# Patient Record
Sex: MALE | Race: WHITE | NOT HISPANIC OR LATINO | Employment: OTHER | URBAN - METROPOLITAN AREA
[De-identification: names, ages, dates, MRNs, and addresses within clinical notes are randomized per-mention and may not be internally consistent; named-entity substitution may affect disease eponyms.]

---

## 2018-08-15 ENCOUNTER — APPOINTMENT (EMERGENCY)
Dept: RADIOLOGY | Facility: HOSPITAL | Age: 48
End: 2018-08-15
Payer: COMMERCIAL

## 2018-08-15 ENCOUNTER — HOSPITAL ENCOUNTER (EMERGENCY)
Facility: HOSPITAL | Age: 48
Discharge: HOME | End: 2018-08-15
Attending: EMERGENCY MEDICINE | Admitting: EMERGENCY MEDICINE
Payer: COMMERCIAL

## 2018-08-15 VITALS
WEIGHT: 185 LBS | HEART RATE: 98 BPM | RESPIRATION RATE: 16 BRPM | TEMPERATURE: 98.6 F | DIASTOLIC BLOOD PRESSURE: 93 MMHG | BODY MASS INDEX: 28.04 KG/M2 | SYSTOLIC BLOOD PRESSURE: 162 MMHG | HEIGHT: 68 IN | OXYGEN SATURATION: 98 %

## 2018-08-15 DIAGNOSIS — K11.20 SIALOADENITIS: Primary | ICD-10-CM

## 2018-08-15 LAB
ANION GAP SERPL CALCULATED.3IONS-SCNC: 8 MMOL/L (ref 4–13)
BASOPHILS # BLD AUTO: 0.04 THOUSANDS/ΜL (ref 0–0.1)
BASOPHILS NFR BLD AUTO: 1 % (ref 0–1)
BUN SERPL-MCNC: 16 MG/DL (ref 5–25)
CALCIUM SERPL-MCNC: 9.3 MG/DL (ref 8.3–10.1)
CHLORIDE SERPL-SCNC: 103 MMOL/L (ref 100–108)
CO2 SERPL-SCNC: 27 MMOL/L (ref 21–32)
CREAT SERPL-MCNC: 1.71 MG/DL (ref 0.6–1.3)
EOSINOPHIL # BLD AUTO: 0.16 THOUSAND/ΜL (ref 0–0.61)
EOSINOPHIL NFR BLD AUTO: 2 % (ref 0–6)
ERYTHROCYTE [DISTWIDTH] IN BLOOD BY AUTOMATED COUNT: 12.6 % (ref 11.6–15.1)
GFR SERPL CREATININE-BSD FRML MDRD: 47 ML/MIN/1.73SQ M
GLUCOSE SERPL-MCNC: 146 MG/DL (ref 65–140)
HCT VFR BLD AUTO: 44.2 % (ref 36.5–49.3)
HGB BLD-MCNC: 15.1 G/DL (ref 12–17)
IMM GRANULOCYTES # BLD AUTO: 0.06 THOUSAND/UL (ref 0–0.2)
IMM GRANULOCYTES NFR BLD AUTO: 1 % (ref 0–2)
LYMPHOCYTES # BLD AUTO: 1.57 THOUSANDS/ΜL (ref 0.6–4.47)
LYMPHOCYTES NFR BLD AUTO: 20 % (ref 14–44)
MCH RBC QN AUTO: 33.3 PG (ref 26.8–34.3)
MCHC RBC AUTO-ENTMCNC: 34.2 G/DL (ref 31.4–37.4)
MCV RBC AUTO: 97 FL (ref 82–98)
MONOCYTES # BLD AUTO: 0.56 THOUSAND/ΜL (ref 0.17–1.22)
MONOCYTES NFR BLD AUTO: 7 % (ref 4–12)
NEUTROPHILS # BLD AUTO: 5.35 THOUSANDS/ΜL (ref 1.85–7.62)
NEUTS SEG NFR BLD AUTO: 69 % (ref 43–75)
NRBC BLD AUTO-RTO: 0 /100 WBCS
PLATELET # BLD AUTO: 202 THOUSANDS/UL (ref 149–390)
PMV BLD AUTO: 10.8 FL (ref 8.9–12.7)
POTASSIUM SERPL-SCNC: 3.9 MMOL/L (ref 3.5–5.3)
RBC # BLD AUTO: 4.54 MILLION/UL (ref 3.88–5.62)
SODIUM SERPL-SCNC: 138 MMOL/L (ref 136–145)
WBC # BLD AUTO: 7.74 THOUSAND/UL (ref 4.31–10.16)

## 2018-08-15 PROCEDURE — 85025 COMPLETE CBC W/AUTO DIFF WBC: CPT | Performed by: EMERGENCY MEDICINE

## 2018-08-15 PROCEDURE — 70491 CT SOFT TISSUE NECK W/DYE: CPT

## 2018-08-15 PROCEDURE — 36415 COLL VENOUS BLD VENIPUNCTURE: CPT | Performed by: EMERGENCY MEDICINE

## 2018-08-15 PROCEDURE — 99284 EMERGENCY DEPT VISIT MOD MDM: CPT

## 2018-08-15 PROCEDURE — 80048 BASIC METABOLIC PNL TOTAL CA: CPT | Performed by: EMERGENCY MEDICINE

## 2018-08-15 RX ORDER — PALIPERIDONE 6 MG/1
6 TABLET, EXTENDED RELEASE ORAL EVERY MORNING
COMMUNITY

## 2018-08-15 RX ADMIN — IOHEXOL 85 ML: 350 INJECTION, SOLUTION INTRAVENOUS at 18:56

## 2018-08-16 ENCOUNTER — VBI (OUTPATIENT)
Dept: ADMINISTRATIVE | Facility: OTHER | Age: 48
End: 2018-08-16

## 2018-08-16 NOTE — ED NOTES
T/c to [facility] for transport home Ref# [facility] [place]  08/15/18 2027

## 2018-08-16 NOTE — DISCHARGE INSTRUCTIONS
Sialoadenitis   WHAT YOU NEED TO KNOW:   Sialoadenitis is an inflammation or infection of one or more of your salivary glands  A small stone can block the salivary gland and cause inflammation  Infection may be caused by a virus or bacteria  You can develop sialoadenitis on one or both sides of your face  You may have sialoadenitis once, or it may come back and last a long time  DISCHARGE INSTRUCTIONS:   Manage your sialoadenitis:  It is important to manage your sialoadenitis to help prevent future infections  · Drinking liquids:  Adults should drink about 9 to 13 cups of liquid each day  One cup is 8 ounces  Good choices of liquids for most people include water, juice, and milk  Coffee, soup, and fruit may be counted in your daily liquid amount  Ask your caregiver how much liquid you should drink each day  · Keep your mouth moist:  Suck on hard candy or chew sugarless gum to get your saliva flowing  Sour and tart flavors such as lemon and orange will help get saliva to flow  This will help keep your mouth moist and help push out a stone blocking your salivary duct  · Rinse your mouth:  Use water or mouthwash to clean out pus that may be draining into your mouth  · Massage your jaw:  Massage the area of your swollen gland  This may help relieve swelling and pain by pushing the pus out of the gland  · Apply heat:  Place a warm, moist cloth on the area  Medicines:   · NSAIDs  help decrease swelling and pain or fever  This medicine is available with or without a doctor's order  NSAIDs can cause stomach bleeding or kidney problems in certain people  If you take blood thinner medicine, always ask your healthcare provider if NSAIDs are safe for you  Always read the medicine label and follow directions  · Do not give aspirin to children under 25years of age  Your child could develop Reye syndrome if he takes aspirin  Reye syndrome can cause life-threatening brain and liver damage   Check your child's medicine labels for aspirin, salicylates, or oil of wintergreen  · Pain medicine: You may be given medicine to take away or decrease pain  Do not wait until the pain is severe before you take your medicine  · Antibiotics: This medicine will help fight an infection  You will be given antibiotics if your sialoadenitis is caused by a bacterial infection  Take your antibiotics until they are gone, even if you feel better  · Take your medicine as directed  Contact your healthcare provider if you think your medicine is not helping or if you have side effects  Tell him of her if you are allergic to any medicine  Keep a list of the medicines, vitamins, and herbs you take  Include the amounts, and when and why you take them  Bring the list or the pill bottles to follow-up visits  Carry your medicine list with you in case of an emergency  Follow up with your healthcare provider or ear, nose, and throat specialist as directed:  Write down your questions so you remember to ask them during your visits  Contact your healthcare provider or ear, nose, and throat specialist if:   · The pain and swelling do not go away within 2 days, or they get worse  · Your mouth and eyes are very dry  · You lose movement on one side of your face  · You have questions about your condition or care  Return to the emergency department if:   · You have a fever  · Your salivary gland gets red and hot or drains pus  · You have trouble opening your mouth because of swelling  · You have trouble breathing or swallowing because of swelling  © 2017 [employer] Information is for End User's use only and may not be sold, redistributed or otherwise used for commercial purposes  All illustrations and images included in CareNotes® are the copyrighted property of A D A M , Inc  or Renato [employer]  The above information is an  only   It is not intended as medical advice for individual conditions or treatments  Talk to your doctor, nurse or pharmacist before following any medical regimen to see if it is safe and effective for you

## 2018-08-16 NOTE — TELEPHONE ENCOUNTER
NO OUTREACH FROM [facility] [place] - PATIENT IS TO FOLLOW UP WITH DR LIANG IN 1 WEEK - PATIENT HAS AN NEW PATIENT PHYSICAL SCHEDULED ON 8/31 WITH DR CASEY

## 2018-08-18 NOTE — ED PROVIDER NOTES
History  Chief Complaint   Patient presents with    Neck Swelling     Pt arrived via squad from home  Noticed swelling to left side of neck while eating dinner tonight  No difficulty swallowing or breathing  Patient presents for evaluation of of left neck swelling starting while eating dinner tonight  No toruble swallowing or breathing  History provided by:  Patient   used: No        Prior to Admission Medications   Prescriptions Last Dose Informant Patient Reported? Taking?   paliperidone (INVEGA) 6 MG 24 hr tablet   Yes Yes   Sig: Take 6 mg by mouth every morning      Facility-Administered Medications: None       Past Medical History:   Diagnosis Date    Mood disorder ([facility] )     Psychiatric disorder     Schizophrenia ([place] [address] )        History reviewed  No pertinent surgical history  History reviewed  No pertinent family history  I have reviewed and agree with the history as documented  Social History   Substance Use Topics    Smoking status: Current Every Day Smoker     Packs/day: 1 00    Smokeless tobacco: Never Used    Alcohol use Yes      Comment: weekends        Review of Systems   All other systems reviewed and are negative  Physical Exam  Physical Exam   Constitutional: He is oriented to person, place, and time  No distress  HENT:   Head:       Mouth/Throat: Oropharynx is clear and moist    Eyes: Pupils are equal, round, and reactive to light  Neck: Normal range of motion  Neck supple  Cardiovascular: Normal rate, regular rhythm and intact distal pulses  Pulmonary/Chest: Effort normal and breath sounds normal  No respiratory distress  Abdominal: Soft  There is no tenderness  Musculoskeletal: Normal range of motion  Neurological: He is alert and oriented to person, place, and time  Skin: Capillary refill takes less than 2 seconds  He is not diaphoretic  Nursing note and vitals reviewed        Vital Signs  ED Triage Vitals   Temperature Pulse Respirations Blood Pressure SpO2   08/15/18 1801 08/15/18 1801 08/15/18 1801 08/15/18 1801 08/15/18 1801   98 6 °F (37 °C) 98 16 162/93 98 %      Temp src Heart Rate Source Patient Position - Orthostatic VS BP Location FiO2 (%)   -- -- -- -- --             Pain Score       08/15/18 1802       No Pain           Vitals:    08/15/18 1801   BP: 162/93   Pulse: 98       Visual Acuity      ED Medications  Medications   iohexol (OMNIPAQUE) 350 MG/ML injection (MULTI-DOSE) 85 mL (85 mL Intravenous Given 8/15/18 1856)       Diagnostic Studies  Results Reviewed     Procedure Component Value Units Date/Time    Basic metabolic panel [[id number]]  (Abnormal) Collected:  08/15/18 1816    Lab Status:  Final result Specimen:  Blood from Arm, Left Updated:  08/15/18 1832     Sodium 138 mmol/L      Potassium 3 9 mmol/L      Chloride 103 mmol/L      CO2 27 mmol/L      Anion Gap 8 mmol/L      BUN 16 mg/dL      Creatinine 1 71 (H) mg/dL      Glucose 146 (H) mg/dL      Calcium 9 3 mg/dL      eGFR 47 ml/min/1 73sq m     Narrative:         National Kidney Disease Education Program recommendations are as follows:  GFR calculation is accurate only with a steady state creatinine  Chronic Kidney disease less than 60 ml/min/1 73 sq  meters  Kidney failure less than 15 ml/min/1 73 sq  meters      CBC and differential [[id number]] Collected:  08/15/18 1816    Lab Status:  Final result Specimen:  Blood from Arm, Left Updated:  08/15/18 1821     WBC 7 74 Thousand/uL      RBC 4 54 Million/uL      Hemoglobin 15 1 g/dL      Hematocrit 44 2 %      MCV 97 fL      MCH 33 3 pg      MCHC 34 2 g/dL      RDW 12 6 %      MPV 10 8 fL      Platelets 723 Thousands/uL      nRBC 0 /100 WBCs      Neutrophils Relative 69 %      Immat GRANS % 1 %      Lymphocytes Relative 20 %      Monocytes Relative 7 %      Eosinophils Relative 2 %      Basophils Relative 1 %      Neutrophils Absolute 5 35 Thousands/µL      Immature Grans Absolute 0 06 Thousand/uL      Lymphocytes Absolute 1 57 Thousands/µL      Monocytes Absolute 0 56 Thousand/µL      Eosinophils Absolute 0 16 Thousand/µL      Basophils Absolute 0 04 Thousands/µL                  CT soft tissue neck   Final Result by Aidee Reed MD (08/15 1927)      1  Asymmetric enlargement of the left submandibular gland with surrounding infiltrative changes compatible with sialoadenitis  There is a central 6 mm sialolith likely obstructing  The study was marked in EPIC for significant notification  Workstation performed: [id number]                    Procedures  Procedures       Phone Contacts  ED Phone Contact    ED Course                               MDM  Number of Diagnoses or Management Options  Sialoadenitis:   Diagnosis management comments: Pulse ox 98% on RA indicating adequate oxygenation    CT reviewed with patient  Advised sour/lemon candies and follow up with ENT  Amount and/or Complexity of Data Reviewed  Clinical lab tests: reviewed and ordered  Tests in the radiology section of CPT®: ordered and reviewed  Decide to obtain previous medical records or to obtain history from someone other than the patient: yes  Review and summarize past medical records: yes    Patient Progress  Patient progress: stable    CritCare Time    Disposition  Final diagnoses:   Sialoadenitis     Time reflects when diagnosis was documented in both MDM as applicable and the Disposition within this note     Time User Action Codes Description Comment    8/15/2018  8:09 PM Jordyn Tommy Add [K11 20] Sialoadenitis       ED Disposition     ED Disposition Condition Comment    Discharge  Arleth Caul discharge to home/self care      Condition at discharge: stable        Follow-up Information     Follow up With Specialties Details Why Son White MD Otolaryngology In 1 week  [address] [facility] [address] [facility]   [phone]            Discharge Medication List as of 8/15/2018  8:10 PM      CONTINUE these medications which have NOT CHANGED    Details   paliperidone (INVEGA) 6 MG 24 hr tablet Take 6 mg by mouth every morning, Historical Med           No discharge procedures on file      ED Provider  Electronically Signed by           Brooklyn Baig DO  08/18/18 1024

## 2018-08-31 ENCOUNTER — OFFICE VISIT (OUTPATIENT)
Dept: FAMILY MEDICINE CLINIC | Facility: CLINIC | Age: 48
End: 2018-08-31
Payer: COMMERCIAL

## 2018-08-31 VITALS
BODY MASS INDEX: 27.98 KG/M2 | SYSTOLIC BLOOD PRESSURE: 130 MMHG | HEART RATE: 80 BPM | HEIGHT: 68 IN | DIASTOLIC BLOOD PRESSURE: 94 MMHG | TEMPERATURE: 97.5 F | WEIGHT: 184.6 LBS | RESPIRATION RATE: 16 BRPM

## 2018-08-31 DIAGNOSIS — K11.20 SUBMANDIBULAR SIALOADENITIS: ICD-10-CM

## 2018-08-31 DIAGNOSIS — R30.0 DYSURIA: ICD-10-CM

## 2018-08-31 DIAGNOSIS — R79.89 ELEVATED SERUM CREATININE: ICD-10-CM

## 2018-08-31 DIAGNOSIS — F17.200 SMOKER: ICD-10-CM

## 2018-08-31 DIAGNOSIS — R73.09 HIGH GLUCOSE: ICD-10-CM

## 2018-08-31 DIAGNOSIS — R05.9 COUGH: ICD-10-CM

## 2018-08-31 DIAGNOSIS — Z00.00 PHYSICAL EXAM: Primary | ICD-10-CM

## 2018-08-31 DIAGNOSIS — F20.9 SCHIZOPHRENIA, UNSPECIFIED TYPE (HCC): ICD-10-CM

## 2018-08-31 DIAGNOSIS — I10 HYPERTENSION, UNSPECIFIED TYPE: ICD-10-CM

## 2018-08-31 LAB
SL AMB  POCT GLUCOSE, UA: ABNORMAL
SL AMB LEUKOCYTE ESTERASE,UA: ABNORMAL
SL AMB POCT BILIRUBIN,UA: ABNORMAL
SL AMB POCT BLOOD,UA: 250
SL AMB POCT CLARITY,UA: ABNORMAL
SL AMB POCT COLOR,UA: CLEAR
SL AMB POCT HEMOGLOBIN AIC: 5.6
SL AMB POCT KETONES,UA: ABNORMAL
SL AMB POCT NITRITE,UA: ABNORMAL
SL AMB POCT PH,UA: 5
SL AMB POCT SPECIFIC GRAVITY,UA: 1
SL AMB POCT URINE PROTEIN: ABNORMAL
SL AMB POCT UROBILINOGEN: ABNORMAL

## 2018-08-31 PROCEDURE — 99396 PREV VISIT EST AGE 40-64: CPT | Performed by: FAMILY MEDICINE

## 2018-08-31 PROCEDURE — 3725F SCREEN DEPRESSION PERFORMED: CPT | Performed by: FAMILY MEDICINE

## 2018-08-31 PROCEDURE — 93000 ELECTROCARDIOGRAM COMPLETE: CPT | Performed by: FAMILY MEDICINE

## 2018-08-31 PROCEDURE — 81003 URINALYSIS AUTO W/O SCOPE: CPT | Performed by: FAMILY MEDICINE

## 2018-08-31 PROCEDURE — 83036 HEMOGLOBIN GLYCOSYLATED A1C: CPT | Performed by: FAMILY MEDICINE

## 2018-08-31 NOTE — PROGRESS NOTES
Chief Complaint   Patient presents with   [employer] Establish Care    Physical Exam        Patient ID: Dinorah Teran is a 53 y o  male  60-year-old patient new to practice in to get established and follow up from [facility] emergency room visit 08/15/2018  Diagnosed with sialoadenitis after presenting with left facial/neck swelling  Has not yet been to see ENT for follow-up  Has been compliant with using lemon drops as recommended by ER doctor to try to increase saliva and dislodge sialolith  Does have significant history as well of schizophrenia for which he follows with psychiatrist   Indu Fraser has been stable on current medications  He is due for some additional labs  Creatinine in emergency room was noted to be elevated at 1 71-question related to medications versus other  Is not aware of any kidney disease in self or family  Glucose was also noted to be elevated-patient was not fasting-hemoglobin A1c in office today equal to 5 6%  Patient is a positive smoker has had no recent chest x-ray  Has occasional cough-denies chest pain, shortness of breath, hemoptysis or night sweats  No significant unexplained weight change or fever  Past Medical History:   Diagnosis Date    Hypertension     Mood disorder ([facility] )     Psychiatric disorder     Schizophrenia ([facility] )     Schizophrenia ([facility] )        History reviewed  No pertinent surgical history  Family History   Problem Relation Age of Onset   [employer] Breast cancer Mother     Cancer Father         bone/spinal         There is no immunization history on file for this patient  Allergies   Allergen Reactions    Geodon [Ziprasidone]     Haldol [Haloperidol]        Current Outpatient Prescriptions   Medication Sig Dispense Refill    paliperidone (INVEGA) 6 MG 24 hr tablet Take 6 mg by mouth every morning       No current facility-administered medications for this visit          Social History     Social History    Marital status: Single     Spouse name: N/A    Number of children: N/A    Years of education: N/A     Social History Main Topics    Smoking status: Current Every Day Smoker     Packs/day: 1 00    Smokeless tobacco: Never Used    Alcohol use Yes      Comment: weekends    Drug use: No    Sexual activity: Not Asked     Other Topics Concern    None     Social History Narrative    None       Review of Systems   Constitutional: Positive for fatigue  Negative for fever  HENT: Positive for dental problem  Recently in [address] [place] [address] - sialoadenitis   Respiratory: Negative  Cardiovascular: Negative  Gastrointestinal: Negative  Musculoskeletal: Positive for arthralgias and myalgias  Neurological: Negative for seizures  Psychiatric/Behavioral:        Hx schizophrenia     Objective:    /94 (BP Location: Left arm, Patient Position: Sitting, Cuff Size: Standard)   Pulse 80   Temp 97 5 °F (36 4 °C)   Resp 16   Ht 5' 8" (1 727 m)   Wt 83 7 kg (184 lb 9 6 oz)   BMI 28 07 kg/m²        Physical Exam   Constitutional: He is oriented to person, place, and time  He appears well-developed and well-nourished  No distress  HENT:   Nose: Nose normal    Mouth/Throat: No oropharyngeal exudate  Mild facial asymmetry due to left submandibular swelling  Fair dentition   Eyes: Conjunctivae are normal    Neck: Neck supple  Cardiovascular: Normal rate, regular rhythm, normal heart sounds and intact distal pulses  Pulmonary/Chest: Effort normal and breath sounds normal  No respiratory distress  Abdominal: Soft  Bowel sounds are normal  There is no tenderness  Musculoskeletal: Normal range of motion  Neurological: He is alert and oriented to person, place, and time  No cranial nerve deficit  Skin: Skin is warm and dry  No rash noted  Psychiatric:   Mood stable, affect flattened, cooperative w exam   Nursing note and vitals reviewed          Assessment/Plan:     Diagnoses and all orders for this visit:    Physical exam    Dysuria  - POCT urine dip auto non-scope    Hypertension, unspecified type  Comments:  borderline hi diastolic  lower sodium in diet, avoid caffeine, avoid NSAIDs  cont t monitor  Orders:  -     POCT ECG  -     US retroperitoneal complete; Future  -     XR chest pa & lateral; Future    High glucose  Comments:  [id number]=2 6% in office today  Orders:  -     POCT hemoglobin A1c    Elevated serum creatinine  Comments:  check renal u/s , rpt renal lab studies  Orders:  -     US retroperitoneal complete; Future    Smoker  Comments:  encouraged smoking cessation  Orders:  -     XR chest pa & lateral; Future    Cough  Comments:  check cxr  Orders:  -     XR chest pa & lateral; Future    Submandibular sialoadenitis  Comments:  cont to suc on lemon drops to help inc saliva in attempt to rid prb stone  Ref to ENT  Orders:  -     Ambulatory Referral to Otolaryngology; Future    Sch[facility]ia, unspecified type ([facility] )  Comments:  cont  w psych mgt          Carol Gonzalez MD

## 2018-08-31 NOTE — PATIENT INSTRUCTIONS

## 2018-09-04 ENCOUNTER — TRANSCRIBE ORDERS (OUTPATIENT)
Dept: RADIOLOGY | Facility: CLINIC | Age: 48
End: 2018-09-04

## 2018-09-04 ENCOUNTER — TELEPHONE (OUTPATIENT)
Dept: FAMILY MEDICINE CLINIC | Facility: CLINIC | Age: 48
End: 2018-09-04

## 2018-09-04 ENCOUNTER — APPOINTMENT (OUTPATIENT)
Dept: RADIOLOGY | Facility: CLINIC | Age: 48
End: 2018-09-04
Payer: COMMERCIAL

## 2018-09-04 DIAGNOSIS — F17.200 SMOKER: ICD-10-CM

## 2018-09-04 DIAGNOSIS — I10 HYPERTENSION, UNSPECIFIED TYPE: ICD-10-CM

## 2018-09-04 DIAGNOSIS — K11.20 SIALOADENITIS OF SUBMANDIBULAR GLAND: Primary | ICD-10-CM

## 2018-09-04 DIAGNOSIS — R05.9 COUGH: ICD-10-CM

## 2018-09-04 PROCEDURE — 71046 X-RAY EXAM CHEST 2 VIEWS: CPT

## 2018-09-04 NOTE — TELEPHONE ENCOUNTER
Dr Kentrell Gamble:    Dr Kentrell Gamble:    Patient needs an order for Dr Drake Olmedo for his health insurance  Please call him when its ready  Jason Mejía

## 2018-09-04 NOTE — TELEPHONE ENCOUNTER
Confirmed with [facility] that they do accept patients insurance- [facility] with patient - please advise patient upon return call that Dr Eber Franklin does accept patients insurance

## 2018-09-13 ENCOUNTER — OFFICE VISIT (OUTPATIENT)
Dept: OTOLARYNGOLOGY | Facility: CLINIC | Age: 48
End: 2018-09-13
Payer: COMMERCIAL

## 2018-09-13 VITALS
BODY MASS INDEX: 28.19 KG/M2 | SYSTOLIC BLOOD PRESSURE: 138 MMHG | DIASTOLIC BLOOD PRESSURE: 90 MMHG | HEIGHT: 68 IN | WEIGHT: 186 LBS

## 2018-09-13 DIAGNOSIS — K11.5 SUBMANDIBULAR SIALOLITHIASIS: Primary | ICD-10-CM

## 2018-09-13 PROCEDURE — 99203 OFFICE O/P NEW LOW 30 MIN: CPT | Performed by: OTOLARYNGOLOGY

## 2018-09-13 NOTE — PROGRESS NOTES
Formerly named [facility]'s Otolaryngology New Patient Visit    Malick Campuzano is a 53 y o  who presents with a chief complaint of acute sialolithiasis    Pertinent elements of the history include:  He presents status post left submandibular gland swelling following eating  He describes acute left neck swelling to the size of a golf ball  Not painful  He was seen at [facility] for this and treated with sialogogues  Swelling resolved within 24 hours  Not given antibiotics  He has not had any recurrent swelling since this episode, which occurred 1 month ago  CT neck images reviewed  He has continued to chew sour candies and stay well hydrated, which has been helpful  Review of systems 10 point review of systems reviewed as documented in the intake form, scanned into the medical record under the media tab  Results reviewed; images from any scan have been personally reviewed:    CT neck with contrast demonstrates left submandibular salivary duct stone (6mm) and glandular enlargement  The past medical, surgical, social and family history have been reviewed as documented in today's record  Physical exam: (abnormal findings appear in bold and supercede any conflicting normal findings listed below)    /90 (BP Location: Left arm, Patient Position: Sitting, Cuff Size: Adult)   Ht 5' 8" (1 727 m)   Wt 84 4 kg (186 lb)   BMI 28 28 kg/m²     Constitutional:  Well developed, well nourished and groomed, in no acute distress  Eyes:  Extra-ocular movements intact, pupils equally round and reactive to light and accommodation, the lids and conjunctivae are normal in appearance  Head: Atraumatic, normocephalic, no visible scalp lesions, bony palpation unremarkable without stepoffs, parotid and submandibular salivary glands non-tender to palpation and without masses bilaterally       Ears:  Auricles normal in appearance bilaterally, mastoid prominence non-tender, external auditory canals clear bilaterally, tympanic membranes intact bilaterally without evidence of middle ear effusion or masses, normal appearing ossicles  Nose/Sinuses:  External appearance unremarkable, no maxillary or frontal sinus tenderness to palpation bilaterally  Anterior rhinoscopy reveals:  Normal mucosa    Oral Cavity:  Moist mucus membranes, gums and dentition unremarkable, no oral mucosal masses or lesions, floor of mouth soft, tongue mobile without masses or lesions  Oropharynx:  Base of tongue soft and without masses, tonsils bilaterally unremarkable, soft palate mucosa unremarkable, laryngeal mirror exam unrevealing  Neck:  No visible or palpable cervical lesions or lymphadenopathy, thyroid gland is normal in size and symmetry and without masses, normal laryngeal elevation with swallowing  Cardiovascular:  Normal rate and rhythm, no palpable thrills, no jugulovenous distension observed  Respiratory:  Normal respiratory effort without evidence of retractions or use of accessory muscles  Integument:  Normal appearing without observed masses or lesions  Neurologic:  Cranial nerves II-XII intact bilaterally  Psychiatric:  Alert and oriented to time, place and person, normal affect  Procedures      Assessment:   1  Submandibular sialolithiasis         Orders  No orders of the defined types were placed in this encounter  Discussion/Plan:    1  He had an isolated episode of left submandibular gland sialolithiasis  Swelling decreased dramatically overnight suggesting that the 6 mm stone seen on CT has passed  He has not had any recurrent symptoms  I recommended maintaining a well hydrated state but he does not need to use sialagogues as frequently as he has been doing  At this time there is no need for further procedures, however, if his symptoms recur he would benefit from sialoendoscopy or submandibular gland excision    We discussed the possibility that his current medications may predispose him to developing salivary duct stones and while this is possible, it is impossible to say for sure that there is a causality between medication and his symptoms  He will follow up again as needed  Thank you for allowing me to participate in the care of your patient

## 2018-09-13 NOTE — LETTER
September 13, 2018     Milady Sepulveda, [address] [facility]    Patient: Geena Zaldivar   YOB: 1970   Date of Visit: 9/13/2018       Dear Dr Erlinda Severin: Thank you for referring Geena Zaldivar to me for evaluation  Below are my notes for this consultation  If you have questions, please do not hesitate to call me  I look forward to following your patient along with you  Sincerely,        Nancy Amador MD        CC: No Recipients  Nancy Amador MD  9/13/2018 11:00 AM  Sign at close encounter  [facility] [place] Otolaryngology New Patient Visit    Geena Zaldivar is a 53 y o  who presents with a chief complaint of acute sialolithiasis    Pertinent elements of the history include:  He presents status post left submandibular gland swelling following eating  He describes acute left neck swelling to the size of a golf ball  Not painful  He was seen at [facility] for this and treated with sialogogues  Swelling resolved within 24 hours  Not given antibiotics  He has not had any recurrent swelling since this episode, which occurred 1 month ago  CT neck images reviewed  He has continued to chew sour candies and stay well hydrated, which has been helpful  Review of systems 10 point review of systems reviewed as documented in the intake form, scanned into the medical record under the media tab  Results reviewed; images from any scan have been personally reviewed:    CT neck with contrast demonstrates left submandibular salivary duct stone (6mm) and glandular enlargement  The past medical, surgical, social and family history have been reviewed as documented in today's record      Physical exam: (abnormal findings appear in bold and supercede any conflicting normal findings listed below)    /90 (BP Location: Left arm, Patient Position: Sitting, Cuff Size: Adult)   Ht 5' 8" (1 727 m)   Wt 84 4 kg (186 lb)   BMI 28 28 kg/m²      Constitutional:  Well developed, well nourished and groomed, in no acute distress  Eyes:  Extra-ocular movements intact, pupils equally round and reactive to light and accommodation, the lids and conjunctivae are normal in appearance  Head: Atraumatic, normocephalic, no visible scalp lesions, bony palpation unremarkable without stepoffs, parotid and submandibular salivary glands non-tender to palpation and without masses bilaterally  Ears:  Auricles normal in appearance bilaterally, mastoid prominence non-tender, external auditory canals clear bilaterally, tympanic membranes intact bilaterally without evidence of middle ear effusion or masses, normal appearing ossicles  Nose/Sinuses:  External appearance unremarkable, no maxillary or frontal sinus tenderness to palpation bilaterally  Anterior rhinoscopy reveals:  Normal mucosa    Oral Cavity:  Moist mucus membranes, gums and dentition unremarkable, no oral mucosal masses or lesions, floor of mouth soft, tongue mobile without masses or lesions  Oropharynx:  Base of tongue soft and without masses, tonsils bilaterally unremarkable, soft palate mucosa unremarkable, laryngeal mirror exam unrevealing  Neck:  No visible or palpable cervical lesions or lymphadenopathy, thyroid gland is normal in size and symmetry and without masses, normal laryngeal elevation with swallowing  Cardiovascular:  Normal rate and rhythm, no palpable thrills, no jugulovenous distension observed  Respiratory:  Normal respiratory effort without evidence of retractions or use of accessory muscles  Integument:  Normal appearing without observed masses or lesions  Neurologic:  Cranial nerves II-XII intact bilaterally  Psychiatric:  Alert and oriented to time, place and person, normal affect  Procedures      Assessment:   1  Submandibular sialolithiasis         Orders  No orders of the defined types were placed in this encounter  Discussion/Plan:    1   He had an isolated episode of left submandibular gland sialolithiasis  Swelling decreased dramatically overnight suggesting that the 6 mm stone seen on CT has passed  He has not had any recurrent symptoms  I recommended maintaining a well hydrated state but he does not need to use sialagogues as frequently as he has been doing  At this time there is no need for further procedures, however, if his symptoms recur he would benefit from sialoendoscopy or submandibular gland excision  We discussed the possibility that his current medications may predispose him to developing salivary duct stones and while this is possible, it is impossible to say for sure that there is a causality between medication and his symptoms  He will follow up again as needed  Thank you for allowing me to participate in the care of your patient

## 2018-09-18 ENCOUNTER — HOSPITAL ENCOUNTER (OUTPATIENT)
Dept: RADIOLOGY | Facility: CLINIC | Age: 48
Discharge: HOME | End: 2018-09-18
Payer: COMMERCIAL

## 2018-09-18 DIAGNOSIS — I10 HYPERTENSION, UNSPECIFIED TYPE: ICD-10-CM

## 2018-09-18 DIAGNOSIS — R79.89 ELEVATED SERUM CREATININE: ICD-10-CM

## 2018-09-18 PROCEDURE — 76770 US EXAM ABDO BACK WALL COMP: CPT

## 2018-09-24 ENCOUNTER — TELEPHONE (OUTPATIENT)
Dept: FAMILY MEDICINE CLINIC | Facility: CLINIC | Age: 48
End: 2018-09-24

## 2018-09-24 NOTE — TELEPHONE ENCOUNTER
Dr Hernan Dozier    Patient is requesting results of abdomen ultrasound done 9/18/18  On 9/6/18 patient received letter from [employer] stating the ultrasound was approved, which is why he went for test   He has now received letter dated 9/19 denying abdomen ultrasound stating they have not received doctor's notes, records regarding this issue  Patient is requesting you follow up with [facility] regarding this, so he does not get a bill  Copies of both letters from [facility] placed in Dr Hernan Dozier folder, copies in drawer up front

## 2018-09-25 NOTE — TELEPHONE ENCOUNTER
Please pull letters from drawer up front and see where to send office note and abnormal kidney function(metabolic panels)  labs in chart-also send approval letter under media I believe dated 9/7/18  Inform pt when done and find out how he is feeling- let him know that u/sshowed a renal cyst--we will monitor for now      Thanks

## 2018-10-01 NOTE — TELEPHONE ENCOUNTER
Gera Phillips called from [employer]  They received request for appeal with supporting documents and will send out for independent review   Her # is

## 2018-10-05 NOTE — TELEPHONE ENCOUNTER
US is  Approved  Disclaimer # Pippa Rae # [id number] valid 9/6 to 11/5 set up at  only  Diana gaines/anca

## 2018-10-08 NOTE — TELEPHONE ENCOUNTER
Patient brought in letter from [employer] dated 10/2 stating they are denying appeal on abdomen ultrasound  Per Teresa and Dr Heydi Olmstead, I advised patient that we spoke with [facility] on 10/5 and was advised of approval  Gave him authorization# for his record  Made copy of latest letter from [facility] to be scanned in chart

## 2018-10-15 ENCOUNTER — TELEPHONE (OUTPATIENT)
Dept: FAMILY MEDICINE CLINIC | Facility: CLINIC | Age: 48
End: 2018-10-15

## 2018-10-15 ENCOUNTER — OFFICE VISIT (OUTPATIENT)
Dept: FAMILY MEDICINE CLINIC | Facility: CLINIC | Age: 48
End: 2018-10-15
Payer: COMMERCIAL

## 2018-10-15 VITALS
RESPIRATION RATE: 16 BRPM | TEMPERATURE: 96.9 F | DIASTOLIC BLOOD PRESSURE: 98 MMHG | WEIGHT: 190.8 LBS | HEART RATE: 88 BPM | BODY MASS INDEX: 28.92 KG/M2 | SYSTOLIC BLOOD PRESSURE: 150 MMHG | HEIGHT: 68 IN

## 2018-10-15 DIAGNOSIS — I10 ESSENTIAL HYPERTENSION: Primary | ICD-10-CM

## 2018-10-15 DIAGNOSIS — N28.1 RENAL CYST: ICD-10-CM

## 2018-10-15 DIAGNOSIS — R73.09 ABNORMAL GLUCOSE: ICD-10-CM

## 2018-10-15 DIAGNOSIS — F20.9 SCHIZOPHRENIA, UNSPECIFIED TYPE (HCC): ICD-10-CM

## 2018-10-15 DIAGNOSIS — E78.5 HYPERLIPIDEMIA, UNSPECIFIED HYPERLIPIDEMIA TYPE: ICD-10-CM

## 2018-10-15 PROCEDURE — 99214 OFFICE O/P EST MOD 30 MIN: CPT | Performed by: FAMILY MEDICINE

## 2018-10-15 RX ORDER — METOPROLOL SUCCINATE 25 MG/1
25 TABLET, EXTENDED RELEASE ORAL DAILY
Qty: 30 TABLET | Refills: 3 | Status: SHIPPED | OUTPATIENT
Start: 2018-10-15 | End: 2018-11-12 | Stop reason: DRUGHIGH

## 2018-10-15 NOTE — TELEPHONE ENCOUNTER
Dr Mario Lawrence    Patient wants to know if he is ok to eat 3 hard boiled eggs a day, with new medication you prescribed  Please advise  Ok to leave message on machine

## 2018-10-18 LAB
ALBUMIN SERPL-MCNC: 3.9 G/DL (ref 3.5–5.5)
ALBUMIN/GLOB SERPL: 1.4 {RATIO} (ref 1.2–2.2)
ALP SERPL-CCNC: 55 IU/L (ref 39–117)
ALT SERPL-CCNC: 20 IU/L (ref 0–44)
AST SERPL-CCNC: 18 IU/L (ref 0–40)
BASOPHILS # BLD AUTO: 0 X10E3/UL (ref 0–0.2)
BASOPHILS NFR BLD AUTO: 1 %
BILIRUB SERPL-MCNC: 0.2 MG/DL (ref 0–1.2)
BUN SERPL-MCNC: 14 MG/DL (ref 6–24)
BUN/CREAT SERPL: 12 (ref 9–20)
CALCIUM SERPL-MCNC: 9.5 MG/DL (ref 8.7–10.2)
CHLORIDE SERPL-SCNC: 102 MMOL/L (ref 96–106)
CHOLEST SERPL-MCNC: 323 MG/DL (ref 100–199)
CHOLEST/HDLC SERPL: 5.7 RATIO (ref 0–5)
CO2 SERPL-SCNC: 22 MMOL/L (ref 20–29)
CREAT SERPL-MCNC: 1.17 MG/DL (ref 0.76–1.27)
CYTOLOGY CMNT CVX/VAG CYTO-IMP: ABNORMAL
EOSINOPHIL # BLD AUTO: 0.2 X10E3/UL (ref 0–0.4)
EOSINOPHIL NFR BLD AUTO: 3 %
ERYTHROCYTE [DISTWIDTH] IN BLOOD BY AUTOMATED COUNT: 14.7 % (ref 12.3–15.4)
EST. AVERAGE GLUCOSE BLD GHB EST-MCNC: 114 MG/DL
GLOBULIN SER-MCNC: 2.7 G/DL (ref 1.5–4.5)
GLUCOSE SERPL-MCNC: 99 MG/DL (ref 65–99)
HBA1C MFR BLD: 5.6 % (ref 4.8–5.6)
HCT VFR BLD AUTO: 44.6 % (ref 37.5–51)
HDLC SERPL-MCNC: 57 MG/DL
HGB BLD-MCNC: 15.7 G/DL (ref 13–17.7)
IMM GRANULOCYTES # BLD: 0.1 X10E3/UL (ref 0–0.1)
IMM GRANULOCYTES NFR BLD: 2 %
LDLC SERPL CALC-MCNC: 192 MG/DL (ref 0–99)
LDLC SERPL DIRECT ASSAY-MCNC: 221 MG/DL (ref 0–99)
LYMPHOCYTES # BLD AUTO: 2 X10E3/UL (ref 0.7–3.1)
LYMPHOCYTES NFR BLD AUTO: 29 %
MAGNESIUM SERPL-MCNC: 2 MG/DL (ref 1.6–2.3)
MCH RBC QN AUTO: 33.6 PG (ref 26.6–33)
MCHC RBC AUTO-ENTMCNC: 35.2 G/DL (ref 31.5–35.7)
MCV RBC AUTO: 96 FL (ref 79–97)
MICRODELETION SYND BLD/T FISH: NORMAL
MONOCYTES # BLD AUTO: 0.8 X10E3/UL (ref 0.1–0.9)
MONOCYTES NFR BLD AUTO: 12 %
NEUTROPHILS # BLD AUTO: 3.7 X10E3/UL (ref 1.4–7)
NEUTROPHILS NFR BLD AUTO: 53 %
PLATELET # BLD AUTO: 178 X10E3/UL (ref 150–379)
POTASSIUM SERPL-SCNC: 4.2 MMOL/L (ref 3.5–5.2)
PROT SERPL-MCNC: 6.6 G/DL (ref 6–8.5)
RBC # BLD AUTO: 4.67 X10E6/UL (ref 4.14–5.8)
SL AMB EGFR AFRICAN AMERICAN: 85 ML/MIN/1.73
SL AMB EGFR NON AFRICAN AMERICAN: 74 ML/MIN/1.73
SL AMB VLDL CHOLESTEROL CALC: 74 MG/DL (ref 5–40)
SODIUM SERPL-SCNC: 140 MMOL/L (ref 134–144)
TRIGL SERPL-MCNC: 372 MG/DL (ref 0–149)
TSH SERPL DL<=0.005 MIU/L-ACNC: 1.7 UIU/ML (ref 0.45–4.5)
WBC # BLD AUTO: 7 X10E3/UL (ref 3.4–10.8)

## 2018-10-29 NOTE — PROGRESS NOTES
Assessment/Plan:     Diagnoses and all orders for this visit:    Essential hypertension  Comments:  rx metoprolol, dec sodium in diet and liit caffeine  cont t monitor  Orders:  -     Comprehensive metabolic panel; Future  -     HEMOGLOBIN A1C W/ EAG ESTIMATION; Future  -     Magnesium; Future  -     CBC and differential; Future  -     Comprehensive metabolic panel  -     HEMOGLOBIN A1C W/ EAG ESTIMATION  -     Magnesium  -     CBC and differential  -     metoprolol succinate (TOPROL-XL) 25 mg 24 hr tablet; Take 1 tablet (25 mg total) by mouth daily    Schizophrenia, unspecified type ([facility] )  Comments:  cont current meds and psych follow-up  Orders:  -     Magnesium; Future  -     CBC and differential; Future  -     Magnesium  -     CBC and differential    Renal cyst  Comments:  appears to be simple cyst, check renal fxn, cont surveillance  Orders:  -     CBC and differential; Future  -     CBC and differential    Hyperlipidemia, unspecified hyperlipidemia type  Comments:  check lipds and thyroid fxn  maintain low chol diet  follow-up post lab  Orders:  -     Comprehensive metabolic panel; Future  -     Lipid panel; Future  -     TSH, 3rd generation with Free T4 reflex; Future  -     Comprehensive metabolic panel  -     Lipid panel  -     TSH, 3rd generation with Free T4 reflex    Abnormal glucose  Comments:  check labs, watch diet  Orders:  -     Comprehensive metabolic panel; Future  -     HEMOGLOBIN A1C W/ EAG ESTIMATION; Future  -     Comprehensive metabolic panel  -     HEMOGLOBIN A1C W/ EAG ESTIMATION    Other orders  -     LDL cholesterol, direct  -     Cardiovascular Report            Subjective:      Patient ID: Marybeth Magaña is a 53 y o  male  Chief Complaint   Patient presents with    Results     us and xray       52 yo pt -sig hx schizophrenia-in for BP check and to review recent imaging studies  BP remains elevated  Pt denies h/a, cp , dizziness  +Smoker--cxr-negative    U/S renal--+simple renal cyst, min debris in bladder  No further prob w salivary gland-Saw ENT-Dr Lisa Haider reviewed in chart        The following portions of the patient's history were reviewed and updated as appropriate: allergies, current medications, past family history, past medical history, past social history, past surgical history and problem list      Review of Systems   Constitutional: Positive for fatigue  Respiratory: Negative  Cardiovascular: Negative  Gastrointestinal: Negative  Musculoskeletal: Positive for arthralgias and myalgias  Neurological: Negative  Psychiatric/Behavioral: Positive for behavioral problems, decreased concentration and sleep disturbance  The patient is nervous/anxious  Objective:    /98 (BP Location: Left arm, Patient Position: Sitting, Cuff Size: Large)   Pulse 88   Temp (!) 96 9 °F (36 1 °C)   Resp 16   Ht 5' 8" (1 727 m)   Wt 86 5 kg (190 lb 12 8 oz)   BMI 29 01 kg/m²        Physical Exam   Constitutional: He is oriented to person, place, and time  OW, chronically ill   Eyes: Conjunctivae are normal  No scleral icterus  Neck: Neck supple  Cardiovascular: Normal rate, regular rhythm and intact distal pulses  Pulmonary/Chest: Effort normal and breath sounds normal  No respiratory distress  Abdominal: Soft  Bowel sounds are normal  There is tenderness  Musculoskeletal: Normal range of motion  Neurological: He is alert and oriented to person, place, and time  No cranial nerve deficit  Skin: Skin is warm and dry  Psychiatric:   Mood stable, affect sl flat, cooperative w exam   Nursing note and vitals reviewed          Zelda Roque MD

## 2018-11-02 ENCOUNTER — TELEPHONE (OUTPATIENT)
Dept: FAMILY MEDICINE CLINIC | Facility: CLINIC | Age: 48
End: 2018-11-02

## 2018-11-02 NOTE — TELEPHONE ENCOUNTER
Dr Milvia Templeton,     Patient said that he has been dizzy since Monday but during the day he gets used too it and he thinks it is because if his medications  Can you please call him back regarding this?  TY

## 2018-11-02 NOTE — TELEPHONE ENCOUNTER
I spoke with patient  He states that the dizziness seems to be improving, but he is dizzy all day  States that he believes that it is coming from the new medication that you put him on for BP  Patient has not checked his BP since starting the medication   Please advise

## 2018-11-02 NOTE — TELEPHONE ENCOUNTER
Need to know BP and HR readings--can he come back in for appt to check? Also is he staying hydrated?   Any other sxs-eg h/a, cp,etc?

## 2018-11-12 ENCOUNTER — OFFICE VISIT (OUTPATIENT)
Dept: FAMILY MEDICINE CLINIC | Facility: CLINIC | Age: 48
End: 2018-11-12
Payer: COMMERCIAL

## 2018-11-12 VITALS
HEART RATE: 98 BPM | SYSTOLIC BLOOD PRESSURE: 134 MMHG | TEMPERATURE: 97.1 F | RESPIRATION RATE: 16 BRPM | DIASTOLIC BLOOD PRESSURE: 98 MMHG | BODY MASS INDEX: 28.98 KG/M2 | HEIGHT: 68 IN | WEIGHT: 191.2 LBS

## 2018-11-12 DIAGNOSIS — F20.9 SCHIZOPHRENIA, UNSPECIFIED TYPE (HCC): ICD-10-CM

## 2018-11-12 DIAGNOSIS — I10 ESSENTIAL HYPERTENSION: Primary | ICD-10-CM

## 2018-11-12 DIAGNOSIS — E78.2 MIXED HYPERLIPIDEMIA: ICD-10-CM

## 2018-11-12 PROCEDURE — 99214 OFFICE O/P EST MOD 30 MIN: CPT | Performed by: FAMILY MEDICINE

## 2018-11-12 RX ORDER — METOPROLOL SUCCINATE 50 MG/1
50 TABLET, EXTENDED RELEASE ORAL DAILY
Qty: 90 TABLET | Refills: 3 | Status: SHIPPED | OUTPATIENT
Start: 2018-11-12 | End: 2018-12-10 | Stop reason: SDUPTHER

## 2018-12-04 ENCOUNTER — TELEPHONE (OUTPATIENT)
Dept: FAMILY MEDICINE CLINIC | Facility: CLINIC | Age: 48
End: 2018-12-04

## 2018-12-05 ENCOUNTER — TELEPHONE (OUTPATIENT)
Dept: FAMILY MEDICINE CLINIC | Facility: CLINIC | Age: 48
End: 2018-12-05

## 2018-12-05 NOTE — TELEPHONE ENCOUNTER
Jordy Perez's bp today at [facility] was 161/93    He checked it again a few mins later just now and it was 156/100

## 2018-12-06 DIAGNOSIS — I10 ESSENTIAL HYPERTENSION: Primary | ICD-10-CM

## 2018-12-06 RX ORDER — AMLODIPINE BESYLATE 5 MG/1
5 TABLET ORAL DAILY
Qty: 30 TABLET | Refills: 3 | Status: SHIPPED | OUTPATIENT
Start: 2018-12-06 | End: 2018-12-11 | Stop reason: DRUGHIGH

## 2018-12-06 NOTE — TELEPHONE ENCOUNTER
Please inform pt that I sent an additional BP med-amlodipine- to [id number] pharmacy  He should cont the metoprolol as well--he can take one med in AM and the other PM  Also sched appt in 2-3 weeks to recheck BP-thanks

## 2018-12-09 NOTE — TELEPHONE ENCOUNTER
Reviewed--I had also snt in new med-amlodipine- to add on to current ones taking-  Please check how he is doing on new med and howBP readings have been--thanks

## 2018-12-10 ENCOUNTER — OFFICE VISIT (OUTPATIENT)
Dept: FAMILY MEDICINE CLINIC | Facility: CLINIC | Age: 48
End: 2018-12-10
Payer: COMMERCIAL

## 2018-12-10 VITALS
DIASTOLIC BLOOD PRESSURE: 90 MMHG | HEART RATE: 72 BPM | BODY MASS INDEX: 28.89 KG/M2 | RESPIRATION RATE: 14 BRPM | WEIGHT: 190 LBS | TEMPERATURE: 97 F | SYSTOLIC BLOOD PRESSURE: 130 MMHG

## 2018-12-10 DIAGNOSIS — I10 ESSENTIAL HYPERTENSION: ICD-10-CM

## 2018-12-10 DIAGNOSIS — J06.9 VIRAL URI: Primary | ICD-10-CM

## 2018-12-10 PROCEDURE — 99213 OFFICE O/P EST LOW 20 MIN: CPT | Performed by: NURSE PRACTITIONER

## 2018-12-10 RX ORDER — BENZONATATE 200 MG/1
200 CAPSULE ORAL 3 TIMES DAILY PRN
Qty: 20 CAPSULE | Refills: 0 | Status: SHIPPED | OUTPATIENT
Start: 2018-12-10 | End: 2019-02-06 | Stop reason: ALTCHOICE

## 2018-12-10 NOTE — TELEPHONE ENCOUNTER
Pt is on his 2nd day of new med , he was into see pa for acute visit , bp was 130/90  he wasn't happy that he did not get in to see you but they said your schedule was booked  tc/cma Tc/cma

## 2018-12-10 NOTE — PATIENT INSTRUCTIONS
Upper Respiratory Infection   AMBULATORY CARE:   An upper respiratory infection  is also called a common cold  It can affect your nose, throat, ears, and sinuses  Common signs and symptoms include the following:  Cold symptoms are usually worst for the first 3 to 5 days  You may have any of the following:  · Runny or stuffy nose    · Sneezing and coughing    · Sore throat or hoarseness    · Red, watery, and sore eyes    · Fatigue     · Chills and fever    · Headache, body aches, or sore muscles  Seek care immediately if:   · You have chest pain or trouble breathing  Contact your healthcare provider if:   · You have a fever over 102ºF (39°C)  · Your sore throat gets worse or you see white or yellow spots in your throat  · Your symptoms get worse after 3 to 5 days or your cold is not better in 14 days  · You have a rash anywhere on your skin  · You have large, tender lumps in your neck  · You have thick, green or yellow drainage from your nose  · You cough up thick yellow, green, or bloody mucus  · You have vomiting for more than 24 hours and cannot keep fluids down  · You have a bad earache  · You have questions or concerns about your condition or care  Treatment for a cold: There is no cure for the common cold  Colds are caused by viruses and do not get better with antibiotics  Most people get better in 7 to 14 days  You may continue to cough for 2 to 3 weeks  The following may help decrease your symptoms:  · Decongestants  help reduce nasal congestion and help you breathe more easily  If you take decongestant pills, they may make you feel restless or not able to sleep  Do not use decongestant sprays for more than a few days  · Cough suppressants  help reduce coughing  Ask your healthcare provider which type of cough medicine is best for you  · NSAIDs , such as ibuprofen, help decrease swelling, pain, and fever   NSAIDs can cause stomach bleeding or kidney problems in certain people  If you take blood thinner medicine, always ask your healthcare provider if NSAIDs are safe for you  Always read the medicine label and follow directions  · Acetaminophen  decreases pain and fever  It is available without a doctor's order  Ask how much to take and how often to take it  Follow directions  Read the labels of all other medicines you are using to see if they also contain acetaminophen, or ask your doctor or pharmacist  Acetaminophen can cause liver damage if not taken correctly  Do not use more than 4 grams (4,000 milligrams) total of acetaminophen in one day  Manage your cold:   · Rest as much as possible  Slowly start to do more each day  · Drink more liquids as directed  Liquids will help thin and loosen mucus so you can cough it up  Liquids will also help prevent dehydration  Liquids that help prevent dehydration include water, fruit juice, and broth  Do not drink liquids that contain caffeine  Caffeine can increase your risk for dehydration  Ask your healthcare provider how much liquid to drink each day  · Soothe a sore throat  Gargle with warm salt water  This helps your sore throat feel better  Make salt water by dissolving ¼ teaspoon salt in 1 cup warm water  You may also suck on hard candy or throat lozenges  You may use a sore throat spray  · Use a humidifier or vaporizer  Use a cool mist humidifier or a vaporizer to increase air moisture in your home  This may make it easier for you to breathe and help decrease your cough  · Use saline nasal drops as directed  These help relieve congestion  · Apply petroleum-based jelly around the outside of your nostrils  This can decrease irritation from blowing your nose  · Do not smoke  Nicotine and other chemicals in cigarettes and cigars can make your symptoms worse  They can also cause infections such as bronchitis or pneumonia   Ask your healthcare provider for information if you currently smoke and need help to quit  E-cigarettes or smokeless tobacco still contain nicotine  Talk to your healthcare provider before you use these products  Prevent spreading your cold to others:   · Try to stay away from other people during the first 2 to 3 days of your cold when it is more easily spread  · Do not share food or drinks  · Do not share hand towels with household members  · Wash your hands often, especially after you blow your nose  Turn away from other people and cover your mouth and nose with a tissue when you sneeze or cough  Follow up with your healthcare provider as directed:  Write down your questions so you remember to ask them during your visits  © 2017 [facility] Information is for End User's use only and may not be sold, redistributed or otherwise used for commercial purposes  All illustrations and images included in CareNotes® are the copyrighted property of [employer]  or [employer]  The above information is an  only  It is not intended as medical advice for individual conditions or treatments  Talk to your doctor, nurse or pharmacist before following any medical regimen to see if it is safe and effective for you

## 2018-12-10 NOTE — PROGRESS NOTES
Assessment:      viral upper respiratory illness      Plan:      Discussed diagnosis and treatment of URI  Discussed the importance of avoiding unnecessary antibiotic therapy  Suggested symptomatic OTC remedies  Nasal saline spray for congestion  Follow up as needed  Call in 7 days if symptoms aren't resolving  Do not smoke while sick  Subjective:       History was provided by the patient  Aakash Headley is a 53 y o  male who presents for evaluation of symptoms of a URI  Symptoms include dry cough, post nasal drip, sinus and nasal congestion and sore throat  Onset of symptoms was 3 days ago, Slightly better since that time  Associated symptoms include achiness, lightheadedness, low grade fever, productive cough with  yellow colored sputum, rash and shortness of breath  He is not drinking much  Evaluation to date: none  Treatment to date: only taking tylenol  Daily smoker >1ppd  no sick contacts  The following portions of the patient's history were reviewed and updated as appropriate: allergies, current medications, past family history, past medical history, past social history, past surgical history and problem list     Review of Systems  Pertinent items are noted in HPI        Objective:      /90 (BP Location: Left arm, Patient Position: Sitting, Cuff Size: Adult)   Pulse 72   Temp (!) 97 °F (36 1 °C) (Temporal)   Resp 14   Wt 86 2 kg (190 lb)   BMI 28 89 kg/m²   General appearance: alert and oriented, in no acute distress  Head: Normocephalic, without obvious abnormality, sinuses nontender to percussion  Ears: normal TM's and external ear canals both ears  Nose: no discharge, turbinates red  Throat: abnormal findings: mild oropharyngeal erythema  Lungs: clear to auscultation bilaterally  Heart: regular rate and rhythm, S1, S2 normal, no murmur, click, rub or gallop  Pulses: 2+ and symmetric  Lymph nodes: Cervical, supraclavicular, and axillary nodes normal

## 2018-12-11 PROBLEM — E78.2 MIXED HYPERLIPIDEMIA: Status: ACTIVE | Noted: 2018-12-11

## 2018-12-11 PROBLEM — I10 ESSENTIAL HYPERTENSION: Status: ACTIVE | Noted: 2018-12-11

## 2018-12-11 PROBLEM — F20.9 SCHIZOPHRENIA (HCC): Status: ACTIVE | Noted: 2018-12-11

## 2018-12-11 RX ORDER — METOPROLOL SUCCINATE 50 MG/1
25 TABLET, EXTENDED RELEASE ORAL DAILY
Qty: 30 TABLET | Refills: 0 | Status: SHIPPED | OUTPATIENT
Start: 2018-12-11 | End: 2018-12-11 | Stop reason: DRUGHIGH

## 2018-12-11 RX ORDER — METOPROLOL SUCCINATE 50 MG/1
50 TABLET, EXTENDED RELEASE ORAL DAILY
Qty: 90 TABLET | Refills: 1 | Status: SHIPPED | OUTPATIENT
Start: 2018-12-11 | End: 2019-05-26 | Stop reason: SDUPTHER

## 2018-12-11 RX ORDER — AMLODIPINE BESYLATE 5 MG/1
5 TABLET ORAL DAILY
COMMUNITY
End: 2018-12-19 | Stop reason: SDUPTHER

## 2018-12-11 NOTE — PROGRESS NOTES
Assessment/Plan:    Diagnoses and all orders for this visit:    Essential hypertension  Comments:  BP remains elevated--inc  Metoprolol from 25 to 50mg XL daily  Lower salt and caffeine in diet-cont  to monitor  Call in one week w further readings  Orders:  -     Discontinue: metoprolol succinate (TOPROL-XL) 50 mg 24 hr tablet; Take 1 tablet (50 mg total) by mouth daily    Mixed hyperlipidemia  Comments:  declines statin today--wants to try dietary modification and inc exercise for few mths first--will recheck in 3-4mths-if still hi will readdress med  [facility], [facility] )  Comments:  stable on Invega---rx and tx thru psychiatrist     cont BP med -disregard d/c med order        Subjective:      Patient ID: Jalen Castaneda is a 53 y o  male  Chief Complaint   Patient presents with    Follow-up     bp check       53year-old patient in for blood pressure check and follow-up  Recently started on metoprolol-tolerating without side effects-her blood pressure remains with high diastolic  Denies chest pain, headache, dizziness or focal weakness  Recent labs show elevated lipids  Remainder labs essentially within normal   No new complaints  Mood stable  Continues to follow with psychiatrist on a regular basis  The following portions of the patient's history were reviewed and updated as appropriate: allergies, current medications, past family history, past medical history, past social history, past surgical history and problem list      Review of Systems   Constitutional: Positive for fatigue  Respiratory: Negative  Cardiovascular: Negative  Gastrointestinal: Negative  Musculoskeletal: Positive for arthralgias and myalgias  Neurological: Negative  Psychiatric/Behavioral: Positive for decreased concentration and sleep disturbance  The patient is nervous/anxious            Objective:    /98 (BP Location: Left arm, Patient Position: Sitting, Cuff Size: Large)   Pulse 98   Temp (!) 97 1 °F (36 2 °C)   Resp 16   Ht 5' 8" (1 727 m)   Wt 86 7 kg (191 lb 3 2 oz)   BMI 29 07 kg/m²        Physical Exam   Constitutional: He is oriented to person, place, and time  Overweight, NAD   Eyes: Conjunctivae are normal  No scleral icterus  Neck: Neck supple  Cardiovascular: Normal rate, regular rhythm, normal heart sounds and intact distal pulses  Pulmonary/Chest: Effort normal and breath sounds normal  No respiratory distress  Abdominal: Soft  Bowel sounds are normal  There is no tenderness  Musculoskeletal: Normal range of motion  Neurological: He is alert and oriented to person, place, and time  No cranial nerve deficit  Skin: Skin is warm and dry  Psychiatric:   Mood stable  Affect flattened  Cooperative w exam   Denies a/v halluc at this time  Nursing note and vitals reviewed          Fina Nichole MD

## 2018-12-11 NOTE — TELEPHONE ENCOUNTER
Reviewed--cont current meds--if pt would like me to recheck BP sometime next week please make appt (30 min) now while we have openings--thanks

## 2018-12-19 ENCOUNTER — OFFICE VISIT (OUTPATIENT)
Dept: FAMILY MEDICINE CLINIC | Facility: CLINIC | Age: 48
End: 2018-12-19
Payer: COMMERCIAL

## 2018-12-19 VITALS
WEIGHT: 190.2 LBS | RESPIRATION RATE: 14 BRPM | HEART RATE: 98 BPM | HEIGHT: 68 IN | SYSTOLIC BLOOD PRESSURE: 124 MMHG | TEMPERATURE: 97.2 F | DIASTOLIC BLOOD PRESSURE: 70 MMHG | BODY MASS INDEX: 28.82 KG/M2

## 2018-12-19 DIAGNOSIS — I10 ESSENTIAL HYPERTENSION: Primary | ICD-10-CM

## 2018-12-19 DIAGNOSIS — E78.5 HYPERLIPIDEMIA, UNSPECIFIED HYPERLIPIDEMIA TYPE: ICD-10-CM

## 2018-12-19 DIAGNOSIS — F20.9 SCHIZOPHRENIA, UNSPECIFIED TYPE (HCC): ICD-10-CM

## 2018-12-19 PROCEDURE — 99214 OFFICE O/P EST MOD 30 MIN: CPT | Performed by: FAMILY MEDICINE

## 2018-12-19 RX ORDER — AMLODIPINE BESYLATE 5 MG/1
5 TABLET ORAL DAILY
Qty: 90 TABLET | Refills: 1 | Status: SHIPPED | OUTPATIENT
Start: 2018-12-19 | End: 2019-06-12 | Stop reason: SDUPTHER

## 2019-01-02 ENCOUNTER — TELEPHONE (OUTPATIENT)
Dept: FAMILY MEDICINE CLINIC | Facility: CLINIC | Age: 49
End: 2019-01-02

## 2019-01-02 NOTE — TELEPHONE ENCOUNTER
Dr Squires Home,  Pt  Is concerned about taking amlodopine as he saw on the news it has carcinogens in it  He picked up amlodopine besonate today  Please advise

## 2019-01-03 NOTE — TELEPHONE ENCOUNTER
Patient said that he has Amlodipine Bensalate and he said that the pharmacy said that it is okay for him so he is going to continue to take the medication unless advised otherwise, TY

## 2019-01-03 NOTE — TELEPHONE ENCOUNTER
Please ask t to confirm with pharmacy that his brand of amlodipine is not part of recall  I do not believe they would have dispensed it any concern

## 2019-01-07 NOTE — PROGRESS NOTES
Assessment/Plan:   Diagnoses and all orders for this visit:    Essential hypertension  Comments:  BP wnl--tolerating meds better--cont same  Sched eye exam   Orders:  -     amLODIPine (NORVASC) 5 mg tablet; Take 1 tablet (5 mg total) by mouth daily    Hyperlipidemia, unspecified hyperlipidemia type  Comments:  t/c start of statin--pt declines today-wants to cont efforts w diet/exercise to see if able to bring down w/o med-will recheck in few mths,  Orders:  -     Lipid panel; Future  -     Comprehensive metabolic panel; Future  -     Lipid panel  -     Comprehensive metabolic panel    Adult BMI 28 0-28 9 kg/sq m  Comments:  target BMI 25-26    Schizophrenia, unspecified type ([facility] )  Comments:  stable--cont thru psych mgt  Subjective:      Patient ID: Colette Carnes is a 51 y o  male  Chief Complaint   Patient presents with    Follow-up     hypertension        52-year-old patient in for follow-up hypertension and  Hyperlipidemia  BP improved  Patient tolerating meds better now  Initially was having some dizziness which has since resolved  Reviewed last lipids again as well  Patient would like to continue to work on diet and exercise program prior to starting any medication for the elevation noted  Due to schedule eye and dental checks  Continues to follow with psych regarding mental health issues  No new complaints  The following portions of the patient's history were reviewed and updated as appropriate: allergies, current medications, past family history, past medical history, past social history, past surgical history and problem list      Review of Systems   Constitutional: Positive for fatigue  Respiratory: Negative  Cardiovascular: Negative  Gastrointestinal: Negative  Neurological: Negative  Psychiatric/Behavioral: The patient is nervous/anxious            Objective:    /70 (BP Location: Left arm, Patient Position: Sitting, Cuff Size: Large)   Pulse 98   Temp Emaline Peter ) 97 2 °F (36 2 °C)   Resp 14   Ht 5' 8" (1 727 m)   Wt 86 3 kg (190 lb 3 2 oz)   BMI 28 92 kg/m²        Physical Exam   Constitutional: He is oriented to person, place, and time  He appears well-developed and well-nourished  No distress  HENT:   Mouth/Throat: No oropharyngeal exudate  Eyes: Conjunctivae are normal    Cardiovascular: Normal rate, regular rhythm, normal heart sounds and intact distal pulses  Pulmonary/Chest: Effort normal and breath sounds normal  No respiratory distress  Abdominal: Soft  Bowel sounds are normal  There is no tenderness  Musculoskeletal: Normal range of motion  Neurological: He is alert and oriented to person, place, and time  No cranial nerve deficit  Psychiatric:   Mood stable, affect blunted, pleasant,cooperative w exam   Nursing note and vitals reviewed  Labs;  Labs in chart were reviewed        Cailin Johnson MD

## 2019-01-30 LAB
ALBUMIN SERPL-MCNC: 4.2 G/DL (ref 3.5–5.5)
ALBUMIN/GLOB SERPL: 1.6 {RATIO} (ref 1.2–2.2)
ALP SERPL-CCNC: 56 IU/L (ref 39–117)
ALT SERPL-CCNC: 22 IU/L (ref 0–44)
AST SERPL-CCNC: 17 IU/L (ref 0–40)
BILIRUB SERPL-MCNC: 0.3 MG/DL (ref 0–1.2)
BUN SERPL-MCNC: 15 MG/DL (ref 6–24)
BUN/CREAT SERPL: 11 (ref 9–20)
CALCIUM SERPL-MCNC: 10 MG/DL (ref 8.7–10.2)
CHLORIDE SERPL-SCNC: 102 MMOL/L (ref 96–106)
CHOLEST SERPL-MCNC: 309 MG/DL (ref 100–199)
CHOLEST/HDLC SERPL: 7 RATIO (ref 0–5)
CO2 SERPL-SCNC: 24 MMOL/L (ref 20–29)
CREAT SERPL-MCNC: 1.37 MG/DL (ref 0.76–1.27)
CYTOLOGY CMNT CVX/VAG CYTO-IMP: ABNORMAL
GLOBULIN SER-MCNC: 2.6 G/DL (ref 1.5–4.5)
GLUCOSE SERPL-MCNC: 88 MG/DL (ref 65–99)
HDLC SERPL-MCNC: 44 MG/DL
LDLC SERPL CALC-MCNC: ABNORMAL MG/DL (ref 0–99)
LDLC SERPL DIRECT ASSAY-MCNC: 197 MG/DL (ref 0–99)
MICRODELETION SYND BLD/T FISH: NORMAL
POTASSIUM SERPL-SCNC: 4.6 MMOL/L (ref 3.5–5.2)
PROT SERPL-MCNC: 6.8 G/DL (ref 6–8.5)
SL AMB EGFR AFRICAN AMERICAN: 70 ML/MIN/1.73
SL AMB EGFR NON AFRICAN AMERICAN: 61 ML/MIN/1.73
SL AMB VLDL CHOLESTEROL CALC: ABNORMAL MG/DL (ref 5–40)
SODIUM SERPL-SCNC: 141 MMOL/L (ref 134–144)
TRIGL SERPL-MCNC: 528 MG/DL (ref 0–149)

## 2019-01-31 ENCOUNTER — TELEPHONE (OUTPATIENT)
Dept: FAMILY MEDICINE CLINIC | Facility: CLINIC | Age: 49
End: 2019-01-31

## 2019-01-31 NOTE — TELEPHONE ENCOUNTER
----- Message from Zelda Roque MD sent at 1/31/2019 12:20 PM EST -----  Please schedule appt (30min) to review labs and check BP

## 2019-02-06 ENCOUNTER — OFFICE VISIT (OUTPATIENT)
Dept: FAMILY MEDICINE CLINIC | Facility: CLINIC | Age: 49
End: 2019-02-06
Payer: COMMERCIAL

## 2019-02-06 VITALS
WEIGHT: 189.4 LBS | DIASTOLIC BLOOD PRESSURE: 82 MMHG | RESPIRATION RATE: 14 BRPM | HEART RATE: 78 BPM | BODY MASS INDEX: 28.7 KG/M2 | SYSTOLIC BLOOD PRESSURE: 120 MMHG | TEMPERATURE: 97.6 F | HEIGHT: 68 IN

## 2019-02-06 DIAGNOSIS — F20.9 SCHIZOPHRENIA, UNSPECIFIED TYPE (HCC): ICD-10-CM

## 2019-02-06 DIAGNOSIS — I10 ESSENTIAL HYPERTENSION: ICD-10-CM

## 2019-02-06 DIAGNOSIS — E78.01 FAMILIAL HYPERCHOLESTEROLEMIA: Primary | ICD-10-CM

## 2019-02-06 PROCEDURE — 3008F BODY MASS INDEX DOCD: CPT | Performed by: FAMILY MEDICINE

## 2019-02-06 PROCEDURE — 3079F DIAST BP 80-89 MM HG: CPT | Performed by: FAMILY MEDICINE

## 2019-02-06 PROCEDURE — 99213 OFFICE O/P EST LOW 20 MIN: CPT | Performed by: FAMILY MEDICINE

## 2019-02-06 PROCEDURE — 3074F SYST BP LT 130 MM HG: CPT | Performed by: FAMILY MEDICINE

## 2019-02-06 RX ORDER — ROSUVASTATIN CALCIUM 10 MG/1
10 TABLET, COATED ORAL DAILY
Qty: 30 TABLET | Refills: 5 | Status: SHIPPED | OUTPATIENT
Start: 2019-02-06 | End: 2019-07-21 | Stop reason: SDUPTHER

## 2019-02-06 NOTE — PROGRESS NOTES
Assessment/Plan:    Diagnoses and all orders for this visit:    Familial hypercholesterolemia  Comments:  add Crestor 10mg daily, tyr to maintain ow chol diet and inc exercise as tolerated  Orders:  -     rosuvastatin (CRESTOR) 10 MG tablet; Take 1 tablet (10 mg total) by mouth daily  -     Lipid Panel with Direct LDL reflex; Future  -     Comprehensive metabolic panel; Future  -     Lipid Panel with Direct LDL reflex  -     Comprehensive metabolic panel    Essential hypertension  Comments:  BP wnl-cont w current mgt  -metoprolol and amlodipine, low salt diet, limit caffeine, sched yrly eye exams,  Orders:  -     Lipid Panel with Direct LDL reflex; Future  -     Comprehensive metabolic panel; Future  -     Lipid Panel with Direct LDL reflex  -     Comprehensive metabolic panel    Schizophrenia, unspecified type (HCC)  Comments:  follows w psychiatrist         Subjective:      Patient ID: Gosia Lindsay is a 51 y o  male  Chief Complaint   Patient presents with    Results     labs    Blood Pressure Check       51year-old patient in for blood pressure check and to review labs  BP improved with combination of metoprolol amlodipine  Patient tolerating medication without any adverse effect at this time  Denies chest pain, headache, dizziness or focal weakness  Labs do show elevated lipids  Past thyroid function normal   LFTs within normal   Creatinine equal to 1 37  Mood stable at visit  Continues to follow Psychiatry  The following portions of the patient's history were reviewed and updated as appropriate: allergies, current medications, past family history, past medical history, past social history, past surgical history and problem list      Review of Systems   Constitutional: Negative  Eyes:        Wears glasses   Respiratory: Negative  Cardiovascular: Negative      Psychiatric/Behavioral:        Hx Schizophrenia         Objective:    /82 (BP Location: Left arm, Patient Position: Sitting, Cuff Size: Large)   Pulse 78   Temp 97 6 °F (36 4 °C)   Resp 14   Ht 5' 8" (1 727 m)   Wt 85 9 kg (189 lb 6 4 oz)   BMI 28 80 kg/m²        Physical Exam   Constitutional: He is oriented to person, place, and time  He appears well-developed and well-nourished  Cardiovascular: Normal rate and regular rhythm  Pulmonary/Chest: Effort normal and breath sounds normal  No respiratory distress  Abdominal: Soft  Bowel sounds are normal  There is no tenderness  Musculoskeletal: Normal range of motion  Neurological: He is alert and oriented to person, place, and time  Psychiatric:   Anxious, cooperative w exam   Nursing note and vitals reviewed          Manuel Monsalve MD

## 2019-02-06 NOTE — PATIENT INSTRUCTIONS
Chronic Hypertension   AMBULATORY CARE:   Hypertension  is high blood pressure (BP)  Your BP is the force of your blood moving against the walls of your arteries  Normal BP is less than 120/80  Prehypertension is between 120/80 and 139/89  Hypertension is 140/90 or higher  Hypertension causes your BP to get so high that your heart has to work much harder than normal  This can damage your heart  Chronic hypertension is a long-term condition that you can control with a healthy lifestyle or medicines  A controlled blood pressure helps protect your organs, such as your heart, lungs, brain, and kidneys  Common symptoms include the following:   · Headache     · Blurred vision    · Chest pain     · Dizziness or weakness     · Trouble breathing     · Nosebleeds  Call 911 for any of the following:   · You have discomfort in your chest that feels like squeezing, pressure, fullness, or pain  · You become confused or have difficulty speaking  · You suddenly feel lightheaded or have trouble breathing  · You have pain or discomfort in your back, neck, jaw, stomach, or arm  Seek care immediately if:   · You have a severe headache or vision loss  · You have weakness in an arm or leg  Contact your healthcare provider if:   · You feel faint, dizzy, confused, or drowsy  · You have been taking your BP medicine and your BP is still higher than your healthcare provider says it should be  · You have questions or concerns about your condition or care  Treatment for chronic hypertension  may include medicine to lower your BP and lower your cholesterol level  A low cholesterol level helps prevent heart disease and makes it easier to control your blood pressure  Heart disease can make your blood pressure harder to control  You may also need to make lifestyle changes  Take your medicine exactly as directed    Manage chronic hypertension:  Talk with your healthcare provider about these and other ways to manage hypertension:  · Take your BP at home  Sit and rest for 5 minutes before you take your BP  Extend your arm and support it on a flat surface  Your arm should be at the same level as your heart  Follow the directions that came with your BP monitor  If possible, take at least 2 BP readings each time  Take your BP at least twice a day at the same times each day, such as morning and evening  Keep a record of your BP readings and bring it to your follow-up visits  Ask your healthcare provider what your blood pressure should be  · Limit sodium (salt) as directed  Too much sodium can affect your fluid balance  Check labels to find low-sodium or no-salt-added foods  Some low-sodium foods use potassium salts for flavor  Too much potassium can also cause health problems  Your healthcare provider will tell you how much sodium and potassium are safe for you to have in a day  He or she may recommend that you limit sodium to 2,300 mg a day  · Follow the meal plan recommended by your healthcare provider  A dietitian or your provider can give you more information on low-sodium plans or the DASH (Dietary Approaches to Stop Hypertension) eating plan  The DASH plan is low in sodium, unhealthy fats, and total fat  It is high in potassium, calcium, and fiber  · Exercise to maintain a healthy weight  Exercise at least 30 minutes per day, on most days of the week  This will help decrease your blood pressure  Ask about the best exercise plan for you  · Decrease stress  This may help lower your BP  Learn ways to relax, such as deep breathing or listening to music  · Limit alcohol  Women should limit alcohol to 1 drink a day  Men should limit alcohol to 2 drinks a day  A drink of alcohol is 12 ounces of beer, 5 ounces of wine, or 1½ ounces of liquor  · Do not smoke  Nicotine and other chemicals in cigarettes and cigars can increase your BP and also cause lung damage   Ask your healthcare provider for information if you currently smoke and need help to quit  E-cigarettes or smokeless tobacco still contain nicotine  Talk to your healthcare provider before you use these products  Follow up with your healthcare provider as directed: You will need to return to have your BP checked and to have other lab tests done  Write down your questions so you remember to ask them during your visits  © 2017 [employer] Information is for End User's use only and may not be sold, redistributed or otherwise used for commercial purposes  All illustrations and images included in CareNotes® are the copyrighted property of [employer]  or [employer]  The above information is an  only  It is not intended as medical advice for individual conditions or treatments  Talk to your doctor, nurse or pharmacist before following any medical regimen to see if it is safe and effective for you  Hyperlipidemia   AMBULATORY CARE:   Hyperlipidemia  is a high level of lipids (fats) in your blood  These lipids include cholesterol or triglycerides  Lipids are made by your body  They also come from the foods you eat  Your body needs lipids to work properly, but high levels increase your risk for heart disease, heart attack, and stroke     Call 911 for any of the following:   · You have any of the following signs of a heart attack:      ¨ Squeezing, pressure, or pain in your chest that lasts longer than 5 minutes or returns    ¨ Discomfort or pain in your back, neck, jaw, stomach, or arm     ¨ Trouble breathing    ¨ Nausea or vomiting    ¨ Lightheadedness or a sudden cold sweat, especially with chest pain or trouble breathing    · You have any of the following signs of a stroke:      ¨ Numbness or drooping on one side of your face     ¨ Weakness in an arm or leg    ¨ Confusion or difficulty speaking    ¨ Dizziness, a severe headache, or vision loss  Contact your healthcare provider if:   · You have questions or concerns about your condition or care  Treatment of hyperlipidemia  may first include lifestyle changes to help decrease your lipid levels  You may also need to take medicine to lower your lipid levels  Some of the lifestyle changes you may need to make include the following:  · Maintain a healthy weight  Ask your healthcare provider how much you should weigh  Ask him or her to help you create a weight loss plan if you are overweight  Weight loss can decrease your cholesterol and triglyceride levels  · Exercise as directed  Exercise lowers your cholesterol levels and helps you maintain a healthy weight  Get 40 minutes or more of moderate exercise 3 to 4 days each week  You can split your exercise into four 10-minute workouts instead of 40 minutes at one time  Examples of moderate exercises include walking briskly, swimming, or riding a bike  Work with your healthcare provider to plan the best exercise program for you  · Do not smoke  Nicotine and other chemicals in cigarettes and cigars can increase your risk for a heart attack and stroke  Ask your healthcare provider for information if you currently smoke and need help to quit  E-cigarettes or smokeless tobacco still contain nicotine  Talk to your healthcare provider before you use these products  · Eat heart-healthy foods  Talk to your dietitian about a heart-healthy diet  The following will help you manage hyperlipidemia:     ¨ Decrease the total amount of fat you eat  Choose lean meats, fat-free or 1% fat milk, and low-fat dairy products, such as yogurt and cheese  Limit or do not eat red meat  Red meats are high in fat and cholesterol  ¨ Replace unhealthy fats with healthy fats  Unhealthy fats include saturated fat, trans fat, and cholesterol  Choose soft margarines that are low in saturated fat and have little or no trans fat  Monounsaturated fats are healthy fats   These are found in olive oil, canola oil, avocado, and nuts  Polyunsaturated fats are also healthy  These are found in fish, flaxseed, walnuts, and soybeans  ¨ Eat fruits and vegetables every day  They are low in calories and fat and a good source of essential vitamins  Include dark green, red, and orange vegetables  Examples include spinach, kale, broccoli, and carrots  ¨ Eat foods high in fiber  Choose whole grain, high-fiber foods  Good choices include whole-wheat breads or cereals, beans, peas, fruits, and vegetables  · Ask your healthcare provider if it is safe for you to drink alcohol  Alcohol can increase your cholesterol and triglyceride levels  A drink of alcohol is 12 ounces of beer, 5 ounces of wine, or 1½ ounces of liquor  Follow up with your healthcare provider as directed: You may need to return for more tests  Your healthcare provider may refer you to a dietitian  Write down your questions so you remember to ask them during your visits  © 2017 [employer]  Information is for End User's use only and may not be sold, redistributed or otherwise used for commercial purposes  All illustrations and images included in CareNotes® are the copyrighted property of [employer]  or [employer]  The above information is an  only  It is not intended as medical advice for individual conditions or treatments  Talk to your doctor, nurse or pharmacist before following any medical regimen to see if it is safe and effective for you

## 2019-04-25 LAB
ALBUMIN SERPL-MCNC: 4.3 G/DL (ref 3.5–5.5)
ALBUMIN/GLOB SERPL: 1.7 {RATIO} (ref 1.2–2.2)
ALP SERPL-CCNC: 61 IU/L (ref 39–117)
ALT SERPL-CCNC: 34 IU/L (ref 0–44)
AST SERPL-CCNC: 25 IU/L (ref 0–40)
BILIRUB SERPL-MCNC: 0.3 MG/DL (ref 0–1.2)
BUN SERPL-MCNC: 18 MG/DL (ref 6–24)
BUN/CREAT SERPL: 14 (ref 9–20)
CALCIUM SERPL-MCNC: 9.8 MG/DL (ref 8.7–10.2)
CHLORIDE SERPL-SCNC: 105 MMOL/L (ref 96–106)
CHOLEST SERPL-MCNC: 232 MG/DL (ref 100–199)
CO2 SERPL-SCNC: 23 MMOL/L (ref 20–29)
CREAT SERPL-MCNC: 1.26 MG/DL (ref 0.76–1.27)
GLOBULIN SER-MCNC: 2.6 G/DL (ref 1.5–4.5)
GLUCOSE SERPL-MCNC: 101 MG/DL (ref 65–99)
HDLC SERPL-MCNC: 48 MG/DL
LDLC SERPL CALC-MCNC: 111 MG/DL (ref 0–99)
LDLC/HDLC SERPL: 2.3 RATIO (ref 0–3.6)
MICRODELETION SYND BLD/T FISH: NORMAL
POTASSIUM SERPL-SCNC: 4.5 MMOL/L (ref 3.5–5.2)
PROT SERPL-MCNC: 6.9 G/DL (ref 6–8.5)
SL AMB EGFR AFRICAN AMERICAN: 77 ML/MIN/1.73
SL AMB EGFR NON AFRICAN AMERICAN: 67 ML/MIN/1.73
SL AMB VLDL CHOLESTEROL CALC: 73 MG/DL (ref 5–40)
SODIUM SERPL-SCNC: 141 MMOL/L (ref 134–144)
TRIGL SERPL-MCNC: 366 MG/DL (ref 0–149)

## 2019-05-26 DIAGNOSIS — I10 ESSENTIAL HYPERTENSION: ICD-10-CM

## 2019-05-26 RX ORDER — METOPROLOL SUCCINATE 50 MG/1
TABLET, EXTENDED RELEASE ORAL
Qty: 90 TABLET | Refills: 1 | Status: SHIPPED | OUTPATIENT
Start: 2019-05-26 | End: 2019-11-16 | Stop reason: SDUPTHER

## 2019-06-12 DIAGNOSIS — I10 ESSENTIAL HYPERTENSION: ICD-10-CM

## 2019-06-12 RX ORDER — AMLODIPINE BESYLATE 5 MG/1
TABLET ORAL
Qty: 90 TABLET | Refills: 1 | Status: SHIPPED | OUTPATIENT
Start: 2019-06-12 | End: 2019-12-03 | Stop reason: SDUPTHER

## 2019-07-21 DIAGNOSIS — E78.01 FAMILIAL HYPERCHOLESTEROLEMIA: ICD-10-CM

## 2019-07-22 RX ORDER — ROSUVASTATIN CALCIUM 10 MG/1
TABLET, COATED ORAL
Qty: 30 TABLET | Refills: 2 | Status: SHIPPED | OUTPATIENT
Start: 2019-07-22 | End: 2019-10-21 | Stop reason: SDUPTHER

## 2019-08-20 ENCOUNTER — OFFICE VISIT (OUTPATIENT)
Dept: FAMILY MEDICINE CLINIC | Facility: CLINIC | Age: 49
End: 2019-08-20
Payer: COMMERCIAL

## 2019-08-20 VITALS
WEIGHT: 189 LBS | HEART RATE: 72 BPM | TEMPERATURE: 98.3 F | DIASTOLIC BLOOD PRESSURE: 78 MMHG | BODY MASS INDEX: 28.64 KG/M2 | SYSTOLIC BLOOD PRESSURE: 112 MMHG | HEIGHT: 68 IN | RESPIRATION RATE: 12 BRPM

## 2019-08-20 DIAGNOSIS — E78.01 FAMILIAL HYPERCHOLESTEROLEMIA: ICD-10-CM

## 2019-08-20 DIAGNOSIS — I10 ESSENTIAL HYPERTENSION: Primary | ICD-10-CM

## 2019-08-20 DIAGNOSIS — F17.210 CIGARETTE NICOTINE DEPENDENCE WITHOUT COMPLICATION: ICD-10-CM

## 2019-08-20 DIAGNOSIS — F20.9 SCHIZOPHRENIA, UNSPECIFIED TYPE (HCC): ICD-10-CM

## 2019-08-20 PROCEDURE — 99214 OFFICE O/P EST MOD 30 MIN: CPT | Performed by: FAMILY MEDICINE

## 2019-08-23 LAB
ALBUMIN SERPL-MCNC: 4.3 G/DL (ref 3.5–5.5)
ALBUMIN/GLOB SERPL: 1.7 {RATIO} (ref 1.2–2.2)
ALP SERPL-CCNC: 62 IU/L (ref 39–117)
ALT SERPL-CCNC: 32 IU/L (ref 0–44)
AST SERPL-CCNC: 24 IU/L (ref 0–40)
BILIRUB SERPL-MCNC: 0.3 MG/DL (ref 0–1.2)
BUN SERPL-MCNC: 14 MG/DL (ref 6–24)
BUN/CREAT SERPL: 11 (ref 9–20)
CALCIUM SERPL-MCNC: 9.9 MG/DL (ref 8.7–10.2)
CHLORIDE SERPL-SCNC: 105 MMOL/L (ref 96–106)
CHOLEST SERPL-MCNC: 227 MG/DL (ref 100–199)
CHOLEST/HDLC SERPL: 4.6 RATIO (ref 0–5)
CO2 SERPL-SCNC: 22 MMOL/L (ref 20–29)
CREAT SERPL-MCNC: 1.33 MG/DL (ref 0.76–1.27)
GLOBULIN SER-MCNC: 2.6 G/DL (ref 1.5–4.5)
GLUCOSE SERPL-MCNC: 103 MG/DL (ref 65–99)
HDLC SERPL-MCNC: 49 MG/DL
LDLC SERPL CALC-MCNC: 100 MG/DL (ref 0–99)
LDLC SERPL DIRECT ASSAY-MCNC: 129 MG/DL (ref 0–99)
MICRODELETION SYND BLD/T FISH: NORMAL
POTASSIUM SERPL-SCNC: 4.7 MMOL/L (ref 3.5–5.2)
PROT SERPL-MCNC: 6.9 G/DL (ref 6–8.5)
SL AMB EGFR AFRICAN AMERICAN: 73 ML/MIN/1.73
SL AMB EGFR NON AFRICAN AMERICAN: 63 ML/MIN/1.73
SL AMB VLDL CHOLESTEROL CALC: 78 MG/DL (ref 5–40)
SODIUM SERPL-SCNC: 141 MMOL/L (ref 134–144)
TRIGL SERPL-MCNC: 390 MG/DL (ref 0–149)

## 2019-09-01 NOTE — PROGRESS NOTES
Assessment/Plan:     Diagnoses and all orders for this visit:    Essential hypertension  Comments:  BP wnl  cont current mgt  Sched yearly eye check  Orders:  -     Comprehensive metabolic panel; Future  -     Lipid panel; Future  -     Comprehensive metabolic panel  -     Lipid panel    Familial hypercholesterolemia  Comments:  check lipid profile and cmp    cont statin, low chol diet, encourage daily exercise  Orders:  -     Comprehensive metabolic panel; Future  -     Lipid panel; Future  -     Comprehensive metabolic panel  -     Lipid panel    Cigarette nicotine dependence without complication  Comments:  cont to encourage smoking cessation    [facility] )  Comments:  mood stable at vist  continued follow-up w psychistrist     BMI 28 0-28 9,adult    Other orders  -     LDL cholesterol, direct  -     Cardiovascular Report            Subjective:      Patient ID: Christine Elizabeth is a 51 y o  male  Chief Complaint   Patient presents with    Blood Pressure Check    Hyperlipidemia    Nicotine Dependence       50 yo pt in for BP check and follow-up on hyperlipidiemia  Complaint w meds but not always w diet/exercise  BP wnl on current meds  Overall feels well  Offers no new c/o  Needs to check date last eye exam   Cont to smoke  The following portions of the patient's history were reviewed and updated as appropriate: allergies, current medications, past family history, past medical history, past social history, past surgical history and problem list      Review of Systems   Constitutional: Negative  Respiratory: Negative  Cardiovascular: Negative  Gastrointestinal: Negative  Musculoskeletal: Positive for arthralgias  Neurological: Negative  Psychiatric/Behavioral: The patient is nervous/anxious            Objective:    /78 (BP Location: Left arm, Patient Position: Sitting, Cuff Size: Adult)   Pulse 72   Temp 98 3 °F (36 8 °C) (Tympanic)   Resp 12 Ht 5' 8" (1 727 m)   Wt 85 7 kg (189 lb)   BMI 28 74 kg/m²        Physical Exam   Constitutional: He is oriented to person, place, and time  He appears well-developed and well-nourished  No distress  Eyes: Conjunctivae are normal  No scleral icterus  Neck: Neck supple  Cardiovascular: Normal rate and regular rhythm  Pulmonary/Chest: Effort normal and breath sounds normal  No respiratory distress  Abdominal: Soft  Bowel sounds are normal  He exhibits no distension  There is no tenderness  Neurological: He is alert and oriented to person, place, and time  No cranial nerve deficit  Skin: Skin is warm and dry  Nursing note and vitals reviewed            Stu Peoples MD

## 2019-09-01 NOTE — ASSESSMENT & PLAN NOTE
BMI Counseling: Body mass index is 28 74 kg/m²  Discussed the patient's BMI with him  The BMI is above average  BMI counseling and education was provided to the patient  Nutrition recommendations include reducing portion sizes, decreasing overall calorie intake, 3-5 servings of fruits/vegetables daily, reducing fast food intake and consuming healthier snacks  Rec exercise min 3-5x /week-walking 20-30 minutes

## 2019-09-03 ENCOUNTER — TELEPHONE (OUTPATIENT)
Dept: FAMILY MEDICINE CLINIC | Facility: CLINIC | Age: 49
End: 2019-09-03

## 2019-09-03 NOTE — TELEPHONE ENCOUNTER
----- Message from Iza Valente MD sent at 9/3/2019  7:43 AM EDT -----  Please inform cholesterol still a little high but overall better since starting his medications--he should continue the meds and low cholesterol diet/regular exercise,

## 2019-09-03 NOTE — TELEPHONE ENCOUNTER
----- Message from Jimbo Santiago MD sent at 9/3/2019  7:43 AM EDT -----  Please inform cholesterol still a little high but overall better since starting his medications--he should continue the meds and low cholesterol diet/regular exercise,

## 2019-09-03 NOTE — TELEPHONE ENCOUNTER
I informed pt of Dr Rica Grace instructions  Pt also wants to know when Dr Erlinda Severin wants to see him again

## 2019-09-03 NOTE — TELEPHONE ENCOUNTER
----- Message from Giancarlo Murphy MD sent at 9/3/2019  7:43 AM EDT -----  Please inform cholesterol still a little high but overall better since starting his medications--he should continue the meds and low cholesterol diet/regular exercise,

## 2019-09-03 NOTE — TELEPHONE ENCOUNTER
Tamara Espinoza called back and wants to know when Dr Hernan Dozier wants to see him again?  Please let him know when you can, TY

## 2019-09-03 NOTE — TELEPHONE ENCOUNTER
----- Message from Mario Ferrera MD sent at 9/3/2019  7:43 AM EDT -----  Please inform cholesterol still a little high but overall better since starting his medications--he should continue the meds and low cholesterol diet/regular exercise,

## 2019-09-03 NOTE — TELEPHONE ENCOUNTER
----- Message from Griselda Ravel, MD sent at 9/3/2019  7:43 AM EDT -----  Please inform cholesterol still a little high but overall better since starting his medications--he should continue the meds and low cholesterol diet/regular exercise,

## 2019-10-21 DIAGNOSIS — E78.01 FAMILIAL HYPERCHOLESTEROLEMIA: ICD-10-CM

## 2019-10-21 RX ORDER — ROSUVASTATIN CALCIUM 10 MG/1
10 TABLET, COATED ORAL DAILY
Qty: 30 TABLET | Refills: 2 | Status: SHIPPED | OUTPATIENT
Start: 2019-10-21 | End: 2020-01-13

## 2019-11-16 DIAGNOSIS — I10 ESSENTIAL HYPERTENSION: ICD-10-CM

## 2019-11-16 RX ORDER — METOPROLOL SUCCINATE 50 MG/1
TABLET, EXTENDED RELEASE ORAL
Qty: 30 TABLET | Refills: 5 | Status: SHIPPED | OUTPATIENT
Start: 2019-11-16 | End: 2020-05-10

## 2019-12-03 DIAGNOSIS — I10 ESSENTIAL HYPERTENSION: ICD-10-CM

## 2019-12-03 RX ORDER — AMLODIPINE BESYLATE 5 MG/1
5 TABLET ORAL DAILY
Qty: 90 TABLET | Refills: 1 | Status: SHIPPED | OUTPATIENT
Start: 2019-12-03 | End: 2020-05-23

## 2020-01-13 DIAGNOSIS — E78.01 FAMILIAL HYPERCHOLESTEROLEMIA: ICD-10-CM

## 2020-01-13 LAB — HBA1C MFR BLD HPLC: 5.6 %

## 2020-01-13 RX ORDER — ROSUVASTATIN CALCIUM 10 MG/1
TABLET, COATED ORAL
Qty: 30 TABLET | Refills: 2 | Status: SHIPPED | OUTPATIENT
Start: 2020-01-13 | End: 2020-04-11

## 2020-03-06 ENCOUNTER — OFFICE VISIT (OUTPATIENT)
Dept: FAMILY MEDICINE CLINIC | Facility: CLINIC | Age: 50
End: 2020-03-06
Payer: COMMERCIAL

## 2020-03-06 VITALS
HEART RATE: 66 BPM | TEMPERATURE: 97.2 F | HEIGHT: 68 IN | WEIGHT: 190 LBS | DIASTOLIC BLOOD PRESSURE: 92 MMHG | BODY MASS INDEX: 28.79 KG/M2 | RESPIRATION RATE: 14 BRPM | SYSTOLIC BLOOD PRESSURE: 128 MMHG

## 2020-03-06 DIAGNOSIS — J02.9 SORE THROAT: Primary | ICD-10-CM

## 2020-03-06 DIAGNOSIS — R09.81 SINUS CONGESTION: ICD-10-CM

## 2020-03-06 PROCEDURE — 99213 OFFICE O/P EST LOW 20 MIN: CPT | Performed by: FAMILY MEDICINE

## 2020-03-06 PROCEDURE — 3074F SYST BP LT 130 MM HG: CPT | Performed by: FAMILY MEDICINE

## 2020-03-06 PROCEDURE — 3080F DIAST BP >= 90 MM HG: CPT | Performed by: FAMILY MEDICINE

## 2020-03-06 RX ORDER — AMOXICILLIN 875 MG/1
875 TABLET, COATED ORAL 2 TIMES DAILY
Qty: 20 TABLET | Refills: 0 | Status: SHIPPED | OUTPATIENT
Start: 2020-03-06 | End: 2020-03-16

## 2020-04-11 DIAGNOSIS — E78.01 FAMILIAL HYPERCHOLESTEROLEMIA: ICD-10-CM

## 2020-04-11 RX ORDER — ROSUVASTATIN CALCIUM 10 MG/1
TABLET, COATED ORAL
Qty: 30 TABLET | Refills: 2 | Status: SHIPPED | OUTPATIENT
Start: 2020-04-11 | End: 2020-07-07

## 2020-05-10 DIAGNOSIS — I10 ESSENTIAL HYPERTENSION: ICD-10-CM

## 2020-05-10 RX ORDER — METOPROLOL SUCCINATE 50 MG/1
TABLET, EXTENDED RELEASE ORAL
Qty: 30 TABLET | Refills: 5 | Status: SHIPPED | OUTPATIENT
Start: 2020-05-10 | End: 2020-10-20 | Stop reason: SDUPTHER

## 2020-05-23 DIAGNOSIS — I10 ESSENTIAL HYPERTENSION: ICD-10-CM

## 2020-05-23 RX ORDER — AMLODIPINE BESYLATE 5 MG/1
TABLET ORAL
Qty: 90 TABLET | Refills: 1 | Status: SHIPPED | OUTPATIENT
Start: 2020-05-23 | End: 2020-10-20 | Stop reason: SDUPTHER

## 2020-06-17 ENCOUNTER — OFFICE VISIT (OUTPATIENT)
Dept: FAMILY MEDICINE CLINIC | Facility: CLINIC | Age: 50
End: 2020-06-17
Payer: COMMERCIAL

## 2020-06-17 VITALS
HEIGHT: 69 IN | TEMPERATURE: 98.2 F | WEIGHT: 191 LBS | BODY MASS INDEX: 28.29 KG/M2 | HEART RATE: 96 BPM | RESPIRATION RATE: 14 BRPM | DIASTOLIC BLOOD PRESSURE: 98 MMHG | SYSTOLIC BLOOD PRESSURE: 138 MMHG

## 2020-06-17 DIAGNOSIS — Z00.00 PHYSICAL EXAM: Primary | ICD-10-CM

## 2020-06-17 DIAGNOSIS — F20.9 SCHIZOPHRENIA, UNSPECIFIED TYPE (HCC): ICD-10-CM

## 2020-06-17 DIAGNOSIS — E78.01 FAMILIAL HYPERCHOLESTEROLEMIA: ICD-10-CM

## 2020-06-17 DIAGNOSIS — E55.9 VITAMIN D DEFICIENCY: ICD-10-CM

## 2020-06-17 DIAGNOSIS — R31.1 BENIGN ESSENTIAL MICROSCOPIC HEMATURIA: ICD-10-CM

## 2020-06-17 DIAGNOSIS — I10 ESSENTIAL HYPERTENSION: ICD-10-CM

## 2020-06-17 LAB
SL AMB  POCT GLUCOSE, UA: ABNORMAL
SL AMB LEUKOCYTE ESTERASE,UA: ABNORMAL
SL AMB POCT BILIRUBIN,UA: ABNORMAL
SL AMB POCT BLOOD,UA: 250
SL AMB POCT CLARITY,UA: ABNORMAL
SL AMB POCT COLOR,UA: YELLOW
SL AMB POCT KETONES,UA: ABNORMAL
SL AMB POCT NITRITE,UA: ABNORMAL
SL AMB POCT PH,UA: 5
SL AMB POCT SPECIFIC GRAVITY,UA: 1.02
SL AMB POCT URINE PROTEIN: 500
SL AMB POCT UROBILINOGEN: ABNORMAL

## 2020-06-17 PROCEDURE — 3080F DIAST BP >= 90 MM HG: CPT | Performed by: FAMILY MEDICINE

## 2020-06-17 PROCEDURE — 3008F BODY MASS INDEX DOCD: CPT | Performed by: FAMILY MEDICINE

## 2020-06-17 PROCEDURE — 81003 URINALYSIS AUTO W/O SCOPE: CPT | Performed by: FAMILY MEDICINE

## 2020-06-17 PROCEDURE — 99396 PREV VISIT EST AGE 40-64: CPT | Performed by: FAMILY MEDICINE

## 2020-06-17 PROCEDURE — 3075F SYST BP GE 130 - 139MM HG: CPT | Performed by: FAMILY MEDICINE

## 2020-06-19 LAB
APPEARANCE UR: CLEAR
BACTERIA UR CULT: NORMAL
BACTERIA URNS QL MICRO: ABNORMAL
BILIRUB UR QL STRIP: NEGATIVE
COLOR UR: YELLOW
EPI CELLS #/AREA URNS HPF: ABNORMAL /HPF (ref 0–10)
GLUCOSE UR QL: NEGATIVE
HGB UR QL STRIP: ABNORMAL
KETONES UR QL STRIP: NEGATIVE
LEUKOCYTE ESTERASE UR QL STRIP: NEGATIVE
Lab: NO GROWTH
MICRO URNS: ABNORMAL
MUCOUS THREADS URNS QL MICRO: PRESENT
NITRITE UR QL STRIP: NEGATIVE
PH UR STRIP: 5.5 [PH] (ref 5–7.5)
PROT UR QL STRIP: ABNORMAL
RBC #/AREA URNS HPF: ABNORMAL /HPF (ref 0–2)
SP GR UR: 1.02 (ref 1–1.03)
UROBILINOGEN UR STRIP-ACNC: 0.2 MG/DL (ref 0.2–1)
WBC #/AREA URNS HPF: ABNORMAL /HPF (ref 0–5)

## 2020-06-23 LAB
COUNSELING NOTE: NORMAL
CYTOLOGIST CVX/VAG CYTO: NORMAL
DX ICD CODE: NORMAL
PATH REPORT.FINAL DX SPEC: NORMAL
PATH REPORT.GROSS SPEC: NORMAL
PATH REPORT.SITE OF ORIGIN SPEC: NORMAL
PATHOLOGIST NAME: NORMAL

## 2020-06-27 LAB
25(OH)D3+25(OH)D2 SERPL-MCNC: 17.8 NG/ML (ref 30–100)
ALBUMIN SERPL-MCNC: 4.4 G/DL (ref 4–5)
ALBUMIN/GLOB SERPL: 1.8 {RATIO} (ref 1.2–2.2)
ALP SERPL-CCNC: 64 IU/L (ref 39–117)
ALT SERPL-CCNC: 29 IU/L (ref 0–44)
AMYLASE SERPL-CCNC: 51 U/L (ref 31–110)
AST SERPL-CCNC: 24 IU/L (ref 0–40)
BILIRUB SERPL-MCNC: 0.4 MG/DL (ref 0–1.2)
BUN SERPL-MCNC: 17 MG/DL (ref 6–24)
BUN/CREAT SERPL: 11 (ref 9–20)
CALCIUM SERPL-MCNC: 9.7 MG/DL (ref 8.7–10.2)
CHLORIDE SERPL-SCNC: 105 MMOL/L (ref 96–106)
CHOLEST SERPL-MCNC: 264 MG/DL (ref 100–199)
CO2 SERPL-SCNC: 18 MMOL/L (ref 20–29)
CREAT SERPL-MCNC: 1.49 MG/DL (ref 0.76–1.27)
ERYTHROCYTE [DISTWIDTH] IN BLOOD BY AUTOMATED COUNT: 13.8 % (ref 11.6–15.4)
GLOBULIN SER-MCNC: 2.4 G/DL (ref 1.5–4.5)
GLUCOSE SERPL-MCNC: 115 MG/DL (ref 65–99)
HCT VFR BLD AUTO: 45.7 % (ref 37.5–51)
HDLC SERPL-MCNC: 57 MG/DL
HGB BLD-MCNC: 16.4 G/DL (ref 13–17.7)
LDLC SERPL CALC-MCNC: ABNORMAL MG/DL (ref 0–99)
LDLC SERPL DIRECT ASSAY-MCNC: 134 MG/DL (ref 0–99)
LDLC/HDLC SERPL: ABNORMAL RATIO
LIPASE SERPL-CCNC: 35 U/L (ref 13–78)
MAGNESIUM SERPL-MCNC: 2 MG/DL (ref 1.6–2.3)
MCH RBC QN AUTO: 35.7 PG (ref 26.6–33)
MCHC RBC AUTO-ENTMCNC: 35.9 G/DL (ref 31.5–35.7)
MCV RBC AUTO: 99 FL (ref 79–97)
MICRODELETION SYND BLD/T FISH: NORMAL
MICRODELETION SYND BLD/T FISH: NORMAL
PLATELET # BLD AUTO: 175 X10E3/UL (ref 150–450)
POTASSIUM SERPL-SCNC: 4.5 MMOL/L (ref 3.5–5.2)
PROT SERPL-MCNC: 6.8 G/DL (ref 6–8.5)
PTH-INTACT SERPL-MCNC: 39 PG/ML (ref 15–65)
RBC # BLD AUTO: 4.6 X10E6/UL (ref 4.14–5.8)
SL AMB EGFR AFRICAN AMERICAN: 63 ML/MIN/1.73
SL AMB EGFR NON AFRICAN AMERICAN: 54 ML/MIN/1.73
SL AMB PDF IMAGE: NORMAL
SL AMB VLDL CHOLESTEROL CALC: ABNORMAL MG/DL (ref 5–40)
SODIUM SERPL-SCNC: 138 MMOL/L (ref 134–144)
TRIGL SERPL-MCNC: 496 MG/DL (ref 0–149)
WBC # BLD AUTO: 5.9 X10E3/UL (ref 3.4–10.8)

## 2020-07-07 DIAGNOSIS — E78.01 FAMILIAL HYPERCHOLESTEROLEMIA: ICD-10-CM

## 2020-07-07 RX ORDER — ROSUVASTATIN CALCIUM 10 MG/1
TABLET, COATED ORAL
Qty: 30 TABLET | Refills: 2 | Status: SHIPPED | OUTPATIENT
Start: 2020-07-07 | End: 2020-09-07

## 2020-07-11 DIAGNOSIS — E78.1 HYPERTRIGLYCERIDEMIA: ICD-10-CM

## 2020-07-11 DIAGNOSIS — E55.9 VITAMIN D DEFICIENCY: Primary | ICD-10-CM

## 2020-07-11 RX ORDER — ERGOCALCIFEROL 1.25 MG/1
50000 CAPSULE ORAL WEEKLY
Qty: 12 CAPSULE | Refills: 0 | Status: SHIPPED | OUTPATIENT
Start: 2020-07-11 | End: 2020-09-09

## 2020-07-11 RX ORDER — FENOFIBRATE 145 MG/1
145 TABLET, COATED ORAL DAILY
Qty: 90 TABLET | Refills: 0 | Status: SHIPPED | OUTPATIENT
Start: 2020-07-11 | End: 2020-10-05

## 2020-07-13 ENCOUNTER — TELEPHONE (OUTPATIENT)
Dept: FAMILY MEDICINE CLINIC | Facility: CLINIC | Age: 50
End: 2020-07-13

## 2020-07-13 NOTE — TELEPHONE ENCOUNTER
[facility] tc/cma----- Message from Millie Arvizu MD sent at 7/11/2020  3:11 PM EDT -----  Please inform pt that triglycerides went up and vit d is low--I sent in 2 new medications for pt to ADD ON to what he is already taking-  Fenofibrate to take once daily and  Vit D--to take once WEEKLY  If pt has further questions please schedule visit  Otherwise can schedule follow-up in 6-8 weeks to see how he is tolerating the medications

## 2020-07-13 NOTE — TELEPHONE ENCOUNTER
[facility] tc/cma----- Message from Saintclair Halter, MD sent at 7/11/2020  3:11 PM EDT -----  Please inform pt that triglycerides went up and vit d is low--I sent in 2 new medications for pt to ADD ON to what he is already taking-  Fenofibrate to take once daily and  Vit D--to take once WEEKLY  If pt has further questions please schedule visit  Otherwise can schedule follow-up in 6-8 weeks to see how he is tolerating the medications

## 2020-07-20 ENCOUNTER — TELEPHONE (OUTPATIENT)
Dept: FAMILY MEDICINE CLINIC | Facility: CLINIC | Age: 50
End: 2020-07-20

## 2020-07-20 NOTE — TELEPHONE ENCOUNTER
[facility] tc/cma----- Message from Matthias Goldmann, MD sent at 7/11/2020  3:11 PM EDT -----  Please inform pt that triglycerides went up and vit d is low--I sent in 2 new medications for pt to ADD ON to what he is already taking-  Fenofibrate to take once daily and  Vit D--to take once WEEKLY  If pt has further questions please schedule visit  Otherwise can schedule follow-up in 6-8 weeks to see how he is tolerating the medications

## 2020-09-02 ENCOUNTER — OFFICE VISIT (OUTPATIENT)
Dept: FAMILY MEDICINE CLINIC | Facility: CLINIC | Age: 50
End: 2020-09-02
Payer: COMMERCIAL

## 2020-09-02 VITALS
SYSTOLIC BLOOD PRESSURE: 132 MMHG | WEIGHT: 184.2 LBS | HEART RATE: 78 BPM | DIASTOLIC BLOOD PRESSURE: 88 MMHG | BODY MASS INDEX: 27.28 KG/M2 | RESPIRATION RATE: 14 BRPM | HEIGHT: 69 IN | TEMPERATURE: 98.2 F

## 2020-09-02 DIAGNOSIS — F20.9 SCHIZOPHRENIA, UNSPECIFIED TYPE (HCC): ICD-10-CM

## 2020-09-02 DIAGNOSIS — E55.9 VITAMIN D DEFICIENCY: ICD-10-CM

## 2020-09-02 DIAGNOSIS — Z00.00 PHYSICAL EXAM: Primary | ICD-10-CM

## 2020-09-02 DIAGNOSIS — I10 ESSENTIAL HYPERTENSION: ICD-10-CM

## 2020-09-02 DIAGNOSIS — E78.1 HYPERTRIGLYCERIDEMIA: ICD-10-CM

## 2020-09-02 PROBLEM — R30.0 DYSURIA: Status: ACTIVE | Noted: 2020-09-02

## 2020-09-02 PROBLEM — E78.01 FAMILIAL HYPERCHOLESTEROLEMIA: Status: ACTIVE | Noted: 2020-09-02

## 2020-09-02 LAB
SL AMB  POCT GLUCOSE, UA: ABNORMAL
SL AMB LEUKOCYTE ESTERASE,UA: ABNORMAL
SL AMB POCT BILIRUBIN,UA: ABNORMAL
SL AMB POCT BLOOD,UA: 250
SL AMB POCT CLARITY,UA: CLEAR
SL AMB POCT COLOR,UA: YELLOW
SL AMB POCT KETONES,UA: ABNORMAL
SL AMB POCT NITRITE,UA: ABNORMAL
SL AMB POCT PH,UA: 6.5
SL AMB POCT SPECIFIC GRAVITY,UA: 1.01
SL AMB POCT URINE PROTEIN: 30
SL AMB POCT UROBILINOGEN: ABNORMAL

## 2020-09-02 PROCEDURE — 3079F DIAST BP 80-89 MM HG: CPT | Performed by: FAMILY MEDICINE

## 2020-09-02 PROCEDURE — 99396 PREV VISIT EST AGE 40-64: CPT | Performed by: FAMILY MEDICINE

## 2020-09-02 PROCEDURE — 81003 URINALYSIS AUTO W/O SCOPE: CPT | Performed by: FAMILY MEDICINE

## 2020-09-02 NOTE — PROGRESS NOTES
FAMILY [facility] HEALTH MAINTENANCE OFFICE VISIT  [facility] - [facility]    NAME: Ramin Gongora  AGE: 53 y o  SEX: male  : 1970     DATE: 2020    Assessment and Plan     1  Physical exam  -     POCT urine dip auto non-scope    2  Essential hypertension  -     POCT urine dip auto non-scope  -     Comprehensive metabolic panel; Future  -     Comprehensive metabolic panel    3  Vitamin D deficiency  -     Vitamin D 25 hydroxy; Future  -     Vitamin D 25 hydroxy    4  Hypertriglyceridemia  -     Comprehensive metabolic panel; Future  -     Lipid Panel with Direct LDL reflex; Future  -     Comprehensive metabolic panel  -     Lipid Panel with Direct LDL reflex  -     TSH, 3rd generation; Future  -     TSH, 3rd generation    5  BMI 27 0-27 9,adult      · Patient Counseling:   · Nutrition: Stressed importance of a well balanced diet, moderation of sodium/saturated fat, caloric balance and sufficient intake of fiber  · Exercise: Stressed the importance of regular exercise with a goal of 150 minutes per week  · Dental Health: Discussed daily flossing and brushing and regular dental visits   · Sexuality: Discussed sexually transmitted infections, use of condoms and prevention of unintended pregnancy  · Alcohol Use:  Recommended moderation of alcohol intake  · Injury Prevention: Discussed Safety Belts, Safety Helmets, and Smoke Detectors    · Immunizations reviewed: Risks and Benefits discussed  · Discussed benefits of:  Colon Cancer Screening, Prostate Cancer Screening  and Screening labs   BMI Counseling: Body mass index is 27 2 kg/m²  Discussed with patient's BMI with him   The BMI is above normal  Nutrition recommendations include 3-5 servings of fruits/vegetables daily, reducing fast food intake, consuming healthier snacks, decreasing soda and/or juice intake, moderation in carbohydrate intake, increasing intake of lean protein, reducing intake of saturated fat and trans fat and reducing intake of cholesterol  Exercise recommendations include exercising 3-5 times per week and strength training exercises  Chief Complaint     Chief Complaint   Patient presents with    Annual Exam     request bw -cholesterol , vitamin d       History of Present Illness     HPI    Well Adult Physical   Patient here for a comprehensive physical exam       Diet and Physical Activity  Diet: fluctuates  Exercise: intermittently      Depression Screen  PHQ-9 Depression Screening    PHQ-9:    Frequency of the following problems over the past two weeks:               General Health  Hearing: Normal:  bilateral  Vision: wears glasses  Dental: due for dental check    Reproductive Health  Denies nocturia      The following portions of the patient's history were reviewed and updated as appropriate: allergies, current medications, past family history, past medical history, past social history, past surgical history and problem list     Review of Systems     Review of Systems   Constitutional: Positive for fatigue  Negative for fever  Eyes:        Wears glasses   Respiratory: Negative  Cardiovascular: Negative  Gastrointestinal: Negative  Musculoskeletal: Positive for arthralgias  Skin: Negative for rash  Allergic/Immunologic: Positive for environmental allergies  Neurological: Negative for seizures and syncope  Psychiatric/Behavioral: Positive for decreased concentration and sleep disturbance  The patient is nervous/anxious  Past Medical History     Past Medical History:   Diagnosis Date    Hypertension     Mood disorder ([facility] )     Psychiatric disorder     Schizophrenia ([facility] )     Schizophrenia ([facility] )        Past Surgical History     History reviewed  No pertinent surgical history      Social History     Social History     Socioeconomic History    Marital status: Single     Spouse name: None    Number of children: None    Years of education: None    Highest education level: None   Occupational History    None   Social Needs    Financial resource strain: None    Food insecurity     Worry: None     Inability: None    Transportation needs     Medical: None     Non-medical: None   Tobacco Use    Smoking status: Current Every Day Smoker     Packs/day: 1 00    Smokeless tobacco: Never Used   Substance and Sexual Activity    Alcohol use: Yes     Comment: weekends    Drug use: No    Sexual activity: None   Lifestyle    Physical activity     Days per week: None     Minutes per session: None    Stress: None   Relationships    Social connections     Talks on phone: None     Gets together: None     Attends Alevism service: None     Active member of club or organization: None     Attends meetings of clubs or organizations: None     Relationship status: None    Intimate partner violence     Fear of current or ex partner: None     Emotionally abused: None     Physically abused: None     Forced sexual activity: None   Other Topics Concern    None   Social History Narrative    None       Family History     Family History   Problem Relation Age of Onset    Breast cancer Mother     Cancer Father         bone/spinal       Current Medications       Current Outpatient Medications:     amLODIPine (NORVASC) 5 mg tablet, TAKE 1 TABLET BY MOUTH EVERY DAY, Disp: 90 tablet, Rfl: 1    fenofibrate (TRICOR) 145 mg tablet, Take 1 tablet (145 mg total) by mouth daily, Disp: 90 tablet, Rfl: 0    metoprolol succinate (TOPROL-XL) 50 mg 24 hr tablet, TAKE 1 TABLET BY MOUTH ONCE A DAY, Disp: 30 tablet, Rfl: 5    paliperidone (INVEGA) 6 MG 24 hr tablet, Take 6 mg by mouth every morning, Disp: , Rfl:     ergocalciferol (VITAMIN D2) 50,000 units, TAKE 1 CAPSULE BY MOUTH ONE TIME PER WEEK, Disp: 12 capsule, Rfl: 0    rosuvastatin (CRESTOR) 10 MG tablet, TAKE 1 TABLET BY MOUTH EVERY DAY, Disp: 30 tablet, Rfl: 2     Allergies     Allergies   Allergen Reactions    Geodon [Ziprasidone] Fatigue    Haldol [Haloperidol] Fatigue       Objective     /88 (BP Location: Left arm, Patient Position: Sitting, Cuff Size: Large)   Pulse 78   Temp 98 2 °F (36 8 °C)   Resp 14   Ht 5' 9" (1 753 m)   Wt 83 6 kg (184 lb 3 2 oz)   BMI 27 20 kg/m²      Physical Exam  Vitals signs and nursing note reviewed  Constitutional:       General: He is not in acute distress  Appearance: He is well-developed  HENT:      Mouth/Throat:      Pharynx: No oropharyngeal exudate  Eyes:      General: No scleral icterus  Conjunctiva/sclera: Conjunctivae normal    Neck:      Musculoskeletal: Neck supple  Cardiovascular:      Rate and Rhythm: Normal rate and regular rhythm  Pulmonary:      Effort: Pulmonary effort is normal  No respiratory distress  Breath sounds: Normal breath sounds  Abdominal:      General: Bowel sounds are normal       Palpations: Abdomen is soft  Tenderness: There is no abdominal tenderness  Musculoskeletal: Normal range of motion  Skin:     General: Skin is warm and dry  Capillary Refill: Capillary refill takes less than 2 seconds  Neurological:      Mental Status: He is alert and oriented to person, place, and time  Cranial Nerves: No cranial nerve deficit        Comments: No acute focal deficit   Psychiatric:      Comments: Mood stable, pleasant, cooperative            Visual Acuity Screening    Right eye Left eye Both eyes   Without correction:      With correction: 20/25 20/40 20/25       Kianna Rolon MD  [address]

## 2020-09-07 DIAGNOSIS — E78.01 FAMILIAL HYPERCHOLESTEROLEMIA: ICD-10-CM

## 2020-09-07 RX ORDER — ROSUVASTATIN CALCIUM 10 MG/1
TABLET, COATED ORAL
Qty: 30 TABLET | Refills: 2 | Status: SHIPPED | OUTPATIENT
Start: 2020-09-07 | End: 2020-10-20 | Stop reason: SDUPTHER

## 2020-09-09 DIAGNOSIS — E55.9 VITAMIN D DEFICIENCY: ICD-10-CM

## 2020-09-09 RX ORDER — ERGOCALCIFEROL 1.25 MG/1
CAPSULE ORAL
Qty: 12 CAPSULE | Refills: 0 | Status: SHIPPED | OUTPATIENT
Start: 2020-09-09 | End: 2020-10-20 | Stop reason: SDUPTHER

## 2020-09-30 LAB
25(OH)D3+25(OH)D2 SERPL-MCNC: 39.1 NG/ML (ref 30–100)
ALBUMIN SERPL-MCNC: 4.3 G/DL (ref 4–5)
ALBUMIN/GLOB SERPL: 1.9 {RATIO} (ref 1.2–2.2)
ALP SERPL-CCNC: 38 IU/L (ref 39–117)
ALT SERPL-CCNC: 13 IU/L (ref 0–44)
AST SERPL-CCNC: 16 IU/L (ref 0–40)
BILIRUB SERPL-MCNC: 0.3 MG/DL (ref 0–1.2)
BUN SERPL-MCNC: 21 MG/DL (ref 6–24)
BUN/CREAT SERPL: 13 (ref 9–20)
CALCIUM SERPL-MCNC: 9.7 MG/DL (ref 8.7–10.2)
CHLORIDE SERPL-SCNC: 105 MMOL/L (ref 96–106)
CHOLEST SERPL-MCNC: 187 MG/DL (ref 100–199)
CO2 SERPL-SCNC: 21 MMOL/L (ref 20–29)
CREAT SERPL-MCNC: 1.61 MG/DL (ref 0.76–1.27)
GLOBULIN SER-MCNC: 2.3 G/DL (ref 1.5–4.5)
GLUCOSE SERPL-MCNC: 95 MG/DL (ref 65–99)
HDLC SERPL-MCNC: 57 MG/DL
LDLC SERPL CALC-MCNC: 96 MG/DL (ref 0–99)
LDLC/HDLC SERPL: 1.7 RATIO (ref 0–3.6)
MICRODELETION SYND BLD/T FISH: NORMAL
MICRODELETION SYND BLD/T FISH: NORMAL
POTASSIUM SERPL-SCNC: 4.8 MMOL/L (ref 3.5–5.2)
PROT SERPL-MCNC: 6.6 G/DL (ref 6–8.5)
SL AMB EGFR AFRICAN AMERICAN: 57 ML/MIN/1.73
SL AMB EGFR NON AFRICAN AMERICAN: 49 ML/MIN/1.73
SL AMB PDF IMAGE: NORMAL
SL AMB VLDL CHOLESTEROL CALC: 34 MG/DL (ref 5–40)
SODIUM SERPL-SCNC: 140 MMOL/L (ref 134–144)
TRIGL SERPL-MCNC: 203 MG/DL (ref 0–149)
TSH SERPL DL<=0.005 MIU/L-ACNC: 1.74 UIU/ML (ref 0.45–4.5)

## 2020-10-01 ENCOUNTER — TELEPHONE (OUTPATIENT)
Dept: FAMILY MEDICINE CLINIC | Facility: CLINIC | Age: 50
End: 2020-10-01

## 2020-10-05 DIAGNOSIS — E78.1 HYPERTRIGLYCERIDEMIA: ICD-10-CM

## 2020-10-05 RX ORDER — FENOFIBRATE 145 MG/1
TABLET, COATED ORAL
Qty: 30 TABLET | Refills: 2 | Status: SHIPPED | OUTPATIENT
Start: 2020-10-05 | End: 2020-10-20 | Stop reason: SDUPTHER

## 2020-10-20 ENCOUNTER — OFFICE VISIT (OUTPATIENT)
Dept: FAMILY MEDICINE CLINIC | Facility: CLINIC | Age: 50
End: 2020-10-20
Payer: COMMERCIAL

## 2020-10-20 VITALS
WEIGHT: 184.6 LBS | HEIGHT: 69 IN | SYSTOLIC BLOOD PRESSURE: 134 MMHG | BODY MASS INDEX: 27.34 KG/M2 | RESPIRATION RATE: 16 BRPM | DIASTOLIC BLOOD PRESSURE: 90 MMHG | TEMPERATURE: 97.6 F | HEART RATE: 76 BPM

## 2020-10-20 DIAGNOSIS — E55.9 VITAMIN D DEFICIENCY: ICD-10-CM

## 2020-10-20 DIAGNOSIS — Z12.5 PROSTATE CANCER SCREENING: ICD-10-CM

## 2020-10-20 DIAGNOSIS — I10 ESSENTIAL HYPERTENSION: Primary | ICD-10-CM

## 2020-10-20 DIAGNOSIS — E78.01 FAMILIAL HYPERCHOLESTEROLEMIA: ICD-10-CM

## 2020-10-20 DIAGNOSIS — F20.9 SCHIZOPHRENIA, UNSPECIFIED TYPE (HCC): ICD-10-CM

## 2020-10-20 DIAGNOSIS — E78.1 HYPERTRIGLYCERIDEMIA: ICD-10-CM

## 2020-10-20 PROCEDURE — 99214 OFFICE O/P EST MOD 30 MIN: CPT | Performed by: FAMILY MEDICINE

## 2020-10-20 RX ORDER — FENOFIBRATE 145 MG/1
145 TABLET, COATED ORAL DAILY
Qty: 90 TABLET | Refills: 1 | Status: SHIPPED | OUTPATIENT
Start: 2020-10-20 | End: 2021-08-23

## 2020-10-20 RX ORDER — AMLODIPINE BESYLATE 5 MG/1
5 TABLET ORAL DAILY
Qty: 90 TABLET | Refills: 1 | Status: SHIPPED | OUTPATIENT
Start: 2020-10-20 | End: 2021-05-06

## 2020-10-20 RX ORDER — ERGOCALCIFEROL 1.25 MG/1
50000 CAPSULE ORAL WEEKLY
Qty: 12 CAPSULE | Refills: 1 | Status: SHIPPED | OUTPATIENT
Start: 2020-10-20 | End: 2021-06-02

## 2020-10-20 RX ORDER — METOPROLOL SUCCINATE 50 MG/1
50 TABLET, EXTENDED RELEASE ORAL DAILY
Qty: 90 TABLET | Refills: 1 | Status: SHIPPED | OUTPATIENT
Start: 2020-10-20 | End: 2021-04-18

## 2020-10-20 RX ORDER — ROSUVASTATIN CALCIUM 10 MG/1
10 TABLET, COATED ORAL DAILY
Qty: 90 TABLET | Refills: 1 | Status: SHIPPED | OUTPATIENT
Start: 2020-10-20 | End: 2021-03-27

## 2020-11-17 PROBLEM — Z12.5 PROSTATE CANCER SCREENING: Status: ACTIVE | Noted: 2020-09-02

## 2021-03-10 DIAGNOSIS — Z23 ENCOUNTER FOR IMMUNIZATION: ICD-10-CM

## 2021-03-26 DIAGNOSIS — E78.01 FAMILIAL HYPERCHOLESTEROLEMIA: ICD-10-CM

## 2021-03-27 RX ORDER — ROSUVASTATIN CALCIUM 10 MG/1
TABLET, COATED ORAL
Qty: 30 TABLET | Refills: 5 | Status: SHIPPED | OUTPATIENT
Start: 2021-03-27 | End: 2021-11-15

## 2021-03-31 ENCOUNTER — IMMUNIZATIONS (OUTPATIENT)
Dept: FAMILY MEDICINE CLINIC | Facility: HOSPITAL | Age: 51
End: 2021-03-31

## 2021-03-31 DIAGNOSIS — Z23 ENCOUNTER FOR IMMUNIZATION: Primary | ICD-10-CM

## 2021-03-31 PROCEDURE — 0001A SARS-COV-2 / COVID-19 MRNA VACCINE (PFIZER-BIONTECH) 30 MCG: CPT

## 2021-03-31 PROCEDURE — 91300 SARS-COV-2 / COVID-19 MRNA VACCINE (PFIZER-BIONTECH) 30 MCG: CPT

## 2021-04-18 DIAGNOSIS — I10 ESSENTIAL HYPERTENSION: ICD-10-CM

## 2021-04-18 RX ORDER — METOPROLOL SUCCINATE 50 MG/1
TABLET, EXTENDED RELEASE ORAL
Qty: 30 TABLET | Refills: 5 | Status: SHIPPED | OUTPATIENT
Start: 2021-04-18 | End: 2021-11-08 | Stop reason: SDUPTHER

## 2021-04-21 ENCOUNTER — IMMUNIZATIONS (OUTPATIENT)
Dept: FAMILY MEDICINE CLINIC | Facility: HOSPITAL | Age: 51
End: 2021-04-21

## 2021-04-21 DIAGNOSIS — Z23 ENCOUNTER FOR IMMUNIZATION: Primary | ICD-10-CM

## 2021-04-21 PROCEDURE — 0002A SARS-COV-2 / COVID-19 MRNA VACCINE (PFIZER-BIONTECH) 30 MCG: CPT

## 2021-04-21 PROCEDURE — 91300 SARS-COV-2 / COVID-19 MRNA VACCINE (PFIZER-BIONTECH) 30 MCG: CPT

## 2021-05-06 DIAGNOSIS — I10 ESSENTIAL HYPERTENSION: ICD-10-CM

## 2021-05-06 RX ORDER — AMLODIPINE BESYLATE 5 MG/1
TABLET ORAL
Qty: 90 TABLET | Refills: 1 | Status: SHIPPED | OUTPATIENT
Start: 2021-05-06 | End: 2021-10-28

## 2021-05-21 LAB
25(OH)D3+25(OH)D2 SERPL-MCNC: 36.2 NG/ML (ref 30–100)
ALBUMIN SERPL-MCNC: 4 G/DL (ref 4–5)
ALBUMIN/GLOB SERPL: 1.6 {RATIO} (ref 1.2–2.2)
ALP SERPL-CCNC: 33 IU/L (ref 48–121)
ALT SERPL-CCNC: 14 IU/L (ref 0–44)
AMYLASE SERPL-CCNC: 54 U/L (ref 31–110)
AST SERPL-CCNC: 17 IU/L (ref 0–40)
BILIRUB SERPL-MCNC: 0.3 MG/DL (ref 0–1.2)
BUN SERPL-MCNC: 22 MG/DL (ref 6–24)
BUN/CREAT SERPL: 12 (ref 9–20)
CALCIUM SERPL-MCNC: 9.5 MG/DL (ref 8.7–10.2)
CHLORIDE SERPL-SCNC: 107 MMOL/L (ref 96–106)
CHOLEST SERPL-MCNC: 212 MG/DL (ref 100–199)
CO2 SERPL-SCNC: 21 MMOL/L (ref 20–29)
CREAT SERPL-MCNC: 1.77 MG/DL (ref 0.76–1.27)
ERYTHROCYTE [DISTWIDTH] IN BLOOD BY AUTOMATED COUNT: 12.9 % (ref 11.6–15.4)
GLOBULIN SER-MCNC: 2.5 G/DL (ref 1.5–4.5)
GLUCOSE SERPL-MCNC: 103 MG/DL (ref 65–99)
HCT VFR BLD AUTO: 41.6 % (ref 37.5–51)
HDLC SERPL-MCNC: 57 MG/DL
HGB BLD-MCNC: 14.6 G/DL (ref 13–17.7)
LDLC SERPL CALC-MCNC: 117 MG/DL (ref 0–99)
LDLC/HDLC SERPL: 2.1 RATIO (ref 0–3.6)
LIPASE SERPL-CCNC: 36 U/L (ref 13–78)
MCH RBC QN AUTO: 35.1 PG (ref 26.6–33)
MCHC RBC AUTO-ENTMCNC: 35.1 G/DL (ref 31.5–35.7)
MCV RBC AUTO: 100 FL (ref 79–97)
MICRODELETION SYND BLD/T FISH: NORMAL
MICRODELETION SYND BLD/T FISH: NORMAL
PLATELET # BLD AUTO: 199 X10E3/UL (ref 150–450)
POTASSIUM SERPL-SCNC: 4.6 MMOL/L (ref 3.5–5.2)
PROT SERPL-MCNC: 6.5 G/DL (ref 6–8.5)
PSA SERPL-MCNC: 4.1 NG/ML (ref 0–4)
RBC # BLD AUTO: 4.16 X10E6/UL (ref 4.14–5.8)
SL AMB EGFR AFRICAN AMERICAN: 51 ML/MIN/1.73
SL AMB EGFR NON AFRICAN AMERICAN: 44 ML/MIN/1.73
SL AMB VLDL CHOLESTEROL CALC: 38 MG/DL (ref 5–40)
SODIUM SERPL-SCNC: 139 MMOL/L (ref 134–144)
TRIGL SERPL-MCNC: 222 MG/DL (ref 0–149)
WBC # BLD AUTO: 5.7 X10E3/UL (ref 3.4–10.8)

## 2021-05-24 ENCOUNTER — TELEPHONE (OUTPATIENT)
Dept: FAMILY MEDICINE CLINIC | Facility: CLINIC | Age: 51
End: 2021-05-24

## 2021-05-24 NOTE — TELEPHONE ENCOUNTER
----- Message from Luis Mason MD sent at 5/21/2021  4:47 PM EDT -----  Please schedule 30min in-office follow-up appt to review labs and check BP--thanks

## 2021-05-25 ENCOUNTER — RA CDI HCC (OUTPATIENT)
Dept: OTHER | Facility: HOSPITAL | Age: 51
End: 2021-05-25

## 2021-05-25 NOTE — PROGRESS NOTES
[place] [address]  coding opportunities             Chart reviewed, (number of) suggestions sent to provider: 1     Problem listed updated  Provider Accepted, (number of) suggestions accepted: 1        Patients insurance company: [employer] [place] (Medicare and [employer] for [employer] and [employer])     Visit status: Patient canceled the appointment        [place] [address]  coding opportunities             Chart reviewed, (number of) suggestions sent to provider: 1     Problem listed updated   Provider Accepted, (number of) suggestions accepted: 1        Patients insurance company: [employer] [place] (Medicare and Commercial for [employer] and [employer])           [place] [address]  coding opportunities             Chart reviewed, (number of) suggestions sent to provider: 1  F20 9  Schizophrenia, unspecified         Patients insurance company: [employer] [place] (Medicare and Commercial for [employer] and [employer])

## 2021-06-01 DIAGNOSIS — E55.9 VITAMIN D DEFICIENCY: ICD-10-CM

## 2021-06-02 ENCOUNTER — RA CDI HCC (OUTPATIENT)
Dept: OTHER | Facility: HOSPITAL | Age: 51
End: 2021-06-02

## 2021-06-02 RX ORDER — ERGOCALCIFEROL 1.25 MG/1
CAPSULE ORAL
Qty: 12 CAPSULE | Refills: 1 | Status: SHIPPED | OUTPATIENT
Start: 2021-06-02 | End: 2021-11-05

## 2021-06-02 NOTE — PROGRESS NOTES
[facility] [employer]  coding opportunities             Chart reviewed, (number of) suggestions sent to provider: 1     Problem listed updated  Provider Accepted, (number of) suggestions accepted: 1     Provider Rejected Suggestions for: Note remains open and unfinished  suggestion is in the dx list              Patients insurance company: [employer] (Medicare and Commercial for [employer] and [employer])     Visit status: Patient arrived for their scheduled appointment        [facility] [employer]  coding opportunities             Chart reviewed, (number of) suggestions sent to provider: 1     Problem listed updated   Provider Accepted, (number of) suggestions accepted: 1        Patients insurance company: [employer] (Medicare and Commercial for [employer] and [employer])           [facility] [employer]  coding opportunities             Chart reviewed, (number of) suggestions sent to provider: 1  F20 9  Schizophrenia, unspecified         Patients insurance company: [employer] (Medicare and Commercial for [employer] and [employer])

## 2021-06-08 ENCOUNTER — OFFICE VISIT (OUTPATIENT)
Dept: FAMILY MEDICINE CLINIC | Facility: CLINIC | Age: 51
End: 2021-06-08
Payer: COMMERCIAL

## 2021-06-08 VITALS
BODY MASS INDEX: 27.81 KG/M2 | RESPIRATION RATE: 16 BRPM | WEIGHT: 187.8 LBS | HEIGHT: 69 IN | SYSTOLIC BLOOD PRESSURE: 140 MMHG | HEART RATE: 88 BPM | DIASTOLIC BLOOD PRESSURE: 84 MMHG | TEMPERATURE: 98.4 F

## 2021-06-08 DIAGNOSIS — F20.9 SCHIZOPHRENIA, UNSPECIFIED TYPE (HCC): ICD-10-CM

## 2021-06-08 DIAGNOSIS — E78.2 MIXED HYPERLIPIDEMIA: ICD-10-CM

## 2021-06-08 DIAGNOSIS — I10 ESSENTIAL HYPERTENSION: Primary | ICD-10-CM

## 2021-06-08 DIAGNOSIS — Z12.11 ENCOUNTER FOR COLORECTAL CANCER SCREENING: ICD-10-CM

## 2021-06-08 DIAGNOSIS — Z12.12 ENCOUNTER FOR COLORECTAL CANCER SCREENING: ICD-10-CM

## 2021-06-08 DIAGNOSIS — R97.20 ELEVATED PSA: ICD-10-CM

## 2021-06-08 PROCEDURE — 99214 OFFICE O/P EST MOD 30 MIN: CPT | Performed by: FAMILY MEDICINE

## 2021-06-09 ENCOUNTER — TELEPHONE (OUTPATIENT)
Dept: FAMILY MEDICINE CLINIC | Facility: CLINIC | Age: 51
End: 2021-06-09

## 2021-06-26 PROBLEM — R97.20 ELEVATED PSA: Status: ACTIVE | Noted: 2021-06-26

## 2021-06-26 NOTE — PROGRESS NOTES
Assessment/Plan:    1  Essential hypertension  Assessment & Plan:  BP in acceptable range  Cont current meds  sched eye exam      2  Mixed hyperlipidemia  Assessment & Plan:  Cont statin, low chol diet  Inc exercise as tolerated      3  Schizophrenia, unspecified type [facility])  Assessment & Plan:  Mood stable  Cont follow-up w psychiatry      4  Encounter for colorectal cancer screening  -     Ambulatory referral to Gastroenterology; Future    5  Elevated PSA  -     Ambulatory referral to Urology; Future    6  BMI 27 0-27 9,adult    BMI Counseling: Body mass index is 27 73 kg/m²  The BMI is above normal  Nutrition recommendations include encouraging healthy choices of fruits and vegetables, consuming healthier snacks, moderation in carbohydrate intake, increasing intake of lean protein, reducing intake of saturated and trans fat and reducing intake of cholesterol  Exercise recommendations include exercising 3-5 times per week and strength training exercises  No pharmacotherapy was ordered  Subjective:      Patient ID: Princess Mccollum is a 49 y o  male  Chief Complaint   Patient presents with    Hypertension     f/u    Results     labs       HPI  Follow-up  Sys BP borderline  Labs reviewed  PSA  Sl above upper level=4 1  Elevated BUN/creat  Lipids borderline high  Borderline hi MCV  Overall feels well  Received  COVID vaccine-Pfizer  Mood stable--cont to follow w psych--Dr Siobhan Field [facility]-#[phone]    The following portions of the patient's history were reviewed and updated as appropriate: allergies, current medications, past family history, past medical history, past social history, past surgical history and problem list     Review of Systems   Constitutional: Positive for fatigue  Negative for fever  Respiratory: Negative  Cardiovascular: Negative  Gastrointestinal: Negative      Psychiatric/Behavioral:        Hx schizophrenia         Current Outpatient Medications   Medication Sig Dispense Refill    amLODIPine (NORVASC) 5 mg tablet TAKE 1 TABLET BY MOUTH EVERY DAY 90 tablet 1    ergocalciferol (VITAMIN D2) 50,000 units TAKE 1 CAPSULE BY MOUTH ONE TIME PER WEEK 12 capsule 1    fenofibrate (TRICOR) 145 mg tablet Take 1 tablet (145 mg total) by mouth daily 90 tablet 1    metoprolol succinate (TOPROL-XL) 50 mg 24 hr tablet TAKE 1 TABLET BY MOUTH EVERY DAY 30 tablet 5    paliperidone (INVEGA) 6 MG 24 hr tablet Take 6 mg by mouth every morning      rosuvastatin (CRESTOR) 10 MG tablet TAKE 1 TABLET BY MOUTH EVERY DAY 30 tablet 5     No current facility-administered medications for this visit  Objective:    /84   Pulse 88   Temp 98 4 °F (36 9 °C)   Resp 16   Ht 5' 9" (1 753 m)   Wt 85 2 kg (187 lb 12 8 oz)   BMI 27 73 kg/m²        Physical Exam  Vitals and nursing note reviewed  Constitutional:       General: He is not in acute distress  Appearance: He is well-developed  HENT:      Mouth/Throat:      Pharynx: No oropharyngeal exudate  Eyes:      General: No scleral icterus  Conjunctiva/sclera: Conjunctivae normal    Cardiovascular:      Rate and Rhythm: Normal rate and regular rhythm  Pulses: Normal pulses  Pulmonary:      Effort: Pulmonary effort is normal  No respiratory distress  Breath sounds: Normal breath sounds  Abdominal:      General: Bowel sounds are normal       Palpations: Abdomen is soft  Tenderness: There is no abdominal tenderness  Musculoskeletal:         General: No tenderness  Normal range of motion  Cervical back: Neck supple  Skin:     General: Skin is warm and dry  Capillary Refill: Capillary refill takes less than 2 seconds  Coloration: Skin is not jaundiced  Neurological:      Mental Status: He is alert and oriented to person, place, and time  Cranial Nerves: No cranial nerve deficit        Comments: No acute focal deficit   Psychiatric:      Comments: Mood stable, pleasant, cooperative Zelda Roque MD

## 2021-07-14 ENCOUNTER — OFFICE VISIT (OUTPATIENT)
Dept: GASTROENTEROLOGY | Facility: CLINIC | Age: 51
End: 2021-07-14
Payer: COMMERCIAL

## 2021-07-14 VITALS
SYSTOLIC BLOOD PRESSURE: 134 MMHG | HEART RATE: 75 BPM | HEIGHT: 69 IN | DIASTOLIC BLOOD PRESSURE: 92 MMHG | TEMPERATURE: 97.7 F | WEIGHT: 188 LBS | BODY MASS INDEX: 27.85 KG/M2

## 2021-07-14 DIAGNOSIS — Z12.12 ENCOUNTER FOR COLORECTAL CANCER SCREENING: Primary | ICD-10-CM

## 2021-07-14 DIAGNOSIS — Z12.11 ENCOUNTER FOR COLORECTAL CANCER SCREENING: Primary | ICD-10-CM

## 2021-07-14 PROCEDURE — 3080F DIAST BP >= 90 MM HG: CPT | Performed by: INTERNAL MEDICINE

## 2021-07-14 PROCEDURE — 3008F BODY MASS INDEX DOCD: CPT | Performed by: INTERNAL MEDICINE

## 2021-07-14 PROCEDURE — 3075F SYST BP GE 130 - 139MM HG: CPT | Performed by: INTERNAL MEDICINE

## 2021-07-14 PROCEDURE — 4004F PT TOBACCO SCREEN RCVD TLK: CPT | Performed by: INTERNAL MEDICINE

## 2021-07-14 PROCEDURE — 99243 OFF/OP CNSLTJ NEW/EST LOW 30: CPT | Performed by: INTERNAL MEDICINE

## 2021-07-14 NOTE — H&P (VIEW-ONLY)
Consultation - [facility]  Jhoana Payne 1970 male         Chief Complaint:  For colonoscopy    HPI:   52-year-old male with history of schizophrenia, hyperlipidemia, hypertension was referred for colonoscopy  Patient never had colonoscopy in the past   Patient has regular bowel movements and denies any blood or mucus in the stool  Appetite is good and denies any recent weight loss  Denies any abdominal pain, nausea, or vomiting  Has no heartburn or acid reflux  Denies any difficulty swallowing  REVIEW OF SYSTEMS: Review of Systems   Constitutional: Negative for activity change, appetite change, chills, diaphoresis, fatigue, fever and unexpected weight change  HENT: Negative for ear discharge, ear pain, facial swelling, hearing loss, nosebleeds, sore throat, tinnitus and voice change  Eyes: Negative for pain, discharge, redness, itching and visual disturbance  Respiratory: Negative for apnea, cough, chest tightness, shortness of breath and wheezing  Cardiovascular: Negative for chest pain and palpitations  Gastrointestinal:        As noted in HPI   Endocrine: Negative for cold intolerance, heat intolerance and polyuria  Genitourinary: Negative for difficulty urinating, dysuria, flank pain, hematuria and urgency  Musculoskeletal: Negative for arthralgias, back pain, gait problem, joint swelling and myalgias  Skin: Negative for rash and wound  Neurological: Negative for dizziness, tremors, seizures, speech difficulty, light-headedness, numbness and headaches  Hematological: Negative for adenopathy  Does not bruise/bleed easily  Psychiatric/Behavioral: Negative for agitation, behavioral problems and confusion  The patient is not nervous/anxious  Past Medical History:   Diagnosis Date    Hypertension     Mood disorder ([facility] )     Psychiatric disorder     Schizophrenia ([facility] )     Schizophrenia ([facility] )       History reviewed   No pertinent surgical history  Social History     Socioeconomic History    Marital status: Single     Spouse name: Not on file    Number of children: Not on file    Years of education: Not on file    Highest education level: Not on file   Occupational History    Not on file   Tobacco Use    Smoking status: Current Every Day Smoker     Packs/day: 1 00    Smokeless tobacco: Never Used   Vaping Use    Vaping Use: Never used   Substance and Sexual Activity    Alcohol use: Yes     Comment: weekends    Drug use: No    Sexual activity: Not on file   Other Topics Concern    Not on file   Social History Narrative    Not on file     Social Determinants of Health     Financial Resource Strain:     Difficulty of Paying Living Expenses:    Food Insecurity:     Worried About Running Out of Food in the Last Year:     Ran Out of Food in the Last Year:    Transportation Needs:     Lack of Transportation (Medical):      Lack of Transportation (Non-Medical):    Physical Activity:     Days of Exercise per Week:     Minutes of Exercise per Session:    Stress:     Feeling of Stress :    Social Connections:     Frequency of Communication with Friends and Family:     Frequency of Social Gatherings with Friends and Family:     Attends Mormon Services:     Active Member of Clubs or Organizations:     Attends Club or Organization Meetings:     Marital Status:    Intimate Partner Violence:     Fear of Current or Ex-Partner:     Emotionally Abused:     Physically Abused:     Sexually Abused:      Family History   Problem Relation Age of Onset    Breast cancer Mother     Cancer Father         bone/spinal     Geodon [ziprasidone] and Haldol [haloperidol]  Current Outpatient Medications   Medication Sig Dispense Refill    amLODIPine (NORVASC) 5 mg tablet TAKE 1 TABLET BY MOUTH EVERY DAY 90 tablet 1    ergocalciferol (VITAMIN D2) 50,000 units TAKE 1 CAPSULE BY MOUTH ONE TIME PER WEEK 12 capsule 1    fenofibrate (TRICOR) 145 mg tablet Take 1 tablet (145 mg total) by mouth daily 90 tablet 1    metoprolol succinate (TOPROL-XL) 50 mg 24 hr tablet TAKE 1 TABLET BY MOUTH EVERY DAY 30 tablet 5    paliperidone (INVEGA) 6 MG 24 hr tablet Take 6 mg by mouth every morning      rosuvastatin (CRESTOR) 10 MG tablet TAKE 1 TABLET BY MOUTH EVERY DAY 30 tablet 5     No current facility-administered medications for this visit  Blood pressure 134/92, pulse 75, temperature 97 7 °F (36 5 °C), height 5' 9" (1 753 m), weight 85 3 kg (188 lb)  PHYSICAL EXAM: Physical Exam  Constitutional:       Appearance: He is well-developed  HENT:      Head: Normocephalic and atraumatic  Eyes:      General: No scleral icterus  Right eye: No discharge  Left eye: No discharge  Conjunctiva/sclera: Conjunctivae normal       Pupils: Pupils are equal, round, and reactive to light  Neck:      Thyroid: No thyromegaly  Vascular: No JVD  Trachea: No tracheal deviation  Cardiovascular:      Rate and Rhythm: Normal rate and regular rhythm  Heart sounds: Normal heart sounds  No murmur heard  No friction rub  No gallop  Pulmonary:      Effort: Pulmonary effort is normal  No respiratory distress  Breath sounds: Normal breath sounds  No wheezing or rales  Chest:      Chest wall: No tenderness  Abdominal:      General: Bowel sounds are normal  There is no distension  Palpations: Abdomen is soft  There is no mass  Tenderness: There is no abdominal tenderness  There is no guarding or rebound  Hernia: No hernia is present  Musculoskeletal:      Cervical back: Neck supple  Lymphadenopathy:      Cervical: No cervical adenopathy  Skin:     General: Skin is warm and dry  Findings: No erythema or rash  Neurological:      Mental Status: He is alert and oriented to person, place, and time  Psychiatric:         Behavior: Behavior normal          Thought Content:  Thought content normal           Lab Results Component Value Date    WBC 5 7 05/20/2021    HGB 14 6 05/20/2021    HCT 41 6 05/20/2021     (H) 05/20/2021     05/20/2021     Lab Results   Component Value Date    CALCIUM 9 3 08/15/2018    K 4 6 05/20/2021    CO2 21 05/20/2021     (H) 05/20/2021    BUN 22 05/20/2021    CREATININE 1 77 (H) 05/20/2021     Lab Results   Component Value Date    ALT 14 05/20/2021    AST 17 05/20/2021     No results found for: INR, PROTIME    US retroperitoneal complete    Result Date: 9/20/2018  Impression: No hydronephrosis  Simple cyst in the right kidney  Minimal debris in the bladder  Workstation performed: [id number]       ASSESSMENT & PLAN:    Encounter for colorectal cancer screening  Screening for colon cancer - patient is at average risk for colon cancer screening  Rule out colorectal lesions including polyps or malignancy         -Schedule for colonoscopy  -High-fiber diet     -Patient was given instructions about the colonoscopy prep     -Patient was explained about  the risks and benefits of the procedure  Risks including but not limited to bleeding, infection, perforation were explained in detail  Also explained about less than 100% sensitivity with the exam and other alternatives

## 2021-07-14 NOTE — PROGRESS NOTES
Consultation - [facility]) [facility]  Andreea Ga 1970 male         Chief Complaint:  For colonoscopy    HPI:   56-year-old male with history of schizophrenia, hyperlipidemia, hypertension was referred for colonoscopy  Patient never had colonoscopy in the past   Patient has regular bowel movements and denies any blood or mucus in the stool  Appetite is good and denies any recent weight loss  Denies any abdominal pain, nausea, or vomiting  Has no heartburn or acid reflux  Denies any difficulty swallowing  REVIEW OF SYSTEMS: Review of Systems   Constitutional: Negative for activity change, appetite change, chills, diaphoresis, fatigue, fever and unexpected weight change  HENT: Negative for ear discharge, ear pain, facial swelling, hearing loss, nosebleeds, sore throat, tinnitus and voice change  Eyes: Negative for pain, discharge, redness, itching and visual disturbance  Respiratory: Negative for apnea, cough, chest tightness, shortness of breath and wheezing  Cardiovascular: Negative for chest pain and palpitations  Gastrointestinal:        As noted in HPI   Endocrine: Negative for cold intolerance, heat intolerance and polyuria  Genitourinary: Negative for difficulty urinating, dysuria, flank pain, hematuria and urgency  Musculoskeletal: Negative for arthralgias, back pain, gait problem, joint swelling and myalgias  Skin: Negative for rash and wound  Neurological: Negative for dizziness, tremors, seizures, speech difficulty, light-headedness, numbness and headaches  Hematological: Negative for adenopathy  Does not bruise/bleed easily  Psychiatric/Behavioral: Negative for agitation, behavioral problems and confusion  The patient is not nervous/anxious  Past Medical History:   Diagnosis Date    Hypertension     Mood disorder ([facility] )     Psychiatric disorder     Schizophrenia ([facility] )     Schizophrenia ([facility] )       History reviewed   No pertinent surgical history  Social History     Socioeconomic History    Marital status: Single     Spouse name: Not on file    Number of children: Not on file    Years of education: Not on file    Highest education level: Not on file   Occupational History    Not on file   Tobacco Use    Smoking status: Current Every Day Smoker     Packs/day: 1 00    Smokeless tobacco: Never Used   Vaping Use    Vaping Use: Never used   Substance and Sexual Activity    Alcohol use: Yes     Comment: weekends    Drug use: No    Sexual activity: Not on file   Other Topics Concern    Not on file   Social History Narrative    Not on file     Social Determinants of Health     Financial Resource Strain:     Difficulty of Paying Living Expenses:    Food Insecurity:     Worried About Running Out of Food in the Last Year:     Ran Out of Food in the Last Year:    Transportation Needs:     Lack of Transportation (Medical):      Lack of Transportation (Non-Medical):    Physical Activity:     Days of Exercise per Week:     Minutes of Exercise per Session:    Stress:     Feeling of Stress :    Social Connections:     Frequency of Communication with Friends and Family:     Frequency of Social Gatherings with Friends and Family:     Attends Bahai Services:     Active Member of Clubs or Organizations:     Attends Club or Organization Meetings:     Marital Status:    Intimate Partner Violence:     Fear of Current or Ex-Partner:     Emotionally Abused:     Physically Abused:     Sexually Abused:      Family History   Problem Relation Age of Onset    Breast cancer Mother     Cancer Father         bone/spinal     Geodon [ziprasidone] and Haldol [haloperidol]  Current Outpatient Medications   Medication Sig Dispense Refill    amLODIPine (NORVASC) 5 mg tablet TAKE 1 TABLET BY MOUTH EVERY DAY 90 tablet 1    ergocalciferol (VITAMIN D2) 50,000 units TAKE 1 CAPSULE BY MOUTH ONE TIME PER WEEK 12 capsule 1    fenofibrate (TRICOR) 145 mg tablet Take 1 tablet (145 mg total) by mouth daily 90 tablet 1    metoprolol succinate (TOPROL-XL) 50 mg 24 hr tablet TAKE 1 TABLET BY MOUTH EVERY DAY 30 tablet 5    paliperidone (INVEGA) 6 MG 24 hr tablet Take 6 mg by mouth every morning      rosuvastatin (CRESTOR) 10 MG tablet TAKE 1 TABLET BY MOUTH EVERY DAY 30 tablet 5     No current facility-administered medications for this visit  Blood pressure 134/92, pulse 75, temperature 97 7 °F (36 5 °C), height 5' 9" (1 753 m), weight 85 3 kg (188 lb)  PHYSICAL EXAM: Physical Exam  Constitutional:       Appearance: He is well-developed  HENT:      Head: Normocephalic and atraumatic  Eyes:      General: No scleral icterus  Right eye: No discharge  Left eye: No discharge  Conjunctiva/sclera: Conjunctivae normal       Pupils: Pupils are equal, round, and reactive to light  Neck:      Thyroid: No thyromegaly  Vascular: No JVD  Trachea: No tracheal deviation  Cardiovascular:      Rate and Rhythm: Normal rate and regular rhythm  Heart sounds: Normal heart sounds  No murmur heard  No friction rub  No gallop  Pulmonary:      Effort: Pulmonary effort is normal  No respiratory distress  Breath sounds: Normal breath sounds  No wheezing or rales  Chest:      Chest wall: No tenderness  Abdominal:      General: Bowel sounds are normal  There is no distension  Palpations: Abdomen is soft  There is no mass  Tenderness: There is no abdominal tenderness  There is no guarding or rebound  Hernia: No hernia is present  Musculoskeletal:      Cervical back: Neck supple  Lymphadenopathy:      Cervical: No cervical adenopathy  Skin:     General: Skin is warm and dry  Findings: No erythema or rash  Neurological:      Mental Status: He is alert and oriented to person, place, and time  Psychiatric:         Behavior: Behavior normal          Thought Content:  Thought content normal           Lab Results Component Value Date    WBC 5 7 05/20/2021    HGB 14 6 05/20/2021    HCT 41 6 05/20/2021     (H) 05/20/2021     05/20/2021     Lab Results   Component Value Date    CALCIUM 9 3 08/15/2018    K 4 6 05/20/2021    CO2 21 05/20/2021     (H) 05/20/2021    BUN 22 05/20/2021    CREATININE 1 77 (H) 05/20/2021     Lab Results   Component Value Date    ALT 14 05/20/2021    AST 17 05/20/2021     No results found for: INR, PROTIME    US retroperitoneal complete    Result Date: 9/20/2018  Impression: No hydronephrosis  Simple cyst in the right kidney  Minimal debris in the bladder  Workstation performed: [id number]       ASSESSMENT & PLAN:    Encounter for colorectal cancer screening  Screening for colon cancer - patient is at average risk for colon cancer screening  Rule out colorectal lesions including polyps or malignancy         -Schedule for colonoscopy  -High-fiber diet     -Patient was given instructions about the colonoscopy prep     -Patient was explained about  the risks and benefits of the procedure  Risks including but not limited to bleeding, infection, perforation were explained in detail  Also explained about less than 100% sensitivity with the exam and other alternatives

## 2021-07-28 ENCOUNTER — TELEPHONE (OUTPATIENT)
Dept: GASTROENTEROLOGY | Facility: AMBULARY SURGERY CENTER | Age: 51
End: 2021-07-28

## 2021-07-28 NOTE — TELEPHONE ENCOUNTER
LM for pt re: returned his call w/ diet prior to colonoscopy procedure 8/10/2021   Provided my direct phone # in scheduling on pt's message

## 2021-08-02 ENCOUNTER — TELEPHONE (OUTPATIENT)
Dept: PREADMISSION TESTING | Facility: HOSPITAL | Age: 51
End: 2021-08-02

## 2021-08-03 NOTE — PRE-PROCEDURE INSTRUCTIONS
Pre-Surgery Instructions:   Medication Instructions    amLODIPine (NORVASC) 5 mg tablet Patient was instructed by Physician and understands   ergocalciferol (VITAMIN D2) 50,000 units Patient was instructed by Physician and understands   fenofibrate (TRICOR) 145 mg tablet Patient was instructed by Physician and understands   metoprolol succinate (TOPROL-XL) 50 mg 24 hr tablet Instructed patient per Anesthesia Guidelines   paliperidone (INVEGA) 6 MG 24 hr tablet Instructed patient per Anesthesia Guidelines   rosuvastatin (CRESTOR) 10 MG tablet Patient was instructed by Physician and understands  Pt to take invega and metoprolol a m of procedure  Pt takes amlodipine evening prior to procedure as directed

## 2021-08-10 ENCOUNTER — HOSPITAL ENCOUNTER (OUTPATIENT)
Dept: GASTROENTEROLOGY | Facility: AMBULARY SURGERY CENTER | Age: 51
Setting detail: OUTPATIENT SURGERY
Discharge: HOME | End: 2021-08-10
Attending: INTERNAL MEDICINE | Admitting: INTERNAL MEDICINE
Payer: COMMERCIAL

## 2021-08-10 ENCOUNTER — ANESTHESIA EVENT (OUTPATIENT)
Dept: GASTROENTEROLOGY | Facility: AMBULARY SURGERY CENTER | Age: 51
End: 2021-08-10

## 2021-08-10 ENCOUNTER — ANESTHESIA (OUTPATIENT)
Dept: GASTROENTEROLOGY | Facility: AMBULARY SURGERY CENTER | Age: 51
End: 2021-08-10

## 2021-08-10 VITALS
HEART RATE: 66 BPM | SYSTOLIC BLOOD PRESSURE: 142 MMHG | RESPIRATION RATE: 20 BRPM | DIASTOLIC BLOOD PRESSURE: 95 MMHG | TEMPERATURE: 97.4 F | OXYGEN SATURATION: 99 %

## 2021-08-10 DIAGNOSIS — Z12.12 ENCOUNTER FOR COLORECTAL CANCER SCREENING: ICD-10-CM

## 2021-08-10 DIAGNOSIS — Z12.11 ENCOUNTER FOR COLORECTAL CANCER SCREENING: ICD-10-CM

## 2021-08-10 PROCEDURE — 88305 TISSUE EXAM BY PATHOLOGIST: CPT | Performed by: PATHOLOGY

## 2021-08-10 PROCEDURE — 45380 COLONOSCOPY AND BIOPSY: CPT | Performed by: INTERNAL MEDICINE

## 2021-08-10 RX ORDER — PROPOFOL 10 MG/ML
INJECTION, EMULSION INTRAVENOUS CONTINUOUS PRN
Status: DISCONTINUED | OUTPATIENT
Start: 2021-08-10 | End: 2021-08-10

## 2021-08-10 RX ORDER — PROPOFOL 10 MG/ML
INJECTION, EMULSION INTRAVENOUS AS NEEDED
Status: DISCONTINUED | OUTPATIENT
Start: 2021-08-10 | End: 2021-08-10

## 2021-08-10 RX ORDER — SODIUM CHLORIDE, SODIUM LACTATE, POTASSIUM CHLORIDE, CALCIUM CHLORIDE 600; 310; 30; 20 MG/100ML; MG/100ML; MG/100ML; MG/100ML
125 INJECTION, SOLUTION INTRAVENOUS CONTINUOUS
Status: CANCELLED | OUTPATIENT
Start: 2021-08-10

## 2021-08-10 RX ORDER — SODIUM CHLORIDE, SODIUM LACTATE, POTASSIUM CHLORIDE, CALCIUM CHLORIDE 600; 310; 30; 20 MG/100ML; MG/100ML; MG/100ML; MG/100ML
INJECTION, SOLUTION INTRAVENOUS CONTINUOUS PRN
Status: DISCONTINUED | OUTPATIENT
Start: 2021-08-10 | End: 2021-08-10

## 2021-08-10 RX ORDER — LIDOCAINE HYDROCHLORIDE 10 MG/ML
INJECTION, SOLUTION EPIDURAL; INFILTRATION; INTRACAUDAL; PERINEURAL AS NEEDED
Status: DISCONTINUED | OUTPATIENT
Start: 2021-08-10 | End: 2021-08-10

## 2021-08-10 RX ORDER — SODIUM CHLORIDE, SODIUM LACTATE, POTASSIUM CHLORIDE, CALCIUM CHLORIDE 600; 310; 30; 20 MG/100ML; MG/100ML; MG/100ML; MG/100ML
125 INJECTION, SOLUTION INTRAVENOUS CONTINUOUS
Status: DISCONTINUED | OUTPATIENT
Start: 2021-08-10 | End: 2021-08-14 | Stop reason: HOSPADM

## 2021-08-10 RX ADMIN — PROPOFOL 150 MCG/KG/MIN: 10 INJECTION, EMULSION INTRAVENOUS at 12:03

## 2021-08-10 RX ADMIN — SODIUM CHLORIDE, SODIUM LACTATE, POTASSIUM CHLORIDE, AND CALCIUM CHLORIDE: .6; .31; .03; .02 INJECTION, SOLUTION INTRAVENOUS at 11:58

## 2021-08-10 RX ADMIN — PROPOFOL 140 MG: 10 INJECTION, EMULSION INTRAVENOUS at 12:03

## 2021-08-10 RX ADMIN — LIDOCAINE HYDROCHLORIDE 50 MG: 10 INJECTION, SOLUTION EPIDURAL; INFILTRATION; INTRACAUDAL; PERINEURAL at 12:03

## 2021-08-10 NOTE — ANESTHESIA POSTPROCEDURE EVALUATION
Post-Op Assessment Note    CV Status:  Stable  Pain Score: 0    Pain management: adequate     Mental Status:  Alert and awake   Hydration Status:  Stable and euvolemic   PONV Controlled:  Controlled   Airway Patency:  Patent and adequate      Post Op Vitals Reviewed: Yes      Staff: CRNA         No complications documented      BP   127/91   Temp     Pulse  84   Resp   20   SpO2   98

## 2021-08-10 NOTE — ANESTHESIA PREPROCEDURE EVALUATION
Procedure:  COLONOSCOPY    Relevant Problems   CARDIO   (+) Essential hypertension   (+) Familial hypercholesterolemia   (+) Hypertriglyceridemia   (+) Mixed hyperlipidemia      NEURO/PSYCH   (+) Schizophrenia (HCC)        Physical Exam    Airway    Mallampati score: II  TM Distance: >3 FB  Neck ROM: full     Dental   No notable dental hx     Cardiovascular  Cardiovascular exam normal    Pulmonary  Pulmonary exam normal     Other Findings        Anesthesia Plan  ASA Score- 2     Anesthesia Type- IV sedation with anesthesia with ASA Monitors  Additional Monitors:   Airway Plan:           Plan Factors-Exercise tolerance (METS): >4 METS  Chart reviewed  Imaging results reviewed  Existing labs reviewed  Patient summary reviewed  Patient is a current smoker  Patient smoked on day of surgery  Induction-     Postoperative Plan-     Informed Consent- Anesthetic plan and risks discussed with patient  I personally reviewed this patient with the CRNA  Discussed and agreed on the Anesthesia Plan with the CRNA  Ivy Spence

## 2021-08-23 DIAGNOSIS — E78.1 HYPERTRIGLYCERIDEMIA: ICD-10-CM

## 2021-08-23 RX ORDER — FENOFIBRATE 145 MG/1
TABLET, COATED ORAL
Qty: 90 TABLET | Refills: 1 | Status: SHIPPED | OUTPATIENT
Start: 2021-08-23 | End: 2022-02-28 | Stop reason: ALTCHOICE

## 2021-10-13 ENCOUNTER — OFFICE VISIT (OUTPATIENT)
Dept: FAMILY MEDICINE CLINIC | Facility: CLINIC | Age: 51
End: 2021-10-13
Payer: COMMERCIAL

## 2021-10-13 VITALS
HEART RATE: 72 BPM | HEIGHT: 68 IN | RESPIRATION RATE: 14 BRPM | BODY MASS INDEX: 28.95 KG/M2 | OXYGEN SATURATION: 97 % | SYSTOLIC BLOOD PRESSURE: 128 MMHG | DIASTOLIC BLOOD PRESSURE: 74 MMHG | WEIGHT: 191 LBS | TEMPERATURE: 97.9 F

## 2021-10-13 DIAGNOSIS — F20.9 SCHIZOPHRENIA, UNSPECIFIED TYPE (HCC): ICD-10-CM

## 2021-10-13 DIAGNOSIS — F17.200 TOBACCO DEPENDENCE: ICD-10-CM

## 2021-10-13 DIAGNOSIS — I10 ESSENTIAL HYPERTENSION: ICD-10-CM

## 2021-10-13 DIAGNOSIS — E78.1 HYPERTRIGLYCERIDEMIA: Primary | ICD-10-CM

## 2021-10-13 PROCEDURE — 99214 OFFICE O/P EST MOD 30 MIN: CPT | Performed by: FAMILY MEDICINE

## 2021-10-28 DIAGNOSIS — I10 ESSENTIAL HYPERTENSION: ICD-10-CM

## 2021-10-28 RX ORDER — AMLODIPINE BESYLATE 5 MG/1
TABLET ORAL
Qty: 90 TABLET | Refills: 1 | Status: SHIPPED | OUTPATIENT
Start: 2021-10-28 | End: 2022-04-21

## 2021-11-03 PROBLEM — F17.200 TOBACCO DEPENDENCE: Status: ACTIVE | Noted: 2021-11-03

## 2021-11-05 DIAGNOSIS — E55.9 VITAMIN D DEFICIENCY: ICD-10-CM

## 2021-11-05 RX ORDER — ERGOCALCIFEROL 1.25 MG/1
CAPSULE ORAL
Qty: 12 CAPSULE | Refills: 1 | Status: SHIPPED | OUTPATIENT
Start: 2021-11-05 | End: 2022-05-06

## 2021-11-08 DIAGNOSIS — I10 ESSENTIAL HYPERTENSION: ICD-10-CM

## 2021-11-08 RX ORDER — METOPROLOL SUCCINATE 50 MG/1
50 TABLET, EXTENDED RELEASE ORAL DAILY
Qty: 30 TABLET | Refills: 5 | Status: SHIPPED | OUTPATIENT
Start: 2021-11-08 | End: 2022-04-23

## 2021-11-12 NOTE — TELEPHONE ENCOUNTER
Pt HR 88 , he is cutting back on salt , he has 2 1/2 cups of coffee in the a m  He will try to cut back  tc/cma 1 person assist

## 2021-11-14 DIAGNOSIS — E78.01 FAMILIAL HYPERCHOLESTEROLEMIA: ICD-10-CM

## 2021-11-15 RX ORDER — ROSUVASTATIN CALCIUM 10 MG/1
TABLET, COATED ORAL
Qty: 30 TABLET | Refills: 5 | Status: SHIPPED | OUTPATIENT
Start: 2021-11-15 | End: 2022-04-25

## 2021-11-19 LAB
ALBUMIN SERPL-MCNC: 4.1 G/DL (ref 4–5)
ALBUMIN/GLOB SERPL: 2.1 {RATIO} (ref 1.2–2.2)
ALP SERPL-CCNC: 34 IU/L (ref 44–121)
ALT SERPL-CCNC: 11 IU/L (ref 0–44)
AST SERPL-CCNC: 19 IU/L (ref 0–40)
BILIRUB SERPL-MCNC: 0.2 MG/DL (ref 0–1.2)
BUN SERPL-MCNC: 24 MG/DL (ref 6–24)
BUN/CREAT SERPL: 13 (ref 9–20)
CALCIUM SERPL-MCNC: 9.2 MG/DL (ref 8.7–10.2)
CHLORIDE SERPL-SCNC: 105 MMOL/L (ref 96–106)
CHOLEST SERPL-MCNC: 228 MG/DL (ref 100–199)
CO2 SERPL-SCNC: 21 MMOL/L (ref 20–29)
CREAT SERPL-MCNC: 1.86 MG/DL (ref 0.76–1.27)
ERYTHROCYTE [DISTWIDTH] IN BLOOD BY AUTOMATED COUNT: 12.3 % (ref 11.6–15.4)
GLOBULIN SER-MCNC: 2 G/DL (ref 1.5–4.5)
GLUCOSE SERPL-MCNC: 106 MG/DL (ref 65–99)
HCT VFR BLD AUTO: 41.4 % (ref 37.5–51)
HDLC SERPL-MCNC: 63 MG/DL
HGB BLD-MCNC: 14.5 G/DL (ref 13–17.7)
LDLC SERPL CALC-MCNC: 124 MG/DL (ref 0–99)
LDLC/HDLC SERPL: 2 RATIO (ref 0–3.6)
MCH RBC QN AUTO: 33.6 PG (ref 26.6–33)
MCHC RBC AUTO-ENTMCNC: 35 G/DL (ref 31.5–35.7)
MCV RBC AUTO: 96 FL (ref 79–97)
MICRODELETION SYND BLD/T FISH: NORMAL
MICRODELETION SYND BLD/T FISH: NORMAL
PLATELET # BLD AUTO: 204 X10E3/UL (ref 150–450)
POTASSIUM SERPL-SCNC: 5.1 MMOL/L (ref 3.5–5.2)
PROT SERPL-MCNC: 6.1 G/DL (ref 6–8.5)
RBC # BLD AUTO: 4.31 X10E6/UL (ref 4.14–5.8)
SL AMB EGFR AFRICAN AMERICAN: 48 ML/MIN/1.73
SL AMB EGFR NON AFRICAN AMERICAN: 41 ML/MIN/1.73
SL AMB VLDL CHOLESTEROL CALC: 41 MG/DL (ref 5–40)
SODIUM SERPL-SCNC: 140 MMOL/L (ref 134–144)
TRIGL SERPL-MCNC: 235 MG/DL (ref 0–149)
WBC # BLD AUTO: 6.7 X10E3/UL (ref 3.4–10.8)

## 2021-12-08 ENCOUNTER — TELEPHONE (OUTPATIENT)
Dept: FAMILY MEDICINE CLINIC | Facility: CLINIC | Age: 51
End: 2021-12-08

## 2021-12-14 ENCOUNTER — IMMUNIZATIONS (OUTPATIENT)
Dept: FAMILY MEDICINE CLINIC | Facility: HOSPITAL | Age: 51
End: 2021-12-14

## 2021-12-14 DIAGNOSIS — Z23 ENCOUNTER FOR IMMUNIZATION: Primary | ICD-10-CM

## 2021-12-14 PROCEDURE — 91306 COVID-19 MODERNA VACC 0.25 ML BOOSTER: CPT

## 2021-12-14 PROCEDURE — 0064A COVID-19 MODERNA VACC 0.25 ML BOOSTER: CPT

## 2021-12-15 ENCOUNTER — TELEPHONE (OUTPATIENT)
Dept: FAMILY MEDICINE CLINIC | Facility: CLINIC | Age: 51
End: 2021-12-15

## 2021-12-20 ENCOUNTER — RA CDI HCC (OUTPATIENT)
Dept: OTHER | Facility: HOSPITAL | Age: 51
End: 2021-12-20

## 2022-01-04 ENCOUNTER — OFFICE VISIT (OUTPATIENT)
Dept: FAMILY MEDICINE CLINIC | Facility: CLINIC | Age: 52
End: 2022-01-04
Payer: COMMERCIAL

## 2022-01-04 VITALS
DIASTOLIC BLOOD PRESSURE: 88 MMHG | OXYGEN SATURATION: 98 % | WEIGHT: 199.8 LBS | HEIGHT: 68 IN | TEMPERATURE: 98.5 F | SYSTOLIC BLOOD PRESSURE: 136 MMHG | HEART RATE: 76 BPM | RESPIRATION RATE: 16 BRPM | BODY MASS INDEX: 30.28 KG/M2

## 2022-01-04 DIAGNOSIS — E78.2 MIXED HYPERLIPIDEMIA: ICD-10-CM

## 2022-01-04 DIAGNOSIS — F20.9 SCHIZOPHRENIA, UNSPECIFIED TYPE (HCC): ICD-10-CM

## 2022-01-04 DIAGNOSIS — I10 ESSENTIAL HYPERTENSION: Primary | ICD-10-CM

## 2022-01-04 DIAGNOSIS — N28.9 RENAL INSUFFICIENCY: ICD-10-CM

## 2022-01-04 PROCEDURE — 99214 OFFICE O/P EST MOD 30 MIN: CPT | Performed by: FAMILY MEDICINE

## 2022-01-04 NOTE — PATIENT INSTRUCTIONS
Hypertension, Ambulatory Care   GENERAL INFORMATION:   Hypertension  is high blood pressure (BP)  Your BP is the force of your blood moving against the walls of your arteries  Hypertension is a BP of 140/90 or higher  Hypertension causes your BP to get so high that your heart has to work much harder than normal  This can cause damage to your heart  Common symptoms include the following:   Headache     Blurred vision     Chest pain     Dizziness or weakness     Trouble breathing    Nosebleeds  Seek immediate care for the following symptoms:   Severe headache or vision loss    Weakness in an arm or leg    Confusion or difficulty speaking    Discomfort in your chest that feels like squeezing, pressure, fullness, or pain    Suddenly feeling lightheaded or trouble breathing    Pain or discomfort in your back, neck, jaw, stomach, or arm  Treatment for hypertension  may include medicine to lower your BP  You may also need to make lifestyle changes  Take your medicine exactly as directed  Manage hypertension:   Take your BP at home  Sit and rest for 5 minutes before you take your BP  Extend your arm and support it on a flat surface  Your arm should be at the same level as your heart  Follow the directions that came with your BP monitor  If possible, take at least 2 BP readings each time  Take your BP at least twice a day at the same times each day, such as morning and evening  Keep a log of your BP readings and bring it to your follow-up visits  Eat less sodium (salt)  Do not add sodium to your food  Limit foods that are high in sodium, such as canned foods, potato chips, and cold cuts  Your healthcare provider may suggest that you follow the [facility] [address] [facility]  The plan is low in sodium, unhealthy fats, and total fat  It is high in potassium, calcium, and fiber  Exercise regularly  Exercise at least 30 minutes per day, on most days of the week  This will help decrease your BP   Ask your healthcare provider about the best exercise plan for you  Limit alcohol  Women should limit alcohol to 1 drink a day  Men should limit alcohol to 2 drinks a day  A drink of alcohol is 12 ounces of beer, 5 ounces of wine, or 1½ ounces of liquor  Do not smoke  If you smoke, it is never too late to quit  Smoking can increase your BP  Smoking also worsens other health conditions you may have that can increase your risk for hypertension  Ask your healthcare provider for information if you need help quitting  Follow up with your healthcare provider as directed: You will need to return to have your BP checked and to have other lab tests done  Write down your questions so you remember to ask them during your visits  CARE AGREEMENT:   You have the right to help plan your care  Learn about your health condition and how it may be treated  Discuss treatment options with your caregivers to decide what care you want to receive  You always have the right to refuse treatment  The above information is an  only  It is not intended as medical advice for individual conditions or treatments  Talk to your doctor, nurse or pharmacist before following any medical regimen to see if it is safe and effective for you  © 2014 [address] is for End User's use only and may not be sold, redistributed or otherwise used for commercial purposes  All illustrations and images included in CareNotes® are the copyrighted property of [employer]  or Renato Clark  Meal Planning with the Plate Method   AMBULATORY CARE:   Meal planning with the plate method  is a simple way for people with diabetes to plan meals  This method can help you eat the right amount of carbohydrates and keep your blood sugar levels under control  Carbohydrates naturally raise your blood sugar level  Your blood sugar level can rise too high if you eat too much carbohydrate at one time   Carbohydrates are found in starches (bread, cereal, starchy vegetables, and beans), fruit, milk, yogurt, and sweets  How to use the plate method to plan meals:   · Draw an imaginary line down the middle of a 9-inch dinner plate  On one side, draw another line to divide that section in half  Your plate will have 3 sections  · Fill the largest section of your plate with non-starchy vegetables  These include broccoli, spinach, cucumbers, peppers, cauliflower, and tomatoes  · Add a starch to 1 of the small sections of your plate  Starches include pasta, rice, whole-grain bread, tortillas, corn, potatoes, and beans  · Add meat or another source of protein to the other small section of your plate  Examples include chicken or turkey without skin, fish, lean beef or pork, low-fat cheese, tofu, or eggs  · Add dairy or fruit to the side of your plate if your meal plan allows  Examples of dairy include skim or 1% milk or low-fat yogurt  If you do not drink milk, you may be able to add another serving of starchy food instead  · Add a low-calorie or calorie-free drink such as water or unsweetened tea or coffee  Serving sizes of foods:   · Non-starchy vegetables:      ? ½ cup of cooked vegetables or 1 cup of raw vegetables    ? ½ cup of vegetable juice    · Starches:      ? 1 ounce of whole-wheat bread or 1 small (6 inch) flour or corn tortilla    ? 1 small (4 inch) pancake (about ¼ inch thick)    ? ¾ cup of dry, unsweetened, whole-grain ready-to-eat cereal or ¼ cup of low-fat granola    ? ½ cup of cooked cereal or oatmeal    ? ? cup of rice or pasta     ? ½ cup of corn, green peas, sweet potatoes, or mashed potatoes    ? ½ cup of cooked beans and peas (garbanzo, sidhu, kidney, white, split, black-eyed)    · Meat and other protein sources:      ? 3 to 4 ounces of any lean meat, fish, or poultry    ? ½ cup of tofu or tempeh    ? 1 large egg    ?  1½ ounces (about 2 tablespoons) of nuts or 2 tablespoons of peanut butter    · Fruit:      ? 1 small piece of fresh fruit     ? ½ cup of canned or fresh fruit or unsweetened fruit juice    ? ¼ cup of dried fruit    · Milk and yogurt:      ? 1 cup (8 ounces) of skim or 1% milk    ? ¾ cup (6 ounces) of plain, non-fat yogurt    Other guidelines to follow:   · Limit salt and sugar  Choose and prepare foods and drinks with less salt and added sugars  Use the nutrition information on food labels to help you make healthy choices  The percent daily value listed on the food label tells you whether a food is low or high in certain nutrients  A percent daily value of 5% or less means that the food is low in a nutrient  A percent daily value of 20% or more means that the food is high in a nutrient  · Choose healthy fats  Choose healthy fats such as polyunsaturated and monounsaturated fats in place of unhealthy fats  Healthy fats are found in vegetable oils such as soybean, corn, canola, olive, and sunflower oil  Unhealthy fats are saturated fats, trans fats, and cholesterol  Unhealthy fats are found in shortening, butter, stick margarine, and animal fat  · Ask your healthcare provider if alcohol is safe for you  and how much is safe for you  If you choose to drink alcohol, drink it with meals  When you drink alcohol on an empty stomach, your blood sugar may fall to a low level  © Copyright [employer] 2021 Information is for End User's use only and may not be sold, redistributed or otherwise used for commercial purposes  All illustrations and images included in CareNotes® are the copyrighted property of [employer]  or [address] [place], [place] [address]   The above information is an  only  It is not intended as medical advice for individual conditions or treatments  Talk to your doctor, nurse or pharmacist before following any medical regimen to see if it is safe and effective for you

## 2022-01-11 PROBLEM — N28.9 RENAL INSUFFICIENCY: Status: ACTIVE | Noted: 2022-01-11

## 2022-01-11 LAB
BUN SERPL-MCNC: 21 MG/DL (ref 6–24)
BUN/CREAT SERPL: 10 (ref 9–20)
CALCIUM SERPL-MCNC: 9.2 MG/DL (ref 8.7–10.2)
CHLORIDE SERPL-SCNC: 106 MMOL/L (ref 96–106)
CO2 SERPL-SCNC: 20 MMOL/L (ref 20–29)
CREAT SERPL-MCNC: 2.11 MG/DL (ref 0.76–1.27)
GLUCOSE SERPL-MCNC: 102 MG/DL (ref 65–99)
MICRODELETION SYND BLD/T FISH: NORMAL
POTASSIUM SERPL-SCNC: 4.9 MMOL/L (ref 3.5–5.2)
SL AMB EGFR AFRICAN AMERICAN: 41 ML/MIN/1.73
SL AMB EGFR NON AFRICAN AMERICAN: 35 ML/MIN/1.73
SODIUM SERPL-SCNC: 140 MMOL/L (ref 134–144)

## 2022-01-12 NOTE — PROGRESS NOTES
Assessment/Plan:    1  Essential hypertension  Assessment & Plan:  BP within range-cont current mgt  Maintain yearly eye exams  Orders:  -     Basic metabolic panel; Future  -     Basic metabolic panel    2  Mixed hyperlipidemia  Assessment & Plan:  Cont current meds and low chol diet  Regular exercise      3  Schizophrenia, unspecified type [facility])  Assessment & Plan:  Cont under care of Dr Maggie Lim cmp--may need t/c med adjustment pending rpt renal fxn  4  Renal insufficiency  Assessment & Plan:  ?sec to [place] +/- hx htn  maintain hydration  Rpt bmp prior t psych appt 1/14--forward result to Dr Aleks Lund for review/recommendations  T/c follow-up renal imaging  Last u/s 2018--simple right renal cyst, min bladder debrs-otherwise wnl  No urinary c/o      5  BMI 30 0-30 9,adult    BMI Counseling: Body mass index is 30 38 kg/m²  The BMI is above normal  Nutrition recommendations include encouraging healthy choices of fruits and vegetables, consuming healthier snacks, moderation in carbohydrate intake, increasing intake of lean protein, reducing intake of saturated and trans fat and reducing intake of cholesterol  Exercise recommendations include exercising 3-5 times per week and strength training exercises  No pharmacotherapy was ordered  Rationale for BMI follow-up plan is due to patient being overweight or obese  Patient Instructions         Hypertension, Ambulatory Care   GENERAL INFORMATION:   Hypertension  is high blood pressure (BP)  Your BP is the force of your blood moving against the walls of your arteries  Hypertension is a BP of 140/90 or higher  Hypertension causes your BP to get so high that your heart has to work much harder than normal  This can cause damage to your heart    Common symptoms include the following:   Headache     Blurred vision     Chest pain     Dizziness or weakness     Trouble breathing    Nosebleeds  Seek immediate care for the following symptoms:   Severe headache or vision loss    Weakness in an arm or leg    Confusion or difficulty speaking    Discomfort in your chest that feels like squeezing, pressure, fullness, or pain    Suddenly feeling lightheaded or trouble breathing    Pain or discomfort in your back, neck, jaw, stomach, or arm  Treatment for hypertension  may include medicine to lower your BP  You may also need to make lifestyle changes  Take your medicine exactly as directed  Manage hypertension:   Take your BP at home  Sit and rest for 5 minutes before you take your BP  Extend your arm and support it on a flat surface  Your arm should be at the same level as your heart  Follow the directions that came with your BP monitor  If possible, take at least 2 BP readings each time  Take your BP at least twice a day at the same times each day, such as morning and evening  Keep a log of your BP readings and bring it to your follow-up visits  Eat less sodium (salt)  Do not add sodium to your food  Limit foods that are high in sodium, such as canned foods, potato chips, and cold cuts  Your healthcare provider may suggest that you follow the [address] [place] [address] Street  The plan is low in sodium, unhealthy fats, and total fat  It is high in potassium, calcium, and fiber  Exercise regularly  Exercise at least 30 minutes per day, on most days of the week  This will help decrease your BP  Ask your healthcare provider about the best exercise plan for you  Limit alcohol  Women should limit alcohol to 1 drink a day  Men should limit alcohol to 2 drinks a day  A drink of alcohol is 12 ounces of beer, 5 ounces of wine, or 1½ ounces of liquor  Do not smoke  If you smoke, it is never too late to quit  Smoking can increase your BP  Smoking also worsens other health conditions you may have that can increase your risk for hypertension  Ask your healthcare provider for information if you need help quitting    Follow up with your healthcare provider as directed: You will need to return to have your BP checked and to have other lab tests done  Write down your questions so you remember to ask them during your visits  CARE AGREEMENT:   You have the right to help plan your care  Learn about your health condition and how it may be treated  Discuss treatment options with your caregivers to decide what care you want to receive  You always have the right to refuse treatment  The above information is an  only  It is not intended as medical advice for individual conditions or treatments  Talk to your doctor, nurse or pharmacist before following any medical regimen to see if it is safe and effective for you  © 2014 [address] is for End User's use only and may not be sold, redistributed or otherwise used for commercial purposes  All illustrations and images included in CareNotes® are the copyrighted property of [employer]  or Renato Clark  Meal Planning with the Plate Method   AMBULATORY CARE:   Meal planning with the plate method  is a simple way for people with diabetes to plan meals  This method can help you eat the right amount of carbohydrates and keep your blood sugar levels under control  Carbohydrates naturally raise your blood sugar level  Your blood sugar level can rise too high if you eat too much carbohydrate at one time  Carbohydrates are found in starches (bread, cereal, starchy vegetables, and beans), fruit, milk, yogurt, and sweets  How to use the plate method to plan meals:   · Draw an imaginary line down the middle of a 9-inch dinner plate  On one side, draw another line to divide that section in half  Your plate will have 3 sections  · Fill the largest section of your plate with non-starchy vegetables  These include broccoli, spinach, cucumbers, peppers, cauliflower, and tomatoes  · Add a starch to 1 of the small sections of your plate   Starches include pasta, rice, whole-grain bread, tortillas, corn, potatoes, and beans  · Add meat or another source of protein to the other small section of your plate  Examples include chicken or turkey without skin, fish, lean beef or pork, low-fat cheese, tofu, or eggs  · Add dairy or fruit to the side of your plate if your meal plan allows  Examples of dairy include skim or 1% milk or low-fat yogurt  If you do not drink milk, you may be able to add another serving of starchy food instead  · Add a low-calorie or calorie-free drink such as water or unsweetened tea or coffee  Serving sizes of foods:   · Non-starchy vegetables:      ? ½ cup of cooked vegetables or 1 cup of raw vegetables    ? ½ cup of vegetable juice    · Starches:      ? 1 ounce of whole-wheat bread or 1 small (6 inch) flour or corn tortilla    ? 1 small (4 inch) pancake (about ¼ inch thick)    ? ¾ cup of dry, unsweetened, whole-grain ready-to-eat cereal or ¼ cup of low-fat granola    ? ½ cup of cooked cereal or oatmeal    ? ? cup of rice or pasta     ? ½ cup of corn, green peas, sweet potatoes, or mashed potatoes    ? ½ cup of cooked beans and peas (garbanzo, sidhu, kidney, white, split, black-eyed)    · Meat and other protein sources:      ? 3 to 4 ounces of any lean meat, fish, or poultry    ? ½ cup of tofu or tempeh    ? 1 large egg    ? 1½ ounces (about 2 tablespoons) of nuts or 2 tablespoons of peanut butter    · Fruit:      ? 1 small piece of fresh fruit     ? ½ cup of canned or fresh fruit or unsweetened fruit juice    ? ¼ cup of dried fruit    · Milk and yogurt:      ? 1 cup (8 ounces) of skim or 1% milk    ? ¾ cup (6 ounces) of plain, non-fat yogurt    Other guidelines to follow:   · Limit salt and sugar  Choose and prepare foods and drinks with less salt and added sugars  Use the nutrition information on food labels to help you make healthy choices  The percent daily value listed on the food label tells you whether a food is low or high in certain nutrients   A percent daily value of 5% or less means that the food is low in a nutrient  A percent daily value of 20% or more means that the food is high in a nutrient  · Choose healthy fats  Choose healthy fats such as polyunsaturated and monounsaturated fats in place of unhealthy fats  Healthy fats are found in vegetable oils such as soybean, corn, canola, olive, and sunflower oil  Unhealthy fats are saturated fats, trans fats, and cholesterol  Unhealthy fats are found in shortening, butter, stick margarine, and animal fat  · Ask your healthcare provider if alcohol is safe for you  and how much is safe for you  If you choose to drink alcohol, drink it with meals  When you drink alcohol on an empty stomach, your blood sugar may fall to a low level  © Copyright [employer] 2021 Information is for End User's use only and may not be sold, redistributed or otherwise used for commercial purposes  All illustrations and images included in CareNotes® are the copyrighted property of [employer]  or [employer]   The above information is an  only  It is not intended as medical advice for individual conditions or treatments  Talk to your doctor, nurse or pharmacist before following any medical regimen to see if it is safe and effective for you  Subjective:      Patient ID: Ramin Gongora is a 47 y o  male  Chief Complaint   Patient presents with    Follow-up     labs and some questions for the doctor       HPI  Follow-up  Interval hx reviewed  Last labs w inc creat, inc lipids  BP w/i range  +nicotine and etoh use though has decreased  Denies cp, cough or change in b/b  Eye care utd  Had covid vacc--Moderna as well as received flu shot     Upcoming appt Dr Jeevan Talbot at [facility] 1/14/2022  The following portions of the patient's history were reviewed and updated as appropriate: allergies, current medications, past family history, past medical history, past social history, past surgical history and problem list     Review of Systems   Constitutional: Positive for fatigue  Negative for fever  Eyes:        Wears glasses   Respiratory: Negative  Cardiovascular: Negative  Gastrointestinal: Negative  Musculoskeletal: Positive for arthralgias  Skin: Negative for rash  Allergic/Immunologic: Positive for environmental allergies  Neurological: Negative for seizures and syncope  Psychiatric/Behavioral: Positive for decreased concentration and sleep disturbance  The patient is nervous/anxious  Current Outpatient Medications   Medication Sig Dispense Refill    amLODIPine (NORVASC) 5 mg tablet TAKE 1 TABLET BY MOUTH EVERY DAY 90 tablet 1    ergocalciferol (VITAMIN D2) 50,000 units TAKE 1 CAPSULE BY MOUTH ONE TIME PER WEEK 12 capsule 1    fenofibrate (TRICOR) 145 mg tablet TAKE 1 TABLET BY MOUTH EVERY DAY 90 tablet 1    metoprolol succinate (TOPROL-XL) 50 mg 24 hr tablet Take 1 tablet (50 mg total) by mouth daily 30 tablet 5    paliperidone (INVEGA) 6 MG 24 hr tablet Take 6 mg by mouth every morning      rosuvastatin (CRESTOR) 10 MG tablet TAKE 1 TABLET BY MOUTH EVERY DAY 30 tablet 5     No current facility-administered medications for this visit  Objective:    /88 (BP Location: Left arm, Patient Position: Sitting, Cuff Size: Large)   Pulse 76   Temp 98 5 °F (36 9 °C) (Temporal)   Resp 16   Ht 5' 8" (1 727 m)   Wt 90 6 kg (199 lb 12 8 oz)   SpO2 98%   BMI 30 38 kg/m²        Physical Exam  Vitals and nursing note reviewed  Constitutional:       General: He is not in acute distress  Appearance: He is well-developed  HENT:      Mouth/Throat:      Pharynx: No oropharyngeal exudate  Eyes:      General: No scleral icterus  Conjunctiva/sclera: Conjunctivae normal    Cardiovascular:      Rate and Rhythm: Normal rate and regular rhythm  Pulses: Normal pulses  Pulmonary:      Effort: Pulmonary effort is normal  No respiratory distress  Breath sounds: Normal breath sounds  Abdominal:      General: Bowel sounds are normal       Palpations: Abdomen is soft  Tenderness: There is no abdominal tenderness  Musculoskeletal:         General: No tenderness  Normal range of motion  Cervical back: Neck supple  Skin:     General: Skin is warm and dry  Capillary Refill: Capillary refill takes less than 2 seconds  Coloration: Skin is not jaundiced  Neurological:      Mental Status: He is alert and oriented to person, place, and time  Cranial Nerves: No cranial nerve deficit        Comments: No acute focal deficit   Psychiatric:      Comments: Mood stable, pleasant, cooperative           Sue Lerma MD

## 2022-01-12 NOTE — ASSESSMENT & PLAN NOTE
?sec to [place] +/- hx htn  maintain hydration  Rpt bmp prior t psych appt 1/14--forward result to Dr Kirk Viveros for review/recommendations  T/c follow-up renal imaging  Last u/s 2018--simple right renal cyst, min bladder debrs-otherwise wnl  No urinary c/o

## 2022-01-25 ENCOUNTER — OFFICE VISIT (OUTPATIENT)
Dept: FAMILY MEDICINE CLINIC | Facility: CLINIC | Age: 52
End: 2022-01-25
Payer: COMMERCIAL

## 2022-01-25 VITALS
WEIGHT: 197.2 LBS | TEMPERATURE: 97.9 F | DIASTOLIC BLOOD PRESSURE: 84 MMHG | SYSTOLIC BLOOD PRESSURE: 120 MMHG | HEIGHT: 68 IN | HEART RATE: 62 BPM | RESPIRATION RATE: 16 BRPM | BODY MASS INDEX: 29.89 KG/M2

## 2022-01-25 DIAGNOSIS — N28.1 RENAL CYST: ICD-10-CM

## 2022-01-25 DIAGNOSIS — F20.9 SCHIZOPHRENIA, UNSPECIFIED TYPE (HCC): ICD-10-CM

## 2022-01-25 DIAGNOSIS — I10 ESSENTIAL HYPERTENSION: Primary | ICD-10-CM

## 2022-01-25 DIAGNOSIS — Z12.2 SCREENING FOR LUNG CANCER: ICD-10-CM

## 2022-01-25 DIAGNOSIS — R79.89 ELEVATED SERUM CREATININE: ICD-10-CM

## 2022-01-25 DIAGNOSIS — F17.200 TOBACCO DEPENDENCE: ICD-10-CM

## 2022-01-25 PROCEDURE — 3008F BODY MASS INDEX DOCD: CPT | Performed by: FAMILY MEDICINE

## 2022-01-25 PROCEDURE — 4004F PT TOBACCO SCREEN RCVD TLK: CPT | Performed by: FAMILY MEDICINE

## 2022-01-25 PROCEDURE — 99213 OFFICE O/P EST LOW 20 MIN: CPT | Performed by: FAMILY MEDICINE

## 2022-01-25 PROCEDURE — 3079F DIAST BP 80-89 MM HG: CPT | Performed by: FAMILY MEDICINE

## 2022-01-25 PROCEDURE — 3074F SYST BP LT 130 MM HG: CPT | Performed by: FAMILY MEDICINE

## 2022-01-27 ENCOUNTER — TELEPHONE (OUTPATIENT)
Dept: ADMINISTRATIVE | Facility: OTHER | Age: 52
End: 2022-01-27

## 2022-01-27 NOTE — TELEPHONE ENCOUNTER
----- Message from Yordy Puente sent at 1/25/2022  2:11 PM EST -----  Regarding: tdap  01/25/22 2:11 PM    Hello, our patient Fausto Mccray has had tdap completed/performed  Please assist in [facility]  The date of service is 2021      Thank you,  ALOK Pitt PG

## 2022-01-27 NOTE — TELEPHONE ENCOUNTER
Upon review of the In Basket request we Please forward more information on the location of correct [facility]    Any additional questions or concerns should be emailed to the Practice Liaisons via [email or web address] email, please do not reply via In Basket      Thank you  Quincy Espinoza MA

## 2022-01-30 PROBLEM — Z12.2 SCREENING FOR LUNG CANCER: Status: ACTIVE | Noted: 2020-09-02

## 2022-01-30 PROBLEM — R79.89 ELEVATED SERUM CREATININE: Status: ACTIVE | Noted: 2022-01-30

## 2022-01-30 PROBLEM — N28.1 RENAL CYST: Status: ACTIVE | Noted: 2022-01-30

## 2022-01-31 NOTE — PROGRESS NOTES
Assessment/Plan:    1  Essential hypertension  -     Ambulatory Referral to Nephrology; Future    2  Elevated serum creatinine  -     Ambulatory Referral to Nephrology; Future    3  Tobacco dependence  -     CT lung screening program; Future; Expected date: 01/25/2022    4  Screening for lung cancer  -     CT lung screening program; Future; Expected date: 01/25/2022    5  Renal cyst  -     Ambulatory Referral to Nephrology; Future    6  [facility]    7  BMI 29 0-29 9,adult          Cont current medications  Await further recommendations from nephrologist  Cont  Psychiatric care w Dr Anitra Chávez Guidance    Subjective:      Patient ID: Reinier Gomez is a 47 y o  male  Chief Complaint   Patient presents with    Follow-up     psych doesnt want to change any meds for patient at this time , and he should be referred to kidney specialist        HPI  Follow-up  Saw Dr Garcia Marker not want to change medication at this time''  req nephrology referral  BP in range  Overall pt feeling well  No new c/o    The following portions of the patient's history were reviewed and updated as appropriate: allergies, current medications, past family history, past medical history, past social history, past surgical history and problem list     Review of Systems   Constitutional: Positive for fatigue  Negative for fever  Eyes:        Wears glasses   Respiratory: Negative  Cardiovascular: Negative  Gastrointestinal: Negative  Musculoskeletal: Positive for arthralgias  Skin: Negative for rash  Allergic/Immunologic: Positive for environmental allergies  Neurological: Negative for seizures and syncope  Psychiatric/Behavioral: Positive for decreased concentration and sleep disturbance  The patient is nervous/anxious            Current Outpatient Medications   Medication Sig Dispense Refill    amLODIPine (NORVASC) 5 mg tablet TAKE 1 TABLET BY MOUTH EVERY DAY 90 tablet 1    ergocalciferol (VITAMIN D2) 50,000 units TAKE 1 CAPSULE BY MOUTH ONE TIME PER WEEK 12 capsule 1    fenofibrate (TRICOR) 145 mg tablet TAKE 1 TABLET BY MOUTH EVERY DAY 90 tablet 1    metoprolol succinate (TOPROL-XL) 50 mg 24 hr tablet Take 1 tablet (50 mg total) by mouth daily 30 tablet 5    paliperidone (INVEGA) 6 MG 24 hr tablet Take 6 mg by mouth every morning      rosuvastatin (CRESTOR) 10 MG tablet TAKE 1 TABLET BY MOUTH EVERY DAY 30 tablet 5     No current facility-administered medications for this visit  Objective:    /84 (BP Location: Left arm, Patient Position: Sitting, Cuff Size: Large)   Pulse 62   Temp 97 9 °F (36 6 °C)   Resp 16   Ht 5' 8" (1 727 m)   Wt 89 4 kg (197 lb 3 2 oz)   BMI 29 98 kg/m²        Physical Exam  Vitals and nursing note reviewed  Constitutional:       General: He is not in acute distress  Appearance: He is well-developed  HENT:      Mouth/Throat:      Pharynx: No oropharyngeal exudate  Eyes:      General: No scleral icterus  Conjunctiva/sclera: Conjunctivae normal    Cardiovascular:      Rate and Rhythm: Normal rate and regular rhythm  Pulses: Normal pulses  Pulmonary:      Effort: Pulmonary effort is normal  No respiratory distress  Breath sounds: Normal breath sounds  Abdominal:      General: Bowel sounds are normal       Palpations: Abdomen is soft  Tenderness: There is no abdominal tenderness  There is no right CVA tenderness or left CVA tenderness  Musculoskeletal:         General: No tenderness  Normal range of motion  Cervical back: Neck supple  Skin:     General: Skin is warm and dry  Capillary Refill: Capillary refill takes less than 2 seconds  Coloration: Skin is not jaundiced  Neurological:      Mental Status: He is alert and oriented to person, place, and time  Cranial Nerves: No cranial nerve deficit        Comments: No acute focal deficit   Psychiatric:      Comments: Mood stable, pleasant, cooperative Fina Nichole MD

## 2022-02-02 ENCOUNTER — HOSPITAL ENCOUNTER (OUTPATIENT)
Dept: RADIOLOGY | Facility: HOSPITAL | Age: 52
Discharge: HOME | End: 2022-02-02
Attending: FAMILY MEDICINE
Payer: COMMERCIAL

## 2022-02-02 DIAGNOSIS — Z12.2 SCREENING FOR LUNG CANCER: ICD-10-CM

## 2022-02-02 DIAGNOSIS — F17.200 TOBACCO DEPENDENCE: ICD-10-CM

## 2022-02-02 PROCEDURE — 71271 CT THORAX LUNG CANCER SCR C-: CPT

## 2022-02-04 ENCOUNTER — CONSULT (OUTPATIENT)
Dept: NEPHROLOGY | Facility: CLINIC | Age: 52
End: 2022-02-04
Payer: COMMERCIAL

## 2022-02-04 VITALS
SYSTOLIC BLOOD PRESSURE: 124 MMHG | WEIGHT: 198 LBS | BODY MASS INDEX: 30.01 KG/M2 | DIASTOLIC BLOOD PRESSURE: 82 MMHG | HEIGHT: 68 IN

## 2022-02-04 DIAGNOSIS — I10 ESSENTIAL HYPERTENSION: ICD-10-CM

## 2022-02-04 DIAGNOSIS — E55.9 VITAMIN D DEFICIENCY: Primary | ICD-10-CM

## 2022-02-04 DIAGNOSIS — R79.89 ELEVATED SERUM CREATININE: ICD-10-CM

## 2022-02-04 DIAGNOSIS — N28.1 RENAL CYST: ICD-10-CM

## 2022-02-04 PROCEDURE — 3008F BODY MASS INDEX DOCD: CPT | Performed by: INTERNAL MEDICINE

## 2022-02-04 PROCEDURE — 3079F DIAST BP 80-89 MM HG: CPT | Performed by: INTERNAL MEDICINE

## 2022-02-04 PROCEDURE — 4004F PT TOBACCO SCREEN RCVD TLK: CPT | Performed by: INTERNAL MEDICINE

## 2022-02-04 PROCEDURE — 99204 OFFICE O/P NEW MOD 45 MIN: CPT | Performed by: INTERNAL MEDICINE

## 2022-02-04 PROCEDURE — 3074F SYST BP LT 130 MM HG: CPT | Performed by: INTERNAL MEDICINE

## 2022-02-04 NOTE — PATIENT INSTRUCTIONS
Chronic Kidney Disease   WHAT YOU NEED TO KNOW:   Chronic kidney disease (CKD) is the gradual and permanent loss of kidney function  It is also called chronic kidney failure, or chronic renal insufficiency  Normally, the kidneys remove fluid, chemicals, and waste from your blood  These wastes are turned into urine by your kidneys  CKD may worsen over time and lead to kidney failure  Your CKD team will help you and your family plan for your care at home  The team will help you create goals and find ways to meet your goals  Your care plan may change over time as your needs change  DISCHARGE INSTRUCTIONS:   Call your local emergency number (911 in the [address] [facility] [address]) if:   · You have a seizure  · You have shortness of breath  Return to the emergency department if:   · You are confused and very drowsy  Call your doctor or nephrologist if:   · You suddenly gain or lose more weight than your healthcare provider has told you is okay  · You have itchy skin or a rash  · You urinate more or less than you normally do  · You have blood in your urine  · You have nausea and are vomiting  · You have fatigue or muscle weakness  · You have hiccups that will not stop  · You have questions or concerns about your condition or care  Medicines:   · Medicines  may be given to decrease blood pressure and get rid of extra fluid  You may also receive medicine to manage health conditions that may occur with CKD, such as anemia, diabetes, and heart disease  · Take your medicine as directed  Contact your healthcare provider if you think your medicine is not helping or if you have side effects  Tell him or her if you are allergic to any medicine  Keep a list of the medicines, vitamins, and herbs you take  Include the amounts, and when and why you take them  Bring the list or the pill bottles to follow-up visits  Carry your medicine list with you in case of an emergency      What you can do to manage CKD: Management may include making some lifestyle changes  Tell your healthcare provider if you have any concerns about being able to make changes  He or she can help you find solutions, including working with specialists  Ask for help creating a plan to break large goals into smaller steps  Your plan may include any of the following:  · Manage other health conditions  Your healthcare provider will work with you to make a care plan that meets your needs  You will be checked regularly for heart disease or other conditions that can make CKD worse, such as diabetes  Your blood pressure will be closely monitored  You will also get a target blood pressure and help making a plan to reach your target  This may include taking your blood pressure at home  · Maintain a healthy weight  Your weight and body mass index (BMI) will be checked regularly  BMI helps find if your weight is healthy for your height  Your healthcare provider will use other tests to check your muscle and protein levels  Extra weight can strain your kidneys  A low weight or low muscle mass can make you feel more tired  You may have trouble doing your daily activities  Ask your provider what a healthy weight is for you  He or she can help you create a plan to lose or gain weight safely, if needed  The plan may include keeping a food diary  This is a list of foods and liquids you have each day  Your provider will use the diary to help you make changes, if needed  Changes are based on your health and any other conditions you have, such as diabetes  · Create an exercise plan  Regular exercise can help you manage CKD, high blood pressure, and diabetes  Exercise also helps control weight  Your provider can help you create exercise goals and a plan to reach those goals  For example, your goal may be to exercise for 30 minutes in a day  Your plan can include breaking exercise into 10 minute sessions, 3 times during the day           · Create a healthy eating plan  Your provider may tell you to eat food low in potassium, phosphorus, or protein  Your provider may also recommend vitamin or mineral supplements  Do not take any supplements without talking to your provider  A dietitian can help you plan meals if needed  Ask how much liquid to drink each day and which liquids are best for you  · Limit sodium (salt) as directed  You may need to limit sodium to less than 2,300 milligrams (mg) each day  Ask your dietitian or healthcare provider how much sodium you can have each day  The amount depends on your stage of kidney disease  Table salt, canned foods, soups, salted snacks, and processed meats, like deli meats and sausage, are high in sodium  Your provider or a dietitian can show you how to read food labels for sodium  · Limit alcohol as directed  Alcohol can cause fluid retention and can affect your kidneys  Ask how much alcohol is safe for you  A drink of alcohol is 12 ounces of beer, 5 ounces of wine, or 1½ ounces of liquor  · Do not smoke  Nicotine and other chemicals in cigarettes and cigars can cause kidney damage  Ask your provider for information if you currently smoke and need help to quit  E-cigarettes or smokeless tobacco still contain nicotine  Talk to your provider before you use these products  · Ask about over-the-counter medicines  Medicines such as NSAIDs and laxatives may harm your kidneys  Some cough and cold medicines can raise your blood pressure  Always ask if a medicine is safe before you take it  · Ask about vaccines you may need  CKD can increase your risk for infections such as pneumonia, influenza, and hepatitis  Vaccines lower your risk for infection  Your healthcare provider will tell you which vaccines you need and when to get them  Follow up with your doctor or nephrologist as directed: You will need to return for tests to monitor your kidney and nerve function, and your parathyroid hormone level   Your medicines may be changed, based on certain test results  Write down your questions so you remember to ask them during your visits  © Copyright [address] 2021 Information is for End User's use only and may not be sold, redistributed or otherwise used for commercial purposes  All illustrations and images included in CareNotes® are the copyrighted property of A D A M , Inc  or [facility] [employer]   The above information is an  only  It is not intended as medical advice for individual conditions or treatments  Talk to your doctor, nurse or pharmacist before following any medical regimen to see if it is safe and effective for you       1 )  Low 2 g sodium diet    2 )  Monitor weights at home    3 )  Avoid NSAIDs (ibuprofen, Motrin, Advil, Aleve, naproxen)    4 )  Monitor blood pressure at home, call if blood pressure greater than 150/90 persistently    5 ) I will plan to discuss all results including blood work, and/or imaging at our next visit, unless there is an urgent indication, in which case I will call you earlier  If you have any questions or concerns about your results, please feel free to call our office      6 ) Stop the Fenofibrate, repeat blood work after 2 weeks of stopping (no need to fast)    7 ) Check Kidney Ultrasound    8 ) Cut back or even quit smoking    9 ) Check urine studies

## 2022-02-04 NOTE — PROGRESS NOTES
NEPHROLOGY OUTPATIENT CONSULTATION   Gosia Lindsay 47 y o  male MRN: [id number]  Date: 2/4/2022  Reason for consultation:   Chief Complaint   Patient presents with    Consult       ASSESSMENT and PLAN:    Thank you for the courtesy of this consultation  I had the pleasure of seeing Lisa Lee today in the renal clinic for the initial management of worsening renal function  Worsening renal function/elevated creatinine/abnormal renal function test  --renal function has been worsening over the past couple of months  --started fenofibrate in 6 months ago  --reports no NSAID use  --asymptomatic  --check urinalysis and urine protein creatinine ratio  --check a renal ultrasound  --stop fenofibrate  --repeat blood work, nonfasting along with a Cystatin C level after 2 weeks of stopping the fenofibrate    Chronic kidney disease stage II/IIIA  --baseline creatinine mid 1's (1 3-1 5 mg/dL)  --presumed to be secondary to hypertensive nephrosclerosis, obesity related renal dysfunction, and 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  --renal function has been persistently abnormal for over 3 months  --renal ultrasound from 2018 showed symmetrically sized kidneys with no evidence of hydronephrosis  Simple cyst noted on the left kidney  Plan for repeat ultrasound to make sure there is no acute component of obstructive uropathy    --check urinalysis and urine protein creatinine ratio  --continued blood pressure control  --avoid NSAIDs  --repeat blood work    Dyslipidemia  --continue Crestor  --discontinue fenofibrate which can cause an elevation in the serum creatinine levels  --consider addition of Zetia    Microscopic hematuria  --underwent cystoscopy with negative findings  --repeat the urinalysis  --chronic smoker    Hypertension  --at target  --smoking cessation strongly advised spent about 3-5 minutes discussing smoking cessation  --metoprolol XL 50 mg daily, amlodipine 5 mg daily    Smoker  --1 pack per day for over 30 years      PATIENT INSTRUCTIONS:    Patient Instructions     Chronic Kidney Disease   WHAT YOU NEED TO KNOW:   Chronic kidney disease (CKD) is the gradual and permanent loss of kidney function  It is also called chronic kidney failure, or chronic renal insufficiency  Normally, the kidneys remove fluid, chemicals, and waste from your blood  These wastes are turned into urine by your kidneys  CKD may worsen over time and lead to kidney failure  Your CKD team will help you and your family plan for your care at home  The team will help you create goals and find ways to meet your goals  Your care plan may change over time as your needs change  DISCHARGE INSTRUCTIONS:   Call your local emergency number (911 in the [address]) if:   · You have a seizure  · You have shortness of breath  Return to the emergency department if:   · You are confused and very drowsy  Call your doctor or nephrologist if:   · You suddenly gain or lose more weight than your healthcare provider has told you is okay  · You have itchy skin or a rash  · You urinate more or less than you normally do  · You have blood in your urine  · You have nausea and are vomiting  · You have fatigue or muscle weakness  · You have hiccups that will not stop  · You have questions or concerns about your condition or care  Medicines:   · Medicines  may be given to decrease blood pressure and get rid of extra fluid  You may also receive medicine to manage health conditions that may occur with CKD, such as anemia, diabetes, and heart disease  · Take your medicine as directed  Contact your healthcare provider if you think your medicine is not helping or if you have side effects  Tell him or her if you are allergic to any medicine  Keep a list of the medicines, vitamins, and herbs you take  Include the amounts, and when and why you take them  Bring the list or the pill bottles to follow-up visits   Carry your medicine list with you in case of an emergency  What you can do to manage CKD: Management may include making some lifestyle changes  Tell your healthcare provider if you have any concerns about being able to make changes  He or she can help you find solutions, including working with specialists  Ask for help creating a plan to break large goals into smaller steps  Your plan may include any of the following:  · Manage other health conditions  Your healthcare provider will work with you to make a care plan that meets your needs  You will be checked regularly for heart disease or other conditions that can make CKD worse, such as diabetes  Your blood pressure will be closely monitored  You will also get a target blood pressure and help making a plan to reach your target  This may include taking your blood pressure at home  · Maintain a healthy weight  Your weight and body mass index (BMI) will be checked regularly  BMI helps find if your weight is healthy for your height  Your healthcare provider will use other tests to check your muscle and protein levels  Extra weight can strain your kidneys  A low weight or low muscle mass can make you feel more tired  You may have trouble doing your daily activities  Ask your provider what a healthy weight is for you  He or she can help you create a plan to lose or gain weight safely, if needed  The plan may include keeping a food diary  This is a list of foods and liquids you have each day  Your provider will use the diary to help you make changes, if needed  Changes are based on your health and any other conditions you have, such as diabetes  · Create an exercise plan  Regular exercise can help you manage CKD, high blood pressure, and diabetes  Exercise also helps control weight  Your provider can help you create exercise goals and a plan to reach those goals  For example, your goal may be to exercise for 30 minutes in a day   Your plan can include breaking exercise into 10 minute sessions, 3 times during the day  · Create a healthy eating plan  Your provider may tell you to eat food low in potassium, phosphorus, or protein  Your provider may also recommend vitamin or mineral supplements  Do not take any supplements without talking to your provider  A dietitian can help you plan meals if needed  Ask how much liquid to drink each day and which liquids are best for you  · Limit sodium (salt) as directed  You may need to limit sodium to less than 2,300 milligrams (mg) each day  Ask your dietitian or healthcare provider how much sodium you can have each day  The amount depends on your stage of kidney disease  Table salt, canned foods, soups, salted snacks, and processed meats, like deli meats and sausage, are high in sodium  Your provider or a dietitian can show you how to read food labels for sodium  · Limit alcohol as directed  Alcohol can cause fluid retention and can affect your kidneys  Ask how much alcohol is safe for you  A drink of alcohol is 12 ounces of beer, 5 ounces of wine, or 1½ ounces of liquor  · Do not smoke  Nicotine and other chemicals in cigarettes and cigars can cause kidney damage  Ask your provider for information if you currently smoke and need help to quit  E-cigarettes or smokeless tobacco still contain nicotine  Talk to your provider before you use these products  · Ask about over-the-counter medicines  Medicines such as NSAIDs and laxatives may harm your kidneys  Some cough and cold medicines can raise your blood pressure  Always ask if a medicine is safe before you take it  · Ask about vaccines you may need  CKD can increase your risk for infections such as pneumonia, influenza, and hepatitis  Vaccines lower your risk for infection  Your healthcare provider will tell you which vaccines you need and when to get them  Follow up with your doctor or nephrologist as directed:   You will need to return for tests to monitor your kidney and nerve function, and your parathyroid hormone level  Your medicines may be changed, based on certain test results  Write down your questions so you remember to ask them during your visits  © Copyright [employer] 2021 Information is for End User's use only and may not be sold, redistributed or otherwise used for commercial purposes  All illustrations and images included in CareNotes® are the copyrighted property of [employer] [facility]  or [employer] Mor[employer]   The above information is an  only  It is not intended as medical advice for individual conditions or treatments  Talk to your doctor, nurse or pharmacist before following any medical regimen to see if it is safe and effective for you       1 )  Low 2 g sodium diet    2 )  Monitor weights at home    3 )  Avoid NSAIDs (ibuprofen, Motrin, Advil, Aleve, naproxen)    4 )  Monitor blood pressure at home, call if blood pressure greater than 150/90 persistently    5 ) I will plan to discuss all results including blood work, and/or imaging at our next visit, unless there is an urgent indication, in which case I will call you earlier  If you have any questions or concerns about your results, please feel free to call our office  6 ) Stop the Fenofibrate, repeat blood work after 2 weeks of stopping (no need to fast)    7 ) Check Kidney Ultrasound    8 ) Cut back or even quit smoking    9 ) Check urine studies        HISTORY OF PRESENT ILLNESS:  Requesting Physician: Carole Arndt MD    Juany George is a 47 y o  male who has a history of schizophrenia, hypertension and is an active smoker who presents for worsening renal function over the last few months  We have reviewed his blood work in great detail from 2018th the most recent of 2022  In August of 2018 his creatinine was 1 7 mg/dL he underwent a renal ultrasound which was benign and showed no acute findings  Repeat blood work showed improvement of the creatinine to 1 17 mg/dL    In 2019 his creatinine fluctuated anywhere from 1 3-1 5 mg/dL  In 2028 started to slowly trend up to 1 49-1 6 mg/dL and in 2021 it started to progressively rise to 1 7 then 1 86 and now up to 2 1 mg/dL  He was started on fenofibrate within the last 6 months  No reports no excessive NSAID use  No family history of kidney disease  He has 1 pack per day smoker for 30 years        PAST MEDICAL HISTORY:  Past Medical History:   Diagnosis Date    Hypertension     Mood disorder ([facility] )     Psychiatric disorder     Schizophrenia ([place] [address] )     Schizophrenia ([place] [address] )        PAST SURGICAL HISTORY:  Past Surgical History:   Procedure Laterality Date    NO PAST SURGERIES         ALLERGIES:  Allergies   Allergen Reactions    Geodon [Ziprasidone] Fatigue    Haldol [Haloperidol] Fatigue       SOCIAL HISTORY:  Social History     Substance and Sexual Activity   Alcohol Use Yes    Comment: weekends     Social History     Substance and Sexual Activity   Drug Use No     Social History     Tobacco Use   Smoking Status Current Every Day Smoker    Packs/day: 1 00    Years: 32 00    Pack years: 32 00    Types: Cigarettes   Smokeless Tobacco Never Used       FAMILY HISTORY:  Family History   Problem Relation Age of Onset    Breast cancer Mother     Cancer Father         bone/spinal       MEDICATIONS:    Current Outpatient Medications:     amLODIPine (NORVASC) 5 mg tablet, TAKE 1 TABLET BY MOUTH EVERY DAY, Disp: 90 tablet, Rfl: 1    ergocalciferol (VITAMIN D2) 50,000 units, TAKE 1 CAPSULE BY MOUTH ONE TIME PER WEEK, Disp: 12 capsule, Rfl: 1    fenofibrate (TRICOR) 145 mg tablet, TAKE 1 TABLET BY MOUTH EVERY DAY, Disp: 90 tablet, Rfl: 1    metoprolol succinate (TOPROL-XL) 50 mg 24 hr tablet, Take 1 tablet (50 mg total) by mouth daily, Disp: 30 tablet, Rfl: 5    paliperidone (INVEGA) 6 MG 24 hr tablet, Take 6 mg by mouth every morning, Disp: , Rfl:     rosuvastatin (CRESTOR) 10 MG tablet, TAKE 1 TABLET BY MOUTH EVERY DAY, Disp: 30 tablet, Rfl: 5    REVIEW OF SYSTEMS:  Review of Systems   Constitutional: Negative for activity change and fever  Respiratory: Negative for cough, chest tightness, shortness of breath and wheezing  Cardiovascular: Negative for chest pain and leg swelling  Gastrointestinal: Negative for abdominal pain, diarrhea, nausea and vomiting  Endocrine: Negative for polyuria  Genitourinary: Negative for difficulty urinating, dysuria, flank pain, frequency and urgency  Skin: Negative for rash  Neurological: Negative for dizziness, syncope, light-headedness and headaches  All the systems were reviewed and were negative except as documented on the HPI  PHYSICAL EXAM:  Current Weight: Body mass index is 30 11 kg/m²  Vitals:    02/04/22 1326 02/04/22 1349   BP:  124/82   Weight: 89 8 kg (198 lb)    Height: 5' 8" (1 727 m)        Physical Exam  Vitals and nursing note reviewed  Constitutional:       General: He is not in acute distress  Appearance: He is well-developed  He is not diaphoretic  HENT:      Head: Normocephalic and atraumatic  Eyes:      General: No scleral icterus  Pupils: Pupils are equal, round, and reactive to light  Cardiovascular:      Rate and Rhythm: Normal rate and regular rhythm  Heart sounds: Normal heart sounds  No murmur heard  No friction rub  No gallop  Pulmonary:      Effort: Pulmonary effort is normal  No respiratory distress  Breath sounds: Normal breath sounds  No wheezing or rales  Chest:      Chest wall: No tenderness  Abdominal:      General: Bowel sounds are normal  There is no distension  Palpations: Abdomen is soft  Tenderness: There is no abdominal tenderness  There is no rebound  Musculoskeletal:         General: Normal range of motion  Cervical back: Normal range of motion and neck supple  Skin:     Findings: No rash  Neurological:      Mental Status: He is alert and oriented to person, place, and time           Laboratory results:   Results for orders placed or performed in visit on 80/41/06   Basic metabolic panel   Result Value Ref Range    Glucose, Random 102 (H) 65 - 99 mg/dL    BUN 21 6 - 24 mg/dL    Creatinine 2 11 (H) 0 76 - 1 27 mg/dL    eGFR Non African American 35 (L) >59 mL/min/1 73    eGFR  41 (L) >59 mL/min/1 73    SL AMB BUN/CREATININE RATIO 10 9 - 20    Sodium 140 134 - 144 mmol/L    Potassium 4 9 3 5 - 5 2 mmol/L    Chloride 106 96 - 106 mmol/L    CO2 20 20 - 29 mmol/L    CALCIUM 9 2 8 7 - 10 2 mg/dL   [facility] Kidney Stone Panel   Result Value Ref Range    Interpretation Note

## 2022-02-14 ENCOUNTER — HOSPITAL ENCOUNTER (OUTPATIENT)
Dept: RADIOLOGY | Facility: HOSPITAL | Age: 52
Discharge: HOME | End: 2022-02-14
Attending: INTERNAL MEDICINE
Payer: COMMERCIAL

## 2022-02-14 DIAGNOSIS — R79.89 ELEVATED SERUM CREATININE: ICD-10-CM

## 2022-02-14 DIAGNOSIS — I10 ESSENTIAL HYPERTENSION: ICD-10-CM

## 2022-02-14 PROCEDURE — 76770 US EXAM ABDO BACK WALL COMP: CPT

## 2022-02-23 LAB
ALBUMIN SERPL-MCNC: 3.9 G/DL (ref 3.8–4.9)
ALBUMIN/GLOB SERPL: 1.8 {RATIO} (ref 1.2–2.2)
ALP SERPL-CCNC: 55 IU/L (ref 44–121)
ALT SERPL-CCNC: 17 IU/L (ref 0–44)
APPEARANCE UR: CLEAR
AST SERPL-CCNC: 19 IU/L (ref 0–40)
BACTERIA URNS QL MICRO: ABNORMAL
BILIRUB SERPL-MCNC: <0.2 MG/DL (ref 0–1.2)
BILIRUB UR QL STRIP: NEGATIVE
BUN SERPL-MCNC: 24 MG/DL (ref 6–24)
BUN/CREAT SERPL: 12 (ref 9–20)
CALCIUM SERPL-MCNC: 9.6 MG/DL (ref 8.7–10.2)
CASTS URNS QL MICRO: ABNORMAL /LPF
CHLORIDE SERPL-SCNC: 106 MMOL/L (ref 96–106)
CO2 SERPL-SCNC: 19 MMOL/L (ref 20–29)
COLOR UR: YELLOW
CREAT SERPL-MCNC: 1.96 MG/DL (ref 0.76–1.27)
CREAT UR-MCNC: 67.9 MG/DL
CYSTATIN C SERPL-MCNC: 1.74 MG/L (ref 0.64–1.46)
EPI CELLS #/AREA URNS HPF: ABNORMAL /HPF (ref 0–10)
ERYTHROCYTE [DISTWIDTH] IN BLOOD BY AUTOMATED COUNT: 12.1 % (ref 11.6–15.4)
GFR/BSA.PRED SERPLBLD CYS-BASED-ARV: 39 ML/MIN/1.73
GLOBULIN SER-MCNC: 2.2 G/DL (ref 1.5–4.5)
GLUCOSE SERPL-MCNC: 98 MG/DL (ref 65–99)
GLUCOSE UR QL: NEGATIVE
HCT VFR BLD AUTO: 38 % (ref 37.5–51)
HGB BLD-MCNC: 13.2 G/DL (ref 13–17.7)
HGB UR QL STRIP: ABNORMAL
KETONES UR QL STRIP: NEGATIVE
LEUKOCYTE ESTERASE UR QL STRIP: NEGATIVE
MCH RBC QN AUTO: 33.1 PG (ref 26.6–33)
MCHC RBC AUTO-ENTMCNC: 34.7 G/DL (ref 31.5–35.7)
MCV RBC AUTO: 95 FL (ref 79–97)
MICRO URNS: ABNORMAL
NITRITE UR QL STRIP: NEGATIVE
PH UR STRIP: 5.5 [PH] (ref 5–7.5)
PLATELET # BLD AUTO: 193 X10E3/UL (ref 150–450)
POTASSIUM SERPL-SCNC: 4.8 MMOL/L (ref 3.5–5.2)
PROT SERPL-MCNC: 6.1 G/DL (ref 6–8.5)
PROT UR QL STRIP: ABNORMAL
PROT UR-MCNC: 267.2 MG/DL
PROT/CREAT UR: 3935 MG/G CREAT (ref 0–200)
RBC # BLD AUTO: 3.99 X10E6/UL (ref 4.14–5.8)
RBC #/AREA URNS HPF: ABNORMAL /HPF (ref 0–2)
SL AMB EGFR AFRICAN AMERICAN: 44 ML/MIN/1.73
SL AMB EGFR NON AFRICAN AMERICAN: 38 ML/MIN/1.73
SODIUM SERPL-SCNC: 139 MMOL/L (ref 134–144)
SP GR UR: 1.01 (ref 1–1.03)
UROBILINOGEN UR STRIP-ACNC: 0.2 MG/DL (ref 0.2–1)
WBC # BLD AUTO: 7.7 X10E3/UL (ref 3.4–10.8)
WBC #/AREA URNS HPF: ABNORMAL /HPF (ref 0–5)

## 2022-02-28 ENCOUNTER — TELEPHONE (OUTPATIENT)
Dept: FAMILY MEDICINE CLINIC | Facility: CLINIC | Age: 52
End: 2022-02-28

## 2022-02-28 ENCOUNTER — TELEPHONE (OUTPATIENT)
Dept: NEPHROLOGY | Facility: CLINIC | Age: 52
End: 2022-02-28

## 2022-02-28 DIAGNOSIS — E87.8 LOW BICARBONATE LEVEL: Primary | ICD-10-CM

## 2022-02-28 RX ORDER — SODIUM BICARBONATE 325 MG/1
325 TABLET ORAL 2 TIMES DAILY
Qty: 90 TABLET | Refills: 3 | Status: SHIPPED | OUTPATIENT
Start: 2022-02-28

## 2022-02-28 NOTE — TELEPHONE ENCOUNTER
Dr Villasenor Offer    Patient just spoke with Nephrologist Dr Prince Molina who is sending you note to stop fenofibrate and prescribe different med to lower cholesterol

## 2022-02-28 NOTE — TELEPHONE ENCOUNTER
Fenofibrate has been d/c'd  I sent in new rx for Zetai (ezetimibe) to take along w his cresor to help lower his lipid levels  He will need to repeat his lipids and cmp in 1-2mths--I put orders in chart

## 2022-02-28 NOTE — TELEPHONE ENCOUNTER
Nephrology    Notified that the patient want to speak  I called him and left a voicemail  I have called him twice today and left 2 voice mails, thus far

## 2022-02-28 NOTE — PROGRESS NOTES
Spoke to the patient over the phone    I will message his PCP, and I will prescribe him sodium bicarbonate

## 2022-03-14 ENCOUNTER — TELEPHONE (OUTPATIENT)
Dept: NEPHROLOGY | Facility: CLINIC | Age: 52
End: 2022-03-14

## 2022-04-08 ENCOUNTER — TELEPHONE (OUTPATIENT)
Dept: FAMILY MEDICINE CLINIC | Facility: CLINIC | Age: 52
End: 2022-04-08

## 2022-04-08 NOTE — TELEPHONE ENCOUNTER
Dr Nathaniel Allen:    Patient called asking if you can give him a call back? He is asking if he should get the 4th booster COVID vaccine and would like further advise

## 2022-04-15 ENCOUNTER — IMMUNIZATIONS (OUTPATIENT)
Dept: FAMILY MEDICINE CLINIC | Facility: HOSPITAL | Age: 52
End: 2022-04-15

## 2022-04-15 DIAGNOSIS — Z23 ENCOUNTER FOR IMMUNIZATION: Primary | ICD-10-CM

## 2022-04-15 PROCEDURE — 91306 COVID-19 MODERNA VACC 0.25 ML BOOSTER: CPT

## 2022-04-15 PROCEDURE — 0064A COVID-19 MODERNA VACC 0.25 ML BOOSTER: CPT

## 2022-04-21 DIAGNOSIS — I10 ESSENTIAL HYPERTENSION: ICD-10-CM

## 2022-04-21 RX ORDER — AMLODIPINE BESYLATE 5 MG/1
TABLET ORAL
Qty: 90 TABLET | Refills: 1 | Status: SHIPPED | OUTPATIENT
Start: 2022-04-21

## 2022-04-23 DIAGNOSIS — I10 ESSENTIAL HYPERTENSION: ICD-10-CM

## 2022-04-23 RX ORDER — METOPROLOL SUCCINATE 50 MG/1
TABLET, EXTENDED RELEASE ORAL
Qty: 30 TABLET | Refills: 5 | Status: SHIPPED | OUTPATIENT
Start: 2022-04-23

## 2022-04-24 DIAGNOSIS — E78.01 FAMILIAL HYPERCHOLESTEROLEMIA: ICD-10-CM

## 2022-04-25 RX ORDER — ROSUVASTATIN CALCIUM 10 MG/1
TABLET, COATED ORAL
Qty: 30 TABLET | Refills: 5 | Status: SHIPPED | OUTPATIENT
Start: 2022-04-25

## 2022-05-05 DIAGNOSIS — E55.9 VITAMIN D DEFICIENCY: ICD-10-CM

## 2022-05-06 RX ORDER — ERGOCALCIFEROL 1.25 MG/1
CAPSULE ORAL
Qty: 12 CAPSULE | Refills: 1 | Status: SHIPPED | OUTPATIENT
Start: 2022-05-06

## 2022-05-23 ENCOUNTER — OFFICE VISIT (OUTPATIENT)
Dept: NEPHROLOGY | Facility: CLINIC | Age: 52
End: 2022-05-23
Payer: COMMERCIAL

## 2022-05-23 VITALS
BODY MASS INDEX: 30.22 KG/M2 | DIASTOLIC BLOOD PRESSURE: 82 MMHG | SYSTOLIC BLOOD PRESSURE: 136 MMHG | WEIGHT: 199.4 LBS | HEIGHT: 68 IN

## 2022-05-23 DIAGNOSIS — I12.9 BENIGN HYPERTENSION WITH CKD (CHRONIC KIDNEY DISEASE) STAGE III (HCC): ICD-10-CM

## 2022-05-23 DIAGNOSIS — I10 ESSENTIAL HYPERTENSION: ICD-10-CM

## 2022-05-23 DIAGNOSIS — F17.200 TOBACCO DEPENDENCE: ICD-10-CM

## 2022-05-23 DIAGNOSIS — R79.89 ELEVATED SERUM CREATININE: Primary | ICD-10-CM

## 2022-05-23 DIAGNOSIS — E55.9 VITAMIN D DEFICIENCY: ICD-10-CM

## 2022-05-23 DIAGNOSIS — N18.30 BENIGN HYPERTENSION WITH CKD (CHRONIC KIDNEY DISEASE) STAGE III (HCC): ICD-10-CM

## 2022-05-23 PROCEDURE — 4004F PT TOBACCO SCREEN RCVD TLK: CPT | Performed by: INTERNAL MEDICINE

## 2022-05-23 PROCEDURE — 3079F DIAST BP 80-89 MM HG: CPT | Performed by: INTERNAL MEDICINE

## 2022-05-23 PROCEDURE — 3008F BODY MASS INDEX DOCD: CPT | Performed by: INTERNAL MEDICINE

## 2022-05-23 PROCEDURE — 99214 OFFICE O/P EST MOD 30 MIN: CPT | Performed by: INTERNAL MEDICINE

## 2022-05-23 PROCEDURE — 3075F SYST BP GE 130 - 139MM HG: CPT | Performed by: INTERNAL MEDICINE

## 2022-05-23 NOTE — PATIENT INSTRUCTIONS
1  )  Low 2 g sodium diet    2 )  Monitor weights at home    3 )  Avoid NSAIDs (ibuprofen, Motrin, Advil, Aleve, naproxen)    4 )  Monitor blood pressure at home, call if blood pressure greater than 150/90 persistently    5 ) I will plan to discuss all results including blood work, and/or imaging at our next visit, unless there is an urgent indication, in which case I will call you earlier  If you have any questions or concerns about your results, please feel free to call our office      6 ) Repeat blood work now within next few weeks and few months before your nxt visit    7 ) Can try Metamucil for bowel movements (sugar free) or Psyllium Husk

## 2022-05-23 NOTE — PROGRESS NOTES
NEPHROLOGY OFFICE VISIT   Arpita Miles 47 y o  male MRN: [id number]  5/23/2022    Reason for Visit:  Elevated creatinine, with underlying chronic kidney disease    History of Present Illness (HPI):    I had the pleasure of seeing Usman Awkrachid today in the renal clinic for the continued management of chronic kidney disease  Since our last visit, there has been no ER visits or hospitilizations  He currently has no complaints at this time and is feeling well  Patient denies any chest pain, shortness of breath and swelling  The last blood work was done on and the February, which we have reviewed together  His creatinine had improved from 2 1 down to 1 96 mg/dL and essentially has stabilized in the high 1s over the last year so  We stopped his fenofibrate due to concern that that might be causing some elevation of his creatinine as it was higher than his prior baseline  He still continues to smoke but has cut down to 3/4 pack per day as opposed to 1 pack per day  He tries to walk 1 mi every day and does not completely restrict his sodium is intake but tries his best   No chest pain or shortness of breath  Talking about his bowel movements I recommended him trying Metamucil on a daily basis for increased fiber intake  I started him on sodium bicarbonate for low bicarbonate level back few months ago  I told him that if his bicarb level looks better we can probably discontinue the sodium bicarb      ASSESSMENT and PLAN:    Worsening renal function/elevated creatinine/abnormal renal function test--resolved  --renal function has been worsening over the past couple of months  --remains off fenofibrate  --creatinine had improved 1 9 back in February  --check repeat blood work now  --suspect that his baseline creatinine now runs in the high 1s     Chronic kidney disease stage III  --baseline creatinine likely in the mid to high 1s  --presumed to be secondary to hypertensive nephrosclerosis, obesity related renal dysfunction, and 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  --renal ultrasound shows bilateral echogenicity consistent with underlying chronic kidney disease  --urinalysis showed 11-30 RBCs with some mild proteinuria  --continued blood pressure control  --avoid NSAIDs  --repeat blood work  --Cystatin C with eGFR approximately 40 mL/min     Dyslipidemia  --remains on Crestor and Zetia     Microscopic hematuria  --underwent cystoscopy with negative findings  --check a repeat urinalysis in a few months  --asymptomatic  --history of smoking     Hypertension  --slightly above target  --smoking cessation strongly advised spent about 3-5 minutes discussing smoking cessation  --metoprolol XL 50 mg daily, amlodipine 5 mg daily     Smoker  --1 pack per day for over 30 years, cut back to 3/4 pack per day    Low bicarbonate level  --currently on oral sodium bicarbonate  --will check repeat level is better can discontinue      PATIENT INSTRUCTIONS:    Patient Instructions   1 )  Low 2 g sodium diet    2 )  Monitor weights at home    3 )  Avoid NSAIDs (ibuprofen, Motrin, Advil, Aleve, naproxen)    4 )  Monitor blood pressure at home, call if blood pressure greater than 150/90 persistently    5 ) I will plan to discuss all results including blood work, and/or imaging at our next visit, unless there is an urgent indication, in which case I will call you earlier  If you have any questions or concerns about your results, please feel free to call our office  6 ) Repeat blood work now within next few weeks and few months before your nxt visit    7 ) Can try Metamucil for bowel movements (sugar free) or Psyllium Husk          Orders Placed This Encounter   Procedures    Basic metabolic panel     This is a patient instruction: Patient fasting for 8 hours or longer recommended       Standing Status:   Future     Number of Occurrences:   1     Standing Expiration Date:   5/23/2023    Urinalysis with microscopic     Standing Status:   Future     Number of Occurrences:   1     Standing Expiration Date:   5/23/2023    Comprehensive metabolic panel     This is a patient instruction: Patient fasting for 8 hours or longer recommended  Standing Status:   Future     Number of Occurrences:   1     Standing Expiration Date:   5/23/2023    CBC     Standing Status:   Future     Number of Occurrences:   1     Standing Expiration Date:   5/23/2023       OBJECTIVE:  Current Weight: Weight - Scale: 90 4 kg (199 lb 6 4 oz)  Vitals:    05/23/22 1524 05/23/22 1539   BP:  136/82   Weight: 90 4 kg (199 lb 6 4 oz)    Height: 5' 8" (1 727 m)     Body mass index is 30 32 kg/m²  REVIEW OF SYSTEMS:    Review of Systems   Constitutional: Negative for activity change and fever  Respiratory: Negative for cough, chest tightness, shortness of breath and wheezing  Cardiovascular: Negative for chest pain and leg swelling  Gastrointestinal: Negative for abdominal pain, diarrhea, nausea and vomiting  Endocrine: Negative for polyuria  Genitourinary: Negative for difficulty urinating, dysuria, flank pain, frequency and urgency  Skin: Negative for rash  Neurological: Negative for dizziness, syncope, light-headedness and headaches  PHYSICAL EXAM:      Physical Exam  Constitutional:       General: He is not in acute distress  Appearance: He is well-developed  He is not diaphoretic  HENT:      Head: Normocephalic and atraumatic  Eyes:      General: No scleral icterus  Pupils: Pupils are equal, round, and reactive to light  Cardiovascular:      Rate and Rhythm: Normal rate and regular rhythm  Heart sounds: Normal heart sounds  No murmur heard  No friction rub  No gallop  Pulmonary:      Effort: Pulmonary effort is normal  No respiratory distress  Breath sounds: Normal breath sounds  No wheezing or rales  Chest:      Chest wall: No tenderness     Abdominal:      General: Bowel sounds are normal  There is no distension  Palpations: Abdomen is soft  Tenderness: There is no abdominal tenderness  There is no rebound  Musculoskeletal:         General: Normal range of motion  Cervical back: Normal range of motion and neck supple  Skin:     Findings: No rash  Neurological:      Mental Status: He is alert and oriented to person, place, and time  Medications:    Current Outpatient Medications:     amLODIPine (NORVASC) 5 mg tablet, TAKE 1 TABLET BY MOUTH EVERY DAY, Disp: 90 tablet, Rfl: 1    ergocalciferol (VITAMIN D2) 50,000 units, TAKE 1 CAPSULE BY MOUTH ONE TIME PER WEEK, Disp: 12 capsule, Rfl: 1    ezetimibe (ZETIA) 10 mg tablet, Take 1 tablet (10 mg total) by mouth daily, Disp: 90 tablet, Rfl: 1    metoprolol succinate (TOPROL-XL) 50 mg 24 hr tablet, TAKE 1 TABLET BY MOUTH EVERY DAY, Disp: 30 tablet, Rfl: 5    paliperidone (INVEGA) 6 MG 24 hr tablet, Take 6 mg by mouth every morning, Disp: , Rfl:     rosuvastatin (CRESTOR) 10 MG tablet, TAKE 1 TABLET BY MOUTH EVERY DAY, Disp: 30 tablet, Rfl: 5    sodium bicarbonate 325 MG tablet, Take 1 tablet (325 mg total) by mouth 2 (two) times a day, Disp: 90 tablet, Rfl: 3    Laboratory Results:        Invalid input(s): ALBUMIN    Results for orders placed or performed in visit on 02/04/22   Urinalysis with microscopic   Result Value Ref Range    Specific Gravity 1 011 1 005 - 1 030    Ph 5 5 5 0 - 7 5    Color UA Yellow Yellow    Urine Appearance Clear Clear    Leukocyte Esterase Negative Negative    Protein 3+ (A) Negative/Trace    Glucose, 24 HR Urine Negative Negative    Ketone, Urine Negative Negative    Blood, Urine 2+ (A) Negative    Bilirubin, Urine Negative Negative    Urobilinogen Urine 0 2 0 2 - 1 0 mg/dL    SL AMB NITRITES URINE, QUAL  Negative Negative    Microscopic Examination See below:    Protein / creatinine ratio, urine   Result Value Ref Range    Creatinine, Urine 67 9 Not Estab  mg/dL    Total Protein, Urine 267 2 Not Estab  mg/dL    Prot/Creat Ratio, Ur 3,935 (H) 0 - 200 mg/g creat   Comprehensive metabolic panel   Result Value Ref Range    Glucose, Random 98 65 - 99 mg/dL    BUN 24 6 - 24 mg/dL    Creatinine 1 96 (H) 0 76 - 1 27 mg/dL    eGFR Non  38 (L) >59 mL/min/1 73    eGFR  44 (L) >59 mL/min/1 73    SL AMB BUN/CREATININE RATIO 12 9 - 20    Sodium 139 134 - 144 mmol/L    Potassium 4 8 3 5 - 5 2 mmol/L    Chloride 106 96 - 106 mmol/L    CO2 19 (L) 20 - 29 mmol/L    CALCIUM 9 6 8 7 - 10 2 mg/dL    Protein, Total 6 1 6 0 - 8 5 g/dL    Albumin 3 9 3 8 - 4 9 g/dL    Globulin, Total 2 2 1 5 - 4 5 g/dL    Albumin/Globulin Ratio 1 8 1 2 - 2 2    TOTAL BILIRUBIN <0 2 0 0 - 1 2 mg/dL    Alk Phos Isoenzymes 55 44 - 121 IU/L    AST 19 0 - 40 IU/L    ALT 17 0 - 44 IU/L   CBC   Result Value Ref Range    White Blood Cell Count 7 7 3 4 - 10 8 x10E3/uL    Red Blood Cell Count 3 99 (L) 4 14 - 5 80 x10E6/uL    Hemoglobin 13 2 13 0 - 17 7 g/dL    HCT 38 0 37 5 - 51 0 %    MCV 95 79 - 97 fL    MCH 33 1 (H) 26 6 - 33 0 pg    MCHC 34 7 31 5 - 35 7 g/dL    RDW 12 1 11 6 - 15 4 %    Platelet Count 570 025 - 450 x10E3/uL   Microscopic Examination   Result Value Ref Range    SL AMB WBC, URINE None seen 0 - 5 /hpf    RBC, Urine 11-30 (A) 0 - 2 /hpf    Epithelial Cells (non renal) None seen 0 - 10 /hpf    Casts None seen None seen /lpf    Bacteria, Urine None seen None seen/Few   Cystatin C With eGFR   Result Value Ref Range    CYSTATIN C 1 74 (H) 0 64 - 1 46 mg/L    eGFR 39 (L) >59 mL/min/1 73

## 2022-05-25 LAB
BUN SERPL-MCNC: 27 MG/DL (ref 6–24)
BUN/CREAT SERPL: 13 (ref 9–20)
CALCIUM SERPL-MCNC: 9.7 MG/DL (ref 8.7–10.2)
CHLORIDE SERPL-SCNC: 105 MMOL/L (ref 96–106)
CO2 SERPL-SCNC: 19 MMOL/L (ref 20–29)
CREAT SERPL-MCNC: 2.05 MG/DL (ref 0.76–1.27)
EGFR: 39 ML/MIN/1.73
GLUCOSE SERPL-MCNC: 115 MG/DL (ref 65–99)
POTASSIUM SERPL-SCNC: 5 MMOL/L (ref 3.5–5.2)
SODIUM SERPL-SCNC: 141 MMOL/L (ref 134–144)

## 2022-06-16 ENCOUNTER — TELEPHONE (OUTPATIENT)
Dept: NEPHROLOGY | Facility: CLINIC | Age: 52
End: 2022-06-16

## 2022-07-14 ENCOUNTER — TELEPHONE (OUTPATIENT)
Dept: NEPHROLOGY | Facility: CLINIC | Age: 52
End: 2022-07-14

## 2022-07-14 ENCOUNTER — TELEPHONE (OUTPATIENT)
Dept: FAMILY MEDICINE CLINIC | Facility: CLINIC | Age: 52
End: 2022-07-14

## 2022-07-14 NOTE — TELEPHONE ENCOUNTER
Patient had his 2nd Covid booster on April 15 and wants to know if he should get another booster now  FYI he also wanted you know he was eating a quick check sandwich a broke a few teeth and now needs a root canal and some additional dental work  Was on antibiotics and took a lot of advil until the pain subsided  He scheduled a physical at months end

## 2022-07-14 NOTE — TELEPHONE ENCOUNTER
Pt called to update Dr Zabrina Andrew  Pt needed to have two crowns  He had another dental issue and required him to take penicillin 500 mg 1 every 4 hours for 7 days (28 pills total) He has one more day left  He took 3 advils every 6 hours for pain (2 bottles total)  Once pain went away he stopped the Advil  Pt is scheduled for a root canal on 9/8  He will be planning to take Advil and whatever the Dentist prescribes  He also stopped the Metamucil so the penicillin would be more effective  He plans to start it back up in a couple of weeks  Pt also plans to ask his PCP about getting the 3rd booster vaccine

## 2022-07-14 NOTE — TELEPHONE ENCOUNTER
Pt advised to be careful with utilizing Advil per Dr Robel Castellano to take Tylenol for dental work  No issues with PCN

## 2022-07-29 ENCOUNTER — OFFICE VISIT (OUTPATIENT)
Dept: FAMILY MEDICINE CLINIC | Facility: CLINIC | Age: 52
End: 2022-07-29
Payer: COMMERCIAL

## 2022-07-29 VITALS
HEART RATE: 76 BPM | BODY MASS INDEX: 29.7 KG/M2 | OXYGEN SATURATION: 96 % | SYSTOLIC BLOOD PRESSURE: 120 MMHG | RESPIRATION RATE: 16 BRPM | WEIGHT: 196 LBS | TEMPERATURE: 98 F | HEIGHT: 68 IN | DIASTOLIC BLOOD PRESSURE: 80 MMHG

## 2022-07-29 DIAGNOSIS — I10 ESSENTIAL HYPERTENSION: ICD-10-CM

## 2022-07-29 DIAGNOSIS — Z12.5 PROSTATE CANCER SCREENING: ICD-10-CM

## 2022-07-29 DIAGNOSIS — Z00.00 PHYSICAL EXAM: Primary | ICD-10-CM

## 2022-07-29 DIAGNOSIS — F20.9 SCHIZOPHRENIA, UNSPECIFIED TYPE (HCC): ICD-10-CM

## 2022-07-29 DIAGNOSIS — R97.20 ELEVATED PSA: ICD-10-CM

## 2022-07-29 DIAGNOSIS — E78.2 MIXED HYPERLIPIDEMIA: ICD-10-CM

## 2022-07-29 PROCEDURE — 99396 PREV VISIT EST AGE 40-64: CPT | Performed by: FAMILY MEDICINE

## 2022-07-31 NOTE — PROGRESS NOTES
FAMILY PRACTICE HEALTH MAINTENANCE OFFICE VISIT  [facility]    NAME: Faiza Capps  AGE: 47 y o  SEX: male  : 1970     DATE: 2022    Assessment and Plan     1  Physical exam    2  Essential hypertension  -     Comprehensive metabolic panel; Future  -     Lipid Panel with Direct LDL reflex; Future  -     Comprehensive metabolic panel  -     Lipid Panel with Direct LDL reflex    3  Mixed hyperlipidemia  -     Comprehensive metabolic panel; Future  -     Lipid Panel with Direct LDL reflex; Future  -     Comprehensive metabolic panel  -     Lipid Panel with Direct LDL reflex    4  Prostate cancer screening    5  Elevated PSA  -     PSA Total, Diagnostic; Future  -     PSA Total, Diagnostic    6  Schizophrenia, unspecified type ([facility] [address] )    7  BMI 29 0-29 9,adult      Medications reconciled  Cont [facility] w [facility]--Dr Booth--#[phone]  Dental follow-up---root canal scheduled 2022-Dr Maren Le      · Patient Counseling:   · Nutrition: Stressed importance of a well balanced diet, moderation of sodium/saturated fat, caloric balance and sufficient intake of fiber  · Exercise: Stressed the importance of regular exercise with a goal of 150 minutes per week  · Dental Health: Discussed daily flossing and brushing and regular dental visits   · Sexuality: Discussed sexually transmitted infections, use of condoms and prevention of unintended pregnancy  · Alcohol Use:  Recommended moderation of alcohol intake  · Injury Prevention: Discussed Safety Belts, Safety Helmets, and Smoke Detectors    · Immunizations reviewed: Risks and Benefits discussed  · Discussed benefits of:  Colon Cancer Screening, Prostate Cancer Screening  and Screening labs   BMI Counseling: Body mass index is 29 8 kg/m²  Discussed with patient's BMI with him   The BMI is above normal  Nutrition recommendations include 3-5 servings of fruits/vegetables daily, reducing fast food intake, consuming healthier snacks, decreasing soda and/or juice intake, moderation in carbohydrate intake, increasing intake of lean protein and reducing intake of saturated fat and trans fat  Exercise recommendations include exercising 3-5 times per week and strength training exercises  Chief Complaint     Chief Complaint   Patient presents with    Physical Exam       History of Present Illness     HPI    Well Adult Physical   Patient here for a comprehensive physical exam       Diet and Physical Activity  Diet: well balanced diet  Exercise: intermittently      Depression Screen  PHQ-2/9 Depression Screening    Little interest or pleasure in doing things: 0 - not at all  Feeling down, depressed, or hopeless: 0 - not at all  PHQ-2 Score: 0  PHQ-2 Interpretation: Negative depression screen          General Health  Hearing: Normal:  bilateral  Vision: goes for regular eye exams  Dental: regular dental visits    Reproductive Health  No issues       The following portions of the patient's history were reviewed and updated as appropriate: allergies, current medications, past family history, past medical history, past social history, past surgical history and problem list     Review of Systems     Review of Systems   Constitutional: Positive for fatigue  Negative for fever  Eyes:        Wears glasses   Respiratory: Negative  Cardiovascular: Negative  Gastrointestinal: Negative  Musculoskeletal: Positive for arthralgias  Skin: Negative for rash  Allergic/Immunologic: Positive for environmental allergies  Neurological: Negative for seizures and syncope  Psychiatric/Behavioral: Positive for decreased concentration and sleep disturbance  The patient is nervous/anxious          Past Medical History     Past Medical History:   Diagnosis Date    Hypertension     Mood disorder ([facility] [address]     Psychiatric disorder     Schizophrenia [facility])        Past Surgical History     Past Surgical History:   Procedure Laterality Date    NO PAST SURGERIES         Social History     Social History     Socioeconomic History    Marital status: Single     Spouse name: None    Number of children: None    Years of education: None    Highest education level: None   Occupational History    None   Tobacco Use    Smoking status: Current Every Day Smoker     Packs/day: 1 00     Years: 32 00     Pack years: 32 00     Types: Cigarettes    Smokeless tobacco: Never Used   Vaping Use    Vaping Use: Never used   Substance and Sexual Activity    Alcohol use: Yes     Comment: weekends    Drug use: No    Sexual activity: None   Other Topics Concern    None   Social History Narrative    None     Social Determinants of Health     Financial Resource Strain: Not on file   Food Insecurity: Not on file   Transportation Needs: Not on file   Physical Activity: Not on file   Stress: Not on file   Social Connections: Not on file   Intimate Partner Violence: Not on file   Housing Stability: Not on file       Family History     Family History   Problem Relation Age of Onset    Breast cancer Mother     Cancer Father         bone/spinal       Current Medications       Current Outpatient Medications:     amLODIPine (NORVASC) 5 mg tablet, TAKE 1 TABLET BY MOUTH EVERY DAY, Disp: 90 tablet, Rfl: 1    ergocalciferol (VITAMIN D2) 50,000 units, TAKE 1 CAPSULE BY MOUTH ONE TIME PER WEEK, Disp: 12 capsule, Rfl: 1    ezetimibe (ZETIA) 10 mg tablet, Take 1 tablet (10 mg total) by mouth daily, Disp: 90 tablet, Rfl: 1    metoprolol succinate (TOPROL-XL) 50 mg 24 hr tablet, TAKE 1 TABLET BY MOUTH EVERY DAY, Disp: 30 tablet, Rfl: 5    paliperidone (INVEGA) 6 MG 24 hr tablet, Take 6 mg by mouth every morning, Disp: , Rfl:     rosuvastatin (CRESTOR) 10 MG tablet, TAKE 1 TABLET BY MOUTH EVERY DAY, Disp: 30 tablet, Rfl: 5    sodium bicarbonate 325 MG tablet, Take 1 tablet (325 mg total) by mouth 2 (two) times a day, Disp: 90 tablet, Rfl: 3     Allergies Allergies   Allergen Reactions    Geodon [Ziprasidone] Fatigue    Haldol [Haloperidol] Fatigue       Objective     /80 (BP Location: Left arm, Patient Position: Sitting, Cuff Size: Large)   Pulse 76   Temp 98 °F (36 7 °C)   Resp 16   Ht 5' 8" (1 727 m)   Wt 88 9 kg (196 lb)   SpO2 96%   BMI 29 80 kg/m²      Physical Exam  Vitals and nursing note reviewed  Constitutional:       General: He is not in acute distress  Appearance: He is well-developed  HENT:      Mouth/Throat:      Pharynx: No oropharyngeal exudate  Eyes:      General: No scleral icterus  Conjunctiva/sclera: Conjunctivae normal    Cardiovascular:      Rate and Rhythm: Normal rate and regular rhythm  Pulses: Normal pulses  Pulmonary:      Effort: Pulmonary effort is normal  No respiratory distress  Breath sounds: Normal breath sounds  Abdominal:      General: Bowel sounds are normal       Palpations: Abdomen is soft  Tenderness: There is no abdominal tenderness  There is no right CVA tenderness or left CVA tenderness  Musculoskeletal:         General: No tenderness  Normal range of motion  Cervical back: Neck supple  Skin:     General: Skin is warm and dry  Capillary Refill: Capillary refill takes less than 2 seconds  Coloration: Skin is not jaundiced  Neurological:      Mental Status: He is alert and oriented to person, place, and time  Cranial Nerves: No cranial nerve deficit        Comments: No acute focal deficit   Psychiatric:      Comments: Mood stable, pleasant, cooperative           Fina Nichole MD  [address]

## 2022-08-03 LAB
ALBUMIN SERPL-MCNC: 4 G/DL (ref 3.8–4.9)
ALBUMIN/GLOB SERPL: 2 {RATIO} (ref 1.2–2.2)
ALP SERPL-CCNC: 56 IU/L (ref 44–121)
ALT SERPL-CCNC: 17 IU/L (ref 0–44)
AST SERPL-CCNC: 16 IU/L (ref 0–40)
BILIRUB SERPL-MCNC: 0.2 MG/DL (ref 0–1.2)
BUN SERPL-MCNC: 28 MG/DL (ref 6–24)
BUN/CREAT SERPL: 14 (ref 9–20)
CALCIUM SERPL-MCNC: 9.5 MG/DL (ref 8.7–10.2)
CHLORIDE SERPL-SCNC: 106 MMOL/L (ref 96–106)
CHOLEST SERPL-MCNC: 185 MG/DL (ref 100–199)
CO2 SERPL-SCNC: 21 MMOL/L (ref 20–29)
CREAT SERPL-MCNC: 1.94 MG/DL (ref 0.76–1.27)
EGFR: 41 ML/MIN/1.73
GLOBULIN SER-MCNC: 2 G/DL (ref 1.5–4.5)
GLUCOSE SERPL-MCNC: 92 MG/DL (ref 65–99)
HDLC SERPL-MCNC: 46 MG/DL
LDLC SERPL CALC-MCNC: 89 MG/DL (ref 0–99)
LDLC/HDLC SERPL: 1.9 RATIO (ref 0–3.6)
MICRODELETION SYND BLD/T FISH: NORMAL
MICRODELETION SYND BLD/T FISH: NORMAL
POTASSIUM SERPL-SCNC: 4.7 MMOL/L (ref 3.5–5.2)
PROT SERPL-MCNC: 6 G/DL (ref 6–8.5)
PSA SERPL-MCNC: 1.1 NG/ML (ref 0–4)
SL AMB VLDL CHOLESTEROL CALC: 50 MG/DL (ref 5–40)
SODIUM SERPL-SCNC: 140 MMOL/L (ref 134–144)
TRIGL SERPL-MCNC: 300 MG/DL (ref 0–149)

## 2022-08-04 ENCOUNTER — TELEPHONE (OUTPATIENT)
Dept: FAMILY MEDICINE CLINIC | Facility: CLINIC | Age: 52
End: 2022-08-04

## 2022-08-04 NOTE — TELEPHONE ENCOUNTER
Dr Prem Ryan:    Patient called asking for his labwork results? Please c/b to discuss further  He has a concern in reference to his cholesterol

## 2022-08-05 DIAGNOSIS — E78.2 MIXED HYPERLIPIDEMIA: Primary | ICD-10-CM

## 2022-08-05 DIAGNOSIS — E78.2 MIXED HYPERLIPIDEMIA: ICD-10-CM

## 2022-08-05 RX ORDER — ICOSAPENT ETHYL 1000 MG/1
1 CAPSULE ORAL DAILY
Qty: 90 CAPSULE | Refills: 1 | Status: SHIPPED | OUTPATIENT
Start: 2022-08-05 | End: 2022-08-06

## 2022-08-06 DIAGNOSIS — E78.2 MIXED HYPERLIPIDEMIA: ICD-10-CM

## 2022-08-06 RX ORDER — ICOSAPENT ETHYL 1000 MG/1
1 CAPSULE ORAL DAILY
Qty: 90 CAPSULE | Refills: 1 | Status: SHIPPED | OUTPATIENT
Start: 2022-08-06 | End: 2022-08-08

## 2022-08-08 ENCOUNTER — TELEPHONE (OUTPATIENT)
Dept: FAMILY MEDICINE CLINIC | Facility: CLINIC | Age: 52
End: 2022-08-08

## 2022-08-08 RX ORDER — ICOSAPENT ETHYL 1000 MG/1
1 CAPSULE ORAL DAILY
Qty: 90 CAPSULE | Refills: 1 | Status: SHIPPED | OUTPATIENT
Start: 2022-08-08 | End: 2022-10-23

## 2022-08-08 NOTE — TELEPHONE ENCOUNTER
Dr Simeon Pill:    Patient's insurance does not cover vasepa and would like to know if he can try OTC Fish oil tablets as recommended by pharmacy   Please advise

## 2022-08-09 NOTE — TELEPHONE ENCOUNTER
I sent over a generic rx for the vascepa--please ask pt to check with pharmacist if that was covered--if not then yes he can try an OTC fish oil version-

## 2022-08-17 ENCOUNTER — TELEPHONE (OUTPATIENT)
Dept: FAMILY MEDICINE CLINIC | Facility: CLINIC | Age: 52
End: 2022-08-17

## 2022-08-17 DIAGNOSIS — E78.2 MIXED HYPERLIPIDEMIA: ICD-10-CM

## 2022-08-17 DIAGNOSIS — E87.8 LOW BICARBONATE LEVEL: ICD-10-CM

## 2022-08-17 RX ORDER — SODIUM BICARBONATE 325 MG/1
TABLET ORAL
Qty: 60 TABLET | Refills: 5 | Status: SHIPPED | OUTPATIENT
Start: 2022-08-17

## 2022-08-17 RX ORDER — EZETIMIBE 10 MG/1
TABLET ORAL
Qty: 30 TABLET | Refills: 5 | Status: SHIPPED | OUTPATIENT
Start: 2022-08-17

## 2022-08-17 NOTE — TELEPHONE ENCOUNTER
Results faxed tc/cma----- Message from Ludwin Moran MD sent at 8/8/2022 10:34 AM EDT -----  Please fax copy of pt's labs to [facility] -Dr Violet Aguirre  # [phone]

## 2022-10-10 DIAGNOSIS — E55.9 VITAMIN D DEFICIENCY: Primary | ICD-10-CM

## 2022-10-12 DIAGNOSIS — I10 ESSENTIAL HYPERTENSION: ICD-10-CM

## 2022-10-12 RX ORDER — AMLODIPINE BESYLATE 5 MG/1
TABLET ORAL
Qty: 90 TABLET | Refills: 1 | Status: SHIPPED | OUTPATIENT
Start: 2022-10-12

## 2022-10-14 ENCOUNTER — CLINICAL SUPPORT (OUTPATIENT)
Dept: FAMILY MEDICINE CLINIC | Facility: CLINIC | Age: 52
End: 2022-10-14
Payer: COMMERCIAL

## 2022-10-14 DIAGNOSIS — Z23 NEED FOR INFLUENZA VACCINATION: Primary | ICD-10-CM

## 2022-10-14 PROCEDURE — 90471 IMMUNIZATION ADMIN: CPT

## 2022-10-14 PROCEDURE — 90682 RIV4 VACC RECOMBINANT DNA IM: CPT

## 2022-10-20 LAB
25(OH)D3+25(OH)D2 SERPL-MCNC: 37.5 NG/ML (ref 30–100)
ALBUMIN SERPL-MCNC: 4.1 G/DL (ref 3.8–4.9)
ALBUMIN/GLOB SERPL: 2.1 {RATIO} (ref 1.2–2.2)
ALP SERPL-CCNC: 58 IU/L (ref 44–121)
ALT SERPL-CCNC: 16 IU/L (ref 0–44)
APPEARANCE UR: CLEAR
AST SERPL-CCNC: 15 IU/L (ref 0–40)
BACTERIA URNS QL MICRO: ABNORMAL
BILIRUB SERPL-MCNC: <0.2 MG/DL (ref 0–1.2)
BILIRUB UR QL STRIP: NEGATIVE
BUN SERPL-MCNC: 23 MG/DL (ref 6–24)
BUN/CREAT SERPL: 10 (ref 9–20)
CALCIUM SERPL-MCNC: 9.6 MG/DL (ref 8.7–10.2)
CASTS URNS QL MICRO: ABNORMAL /LPF
CHLORIDE SERPL-SCNC: 106 MMOL/L (ref 96–106)
CO2 SERPL-SCNC: 20 MMOL/L (ref 20–29)
COLOR UR: YELLOW
CREAT SERPL-MCNC: 2.27 MG/DL (ref 0.76–1.27)
EGFR: 34 ML/MIN/1.73
EPI CELLS #/AREA URNS HPF: ABNORMAL /HPF (ref 0–10)
ERYTHROCYTE [DISTWIDTH] IN BLOOD BY AUTOMATED COUNT: 12.7 % (ref 11.6–15.4)
GLOBULIN SER-MCNC: 2 G/DL (ref 1.5–4.5)
GLUCOSE SERPL-MCNC: 102 MG/DL (ref 70–99)
GLUCOSE UR QL: NEGATIVE
HCT VFR BLD AUTO: 41.1 % (ref 37.5–51)
HGB BLD-MCNC: 14.3 G/DL (ref 13–17.7)
HGB UR QL STRIP: ABNORMAL
KETONES UR QL STRIP: NEGATIVE
LEUKOCYTE ESTERASE UR QL STRIP: NEGATIVE
MCH RBC QN AUTO: 33.1 PG (ref 26.6–33)
MCHC RBC AUTO-ENTMCNC: 34.8 G/DL (ref 31.5–35.7)
MCV RBC AUTO: 95 FL (ref 79–97)
MICRO URNS: ABNORMAL
NITRITE UR QL STRIP: NEGATIVE
PH UR STRIP: 6.5 [PH] (ref 5–7.5)
PLATELET # BLD AUTO: 188 X10E3/UL (ref 150–450)
POTASSIUM SERPL-SCNC: 5.1 MMOL/L (ref 3.5–5.2)
PROT SERPL-MCNC: 6.1 G/DL (ref 6–8.5)
PROT UR QL STRIP: ABNORMAL
RBC # BLD AUTO: 4.32 X10E6/UL (ref 4.14–5.8)
RBC #/AREA URNS HPF: ABNORMAL /HPF (ref 0–2)
SODIUM SERPL-SCNC: 141 MMOL/L (ref 134–144)
SP GR UR: 1.01 (ref 1–1.03)
UROBILINOGEN UR STRIP-ACNC: 0.2 MG/DL (ref 0.2–1)
WBC # BLD AUTO: 7 X10E3/UL (ref 3.4–10.8)
WBC #/AREA URNS HPF: ABNORMAL /HPF (ref 0–5)

## 2022-11-02 ENCOUNTER — OFFICE VISIT (OUTPATIENT)
Dept: NEPHROLOGY | Facility: CLINIC | Age: 52
End: 2022-11-02

## 2022-11-02 VITALS
DIASTOLIC BLOOD PRESSURE: 90 MMHG | SYSTOLIC BLOOD PRESSURE: 138 MMHG | BODY MASS INDEX: 30.92 KG/M2 | HEIGHT: 68 IN | WEIGHT: 204 LBS

## 2022-11-02 DIAGNOSIS — R79.89 ELEVATED SERUM CREATININE: ICD-10-CM

## 2022-11-02 DIAGNOSIS — I12.9 BENIGN HYPERTENSION WITH CKD (CHRONIC KIDNEY DISEASE) STAGE III (HCC): Primary | ICD-10-CM

## 2022-11-02 DIAGNOSIS — R31.21 ASYMPTOMATIC MICROSCOPIC HEMATURIA: ICD-10-CM

## 2022-11-02 DIAGNOSIS — E87.8 LOW BICARBONATE LEVEL: ICD-10-CM

## 2022-11-02 DIAGNOSIS — E55.9 VITAMIN D DEFICIENCY: ICD-10-CM

## 2022-11-02 DIAGNOSIS — I10 ESSENTIAL HYPERTENSION: ICD-10-CM

## 2022-11-02 DIAGNOSIS — R80.1 PERSISTENT PROTEINURIA: ICD-10-CM

## 2022-11-02 DIAGNOSIS — N18.30 BENIGN HYPERTENSION WITH CKD (CHRONIC KIDNEY DISEASE) STAGE III (HCC): Primary | ICD-10-CM

## 2022-11-02 PROBLEM — N28.9 RENAL INSUFFICIENCY: Status: RESOLVED | Noted: 2022-01-11 | Resolved: 2022-11-02

## 2022-11-02 RX ORDER — SODIUM BICARBONATE 650 MG/1
650 TABLET ORAL 2 TIMES DAILY
Qty: 90 TABLET | Refills: 3 | Status: SHIPPED | OUTPATIENT
Start: 2022-11-02

## 2022-11-02 RX ORDER — AMOXICILLIN 500 MG
CAPSULE ORAL DAILY
COMMUNITY

## 2022-11-02 NOTE — PATIENT INSTRUCTIONS
1  )  Low 2 g sodium diet    2 )  Monitor weights at home    3 )  Avoid NSAIDs (ibuprofen, Motrin, Advil, Aleve, naproxen)    4 )  Monitor blood pressure at home, call if blood pressure greater than 150/90 persistently    5 ) I will plan to discuss all results including blood work, and/or imaging at our next visit, unless there is an urgent indication, in which case I will call you earlier  If you have any questions or concerns about your results, please feel free to call our office      6 ) Increase sodium bicarbonate to 650 mg twice a day    7 ) repeat blood work  before next visit, no fasting required    8 ) Cut back on smoking

## 2022-11-02 NOTE — PROGRESS NOTES
NEPHROLOGY OFFICE VISIT   Emely Young 47 y o  male MRN: [id number]  11/2/2022    Reason for Visit:  Chronic kidney disease, microscopic hematuria and proteinuria    History of Present Illness (HPI):    I had the pleasure of seeing Sebastian Callaway today in the renal clinic for the continued management of chronic kidney disease, microscopic hematuria, proteinuria, and hypertension  Since our last visit, there has been no ER visits or hospitilizations  He currently has no complaints at this time and is feeling well  Patient denies any chest pain, shortness of breath and swelling  The last blood work was done on October, which we have reviewed together  Has no complaints  Still has persistent proteinuria over 3 g with microscopic hematuria, asymptomatic  Over the past year his creatinine appears to fluctuate in the high 1s to low 2s currently at 2 2 mg/dL  There has been some noted progression over the last few years  He has proteinuria and microscopic hematuria  Will plan to proceed with serologic evaluation  I did discuss with him that we may need to proceed with a renal biopsy      ASSESSMENT and PLAN:    Chronic kidney disease stage III  --baseline creatinine now fluctuating in the high 1s to low 2s  --presumed to be secondary to hypertensive nephrosclerosis, obesity related renal dysfunction, and 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  --renal ultrasound shows bilateral echogenicity consistent with underlying chronic kidney disease  --check serologies, we may potentially need to proceed with renal biopsy  --urinalysis positive for blood and protein  --worsening proteinuria  --continued blood pressure control  --avoid NSAIDs  --Cystatin C with eGFR approximately 40 mL/min     Dyslipidemia  --remains on Crestor and Zetia     Microscopic hematuria  --underwent cystoscopy with negative findings  --asymptomatic  --check serologies  --active smoker     Hypertension  --slightly above target  --smoking cessation strongly advised spent about 3-5 minutes discussing smoking cessation  --metoprolol XL 50 mg daily, amlodipine 5 mg daily  --non ACE-inhibitor angiotensin receptor blocker due to recent elevation of the creatinine with potassium that is borderline on the high end of normal     Smoker  --1 pack per day for over 30 years, cut back to 3/4 pack per day  --spent 3 minutes smoking about smoking cessation I even offered him a patch which he declined as he said it does not help     Low bicarbonate level  --currently on oral sodium bicarbonate  --increase sodium bicarbonate 650 mg twice a day      PATIENT INSTRUCTIONS:    Patient Instructions   1 )  Low 2 g sodium diet    2 )  Monitor weights at home    3 )  Avoid NSAIDs (ibuprofen, Motrin, Advil, Aleve, naproxen)    4 )  Monitor blood pressure at home, call if blood pressure greater than 150/90 persistently    5 ) I will plan to discuss all results including blood work, and/or imaging at our next visit, unless there is an urgent indication, in which case I will call you earlier  If you have any questions or concerns about your results, please feel free to call our office      6 ) Increase sodium bicarbonate to 650 mg twice a day    7 ) repeat blood work  before next visit, no fasting required    8 ) Cut back on smoking          Orders Placed This Encounter   Procedures   • ANGELES Screen w/ Reflex to Titer/Pattern     Standing Status:   Future     Standing Expiration Date:   11/2/2023   • Anti-neutrophilic cytoplasmic antibody     Standing Status:   Future     Number of Occurrences:   1     Standing Expiration Date:   11/2/2023   • C3, C4, Complement CH50     Standing Status:   Future     Standing Expiration Date:   11/2/2023   • HIV AB, HIV 1/HIV 2, EIA W/REFL TO HIV 1 WB (HISTORICAL)     Standing Status:   Future     Standing Expiration Date:   11/2/2023   • Hepatitis panel, acute     Standing Status:   Future     Number of Occurrences:   1     Standing Expiration Date:   11/2/2023   • Phospholipase A2 Receptor Antibodies     Standing Status:   Future     Number of Occurrences:   1     Standing Expiration Date:   11/2/2023   • Protein electrophoresis, serum     Standing Status:   Future     Number of Occurrences:   1     Standing Expiration Date:   11/2/2023   • Immunoglobulin free LT chains blood     Standing Status:   Future     Number of Occurrences:   1     Standing Expiration Date:   11/2/2023   • Glomerular basement membrane antibodies     Standing Status:   Future     Number of Occurrences:   1     Standing Expiration Date:   11/2/2023   • Protein / creatinine ratio, urine     Standing Status:   Future     Number of Occurrences:   1     Standing Expiration Date:   11/2/2023   • Cystatin C With eGFR     Standing Status:   Future     Standing Expiration Date:   11/2/2023       OBJECTIVE:  Current Weight: Weight - Scale: 92 5 kg (204 lb)  Vitals:    11/02/22 1245 11/02/22 1329   BP:  138/90   Weight: 92 5 kg (204 lb)    Height: 5' 8" (1 727 m)     Body mass index is 31 02 kg/m²  REVIEW OF SYSTEMS:    Review of Systems   Constitutional: Negative for activity change and fever  Respiratory: Negative for cough, chest tightness, shortness of breath and wheezing  Cardiovascular: Negative for chest pain and leg swelling  Gastrointestinal: Negative for abdominal pain, diarrhea, nausea and vomiting  Endocrine: Negative for polyuria  Genitourinary: Negative for difficulty urinating, dysuria, flank pain, frequency and urgency  Skin: Negative for rash  Neurological: Negative for dizziness, syncope, light-headedness and headaches  PHYSICAL EXAM:      Physical Exam  Vitals and nursing note reviewed  Constitutional:       General: He is not in acute distress  Appearance: He is well-developed  HENT:      Head: Normocephalic and atraumatic  Eyes:      General: No scleral icterus       Conjunctiva/sclera: Conjunctivae normal       Pupils: Pupils are equal, round, and reactive to light  Cardiovascular:      Rate and Rhythm: Normal rate and regular rhythm  Heart sounds: S1 normal and S2 normal  No murmur heard  No friction rub  No gallop  Pulmonary:      Effort: Pulmonary effort is normal  No respiratory distress  Breath sounds: Normal breath sounds  No wheezing or rales  Abdominal:      General: Bowel sounds are normal       Palpations: Abdomen is soft  Tenderness: There is no abdominal tenderness  There is no rebound  Musculoskeletal:         General: Normal range of motion  Cervical back: Normal range of motion and neck supple  Skin:     Findings: No rash  Neurological:      Mental Status: He is alert and oriented to person, place, and time     Psychiatric:         Behavior: Behavior normal          Medications:    Current Outpatient Medications:   •  amLODIPine (NORVASC) 5 mg tablet, TAKE 1 TABLET BY MOUTH EVERY DAY, Disp: 90 tablet, Rfl: 1  •  ergocalciferol (VITAMIN D2) 50,000 units, TAKE 1 CAPSULE BY MOUTH ONE TIME PER WEEK, Disp: 12 capsule, Rfl: 0  •  ezetimibe (ZETIA) 10 mg tablet, TAKE 1 TABLET BY MOUTH EVERY DAY, Disp: 30 tablet, Rfl: 5  •  metoprolol succinate (TOPROL-XL) 50 mg 24 hr tablet, TAKE 1 TABLET BY MOUTH EVERY DAY, Disp: 30 tablet, Rfl: 5  •  Omega-3 Fatty Acids (Fish Oil) 1200 MG CAPS, Take by mouth in the morning, Disp: , Rfl:   •  paliperidone (INVEGA) 6 MG 24 hr tablet, Take 6 mg by mouth every morning, Disp: , Rfl:   •  rosuvastatin (CRESTOR) 10 MG tablet, TAKE 1 TABLET BY MOUTH EVERY DAY, Disp: 30 tablet, Rfl: 5  •  sodium bicarbonate 650 mg tablet, Take 1 tablet (650 mg total) by mouth 2 (two) times a day, Disp: 90 tablet, Rfl: 3    Laboratory Results:        Invalid input(s): ALBUMIN    Results for orders placed or performed in visit on 10/10/22   Vitamin D 25 hydroxy   Result Value Ref Range    25-HYDROXY VIT D 37 5 30 0 - 100 0 ng/mL

## 2022-11-03 ENCOUNTER — TELEPHONE (OUTPATIENT)
Dept: NEPHROLOGY | Facility: CLINIC | Age: 52
End: 2022-11-03

## 2022-11-03 NOTE — TELEPHONE ENCOUNTER
Patient called worried about 2 of the labs that were ordered yesterday - HIV & hepatitis panel  He wanted to make sure that we don't think that he has HIV or hepatitis and that we just want to rule it out  I told him that it is most likely for ruling it but I will have Dr Arnol Amin call him back to go over why he ordered the tests

## 2022-12-17 LAB
ALBUMIN SERPL ELPH-MCNC: 3.3 G/DL (ref 2.9–4.4)
ALBUMIN/GLOB SERPL: 1.1 {RATIO} (ref 0.7–1.7)
ALPHA1 GLOB SERPL ELPH-MCNC: 0.2 G/DL (ref 0–0.4)
ALPHA2 GLOB SERPL ELPH-MCNC: 1 G/DL (ref 0.4–1)
ANA TITR SER IF: NEGATIVE {TITER}
B-GLOBULIN SERPL ELPH-MCNC: 1.2 G/DL (ref 0.7–1.3)
C-ANCA TITR SER IF: NORMAL TITER
C3 SERPL-MCNC: 132 MG/DL (ref 82–167)
C4 SERPL-MCNC: 32 MG/DL (ref 12–38)
CH50 SERPL-ACNC: 60 U/ML
CREAT UR-MCNC: 73.8 MG/DL
CYSTATIN C SERPL-MCNC: 2.35 MG/L (ref 0.67–1.14)
GAMMA GLOB SERPL ELPH-MCNC: 0.6 G/DL (ref 0.4–1.8)
GBM AB SER IA-ACNC: <0.2 UNITS (ref 0–0.9)
GFR/BSA.PRED SERPLBLD CYS-BASED-ARV: 26 ML/MIN/1.73
GLOBULIN SER CALC-MCNC: 3 G/DL (ref 2.2–3.9)
HAV IGM SERPL QL IA: NEGATIVE
HBV CORE IGM SERPL QL IA: NEGATIVE
HBV SURFACE AG SERPL QL IA: NEGATIVE
HCV AB S/CO SERPL IA: <0.1 S/CO RATIO (ref 0–0.9)
HIV 1+2 AB+HIV1 P24 AG SERPL QL IA: NON REACTIVE
KAPPA LC FREE SER-MCNC: 63.8 MG/L (ref 3.3–19.4)
KAPPA LC FREE/LAMBDA FREE SER: 1.84 {RATIO} (ref 0.26–1.65)
LABORATORY COMMENT REPORT: NORMAL
LAMBDA LC FREE SERPL-MCNC: 34.7 MG/L (ref 5.7–26.3)
M PROTEIN SERPL ELPH-MCNC: NORMAL G/DL
MYELOPEROXIDASE AB SER IA-ACNC: <0.2 UNITS (ref 0–0.9)
P-ANCA ATYPICAL TITR SER IF: NORMAL TITER
P-ANCA TITR SER IF: NORMAL TITER
PLA2R IGG SER IA-ACNC: <1.8 RU/ML (ref 0–19.9)
PROT SERPL-MCNC: 6.3 G/DL (ref 6–8.5)
PROT UR-MCNC: 598.2 MG/DL
PROT/CREAT UR: 8106 MG/G CREAT (ref 0–200)
PROTEINASE3 AB SER IA-ACNC: <0.2 UNITS (ref 0–0.9)
SL AMB INTERPRETATION: NORMAL

## 2023-01-05 ENCOUNTER — PREP FOR PROCEDURE (OUTPATIENT)
Dept: INTERVENTIONAL RADIOLOGY/VASCULAR | Facility: CLINIC | Age: 53
End: 2023-01-05

## 2023-01-05 ENCOUNTER — TELEPHONE (OUTPATIENT)
Dept: NEPHROLOGY | Facility: CLINIC | Age: 53
End: 2023-01-05

## 2023-01-05 ENCOUNTER — OFFICE VISIT (OUTPATIENT)
Dept: NEPHROLOGY | Facility: CLINIC | Age: 53
End: 2023-01-05

## 2023-01-05 VITALS
HEIGHT: 68 IN | HEART RATE: 82 BPM | SYSTOLIC BLOOD PRESSURE: 144 MMHG | BODY MASS INDEX: 31.37 KG/M2 | WEIGHT: 207 LBS | DIASTOLIC BLOOD PRESSURE: 94 MMHG

## 2023-01-05 DIAGNOSIS — R80.1 PERSISTENT PROTEINURIA: Primary | ICD-10-CM

## 2023-01-05 DIAGNOSIS — F17.200 TOBACCO DEPENDENCE: ICD-10-CM

## 2023-01-05 DIAGNOSIS — E55.9 VITAMIN D DEFICIENCY: ICD-10-CM

## 2023-01-05 DIAGNOSIS — E87.8 LOW BICARBONATE LEVEL: ICD-10-CM

## 2023-01-05 DIAGNOSIS — N18.30 BENIGN HYPERTENSION WITH CKD (CHRONIC KIDNEY DISEASE) STAGE III (HCC): ICD-10-CM

## 2023-01-05 DIAGNOSIS — I12.9 BENIGN HYPERTENSION WITH CKD (CHRONIC KIDNEY DISEASE) STAGE III (HCC): ICD-10-CM

## 2023-01-05 PROBLEM — R79.89 ELEVATED SERUM CREATININE: Status: RESOLVED | Noted: 2022-01-30 | Resolved: 2023-01-05

## 2023-01-05 NOTE — PROGRESS NOTES
NEPHROLOGY OFFICE VISIT   Fausto Mccray 47 y o  male MRN: [id number]  1/5/2023    Reason for Visit: Chronic kidney disease with proteinuria    History of Present Illness (HPI):    I had the pleasure of seeing Diego Salmeron today in the renal clinic for the continued management of chronic kidney disease with worsening proteinuria  Since our last visit, there has been no ER visits or hospitilizations  He currently has no complaints at this time and is feeling well  Patient denies any chest pain, shortness of breath and swelling  The last blood work was done on December, which we have reviewed together  I have reviewed his serologies and complete detail  The serologies were essentially negative  Though he is having worsening proteinuria with a urine protein creatinine ratio now at 8 last year it was 4  He still continues to smoke 3/4 pack/day  No interest in quitting at this time  Reports no NSAID use  No chest pain or shortness of breath and no edema  Reports no foamy urine  We discussed about proceeding with a kidney biopsy  He is agreeable to  Risks and benefits were discussed at length  I also discussed about potentially starting an ACE inhibitor likely in the next few weeks for antiproteinuric effects  We will check his repeat blood work first to make sure the potassium and creatinine are okay before starting  I advised him to continue the sodium bicarb and for his low bicarbonate level  ASSESSMENT and PLAN:    Worsening proteinuria  -- Plan for renal biopsy  -- Serologies: ANGELES was negative, HIV was nonreactive, hepatitis panel was negative, ANCA titer was negative, antiphospholipase A2 receptor antibodies were negative, complement C3 and C4 within normal limits, serum protein electrophoresis showed no M spike, kappa lambda ratio was 1 8, anti-GBM was negative  -- Likely will need to start an angiotensin receptor blocker or an ACE inhibitor    We will check the blood work first to make sure the potassium level is okay    Chronic kidney disease stage IIIB  --baseline creatinine now fluctuating in the high 1s to low 2s  --presumed to be secondary to hypertensive nephrosclerosis, obesity related renal dysfunction, and 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  --renal ultrasound shows bilateral echogenicity consistent with underlying chronic kidney disease  --Serologies negative  --For kidney biopsy  --Repeat BMP  --urinalysis positive for blood and protein  --worsening proteinuria  --continued blood pressure control  --avoid NSAIDs     Dyslipidemia  --remains on Crestor and Zetia     Microscopic hematuria  --underwent cystoscopy with negative findings  --asymptomatic  --Serologies negative  --Need for kidney biopsy  --active smoker     Hypertension  --slightly above target  --smoking cessation strongly advised spent about 3-5 minutes discussing smoking cessation  --metoprolol XL 50 mg daily, amlodipine 5 mg daily  -- We will consider starting an angiotensin receptor blocker or ACE inhibitor at the next visit  We will get blood work prior just to make sure the potassium level is okay     Smoker  --1 pack per day for over 30 years, cut back to 3/4 pack per day  --spent 3 minutes smoking about smoking cessation I even offered him a patch which he declined as he said it does not help     Low bicarbonate level  -- Can you sodium bicarb and 650 mg twice a day check a repeat BMP      PATIENT INSTRUCTIONS:    Patient Instructions   1 )  Low 2 g sodium diet    2 )  Monitor weights at home    3 )  Avoid NSAIDs (ibuprofen, Motrin, Advil, Aleve, naproxen)    4 )  Monitor blood pressure at home, call if blood pressure greater than 150/90 persistently    5 ) I will plan to discuss all results including blood work, and/or imaging at our next visit, unless there is an urgent indication, in which case I will call you earlier   If you have any questions or concerns about your results, please feel free to call our office  6 ) Quit Smoking    7 ) Plan for kidney biopsy          Orders Placed This Encounter   Procedures   • Basic metabolic panel     This is a patient instruction: Patient fasting for 8 hours or longer recommended  Standing Status:   Future     Number of Occurrences:   1     Standing Expiration Date:   1/5/2024   • Protein / creatinine ratio, urine     Standing Status:   Future     Number of Occurrences:   1     Standing Expiration Date:   1/5/2024   • Ambulatory Referral to Interventional Radiology     Standing Status:   Future     Standing Expiration Date:   1/5/2024     Referral Priority:   Routine     Referral Type:   Consult - AMB     Referral Reason:   Specialty Services Required     Number of Visits Requested:   1     Expiration Date:   1/5/2024       OBJECTIVE:  Current Weight: Weight - Scale: 93 9 kg (207 lb)  Vitals:    01/05/23 1149   BP: 144/94   BP Location: Left arm   Patient Position: Sitting   Cuff Size: Standard   Pulse: 82   Weight: 93 9 kg (207 lb)   Height: 5' 8" (1 727 m)    Body mass index is 31 47 kg/m²  REVIEW OF SYSTEMS:    Review of Systems   Constitutional: Negative for activity change and fever  Respiratory: Negative for cough, chest tightness, shortness of breath and wheezing  Cardiovascular: Negative for chest pain and leg swelling  Gastrointestinal: Negative for abdominal pain, diarrhea, nausea and vomiting  Endocrine: Negative for polyuria  Genitourinary: Negative for difficulty urinating, dysuria, flank pain, frequency and urgency  Skin: Negative for rash  Neurological: Negative for dizziness, syncope, light-headedness and headaches  PHYSICAL EXAM:      Physical Exam  Vitals and nursing note reviewed  Constitutional:       General: He is not in acute distress  Appearance: He is well-developed  He is not diaphoretic  HENT:      Head: Normocephalic and atraumatic  Eyes:      General: No scleral icterus       Pupils: Pupils are equal, round, and reactive to light  Cardiovascular:      Rate and Rhythm: Normal rate and regular rhythm  Heart sounds: Normal heart sounds  No murmur heard  No friction rub  No gallop  Pulmonary:      Effort: Pulmonary effort is normal  No respiratory distress  Breath sounds: Normal breath sounds  No wheezing or rales  Chest:      Chest wall: No tenderness  Abdominal:      General: Bowel sounds are normal  There is no distension  Palpations: Abdomen is soft  Tenderness: There is no abdominal tenderness  There is no rebound  Musculoskeletal:         General: Normal range of motion  Cervical back: Normal range of motion and neck supple  Skin:     Findings: No rash  Neurological:      Mental Status: He is alert and oriented to person, place, and time           Medications:    Current Outpatient Medications:   •  amLODIPine (NORVASC) 5 mg tablet, TAKE 1 TABLET BY MOUTH EVERY DAY, Disp: 90 tablet, Rfl: 1  •  ergocalciferol (VITAMIN D2) 50,000 units, TAKE 1 CAPSULE BY MOUTH ONE TIME PER WEEK, Disp: 12 capsule, Rfl: 1  •  ezetimibe (ZETIA) 10 mg tablet, TAKE 1 TABLET BY MOUTH EVERY DAY, Disp: 30 tablet, Rfl: 5  •  metoprolol succinate (TOPROL-XL) 50 mg 24 hr tablet, TAKE 1 TABLET BY MOUTH EVERY DAY, Disp: 30 tablet, Rfl: 5  •  Omega-3 Fatty Acids (Fish Oil) 1200 MG CAPS, Take by mouth in the morning, Disp: , Rfl:   •  paliperidone (INVEGA) 6 MG 24 hr tablet, Take 6 mg by mouth every morning, Disp: , Rfl:   •  rosuvastatin (CRESTOR) 10 MG tablet, TAKE 1 TABLET BY MOUTH EVERY DAY, Disp: 30 tablet, Rfl: 5  •  sodium bicarbonate 650 mg tablet, Take 1 tablet (650 mg total) by mouth 2 (two) times a day, Disp: 90 tablet, Rfl: 3    Laboratory Results:        Invalid input(s): ALBUMIN    Results for orders placed or performed in visit on 11/02/22   Anti-neutrophilic cytoplasmic antibody   Result Value Ref Range    MPO AB <0 2 0 0 - 0 9 units    TN-3 AB <0 2 0 0 - 0 9 units    C-ANCA <1:20 Neg:<1:20 titer    P-ANCA <1:20 Neg:<1:20 titer    Atypical pANCA <1:20 Neg:<1:20 titer   Hepatitis panel, acute   Result Value Ref Range    Hep A Ab, IgM Negative Negative    HBsAg Screen Negative Negative    Hep B Core Ab, IgM Negative Negative    HEP C AB <0 1 0 0 - 0 9 s/co ratio   Phospholipase A2 Receptor Antibodies   Result Value Ref Range    PHOSPHOLIPASE A2 RECEPTOR JEREMIE, S <1 8 0 0 - 19 9 RU/mL   Protein electrophoresis, serum   Result Value Ref Range    Protein, Total 6 3 6 0 - 8 5 g/dL    Albumin, Electrophoresis 3 3 2 9 - 4 4 g/dL    Alpha-1 Globulin 0 2 0 0 - 0 4 g/dL    Alpha-2 Globulin 1 0 0 4 - 1 0 g/dL    Beta Globulin 1 2 0 7 - 1 3 g/dL    Gamma Globulin 0 6 0 4 - 1 8 g/dL    M-Tj Not Observed Not Observed g/dL    Globulin 3 0 2 2 - 3 9 g/dL    Albumin/Globulin Ratio 1 1 0 7 - 1 7    Please note Comment    Immunoglobulin free LT chains blood   Result Value Ref Range    Ig Kappa Free Light Chain 63 8 (H) 3 3 - 19 4 mg/L    Ig Lambda Free Light Chain 34 7 (H) 5 7 - 26 3 mg/L    Kappa/Lambda FluidC Ratio 1 84 (H) 0 26 - 1 65   Glomerular basement membrane antibodies   Result Value Ref Range    GBM Ab <0 2 0 0 - 0 9 units   Protein / creatinine ratio, urine   Result Value Ref Range    Creatinine, Urine 73 8 Not Estab  mg/dL    Total Protein, Urine 598 2 Not Estab  mg/dL    Prot/Creat Ratio, Ur 8,106 (H) 0 - 200 mg/g creat   Interpretation   Result Value Ref Range    Interpretation: Comment    C4 + C3   Result Value Ref Range    C3 Complement 132 82 - 167 mg/dL    C4, COMPLEMENT 32 12 - 38 mg/dL   Cystatin C With eGFR   Result Value Ref Range    CYSTATIN C 2 35 (H) 0 67 - 1 14 mg/L    eGFR 26 (L) >59 mL/min/1 73   Human Immunodeficiency Virus 1/2 Antigen / Antibody ( Fourth Generation) with Reflex Testing   Result Value Ref Range    HIV Screen 4th Generation wRflx Non Reactive Non Reactive   Complement, total   Result Value Ref Range    Compl, Total (CH50) 60 >41 U/mL   Antinuclear Antibodies, IFA Result Value Ref Range    Antinuclear Antibodies, IFA Negative

## 2023-01-05 NOTE — PATIENT INSTRUCTIONS
1  )  Low 2 g sodium diet    2 )  Monitor weights at home    3 )  Avoid NSAIDs (ibuprofen, Motrin, Advil, Aleve, naproxen)    4 )  Monitor blood pressure at home, call if blood pressure greater than 150/90 persistently    5 ) I will plan to discuss all results including blood work, and/or imaging at our next visit, unless there is an urgent indication, in which case I will call you earlier  If you have any questions or concerns about your results, please feel free to call our office      6 ) Quit Smoking    7 ) Plan for kidney biopsy

## 2023-01-05 NOTE — TELEPHONE ENCOUNTER
Patient called the office letting us know that he may be 5 mins late for his office visit today at 12 pm with Dr Cristian Fry

## 2023-01-27 ENCOUNTER — TELEPHONE (OUTPATIENT)
Dept: NEPHROLOGY | Facility: CLINIC | Age: 53
End: 2023-01-27

## 2023-01-27 NOTE — TELEPHONE ENCOUNTER
Patient called stating he canceled his kidney biopsy and would not like to move forward with it  He wants to seek alternative treatments

## 2023-01-30 DIAGNOSIS — E78.2 MIXED HYPERLIPIDEMIA: ICD-10-CM

## 2023-01-30 RX ORDER — EZETIMIBE 10 MG/1
TABLET ORAL
Qty: 30 TABLET | Refills: 5 | Status: SHIPPED | OUTPATIENT
Start: 2023-01-30

## 2023-02-02 ENCOUNTER — TELEPHONE (OUTPATIENT)
Dept: NEPHROLOGY | Facility: CLINIC | Age: 53
End: 2023-02-02

## 2023-02-02 NOTE — TELEPHONE ENCOUNTER
Pt rescheduled his appt online and wanted to make sure our systems matched up to what he scheduled online  It was a match so he is good for the appt he made on 2/14 in the [facility] office

## 2023-02-08 ENCOUNTER — HOSPITAL ENCOUNTER (OUTPATIENT)
Dept: RADIOLOGY | Facility: HOSPITAL | Age: 53
Discharge: HOME | End: 2023-02-08
Attending: RADIOLOGY

## 2023-02-09 ENCOUNTER — TELEPHONE (OUTPATIENT)
Dept: NEPHROLOGY | Facility: CLINIC | Age: 53
End: 2023-02-09

## 2023-02-09 LAB
BUN SERPL-MCNC: 29 MG/DL (ref 6–24)
BUN/CREAT SERPL: 10 (ref 9–20)
CALCIUM SERPL-MCNC: 9.8 MG/DL (ref 8.7–10.2)
CHLORIDE SERPL-SCNC: 106 MMOL/L (ref 96–106)
CO2 SERPL-SCNC: 23 MMOL/L (ref 20–29)
CREAT SERPL-MCNC: 2.96 MG/DL (ref 0.76–1.27)
CREAT UR-MCNC: 57.7 MG/DL
EGFR: 25 ML/MIN/1.73
GLUCOSE SERPL-MCNC: 104 MG/DL (ref 70–99)
POTASSIUM SERPL-SCNC: 5.3 MMOL/L (ref 3.5–5.2)
PROT UR-MCNC: 480.5 MG/DL
PROT/CREAT UR: 8328 MG/G CREAT (ref 0–200)
SODIUM SERPL-SCNC: 143 MMOL/L (ref 134–144)

## 2023-02-09 NOTE — TELEPHONE ENCOUNTER
Most recent laboratory data reviewed  Serologies have been negative  Renal function worsening creatinine up to 2 9 mg/dL potassium 5 3    Worsening proteinuria now up to 8 9 g     Patient has a follow-up on February 14    Patient declined kidney biopsy after I recommended it  Ideally I would like the potassium to be normal before trying to start him on an ACE inhibitor or an angiotensin receptor blocker  Possibly the nephrotic range proteinuria may have caused some component of acute tubular necrosis

## 2023-02-14 ENCOUNTER — OFFICE VISIT (OUTPATIENT)
Dept: NEPHROLOGY | Facility: CLINIC | Age: 53
End: 2023-02-14

## 2023-02-14 VITALS
HEART RATE: 80 BPM | SYSTOLIC BLOOD PRESSURE: 132 MMHG | HEIGHT: 68 IN | DIASTOLIC BLOOD PRESSURE: 96 MMHG | BODY MASS INDEX: 31.37 KG/M2 | WEIGHT: 207 LBS

## 2023-02-14 DIAGNOSIS — I12.9 BENIGN HYPERTENSION WITH CKD (CHRONIC KIDNEY DISEASE) STAGE III (HCC): ICD-10-CM

## 2023-02-14 DIAGNOSIS — N28.9 WORSENING RENAL FUNCTION: ICD-10-CM

## 2023-02-14 DIAGNOSIS — E87.8 LOW BICARBONATE LEVEL: ICD-10-CM

## 2023-02-14 DIAGNOSIS — E55.9 VITAMIN D DEFICIENCY: Primary | ICD-10-CM

## 2023-02-14 DIAGNOSIS — E87.5 HYPERKALEMIA: ICD-10-CM

## 2023-02-14 DIAGNOSIS — R80.1 PERSISTENT PROTEINURIA: ICD-10-CM

## 2023-02-14 DIAGNOSIS — I10 ESSENTIAL HYPERTENSION: ICD-10-CM

## 2023-02-14 DIAGNOSIS — F17.200 TOBACCO DEPENDENCE: ICD-10-CM

## 2023-02-14 DIAGNOSIS — N18.30 BENIGN HYPERTENSION WITH CKD (CHRONIC KIDNEY DISEASE) STAGE III (HCC): ICD-10-CM

## 2023-02-14 PROBLEM — R80.9 NEPHROTIC RANGE PROTEINURIA: Status: ACTIVE | Noted: 2023-02-14

## 2023-02-14 NOTE — PROGRESS NOTES
NEPHROLOGY OFFICE VISIT   Mala Trujillo 47 y o  male MRN: [id number]  2/14/2023    Reason for Visit: Worsening renal function and nephrotic range proteinuria    History of Present Illness (HPI):    I had the pleasure of seeing Cleve Farrell today in the renal clinic for the continued management of worsening renal function nephrotic range proteinuria  Since our last visit, there has been no ER visits or hospitilizations  He currently has no complaints at this time and is feeling well  Patient denies any chest pain, shortness of breath and swelling  The last blood work was done on last week, which we have reviewed together  Originally we were planning to set him up for a kidney biopsy but he does not want to pursue that  I explained the importance and the concern I am having about his nephrotic range proteinuria in the absence of diabetes with worsening creatinine  He understands why want the biopsy but he absolutely refuses and will not proceed with that  Given that we do not know the underlying etiology of the nephrotic range proteinuria we would not empirically place him on immunosuppression therapy given that we do not know the underlying cause  All serologies have been negative  At this point the goal was for conservative medical management  I was planning to start him on an angiotensin receptor blocker and ACE inhibitor for the nephrotic syndrome but recently he had an increase intake of more potassium resulting in a potassium of 5 3, worsening creatinine of 2 9 mg/dL  I gave him a list of potassium rich foods  I spent approximately 10 minutes discussing dietary intake of sodium and potassium  I again strongly encouraged a renal biopsy  Still continues to smoke 3/4 pack/day, spite my education and continued education on stopping smoking      ASSESSMENT and PLAN:      Worsening renal function/acute kidney injury  -- Creatinine up to 2 9 mg/dL  -- I suspect that this is likely related to increased nephrotic range proteinuria resulting in some component of acute tubular necrosis  -- We will check a repeat BMP  -- Check repeat urine protein creatinine ratio  -- Check a renal ultrasound to evaluate for obstruction  -- If the creatinine stabilizes we will plan to start an ACE inhibitor or angiotensin receptor blocker along with the potassium being normal as well  Hopefully improving the proteinuria may hopefully hold any further progression    Worsening proteinuria  --Patient declined kidney biopsy  I strongly recommended  -- Serologies: ANGELES was negative, HIV was nonreactive, hepatitis panel was negative, ANCA titer was negative, antiphospholipase A2 receptor antibodies were negative, complement C3 and C4 within normal limits, serum protein electrophoresis showed no M spike, kappa lambda ratio was 1 8, anti-GBM was negative  -- We will eventually plan to start ACE inhibitor or angiotensin receptor blocker based on the repeat blood work  --Urine protein creatinine ratio increased to 8 5     Chronic kidney disease stage IIIB  --baseline creatinine now fluctuating in the high 1s to low 2s  --presumed to be secondary to hypertensive nephrosclerosis, obesity related renal dysfunction, and 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  --Repeat renal ultrasound    Prior ultrasound from last year showed echogenic kidneys consistent with underlying chronic kidney disease  --Serologies negative  --Declined kidney biopsy  --Repeat BMP  --urinalysis positive for blood and protein  --worsening proteinuria and renal function  --continued blood pressure control  --avoid NSAIDs  --See above for worsening renal function  --I did discuss that on his current trajectory he will eventually require renal replacement therapy     Dyslipidemia  --remains on Crestor and Zetia  --Concurrent nephrotic syndrome     Microscopic hematuria  --underwent cystoscopy with negative findings  --asymptomatic  --Serologies negative  --Declined kidney biopsy  --active smoker     Hypertension  --Was to target  --smoking cessation strongly advised spent about 3-5 minutes discussing smoking cessation  --metoprolol XL 50 mg daily, amlodipine 5 mg daily  -- We will consider starting an angiotensin receptor blocker or ACE inhibitor at the next visit  We will get blood work prior just to make sure the potassium level is okay     Smoker  --1 pack per day for over 30 years, cut back to 3/4 pack per day  --spent 3 minutes smoking about smoking cessation I even offered him a patch which he declined as he said it does not help     Low bicarbonate level  -- Continue oral sodium bicarbonate    Hyperkalemia  -- Mild at 5 3  -- We will wait until the potassium is better before starting an ACE inhibitor or angiotensin receptor blocker  -- Educated on a low potassium diet    Obesity  -- BMI 31 47  -- Diet exercise and weight loss program      PATIENT INSTRUCTIONS:    Patient Instructions   1 )  Low 2 g sodium diet    2 )  Monitor weights at home    3 )  Avoid NSAIDs (ibuprofen, Motrin, Advil, Aleve, naproxen)    4 )  Monitor blood pressure at home, call if blood pressure greater than 150/90 persistently    5 ) I will plan to discuss all results including blood work, and/or imaging at our next visit, unless there is an urgent indication, in which case I will call you earlier  If you have any questions or concerns about your results, please feel free to call our office      6 ) Reduce intake of potassium    7 ) Repeat blood work, if potassium better will start you on ACE inhibitors or ARB    8 ) Check blood work this week    9 ) Check kidney US          Orders Placed This Encounter   Procedures   • US kidney and bladder     Worsening CKD     Standing Status:   Future     Standing Expiration Date:   2/14/2027     Scheduling Instructions:      "Prep required if being scheduled in conjunction with other studies, refer to those examination's Preps first before scheduling  All patients for US Kidney and Bladder they must drink 24 oz of water 60 minutes before your scheduled appointment time  This test requires you to have a FULL bladder  Please do not urinate before your test             If you have difficulty holding your urine please arrive 30 minutes early to complete your drinking prep  You may take all of your medications for this test             Pediatric Preps-birth to 12 years             Infants and toddlers to 1 year of age- no drinking prep or restrictions             Toddlers (33 year old)- just give liquids , any clear liquid or juice, and hour prior to the exam             3years old and older- drink liquids (approx  16 to 24 oz and no soda) 30 minutes before, try to not let the child void until the test is completed            Please bring your insurance cards, a form of photo ID and a list of yourmedications with you  Arrive 15 minutes prior to your appointment time in order to register  If you need to have lab work or a urinalysis, please do this AFTER your ultrasound  "            To schedule this appointment, please contact Central Scheduling at [phone]  Order Specific Question:   Is a Renal Artery Doppler also being requested in addition to the Kidney/Renal ultrasound ? Answer:   No   • Basic metabolic panel     This is a patient instruction: Patient fasting for 8 hours or longer recommended  Standing Status:   Future     Number of Occurrences:   1     Standing Expiration Date:   2/14/2024   • Protein / creatinine ratio, urine       OBJECTIVE:  Current Weight: Weight - Scale: 93 9 kg (207 lb)  Vitals:    02/14/23 1227   BP: 132/96   BP Location: Left arm   Patient Position: Sitting   Cuff Size: Large   Pulse: 80   Weight: 93 9 kg (207 lb)   Height: 5' 8" (1 727 m)    Body mass index is 31 47 kg/m²        REVIEW OF SYSTEMS:    Review of Systems   Constitutional: Negative for activity change and fever  Respiratory: Negative for cough, chest tightness, shortness of breath and wheezing  Cardiovascular: Negative for chest pain and leg swelling  Gastrointestinal: Negative for abdominal pain, diarrhea, nausea and vomiting  Endocrine: Negative for polyuria  Genitourinary: Negative for difficulty urinating, dysuria, flank pain, frequency and urgency  Skin: Negative for rash  Neurological: Negative for dizziness, syncope, light-headedness and headaches  PHYSICAL EXAM:      Physical Exam  Vitals and nursing note reviewed  Constitutional:       General: He is not in acute distress  Appearance: He is well-developed  He is obese  HENT:      Head: Normocephalic and atraumatic  Eyes:      General: No scleral icterus  Conjunctiva/sclera: Conjunctivae normal       Pupils: Pupils are equal, round, and reactive to light  Cardiovascular:      Rate and Rhythm: Normal rate and regular rhythm  Heart sounds: S1 normal and S2 normal  No murmur heard  No friction rub  No gallop  Pulmonary:      Effort: Pulmonary effort is normal  No respiratory distress  Breath sounds: Normal breath sounds  No wheezing or rales  Abdominal:      General: Bowel sounds are normal       Palpations: Abdomen is soft  Tenderness: There is no abdominal tenderness  There is no rebound  Musculoskeletal:         General: Normal range of motion  Cervical back: Normal range of motion and neck supple  Right lower leg: No edema  Left lower leg: No edema  Skin:     Findings: No rash  Neurological:      Mental Status: He is alert and oriented to person, place, and time     Psychiatric:         Behavior: Behavior normal          Medications:    Current Outpatient Medications:   •  amLODIPine (NORVASC) 5 mg tablet, TAKE 1 TABLET BY MOUTH EVERY DAY, Disp: 90 tablet, Rfl: 1  •  ergocalciferol (VITAMIN D2) 50,000 units, TAKE 1 CAPSULE BY MOUTH ONE TIME PER WEEK, Disp: 12 capsule, Rfl: 1  •  ezetimibe (ZETIA) 10 mg tablet, TAKE 1 TABLET BY MOUTH EVERY DAY, Disp: 30 tablet, Rfl: 5  •  metoprolol succinate (TOPROL-XL) 50 mg 24 hr tablet, TAKE 1 TABLET BY MOUTH EVERY DAY, Disp: 30 tablet, Rfl: 5  •  Omega-3 Fatty Acids (Fish Oil) 1200 MG CAPS, Take by mouth in the morning, Disp: , Rfl:   •  paliperidone (INVEGA) 6 MG 24 hr tablet, Take 6 mg by mouth every morning, Disp: , Rfl:   •  rosuvastatin (CRESTOR) 10 MG tablet, TAKE 1 TABLET BY MOUTH EVERY DAY, Disp: 30 tablet, Rfl: 5  •  sodium bicarbonate 650 mg tablet, Take 1 tablet (650 mg total) by mouth 2 (two) times a day, Disp: 90 tablet, Rfl: 3    Laboratory Results:  Results from last 7 days   Lab Units 02/07/23  1358   POTASSIUM mmol/L 5 3*   CHLORIDE mmol/L 106   CO2 mmol/L 23   BUN mg/dL 29*   CREATININE mg/dL 2 96*       Results for orders placed or performed in visit on 42/34/07   Basic metabolic panel   Result Value Ref Range    Glucose, Random 104 (H) 70 - 99 mg/dL    BUN 29 (H) 6 - 24 mg/dL    Creatinine 2 96 (H) 0 76 - 1 27 mg/dL    eGFR 25 (L) >59 mL/min/1 73    SL AMB BUN/CREATININE RATIO 10 9 - 20    Sodium 143 134 - 144 mmol/L    Potassium 5 3 (H) 3 5 - 5 2 mmol/L    Chloride 106 96 - 106 mmol/L    CO2 23 20 - 29 mmol/L    CALCIUM 9 8 8 7 - 10 2 mg/dL   Protein / creatinine ratio, urine   Result Value Ref Range    Creatinine, Urine 57 7 Not Estab  mg/dL    Total Protein, Urine 480 5 Not Estab  mg/dL    Prot/Creat Ratio, Ur 8,328 (H) 0 - 200 mg/g creat

## 2023-02-14 NOTE — PATIENT INSTRUCTIONS
1  )  Low 2 g sodium diet    2 )  Monitor weights at home    3 )  Avoid NSAIDs (ibuprofen, Motrin, Advil, Aleve, naproxen)    4 )  Monitor blood pressure at home, call if blood pressure greater than 150/90 persistently    5 ) I will plan to discuss all results including blood work, and/or imaging at our next visit, unless there is an urgent indication, in which case I will call you earlier  If you have any questions or concerns about your results, please feel free to call our office      6 ) Reduce intake of potassium    7 ) Repeat blood work, if potassium better will start you on ACE inhibitors or ARB    8 ) Check blood work this week    9 ) Check kidney US

## 2023-02-16 ENCOUNTER — TELEPHONE (OUTPATIENT)
Dept: NEPHROLOGY | Facility: CLINIC | Age: 53
End: 2023-02-16

## 2023-02-16 ENCOUNTER — DOCUMENTATION (OUTPATIENT)
Dept: NEPHROLOGY | Facility: CLINIC | Age: 53
End: 2023-02-16

## 2023-02-16 DIAGNOSIS — R80.9 NEPHROTIC RANGE PROTEINURIA: Primary | ICD-10-CM

## 2023-02-16 RX ORDER — LISINOPRIL 5 MG/1
5 TABLET ORAL DAILY
Qty: 30 TABLET | Refills: 3 | Status: SHIPPED | OUTPATIENT
Start: 2023-02-16

## 2023-02-16 NOTE — TELEPHONE ENCOUNTER
Patient called requesting Dr Elise Tellez review labs from 2/15 and call him with his interpretations

## 2023-02-16 NOTE — TELEPHONE ENCOUNTER
Nephrology    Blood work from yesterday reviewed  Potassium has improved  Creatinine has plateaued and stabilized currently at 3 mg/dL  Bicarbonate level noted to be low  Increase sodium bicarbonate to 3 times a day      Start lisinopril 5 mg daily    Repeat labs and a urine protein creatinine ratio in 1 week    Spoke to the patient over the phone

## 2023-02-17 LAB
BUN SERPL-MCNC: 34 MG/DL (ref 6–24)
BUN/CREAT SERPL: 11 (ref 9–20)
CALCIUM SERPL-MCNC: 9.4 MG/DL (ref 8.7–10.2)
CHLORIDE SERPL-SCNC: 107 MMOL/L (ref 96–106)
CO2 SERPL-SCNC: 17 MMOL/L (ref 20–29)
CREAT SERPL-MCNC: 3.01 MG/DL (ref 0.76–1.27)
CREAT UR-MCNC: 126.4 MG/DL
EGFR: 24 ML/MIN/1.73
GLUCOSE SERPL-MCNC: 98 MG/DL (ref 70–99)
POTASSIUM SERPL-SCNC: 4.6 MMOL/L (ref 3.5–5.2)
PROT UR-MCNC: 1101.4 MG/DL
PROT/CREAT UR: 8714 MG/G CREAT (ref 0–200)
SODIUM SERPL-SCNC: 139 MMOL/L (ref 134–144)

## 2023-02-22 ENCOUNTER — HOSPITAL ENCOUNTER (OUTPATIENT)
Dept: RADIOLOGY | Facility: HOSPITAL | Age: 53
Discharge: HOME | End: 2023-02-22
Attending: INTERNAL MEDICINE

## 2023-02-22 DIAGNOSIS — N28.9 WORSENING RENAL FUNCTION: ICD-10-CM

## 2023-02-22 DIAGNOSIS — E87.5 HYPERKALEMIA: ICD-10-CM

## 2023-02-23 ENCOUNTER — TELEPHONE (OUTPATIENT)
Dept: NEPHROLOGY | Facility: CLINIC | Age: 53
End: 2023-02-23

## 2023-02-23 DIAGNOSIS — N18.30 CKD (CHRONIC KIDNEY DISEASE) STAGE 3, GFR 30-59 ML/MIN (HCC): Primary | ICD-10-CM

## 2023-02-23 NOTE — TELEPHONE ENCOUNTER
Essence Barrett (:  1948) is a 69 y.o. female,Established patient, here for evaluation of the following chief complaint(s):  Hypothyroidism, Other (Red spot on R arm she would like checked.), and Mental Health Problem (Flu vaccine and mammogram order due in December)         ASSESSMENT/PLAN:  1. Flu vaccine need  -     Influenza, FLUAD, (age 72 y+), IM, Preservative Free, 0.5 mL  2. Encounter for screening mammogram for breast cancer  -     [facility] DIGITAL SCREEN BILATERAL; Future  3. Dermatitis  -     triamcinolone (KENALOG) 0.1 % cream; Apply topically 2 times daily. , Disp-60 g, R-1, Normal  -  Reviewed side effects of medication and patient verbalizes understanding.   -  Advised to call back directly if there are further questions, or if these symptoms fail to improve as anticipated or worsen. -  will consider antifungal cream if steroid is not effective       Return if symptoms worsen or fail to improve. Subjective   SUBJECTIVE/OBJECTIVE:  HPI  Noticed a red spot on her right forearm about 2 weeks ago. She reports she is in the sun a lot     /70   Pulse 65   Resp 16   Ht 5' 2\" (1.575 m)   Wt 150 lb 3.2 oz (68.1 kg)   SpO2 97%   BMI 27.47 kg/m²     Review of Systems   Constitutional:  Negative for activity change, appetite change, chills, diaphoresis, fever and unexpected weight change. Respiratory:  Negative for cough, shortness of breath and wheezing. Cardiovascular:  Negative for chest pain and palpitations. Gastrointestinal:  Negative for constipation, diarrhea, nausea and vomiting. Genitourinary:  Negative for difficulty urinating. Skin:  Positive for rash. Neurological:  Negative for dizziness, weakness and headaches. Psychiatric/Behavioral:  Positive for sleep disturbance. Objective   Physical Exam  Constitutional:       Appearance: She is well-developed. HENT:      Head: Normocephalic and atraumatic. Neck:      Trachea: No tracheal deviation. Patient called requesting Dr Aure Camejo inform him about which "diner meals" are acceptable for him to eat  He is specifically wondering if he can have a cheeseburger with lettuce, onion, pickle, and may; scrambled eggs and sausage, and hard boiled eggs  Please advise  Cardiovascular:      Rate and Rhythm: Normal rate and regular rhythm. Heart sounds: No murmur heard. Pulmonary:      Effort: Pulmonary effort is normal. No respiratory distress. Breath sounds: Normal breath sounds. Abdominal:      General: Bowel sounds are normal.      Palpations: Abdomen is soft. Tenderness: There is no abdominal tenderness. Musculoskeletal:         General: Normal range of motion. Skin:     General: Skin is warm and dry. Neurological:      Mental Status: She is alert and oriented to person, place, and time. Psychiatric:         Behavior: Behavior normal.          [facility]      An electronic signature was used to authenticate this note.     --Kerline Jimenez, APRN - CNP

## 2023-02-28 LAB
BUN SERPL-MCNC: 29 MG/DL (ref 6–24)
BUN/CREAT SERPL: 10 (ref 9–20)
CALCIUM SERPL-MCNC: 9.2 MG/DL (ref 8.7–10.2)
CHLORIDE SERPL-SCNC: 106 MMOL/L (ref 96–106)
CO2 SERPL-SCNC: 19 MMOL/L (ref 20–29)
CREAT SERPL-MCNC: 2.89 MG/DL (ref 0.76–1.27)
CREAT UR-MCNC: 59.1 MG/DL
EGFR: 25 ML/MIN/1.73
GLUCOSE SERPL-MCNC: 80 MG/DL (ref 70–99)
POTASSIUM SERPL-SCNC: 4.2 MMOL/L (ref 3.5–5.2)
PROT UR-MCNC: 490.9 MG/DL
PROT/CREAT UR: 8306 MG/G CREAT (ref 0–200)
SODIUM SERPL-SCNC: 139 MMOL/L (ref 134–144)

## 2023-03-15 DIAGNOSIS — R80.1 PERSISTENT PROTEINURIA: ICD-10-CM

## 2023-03-15 DIAGNOSIS — I12.9 BENIGN HYPERTENSION WITH CKD (CHRONIC KIDNEY DISEASE) STAGE III (HCC): ICD-10-CM

## 2023-03-15 DIAGNOSIS — R31.21 ASYMPTOMATIC MICROSCOPIC HEMATURIA: ICD-10-CM

## 2023-03-15 DIAGNOSIS — E55.9 VITAMIN D DEFICIENCY: ICD-10-CM

## 2023-03-15 DIAGNOSIS — R79.89 ELEVATED SERUM CREATININE: ICD-10-CM

## 2023-03-15 DIAGNOSIS — N18.30 BENIGN HYPERTENSION WITH CKD (CHRONIC KIDNEY DISEASE) STAGE III (HCC): ICD-10-CM

## 2023-03-16 RX ORDER — SODIUM BICARBONATE 650 MG/1
650 TABLET ORAL 3 TIMES DAILY
Qty: 90 TABLET | Refills: 5 | Status: SHIPPED | OUTPATIENT
Start: 2023-03-16

## 2023-03-21 ENCOUNTER — OFFICE VISIT (OUTPATIENT)
Dept: NEPHROLOGY | Facility: CLINIC | Age: 53
End: 2023-03-21

## 2023-03-21 VITALS
WEIGHT: 205 LBS | HEART RATE: 68 BPM | DIASTOLIC BLOOD PRESSURE: 90 MMHG | SYSTOLIC BLOOD PRESSURE: 124 MMHG | BODY MASS INDEX: 31.07 KG/M2 | HEIGHT: 68 IN

## 2023-03-21 DIAGNOSIS — R80.9 NEPHROTIC RANGE PROTEINURIA: ICD-10-CM

## 2023-03-21 DIAGNOSIS — E55.9 VITAMIN D DEFICIENCY: ICD-10-CM

## 2023-03-21 DIAGNOSIS — N28.9 WORSENING RENAL FUNCTION: Primary | ICD-10-CM

## 2023-03-21 DIAGNOSIS — N18.30 BENIGN HYPERTENSION WITH CKD (CHRONIC KIDNEY DISEASE) STAGE III (HCC): ICD-10-CM

## 2023-03-21 DIAGNOSIS — R80.1 PERSISTENT PROTEINURIA: ICD-10-CM

## 2023-03-21 DIAGNOSIS — I12.9 BENIGN HYPERTENSION WITH CKD (CHRONIC KIDNEY DISEASE) STAGE III (HCC): ICD-10-CM

## 2023-03-21 DIAGNOSIS — E87.8 LOW BICARBONATE LEVEL: ICD-10-CM

## 2023-03-21 DIAGNOSIS — R79.89 ELEVATED SERUM CREATININE: ICD-10-CM

## 2023-03-21 RX ORDER — LOSARTAN POTASSIUM 25 MG/1
25 TABLET ORAL DAILY
Qty: 90 TABLET | Refills: 3 | Status: SHIPPED | OUTPATIENT
Start: 2023-03-21

## 2023-03-21 NOTE — PROGRESS NOTES
NEPHROLOGY OFFICE VISIT   Peggy Colunga 47 y o  male MRN: [id number]  3/21/2023    Reason for Visit: NAVEEN    History of Present Illness (HPI):    I had the pleasure of seeing Shawanda Inman today in the renal clinic for the continued management of chronic range proteinuria and worsening renal function  Since our last visit, there has been no ER visits or hospitilizations  He currently has no complaints at this time and is feeling well  Patient denies any chest pain, shortness of breath and swelling  The last blood work was done on end of February, which we have reviewed together  I had started him on lisinopril back in mid February to address the nephrotic range proteinuria which was worsening  Despite his creatinine being high it had stabilized in 3 mg/dL range which was still above his baseline but we need to adjust the proteinuria and he had refused to go for a biopsy  I started him on 5 mg daily  He has been tolerating it well with the exception of having a cough  His creatinine has started to trend down now at 2 89 mg/dL with normal potassium and his proteinuria is starting to improve  We discussed about changing the ACE inhibitor to an angiotensin receptor blockers to avoid the cough  We will check blood work and urine studies every 4 weeks  ASSESSMENT and PLAN:    Worsening renal function/acute kidney injury/elevated creatinine  --Creatinine peaked at 3 07 mg/dL  -- I suspect that this is likely related to increased nephrotic range proteinuria resulting in some component of acute tubular necrosis  -- Renal function starting to improve creatinine down to 2 8 mg/dL  --Proteinuria is improving  --Ultrasound shows no evidence of hydronephrosis  --Change lisinopril to losartan  --Start losartan 25 mg daily  --Continue current management  --Repeat blood work and urine protein creatinine ratio every 4 weeks     Worsening proteinuria  --Patient declined kidney biopsy    I strongly recommended he proceed with a biopsy  -- Serologies: ANGELES was negative, HIV was nonreactive, hepatitis panel was negative, ANCA titer was negative, antiphospholipase A2 receptor antibodies were negative, complement C3 and C4 within normal limits, serum protein electrophoresis showed no M spike, kappa lambda ratio was 1 8, anti-GBM was negative  -- Urine protein creatinine ratio improved to 8 3 g/g  --Creatinine improving down to 2 8 mg/dL  --Change lisinopril to losartan     Chronic kidney disease stage IIIB/IV  --baseline creatinine now fluctuating in the high 1s to low 2s  --presumed to be secondary to hypertensive nephrosclerosis, obesity related renal dysfunction, and 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  --Repeat renal ultrasound    Prior ultrasound from last year showed echogenic kidneys consistent with underlying chronic kidney disease  --Serologies negative  --Declined kidney biopsy  --Serial blood work every month  --urinalysis positive for blood and protein  --Function improving creatinine down to 2 89 mg/dL with an improvement of the proteinuria to 8 3 g/g  --continued blood pressure control  --avoid NSAIDs  -- Start losartan 25 mg     Dyslipidemia  --remains on Crestor and Zetia  --Concurrent nephrotic syndrome     Microscopic hematuria  --underwent cystoscopy with negative findings  --asymptomatic  --Serologies negative  --Declined kidney biopsy  --active smoker     Hypertension  --Blood pressure at target with exception of a slightly elevated diastolic blood pressure  --smoking cessation strongly advised spent about 3-5 minutes discussing smoking cessation  --metoprolol XL 50 mg daily, amlodipine 5 mg daily  -- Change lisinopril to losartan 25 mg daily  --ACE inhibitor related cough     Smoker  --1 pack per day for over 30 years, cut back to 3/4 pack per day  --spent 3 minutes smoking about smoking cessation I even offered him a patch which he declined as he said it does not help     Low bicarbonate level  -- Continue oral sodium bicarbonate  --Bicarbonate level is improving     Hyperkalemia--resolved     Obesity  -- BMI 31 17  -- Diet exercise and weight loss program      PATIENT INSTRUCTIONS:    Patient Instructions   1 )  Low 2 g sodium diet    2 )  Monitor weights at home    3 )  Avoid NSAIDs (ibuprofen, Motrin, Advil, Aleve, naproxen)    4 )  Monitor blood pressure at home, call if blood pressure greater than 150/90 persistently    5 ) I will plan to discuss all results including blood work, and/or imaging at our next visit, unless there is an urgent indication, in which case I will call you earlier  If you have any questions or concerns about your results, please feel free to call our office  6 ) Stop Lisinopril, and start Losartan 25 mg daily    7 ) Check blood work and Urine every 4 weeks          Orders Placed This Encounter   Procedures   • Comprehensive metabolic panel     This is a patient instruction: Patient fasting for 8 hours or longer recommended  Standing Status:   Standing     Number of Occurrences:   5     Standing Expiration Date:   3/21/2024   • Protein / creatinine ratio, urine     Standing Status:   Standing     Number of Occurrences:   5     Standing Expiration Date:   3/21/2024       OBJECTIVE:  Current Weight: Weight - Scale: 93 kg (205 lb)  Vitals:    03/21/23 1014   BP: 124/90   BP Location: Left arm   Patient Position: Sitting   Cuff Size: Large   Pulse: 68   Weight: 93 kg (205 lb)   Height: 5' 8" (1 727 m)    Body mass index is 31 17 kg/m²  REVIEW OF SYSTEMS:    Review of Systems   Constitutional: Negative for activity change and fever  Respiratory: Negative for cough, chest tightness, shortness of breath and wheezing  Cardiovascular: Negative for chest pain and leg swelling  Gastrointestinal: Negative for abdominal pain, diarrhea, nausea and vomiting  Endocrine: Negative for polyuria     Genitourinary: Negative for difficulty urinating, dysuria, flank pain, frequency and urgency  Skin: Negative for rash  Neurological: Negative for dizziness, syncope, light-headedness and headaches  PHYSICAL EXAM:      Physical Exam  Vitals and nursing note reviewed  Constitutional:       General: He is not in acute distress  Appearance: He is well-developed  HENT:      Head: Normocephalic and atraumatic  Eyes:      General: No scleral icterus  Conjunctiva/sclera: Conjunctivae normal       Pupils: Pupils are equal, round, and reactive to light  Cardiovascular:      Rate and Rhythm: Normal rate and regular rhythm  Heart sounds: S1 normal and S2 normal  No murmur heard  No friction rub  No gallop  Pulmonary:      Effort: Pulmonary effort is normal  No respiratory distress  Breath sounds: Normal breath sounds  No wheezing or rales  Abdominal:      General: Bowel sounds are normal       Palpations: Abdomen is soft  Tenderness: There is no abdominal tenderness  There is no rebound  Musculoskeletal:         General: Normal range of motion  Cervical back: Normal range of motion and neck supple  Skin:     Findings: No rash  Neurological:      Mental Status: He is alert and oriented to person, place, and time     Psychiatric:         Behavior: Behavior normal          Medications:    Current Outpatient Medications:   •  amLODIPine (NORVASC) 5 mg tablet, TAKE 1 TABLET BY MOUTH EVERY DAY, Disp: 90 tablet, Rfl: 1  •  ergocalciferol (VITAMIN D2) 50,000 units, TAKE 1 CAPSULE BY MOUTH ONE TIME PER WEEK, Disp: 12 capsule, Rfl: 1  •  ezetimibe (ZETIA) 10 mg tablet, TAKE 1 TABLET BY MOUTH EVERY DAY, Disp: 30 tablet, Rfl: 5  •  losartan (COZAAR) 25 mg tablet, Take 1 tablet (25 mg total) by mouth daily, Disp: 90 tablet, Rfl: 3  •  metoprolol succinate (TOPROL-XL) 50 mg 24 hr tablet, TAKE 1 TABLET BY MOUTH EVERY DAY, Disp: 30 tablet, Rfl: 5  •  Omega-3 Fatty Acids (Fish Oil) 1200 MG CAPS, Take by mouth in the morning, Disp: , Rfl:   •  paliperidone (INVEGA) 6 MG 24 hr tablet, Take 6 mg by mouth every morning, Disp: , Rfl:   •  rosuvastatin (CRESTOR) 10 MG tablet, TAKE 1 TABLET BY MOUTH EVERY DAY, Disp: 90 tablet, Rfl: 1  •  sodium bicarbonate 650 mg tablet, Take 1 tablet (650 mg total) by mouth 3 (three) times a day, Disp: 90 tablet, Rfl: 5    Laboratory Results:        Invalid input(s): ALBUMIN    Results for orders placed or performed in visit on 71/20/30   Basic metabolic panel   Result Value Ref Range    Glucose, Random 80 70 - 99 mg/dL    BUN 29 (H) 6 - 24 mg/dL    Creatinine 2 89 (H) 0 76 - 1 27 mg/dL    eGFR 25 (L) >59 mL/min/1 73    SL AMB BUN/CREATININE RATIO 10 9 - 20    Sodium 139 134 - 144 mmol/L    Potassium 4 2 3 5 - 5 2 mmol/L    Chloride 106 96 - 106 mmol/L    CO2 19 (L) 20 - 29 mmol/L    CALCIUM 9 2 8 7 - 10 2 mg/dL   Protein / creatinine ratio, urine   Result Value Ref Range    Creatinine, Urine 59 1 Not Estab  mg/dL    Total Protein, Urine 490 9 Not Estab  mg/dL    Prot/Creat Ratio, Ur 8,306 (H) 0 - 200 mg/g creat

## 2023-03-21 NOTE — PATIENT INSTRUCTIONS
1  )  Low 2 g sodium diet    2 )  Monitor weights at home    3 )  Avoid NSAIDs (ibuprofen, Motrin, Advil, Aleve, naproxen)    4 )  Monitor blood pressure at home, call if blood pressure greater than 150/90 persistently    5 ) I will plan to discuss all results including blood work, and/or imaging at our next visit, unless there is an urgent indication, in which case I will call you earlier  If you have any questions or concerns about your results, please feel free to call our office      6 ) Stop Lisinopril, and start Losartan 25 mg daily    7 ) Check blood work and Urine every 4 weeks

## 2023-03-29 LAB
ALBUMIN SERPL-MCNC: 4 G/DL (ref 3.8–4.9)
ALBUMIN/GLOB SERPL: 1.9 {RATIO} (ref 1.2–2.2)
ALP SERPL-CCNC: 61 IU/L (ref 44–121)
ALT SERPL-CCNC: 15 IU/L (ref 0–44)
AST SERPL-CCNC: 13 IU/L (ref 0–40)
BILIRUB SERPL-MCNC: <0.2 MG/DL (ref 0–1.2)
BUN SERPL-MCNC: 32 MG/DL (ref 6–24)
BUN/CREAT SERPL: 10 (ref 9–20)
CALCIUM SERPL-MCNC: 9.7 MG/DL (ref 8.7–10.2)
CHLORIDE SERPL-SCNC: 105 MMOL/L (ref 96–106)
CO2 SERPL-SCNC: 20 MMOL/L (ref 20–29)
CREAT SERPL-MCNC: 3.15 MG/DL (ref 0.76–1.27)
CREAT UR-MCNC: 85.4 MG/DL
EGFR: 23 ML/MIN/1.73
GLOBULIN SER-MCNC: 2.1 G/DL (ref 1.5–4.5)
GLUCOSE SERPL-MCNC: 90 MG/DL (ref 70–99)
POTASSIUM SERPL-SCNC: 4.4 MMOL/L (ref 3.5–5.2)
PROT SERPL-MCNC: 6.1 G/DL (ref 6–8.5)
PROT UR-MCNC: 563.1 MG/DL
PROT/CREAT UR: 6594 MG/G CREAT (ref 0–200)
SODIUM SERPL-SCNC: 140 MMOL/L (ref 134–144)

## 2023-04-16 PROBLEM — E78.2 MIXED HYPERLIPIDEMIA: Status: RESOLVED | Noted: 2018-12-11 | Resolved: 2023-04-16

## 2023-04-16 PROBLEM — F20.9 SCHIZOPHRENIA (HCC): Status: RESOLVED | Noted: 2018-12-11 | Resolved: 2023-04-16

## 2023-04-16 PROBLEM — E78.01 FAMILIAL HYPERCHOLESTEROLEMIA: Status: RESOLVED | Noted: 2020-09-02 | Resolved: 2023-04-16

## 2023-04-16 PROBLEM — E78.1 HYPERTRIGLYCERIDEMIA: Status: RESOLVED | Noted: 2020-09-02 | Resolved: 2023-04-16

## 2023-04-24 ENCOUNTER — CLINICAL SUPPORT (OUTPATIENT)
Dept: NUTRITION | Facility: HOSPITAL | Age: 53
End: 2023-04-24
Attending: INTERNAL MEDICINE

## 2023-04-24 VITALS — WEIGHT: 204.2 LBS | BODY MASS INDEX: 31.05 KG/M2

## 2023-04-24 DIAGNOSIS — N18.30 STAGE 3 CHRONIC KIDNEY DISEASE, UNSPECIFIED WHETHER STAGE 3A OR 3B CKD (HCC): ICD-10-CM

## 2023-04-24 DIAGNOSIS — N18.30 CKD (CHRONIC KIDNEY DISEASE) STAGE 3, GFR 30-59 ML/MIN (HCC): ICD-10-CM

## 2023-04-25 NOTE — PROGRESS NOTES
Nutrition Assessment Form    Patient Name: Gwen Ferguson    YOB: 1970    Sex: Male     Assessment Date: 4/24/2023  Start Time: 3:00 Stop Time: 3:56 Total Minutes: 64     Data:  Present at session: self   Parent/Patient Concerns/reason for visit: PT concerned about his diet with dx of CKD stage 3   Medical Dx/Reason for Referral: N18 30 CKD, Stage 3   Past Medical History:   Diagnosis Date   • Hypertension    • Mood disorder ([facility] )    • Psychiatric disorder    • Schizophrenia ([facility] )        Current Outpatient Medications   Medication Sig Dispense Refill   • amLODIPine (NORVASC) 5 mg tablet TAKE 1 TABLET BY MOUTH EVERY DAY 90 tablet 1   • ergocalciferol (VITAMIN D2) 50,000 units TAKE 1 CAPSULE BY MOUTH ONE TIME PER WEEK 12 capsule 1   • ezetimibe (ZETIA) 10 mg tablet TAKE 1 TABLET BY MOUTH EVERY DAY 30 tablet 5   • losartan (COZAAR) 25 mg tablet Take 1 tablet (25 mg total) by mouth daily 90 tablet 3   • metoprolol succinate (TOPROL-XL) 50 mg 24 hr tablet TAKE 1 TABLET BY MOUTH EVERY DAY 30 tablet 5   • Omega-3 Fatty Acids (Fish Oil) 1200 MG CAPS Take by mouth in the morning     • paliperidone (INVEGA) 6 MG 24 hr tablet Take 6 mg by mouth every morning     • rosuvastatin (CRESTOR) 10 MG tablet TAKE 1 TABLET BY MOUTH EVERY DAY 90 tablet 1   • sodium bicarbonate 650 mg tablet Take 1 tablet (650 mg total) by mouth 3 (three) times a day 90 tablet 5     No current facility-administered medications for this visit          Additional Meds/Supplements:    Special Learning Needs/barriers to learning/any new barriers    Height: HC Readings from Last 5 Encounters:   No data found for [facility], [facility]       Weight: Wt Readings from Last 10 Encounters:   04/24/23 92 6 kg (204 lb 3 2 oz)   04/07/23 93 2 kg (205 lb 6 4 oz)   03/21/23 93 kg (205 lb)   02/14/23 93 9 kg (207 lb)   01/05/23 93 9 kg (207 lb)   11/02/22 92 5 kg (204 lb)   07/29/22 88 9 kg (196 lb)   05/23/22 90 4 kg (199 lb 6 4 oz)   02/04/22 89 8 kg (198 lb)   01/25/22 "89 4 kg (197 lb 3 2 oz)     Estimated body mass index is 31 05 kg/m² as calculated from the following:    Height as of 4/7/23: 5' 8\" (1 727 m)  Weight as of this encounter: 92 6 kg (204 lb 3 2 oz)  Recent Weight Change: []Yes     [x]No  Amount:       Energy Needs: No calculation needed   Allergies   Allergen Reactions   • Geodon [Ziprasidone] Fatigue   • Haldol [Haloperidol] Fatigue    or intolerances    Social History     Substance and Sexual Activity   Alcohol Use Yes    Comment: weekends       Social History     Tobacco Use   Smoking Status Every Day   • Packs/day: 1 00   • Years: 32 00   • Pack years: 32 00   • Types: Cigarettes   • Start date: 7/14/1988   Smokeless Tobacco Never       Who shops? patient   Who cooks/cooking methods/Eating out/take out habits   patient  PT does dine out and takes food in   Exercise: PT walks around the block 1-2 times daily     Other: ie: Sleep habits/ stress level/ work habits household-lives with ?/ food security    Prior Nutritional Counseling? []Yes     [x]No  When:      Why:         Diet Hx:  Breakfast: Diet:  1 cup of cranberry juice with medications  3 cups of coffee with 1% milk, Special K pastry crisps, 1 cup of no sugar added applesauce, 8 scoops of yogurt or 1 can sliced peaches in light syrup and 8 Nilla wafers or applesauce, [place] Doones and 10 Nilla wafers   Lunch:   1 cup of cold water  1 can of sardines in lightly smoked oil, 2 quarter slices of kosher dill pickles or 1 plain bagel with lox spread or 1/2 a bowl of Roasted red pepper humus with Australian Crackers       Dinner:   1 cup of cold water or 1 Shelia Peach Tea  3 slices of Plain Pizza or 4 Conrad[employer] Hot Dogs with mustard and Relish with 1/2 bottle of Coke Zero or 2 bone-in chicken thigh and leg     Snacks: Cranberry juice   Other Notes/ Initial Assessment:  PT is concerned as he has been dx'd with CKD stage 3  He has had medications changed so that the protein in his urine has decreased    PT is " noted with BUN: 32, creat: 3 15 and GFR: 23 as of 3/27/2023 and Triglycerides: 244 as of 2/15/2023  PT had talked with the nephrologist who had gone over potassium and sodium intake  PT states that he is no longer eating french fries or canned soups, he doesn't drink orange juice and got rid of his bananas  He is no longer using pasta sauce but is using ander sauce  He states that he eats white pizza instead of the traditional pizza  Discussed sources of sodium and PT has been reading labels  Explained that 140 mg or less of sodium is considered low sodium but also suggested that portion sizes are important as a low sodium item can become high sodium if he eats too much of it  Explained that this was true for potassium as well  Provided handouts on sodium and potassium  Also suggested that he watch his protein intake and that plant based protein may be easier for kidneys to process waste  Provided handout on this as well  Updated assessment (Follow up note only):           Nutrition Diagnosis:   Undesirable food choices  related to Lack of prior exposure to accurate nutrition-related information as evidenced by dx of CKD 3        Any change or new dx since previous visit:     Nutrition Diagnosis:         Medical Nutrition Therapy Intervention:  []Individualized Meal Plan []Understanding Lab Values   []Basic Pathophysiology of Disease []Food/Medication Interactions   []Food Diary []Exercise   []Lifestyle/Behavior Modification Techniques []Medication, Mechanism of Action   []Label Reading: CHO/ Na/ Fat/ other_________ []Self Blood Glucose Monitoring   []Weight/BMI Goals: gain/lose/maintain []Other -           Comprehension: []Excellent  []Very Good  [x]Good  []Fair   []Poor    Receptivity: []Excellent  []Very Good  [x]Good  []Fair   []Poor    Expected Compliance: []Excellent  []Very Good  [x]Good  []Fair   []Poor        Goals (initial)/ Progress made on previous goals/new goals:  1    Limit to 2300 mg sodium per day   2  Limit to 2000 mg potassium daily   3   2 - 3 oz portions of protein at meals       No follow-ups on file    Labs:  CMP  Lab Results   Component Value Date    K 4 4 03/27/2023     03/27/2023    CO2 20 03/27/2023    BUN 32 (H) 03/27/2023    CREATININE 3 15 (H) 03/27/2023    CALCIUM 9 3 08/15/2018    AST 13 03/27/2023    ALT 15 03/27/2023    EGFR 23 (L) 03/27/2023       BMP  Lab Results   Component Value Date    CALCIUM 9 3 08/15/2018    K 4 4 03/27/2023    CO2 20 03/27/2023     03/27/2023    BUN 32 (H) 03/27/2023    CREATININE 3 15 (H) 03/27/2023       Lipids  No results found for: CHOL  Lab Results   Component Value Date    HDL 55 02/15/2023    HDL 46 08/02/2022    HDL 63 11/18/2021     Lab Results   Component Value Date    LDLCALC 81 02/15/2023    LDLCALC 89 08/02/2022    LDLCALC 124 (H) 11/18/2021     Lab Results   Component Value Date    TRIG 244 (H) 02/15/2023    TRIG 300 (H) 08/02/2022    TRIG 235 (H) 11/18/2021     Lab Results   Component Value Date    CHOLHDL 4 6 08/21/2019    CHOLHDL 7 0 (H) 01/29/2019    CHOLHDL 5 7 (H) 10/16/2018       Hemoglobin A1C  Lab Results   Component Value Date    HGBA1C 5 6 01/13/2020       Fasting Glucose  No results found for: GLUF    Insulin     Thyroid  Lab Results   Component Value Date    TSH 1 740 09/29/2020       Hepatic Function Panel  Lab Results   Component Value Date    ALT 15 03/27/2023    AST 13 03/27/2023       Celiac Disease Antibody Panel  No results found for: ENDOMYSIAL IGA, GLIADIN IGA, GLIADIN IGG, IGA, TISSUE TRANSGLUT AB, TTG IGA   Iron  No results found for: IRON, TIBC, [address], [place] [address] [facility] [id number]

## 2023-04-27 LAB
ALBUMIN SERPL-MCNC: 3.9 G/DL (ref 3.8–4.9)
ALBUMIN/GLOB SERPL: 1.9 {RATIO} (ref 1.2–2.2)
ALP SERPL-CCNC: 53 IU/L (ref 44–121)
ALT SERPL-CCNC: 15 IU/L (ref 0–44)
AST SERPL-CCNC: 14 IU/L (ref 0–40)
BILIRUB SERPL-MCNC: <0.2 MG/DL (ref 0–1.2)
BUN SERPL-MCNC: 43 MG/DL (ref 6–24)
BUN/CREAT SERPL: 12 (ref 9–20)
CALCIUM SERPL-MCNC: 9.8 MG/DL (ref 8.7–10.2)
CHLORIDE SERPL-SCNC: 108 MMOL/L (ref 96–106)
CO2 SERPL-SCNC: 19 MMOL/L (ref 20–29)
CREAT SERPL-MCNC: 3.53 MG/DL (ref 0.76–1.27)
CREAT UR-MCNC: 81.7 MG/DL
EGFR: 20 ML/MIN/1.73
GLOBULIN SER-MCNC: 2.1 G/DL (ref 1.5–4.5)
GLUCOSE SERPL-MCNC: 89 MG/DL (ref 70–99)
POTASSIUM SERPL-SCNC: 4.6 MMOL/L (ref 3.5–5.2)
PROT SERPL-MCNC: 6 G/DL (ref 6–8.5)
PROT UR-MCNC: 554.4 MG/DL
PROT/CREAT UR: 6786 MG/G CREAT (ref 0–200)
SODIUM SERPL-SCNC: 141 MMOL/L (ref 134–144)

## 2023-05-21 LAB
ALBUMIN SERPL-MCNC: 4.2 G/DL (ref 3.8–4.9)
ALBUMIN/GLOB SERPL: 2 {RATIO} (ref 1.2–2.2)
ALP SERPL-CCNC: 58 IU/L (ref 44–121)
ALT SERPL-CCNC: 13 IU/L (ref 0–44)
AST SERPL-CCNC: 13 IU/L (ref 0–40)
BILIRUB SERPL-MCNC: <0.2 MG/DL (ref 0–1.2)
BUN SERPL-MCNC: 41 MG/DL (ref 6–24)
BUN/CREAT SERPL: 10 (ref 9–20)
CALCIUM SERPL-MCNC: 9.8 MG/DL (ref 8.7–10.2)
CHLORIDE SERPL-SCNC: 105 MMOL/L (ref 96–106)
CO2 SERPL-SCNC: 20 MMOL/L (ref 20–29)
CREAT SERPL-MCNC: 4.03 MG/DL (ref 0.76–1.27)
CREAT UR-MCNC: 72 MG/DL
EGFR: 17 ML/MIN/1.73
GLOBULIN SER-MCNC: 2.1 G/DL (ref 1.5–4.5)
GLUCOSE SERPL-MCNC: 99 MG/DL (ref 70–99)
POTASSIUM SERPL-SCNC: 5.1 MMOL/L (ref 3.5–5.2)
PROT SERPL-MCNC: 6.3 G/DL (ref 6–8.5)
PROT UR-MCNC: 397 MG/DL
PROT/CREAT UR: 5514 MG/G CREAT (ref 0–200)
SODIUM SERPL-SCNC: 140 MMOL/L (ref 134–144)

## 2023-05-22 ENCOUNTER — TELEPHONE (OUTPATIENT)
Dept: NEPHROLOGY | Facility: CLINIC | Age: 53
End: 2023-05-22

## 2023-05-22 DIAGNOSIS — N28.9 WORSENING RENAL FUNCTION: Primary | ICD-10-CM

## 2023-05-22 NOTE — PROGRESS NOTES
Nephrology    Spoke to the patient about his most recent blood work  His creatinine continues to worsen now up to 4 mg/dL though his proteinuria is improving now down to 5 7 g/g  His bicarbonate level is also improving up to 20, already on sodium bicarbonate tablets  Discussed about holding the losartan at this time repeating the blood work and then proceeding from there      He has declined kidney biopsy despite me strongly advising it

## 2023-05-28 LAB
ALBUMIN SERPL-MCNC: 4 G/DL (ref 3.8–4.9)
ALBUMIN/GLOB SERPL: 1.9 {RATIO} (ref 1.2–2.2)
ALP SERPL-CCNC: 62 IU/L (ref 44–121)
ALT SERPL-CCNC: 12 IU/L (ref 0–44)
AST SERPL-CCNC: 9 IU/L (ref 0–40)
BILIRUB SERPL-MCNC: <0.2 MG/DL (ref 0–1.2)
BUN SERPL-MCNC: 37 MG/DL (ref 6–24)
BUN/CREAT SERPL: 9 (ref 9–20)
CALCIUM SERPL-MCNC: 9.4 MG/DL (ref 8.7–10.2)
CHLORIDE SERPL-SCNC: 107 MMOL/L (ref 96–106)
CO2 SERPL-SCNC: 19 MMOL/L (ref 20–29)
CREAT SERPL-MCNC: 3.9 MG/DL (ref 0.76–1.27)
CREAT UR-MCNC: 66.2 MG/DL
EGFR: 18 ML/MIN/1.73
GLOBULIN SER-MCNC: 2.1 G/DL (ref 1.5–4.5)
GLUCOSE SERPL-MCNC: 82 MG/DL (ref 70–99)
POTASSIUM SERPL-SCNC: 4.6 MMOL/L (ref 3.5–5.2)
PROT SERPL-MCNC: 6.1 G/DL (ref 6–8.5)
PROT UR-MCNC: 350.8 MG/DL
PROT/CREAT UR: 5299 MG/G CREAT (ref 0–200)
SODIUM SERPL-SCNC: 141 MMOL/L (ref 134–144)

## 2023-05-30 ENCOUNTER — TELEPHONE (OUTPATIENT)
Dept: NEPHROLOGY | Facility: CLINIC | Age: 53
End: 2023-05-30

## 2023-05-30 DIAGNOSIS — N28.9 WORSENING RENAL FUNCTION: Primary | ICD-10-CM

## 2023-05-30 DIAGNOSIS — R80.9 NEPHROTIC RANGE PROTEINURIA: ICD-10-CM

## 2023-05-30 NOTE — RESULT ENCOUNTER NOTE
Covering Dr Tamela Mendoza in basket  Labs reviewed  Called patient to review results of BMP and urine protein creatinine ratio  Unable to reach patient and reached voicemail  Left detailed message and instructions to contact the office with any questions or new symptoms  Informed patient that renal function is essentially unchanged from last labs  Creatinine 3 9  Encouraged to reconsider renal biopsy  Instructed patient to continue sodium bicarbonate tablets, recent serum bicarb remains at 19  Continue to hold losartan    Repeat labs in 2 weeks

## 2023-05-30 NOTE — TELEPHONE ENCOUNTER
Patient returned phone call and reviewed recent labs with patient on phone  All questions answered  Patient denies uremic symptoms and is feeling well  Continues to be compliant with all his medications including sodium bicarbonate tablets  Continue to hold losartan for now  patient will obtain repeat labs as ordered on 6/9/2023

## 2023-05-30 NOTE — TELEPHONE ENCOUNTER
Covering Dr Conley Spotted in basket  Labs reviewed  Called patient to review results of BMP and urine protein creatinine ratio  Unable to reach patient and reached voicemail  Left detailed message and instructions to contact the office with any questions or new symptoms  Informed patient that renal function is essentially unchanged from last labs  Creatinine 3 9  Encouraged to reconsider renal biopsy  Instructed patient to continue sodium bicarbonate tablets, recent serum bicarb remains at 19  Continue to hold losartan  Repeat labs in 2 weeks  Standing Orders already in epic  Doug Waggoner

## 2023-06-12 DIAGNOSIS — E55.9 VITAMIN D DEFICIENCY: ICD-10-CM

## 2023-06-12 RX ORDER — ERGOCALCIFEROL 1.25 MG/1
CAPSULE ORAL
Qty: 12 CAPSULE | Refills: 1 | Status: SHIPPED | OUTPATIENT
Start: 2023-06-12

## 2023-06-13 ENCOUNTER — TELEPHONE (OUTPATIENT)
Dept: NEPHROLOGY | Facility: CLINIC | Age: 53
End: 2023-06-13

## 2023-06-13 DIAGNOSIS — N18.30 BENIGN HYPERTENSION WITH CKD (CHRONIC KIDNEY DISEASE) STAGE III (HCC): ICD-10-CM

## 2023-06-13 DIAGNOSIS — E55.9 VITAMIN D DEFICIENCY: ICD-10-CM

## 2023-06-13 DIAGNOSIS — R31.21 ASYMPTOMATIC MICROSCOPIC HEMATURIA: ICD-10-CM

## 2023-06-13 DIAGNOSIS — R80.1 PERSISTENT PROTEINURIA: ICD-10-CM

## 2023-06-13 DIAGNOSIS — I12.9 BENIGN HYPERTENSION WITH CKD (CHRONIC KIDNEY DISEASE) STAGE III (HCC): ICD-10-CM

## 2023-06-13 DIAGNOSIS — R79.89 ELEVATED SERUM CREATININE: ICD-10-CM

## 2023-06-13 LAB
BUN SERPL-MCNC: 34 MG/DL (ref 6–24)
BUN/CREAT SERPL: 9 (ref 9–20)
CALCIUM SERPL-MCNC: 9.6 MG/DL (ref 8.7–10.2)
CHLORIDE SERPL-SCNC: 107 MMOL/L (ref 96–106)
CO2 SERPL-SCNC: 19 MMOL/L (ref 20–29)
CREAT SERPL-MCNC: 3.81 MG/DL (ref 0.76–1.27)
CREAT UR-MCNC: 74.7 MG/DL
CYSTATIN C SERPL-MCNC: 2.96 MG/L (ref 0.67–1.14)
EGFR: 18 ML/MIN/1.73
GFR/BSA.PRED SERPLBLD CYS-BASED-ARV: 19 ML/MIN/1.73
GLUCOSE SERPL-MCNC: 97 MG/DL (ref 70–99)
POTASSIUM SERPL-SCNC: 4.8 MMOL/L (ref 3.5–5.2)
PROT UR-MCNC: 429.6 MG/DL
PROT/CREAT UR: 5751 MG/G CREAT (ref 0–200)
SODIUM SERPL-SCNC: 140 MMOL/L (ref 134–144)

## 2023-06-13 NOTE — TELEPHONE ENCOUNTER
Pt called to check if Dr Giovanni Lomeli read over labwork yet  Pt requests a call back from Dr Giovanni Lomeli or Cheng Gandhi to discuss medication plan after going over the lab results

## 2023-06-14 RX ORDER — SODIUM BICARBONATE 650 MG/1
1300 TABLET ORAL 3 TIMES DAILY
Qty: 90 TABLET | Refills: 5 | Status: SHIPPED | OUTPATIENT
Start: 2023-06-14

## 2023-06-14 NOTE — TELEPHONE ENCOUNTER
Labs reviewed  Please call patient and let him know renal function is stable  Bicarb level remains below goal   Recommend increasing sodium bicarb tablets to 1300 mg 3 times a day  New script sent to pharmacy  Continue to hold losartan for now  Repeat labs in 2-4 weeks per standing orders  Follow-up in office in July and will rediscuss labs at that time

## 2023-06-19 NOTE — TELEPHONE ENCOUNTER
Patient called to verify that he is to take 1300mg sodium bicarbonate TID as he has picked up his prescription  Verified this to the patient  Also confirmed that he is to hold the losartan for now  No further questions or concerns at this time

## 2023-06-29 LAB
ALBUMIN SERPL-MCNC: 4.2 G/DL (ref 3.8–4.9)
ALBUMIN/GLOB SERPL: 1.8 {RATIO} (ref 1.2–2.2)
ALP SERPL-CCNC: 55 IU/L (ref 44–121)
ALT SERPL-CCNC: 13 IU/L (ref 0–44)
AST SERPL-CCNC: 12 IU/L (ref 0–40)
BILIRUB SERPL-MCNC: <0.2 MG/DL (ref 0–1.2)
BUN SERPL-MCNC: 34 MG/DL (ref 6–24)
BUN/CREAT SERPL: 9 (ref 9–20)
CALCIUM SERPL-MCNC: 9.8 MG/DL (ref 8.7–10.2)
CHLORIDE SERPL-SCNC: 101 MMOL/L (ref 96–106)
CO2 SERPL-SCNC: 23 MMOL/L (ref 20–29)
CREAT SERPL-MCNC: 3.87 MG/DL (ref 0.76–1.27)
CREAT UR-MCNC: 46.3 MG/DL
EGFR: 18 ML/MIN/1.73
GLOBULIN SER-MCNC: 2.4 G/DL (ref 1.5–4.5)
GLUCOSE SERPL-MCNC: 85 MG/DL (ref 70–99)
POTASSIUM SERPL-SCNC: 4.7 MMOL/L (ref 3.5–5.2)
PROT SERPL-MCNC: 6.6 G/DL (ref 6–8.5)
PROT UR-MCNC: 337.3 MG/DL
PROT/CREAT UR: 7285 MG/G CREAT (ref 0–200)
SODIUM SERPL-SCNC: 141 MMOL/L (ref 134–144)

## 2023-07-10 PROBLEM — N18.4 CKD (CHRONIC KIDNEY DISEASE) STAGE 4, GFR 15-29 ML/MIN (HCC): Status: ACTIVE | Noted: 2023-07-10

## 2023-07-10 PROBLEM — E78.5 DYSLIPIDEMIA: Status: ACTIVE | Noted: 2023-07-10

## 2023-07-10 PROBLEM — N18.4 BENIGN HYPERTENSION WITH CHRONIC KIDNEY DISEASE, STAGE IV (HCC): Status: ACTIVE | Noted: 2022-05-23

## 2023-07-10 NOTE — PROGRESS NOTES
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 Worsening renal function possibly due to proteinuria causing ATN, possible progression of disease.  -Baseline creatinine previously in the high 1s to low 2s, now in the 3s since February 2023  -Renal ultrasound showed echogenic kidneys consistent with underlying CKD  -Follows with Dr. Thomas Aguilar  -recent creatinine 3.87, GFR 18  -Cystatin C 2.96, eGFR 19 which correlates  -UPCr progressively worse at 7.2 g  -renal function stable since May but progressively worse since January.  -Patient has previously refused renal biopsy now agreeable. IR referral placed.  -Continue to hold losartan due to advanced kidney disease. Not ideal given worsening proteinuria. -Treat modifiable risk factors  -avoid nephrotoxins, NSAIDs, hypotension and IV contrast if possible. -stay well hydrated  -Kidney Smart/CKD education discussed and referred for basic education and modality discussion.  -Modality of choice: undecided  -consider referral for transplant evaluation after renal biopsy  -if continues to progress, need to consider access planning  -repeat BMP end of month per standing order  -follow-up with Dr. Thomas Aguilar in 3-4 months with repeat blood and urine studies. Nephrotic range proteinuria:  -UPCR 7.2 g, interval increase from 5g. -Previous serologies negative. SPEP no monoclonal bands.   Kappa lambda ratio 1.8.  -Losartan held recently due to worsening renal function  -Holding diuretics due to worsening renal function  -not on SGLT2 inhibitor due to advanced kidney disease with GFR 18  -Optimize glycemic and BP control  -Strongly recommend renal biopsy, patient previously refused but now agreeable  -continue to monitor    Hypertension/Volume status:  -BP acceptable  -volume status euvolemic  -current medications: Toprol-XL 50 mg daily, amlodipine 5 mg daily  -changes: none for now  -Avoid NSAIDs  -Encourage smoking cessation  -Avoid hypotension or fluctuations in blood pressure.   -low sodium (2 gm) diet. Encourage regular exercise  -continue to monitor BP at home    Low bicarbonate level:  -Stable  -Serum bicarb 23  -Now on sodium bicarbonate 1300 mg 3 times daily  -Continue to monitor    Hyperkalemia:  -Resolved  -Potassium 4.3  -Continue to monitor    Bone Mineral Disease of CKD:  -Calcium stable  -Vitamin D 25 37.5 and October 2022  -continue to monitor    Dyslipidemia:  -stable  -continue statin and Zetia  -low-cholesterol and low-fat diet, aerobic exercise  -management per PCP    Microscopic hematuria:  -s/p cystoscopy with negative findings  -Asymptomatic  -Serologies negative  -now agreeable to renal biopsy  -Active smoker    Nicotine dependence:  -discussed the pathophysiology and relationship of smoking and renal disease, including CKD/nephritis and renal cell carcinoma  -encourage smoking cessation    Obesity:  -continue to encourage weight management, lifestyle modifications and diet modifications to slow progession of CKD and proteinuria  -continue follow-up and management with PCP      Arely Antoine was seen today for follow-up. Diagnoses and all orders for this visit:    CKD (chronic kidney disease) stage 4, GFR 15-29 ml/min ([facility])  -     Ambulatory referral to ckd education program; Future  -     Comprehensive metabolic panel; Future  -     CBC; Future  -     PTH, intact; Future  -     Phosphorus; Future  -     Protein / creatinine ratio, urine; Future  -     Vitamin D 25 hydroxy; Future  -     Cancel: Basic metabolic panel;  Future  -     Comprehensive metabolic panel  -     CBC  -     PTH, intact  -     Phosphorus  -     Protein / creatinine ratio, urine  -     Vitamin D 25 hydroxy  -     Basic metabolic panel    Asymptomatic microscopic hematuria    Benign hypertension with chronic kidney disease, stage IV (HCC)    Tobacco dependence    Low bicarbonate level  -     Comprehensive metabolic panel; Future  -     Comprehensive metabolic panel    Persistent proteinuria  -     Protein / creatinine ratio, urine; Future  -     Protein / creatinine ratio, urine    Nephrotic range proteinuria  -     Protein / creatinine ratio, urine; Future  -     Protein / creatinine ratio, urine    Hyperkalemia  -     Comprehensive metabolic panel; Future  -     Comprehensive metabolic panel    Elevated serum creatinine  -     Comprehensive metabolic panel; Future  -     Cancel: Basic metabolic panel; Future  -     Comprehensive metabolic panel  -     Basic metabolic panel    Dyslipidemia        Scheduled renal biopsy. Monthly labs. Follow up with Dr. Addison Medrano in 4 months with repeat blood and urine studies. Please call the office with any questions or concerns. Reason for Visit: Follow-up (CKD3/4, proteinuria)    HPI: Chucho Jeffers is a 47 y.o. male with CKD 4, HTN, metabolic acidosis, microscopic hematuria, schizophrenia who presents for follow up of CKD. Patient followed by Dr. Addison Medrano, last seen 3/21/23. Most recent creatinine 3.87, GFR 18, stable. Patient denies recent hospitalizations or ER visits. Patient denies NSAID use. Patient denies nausea, vomiting, diarrhea, dyspnea, orthopnea, edema, hematuria or foamy urine. ROS: A complete review of systems was performed and was negative unless otherwise noted in the history of present illness.     Allergies:   Geodon [ziprasidone] and Haldol [haloperidol]    Medications:     Current Outpatient Medications:   •  amLODIPine (NORVASC) 5 mg tablet, TAKE 1 TABLET BY MOUTH EVERY DAY, Disp: 90 tablet, Rfl: 1  •  ergocalciferol (VITAMIN D2) 50,000 units, TAKE 1 CAPSULE BY MOUTH ONE TIME PER WEEK, Disp: 12 capsule, Rfl: 1  •  ezetimibe (ZETIA) 10 mg tablet, TAKE 1 TABLET BY MOUTH EVERY DAY, Disp: 30 tablet, Rfl: 5  •  metoprolol succinate (TOPROL-XL) 50 mg 24 hr tablet, TAKE 1 TABLET BY MOUTH EVERY DAY, Disp: 30 tablet, Rfl: 5  •  paliperidone (INVEGA) 6 MG 24 hr tablet, Take 6 mg by mouth every morning, Disp: , Rfl:   •  rosuvastatin (CRESTOR) 10 MG tablet, TAKE 1 TABLET BY MOUTH EVERY DAY, Disp: 90 tablet, Rfl: 1  •  sodium bicarbonate 650 mg tablet, Take 2 tablets (1,300 mg total) by mouth 3 (three) times a day, Disp: 90 tablet, Rfl: 5  •  losartan (COZAAR) 25 mg tablet, Take 1 tablet (25 mg total) by mouth daily (Patient not taking: Reported on 7/11/2023), Disp: 90 tablet, Rfl: 3  •  Omega-3 Fatty Acids (Fish Oil) 1200 MG CAPS, Take by mouth in the morning (Patient not taking: Reported on 7/11/2023), Disp: , Rfl:     Past Medical History:   Diagnosis Date   • Hypertension    • Mood disorder ([address])    • Psychiatric disorder    • Schizophrenia ([address])      Past Surgical History:   Procedure Laterality Date   • NO PAST SURGERIES       Family History   Problem Relation Age of Onset   • Breast cancer Mother    • Cancer Father         bone/spinal      reports that he has been smoking cigarettes. He started smoking about 35 years ago. He has a 32.00 pack-year smoking history. He has never used smokeless tobacco. He reports current alcohol use. He reports that he does not use drugs. Physical Exam:   Vitals:    07/11/23 1322   BP: 132/84   BP Location: Left arm   Patient Position: Sitting   Cuff Size: Adult   Pulse: 81   Weight: 94.3 kg (208 lb)   Height: 5' 8" (1.727 m)     Body mass index is 31.63 kg/m². General:  Awake, alert, appears comfortable and in no acute distress. Nontoxic. Skin:  No rash, warm, good skin turgor   Eyes:  PERRL, EOMI, sclerae nonicteric.  no periorbital edema   ENT:  Moist mucous membranes  Neck:  Trachea midline, symmetric. No JVD. No carotid bruits. Chest:  Clear to auscultation bilaterally without wheezes, crackles or rhonchi  CVS:  Regular rate and rhythm without murmur, gallop or rub.   S1 and S2 identified and normal.  No S3, S4.   Abdomen:  Soft, nontender, nondistended without masses. Normal bowel sounds x 4 quadrants. No bruit. Extremities:  Warm, pink, motor and sensory intact and well perfused. No cyanosis, pallor. No BLE edema. Neuro:  Awake, alert, oriented x3. Grossly intact  Psych:  Appropriate affect. Mentating appropriately. Normal mental status exam      Procedure:  No results found for this or any previous visit. Lab Results   Component Value Date    CALCIUM 9.3 08/15/2018    K 4.7 06/27/2023    CO2 23 06/27/2023     06/27/2023    BUN 34 (H) 06/27/2023    CREATININE 3.87 (H) 06/27/2023             Invalid input(s): "ALBUMIN"    EMR, including Epic, [employer] Everywhere and outside scanned documents reviewed. I have personally reviewed the blood work as stated above and in my note. I have personally reviewed PCP, consultants and prior nephrology notes.

## 2023-07-10 NOTE — PATIENT INSTRUCTIONS
Your kidney function is progressively worse since January but currently stable. Continue to recommend and encourage renal biopsy. Most recent creatinine 3.7. We will continue routine surveillance as outlined below. The office will be arranging renal biopsy. Interventional radiology will be contacting you for scheduling. Continue standing order for monthly lab work. You are due at the end of the month. Avoid all NSAIDs to include ibuprofen, Motrin, Aleve, Advil, Naproxen, Celebrex, Indomethacin, Toradol. Stay well hydrated. Continue all prescribed medications. Call if blood pressure consistently more than 140/90 or less than 110/50s. High and low blood pressures may affect your kidney function. Recommend low salt diet (2 gm sodium diet). Please let us know if you are scheduled for any studies with IV contrast (ex: CT scan, arteriogram or cardiac catheterization)   Follow up in 3 months with Dr. Aury Thompson with repeat labs prior to appointment. Kidney Smart education program recommended for general education and discussion of dialysis modalities regarding your known chronic kidney disease. Please contact the office with new symptoms or concerns.

## 2023-07-11 ENCOUNTER — OFFICE VISIT (OUTPATIENT)
Dept: NEPHROLOGY | Facility: CLINIC | Age: 53
End: 2023-07-11
Payer: COMMERCIAL

## 2023-07-11 VITALS
HEIGHT: 68 IN | WEIGHT: 208 LBS | SYSTOLIC BLOOD PRESSURE: 132 MMHG | HEART RATE: 81 BPM | DIASTOLIC BLOOD PRESSURE: 84 MMHG | BODY MASS INDEX: 31.52 KG/M2

## 2023-07-11 DIAGNOSIS — R31.21 ASYMPTOMATIC MICROSCOPIC HEMATURIA: ICD-10-CM

## 2023-07-11 DIAGNOSIS — E87.5 HYPERKALEMIA: ICD-10-CM

## 2023-07-11 DIAGNOSIS — R80.9 NEPHROTIC RANGE PROTEINURIA: ICD-10-CM

## 2023-07-11 DIAGNOSIS — I12.9 BENIGN HYPERTENSION WITH CHRONIC KIDNEY DISEASE, STAGE IV (HCC): ICD-10-CM

## 2023-07-11 DIAGNOSIS — E87.8 LOW BICARBONATE LEVEL: ICD-10-CM

## 2023-07-11 DIAGNOSIS — R79.89 ELEVATED SERUM CREATININE: ICD-10-CM

## 2023-07-11 DIAGNOSIS — E78.5 DYSLIPIDEMIA: ICD-10-CM

## 2023-07-11 DIAGNOSIS — N18.4 BENIGN HYPERTENSION WITH CHRONIC KIDNEY DISEASE, STAGE IV (HCC): ICD-10-CM

## 2023-07-11 DIAGNOSIS — F17.200 TOBACCO DEPENDENCE: ICD-10-CM

## 2023-07-11 DIAGNOSIS — N18.4 CKD (CHRONIC KIDNEY DISEASE) STAGE 4, GFR 15-29 ML/MIN (HCC): Primary | ICD-10-CM

## 2023-07-11 DIAGNOSIS — R80.1 PERSISTENT PROTEINURIA: ICD-10-CM

## 2023-07-11 PROCEDURE — 99214 OFFICE O/P EST MOD 30 MIN: CPT | Performed by: PHYSICIAN ASSISTANT

## 2023-07-13 ENCOUNTER — PREP FOR PROCEDURE (OUTPATIENT)
Dept: INTERVENTIONAL RADIOLOGY/VASCULAR | Facility: CLINIC | Age: 53
End: 2023-07-13

## 2023-07-13 DIAGNOSIS — N18.4 CKD (CHRONIC KIDNEY DISEASE) STAGE 4, GFR 15-29 ML/MIN (HCC): Primary | ICD-10-CM

## 2023-07-13 RX ORDER — SODIUM CHLORIDE 9 MG/ML
75 INJECTION, SOLUTION INTRAVENOUS CONTINUOUS
OUTPATIENT
Start: 2023-07-13

## 2023-07-21 DIAGNOSIS — I10 ESSENTIAL HYPERTENSION: ICD-10-CM

## 2023-07-21 RX ORDER — AMLODIPINE BESYLATE 5 MG/1
TABLET ORAL
Qty: 90 TABLET | Refills: 1 | Status: SHIPPED | OUTPATIENT
Start: 2023-07-21

## 2023-07-26 LAB
25(OH)D3+25(OH)D2 SERPL-MCNC: 39.4 NG/ML (ref 30–100)
ALBUMIN SERPL-MCNC: 4.1 G/DL (ref 3.8–4.9)
ALBUMIN/GLOB SERPL: 1.7 {RATIO} (ref 1.2–2.2)
ALP SERPL-CCNC: 58 IU/L (ref 44–121)
ALT SERPL-CCNC: 10 IU/L (ref 0–44)
AST SERPL-CCNC: 11 IU/L (ref 0–40)
BILIRUB SERPL-MCNC: <0.2 MG/DL (ref 0–1.2)
BUN SERPL-MCNC: 36 MG/DL (ref 6–24)
BUN/CREAT SERPL: 8 (ref 9–20)
CALCIUM SERPL-MCNC: 9.8 MG/DL (ref 8.7–10.2)
CHLORIDE SERPL-SCNC: 102 MMOL/L (ref 96–106)
CO2 SERPL-SCNC: 22 MMOL/L (ref 20–29)
CREAT SERPL-MCNC: 4.34 MG/DL (ref 0.76–1.27)
CREAT UR-MCNC: 86.3 MG/DL
EGFR: 16 ML/MIN/1.73
ERYTHROCYTE [DISTWIDTH] IN BLOOD BY AUTOMATED COUNT: 13 % (ref 11.6–15.4)
GLOBULIN SER-MCNC: 2.4 G/DL (ref 1.5–4.5)
GLUCOSE SERPL-MCNC: 68 MG/DL (ref 70–99)
HCT VFR BLD AUTO: 37.3 % (ref 37.5–51)
HGB BLD-MCNC: 12.8 G/DL (ref 13–17.7)
MCH RBC QN AUTO: 32.5 PG (ref 26.6–33)
MCHC RBC AUTO-ENTMCNC: 34.3 G/DL (ref 31.5–35.7)
MCV RBC AUTO: 95 FL (ref 79–97)
PHOSPHATE SERPL-MCNC: 4.8 MG/DL (ref 2.8–4.1)
PLATELET # BLD AUTO: 169 X10E3/UL (ref 150–450)
POTASSIUM SERPL-SCNC: 4.6 MMOL/L (ref 3.5–5.2)
PROT SERPL-MCNC: 6.5 G/DL (ref 6–8.5)
PROT UR-MCNC: 499.2 MG/DL
PROT/CREAT UR: 5784 MG/G CREAT (ref 0–200)
PTH-INTACT SERPL-MCNC: 81 PG/ML (ref 15–65)
RBC # BLD AUTO: 3.94 X10E6/UL (ref 4.14–5.8)
SODIUM SERPL-SCNC: 139 MMOL/L (ref 134–144)
WBC # BLD AUTO: 7.3 X10E3/UL (ref 3.4–10.8)

## 2023-07-27 ENCOUNTER — TELEPHONE (OUTPATIENT)
Dept: NEPHROLOGY | Facility: CLINIC | Age: 53
End: 2023-07-27

## 2023-07-27 DIAGNOSIS — N18.4 CKD (CHRONIC KIDNEY DISEASE) STAGE 4, GFR 15-29 ML/MIN (HCC): Primary | ICD-10-CM

## 2023-07-27 NOTE — TELEPHONE ENCOUNTER
Patient called back, he stated he feels fine and has no complaints at this time. He does not check his BP at home sohe has no readings. He will go for lab work in 2 weeks.

## 2023-07-27 NOTE — TELEPHONE ENCOUNTER
LM for patient about the following, asked him to call back to let us know how he is feeling nad if he started any new meds:    ----- Message from [address] [facility] sent at 7/26/2023  5:16 PM EDT -----  Labs reviewed. Creatinine is higher than prior levels. Please inquire as to how he is feeling. If he checks his blood pressure at home it would be helpful to obtain some blood pressure readings. Please inquire if he is drinking enough fluids. Inquire if he has been started on any new medications that we are unaware of. Please encourage him not to take NSAIDs. He will need follow-up blood work in 2 weeks. Have him go for a BMP. Thank you.

## 2023-08-09 NOTE — NURSING NOTE
Spoke to patient regarding IR kidney biopsy scheduled 8/15 at 0830. Patient given arrival time of 0700, instructed to be NPO after midnight, to have a , and to take medications in the morning with a small sip of water. Questions answered as offered.

## 2023-08-11 LAB
BUN SERPL-MCNC: 39 MG/DL (ref 6–24)
BUN/CREAT SERPL: 9 (ref 9–20)
CALCIUM SERPL-MCNC: 9.6 MG/DL (ref 8.7–10.2)
CHLORIDE SERPL-SCNC: 103 MMOL/L (ref 96–106)
CO2 SERPL-SCNC: 23 MMOL/L (ref 20–29)
CREAT SERPL-MCNC: 4.41 MG/DL (ref 0.76–1.27)
EGFR: 15 ML/MIN/1.73
GLUCOSE SERPL-MCNC: 88 MG/DL (ref 70–99)
POTASSIUM SERPL-SCNC: 4.8 MMOL/L (ref 3.5–5.2)
SODIUM SERPL-SCNC: 140 MMOL/L (ref 134–144)

## 2023-08-15 ENCOUNTER — HOSPITAL ENCOUNTER (OUTPATIENT)
Dept: RADIOLOGY | Facility: HOSPITAL | Age: 53
Discharge: HOME | End: 2023-08-15
Attending: RADIOLOGY
Payer: COMMERCIAL

## 2023-08-15 VITALS
RESPIRATION RATE: 16 BRPM | BODY MASS INDEX: 29.84 KG/M2 | WEIGHT: 196.87 LBS | DIASTOLIC BLOOD PRESSURE: 93 MMHG | HEART RATE: 78 BPM | HEIGHT: 68 IN | TEMPERATURE: 97.2 F | SYSTOLIC BLOOD PRESSURE: 145 MMHG | OXYGEN SATURATION: 97 %

## 2023-08-15 DIAGNOSIS — N18.4 CKD (CHRONIC KIDNEY DISEASE) STAGE 4, GFR 15-29 ML/MIN (HCC): ICD-10-CM

## 2023-08-15 LAB
ABO GROUP BLD: NORMAL
ANION GAP SERPL CALCULATED.3IONS-SCNC: 6 MMOL/L
BASOPHILS # BLD AUTO: 0.04 THOUSANDS/ÂΜL (ref 0–0.1)
BASOPHILS NFR BLD AUTO: 1 % (ref 0–1)
BLD GP AB SCN SERPL QL: NEGATIVE
BUN SERPL-MCNC: 47 MG/DL (ref 5–25)
CALCIUM SERPL-MCNC: 9.9 MG/DL (ref 8.4–10.2)
CHLORIDE SERPL-SCNC: 110 MMOL/L (ref 96–108)
CO2 SERPL-SCNC: 24 MMOL/L (ref 21–32)
CREAT SERPL-MCNC: 4.86 MG/DL (ref 0.6–1.3)
EOSINOPHIL # BLD AUTO: 0.25 THOUSAND/ÂΜL (ref 0–0.61)
EOSINOPHIL NFR BLD AUTO: 4 % (ref 0–6)
ERYTHROCYTE [DISTWIDTH] IN BLOOD BY AUTOMATED COUNT: 12.7 % (ref 11.6–15.1)
GFR SERPL CREATININE-BSD FRML MDRD: 12 ML/MIN/1.73SQ M
GLUCOSE P FAST SERPL-MCNC: 109 MG/DL (ref 65–99)
GLUCOSE SERPL-MCNC: 109 MG/DL (ref 65–140)
HCT VFR BLD AUTO: 39.5 % (ref 36.5–49.3)
HGB BLD-MCNC: 13.1 G/DL (ref 12–17)
IMM GRANULOCYTES # BLD AUTO: 0.05 THOUSAND/UL (ref 0–0.2)
IMM GRANULOCYTES NFR BLD AUTO: 1 % (ref 0–2)
INR PPP: 0.89 (ref 0.84–1.19)
LYMPHOCYTES # BLD AUTO: 1.19 THOUSANDS/ÂΜL (ref 0.6–4.47)
LYMPHOCYTES NFR BLD AUTO: 18 % (ref 14–44)
MCH RBC QN AUTO: 32.8 PG (ref 26.8–34.3)
MCHC RBC AUTO-ENTMCNC: 33.2 G/DL (ref 31.4–37.4)
MCV RBC AUTO: 99 FL (ref 82–98)
MONOCYTES # BLD AUTO: 0.83 THOUSAND/ÂΜL (ref 0.17–1.22)
MONOCYTES NFR BLD AUTO: 12 % (ref 4–12)
NEUTROPHILS # BLD AUTO: 4.36 THOUSANDS/ÂΜL (ref 1.85–7.62)
NEUTS SEG NFR BLD AUTO: 64 % (ref 43–75)
NRBC BLD AUTO-RTO: 0 /100 WBCS
PLATELET # BLD AUTO: 179 THOUSANDS/UL (ref 149–390)
PMV BLD AUTO: 11 FL (ref 8.9–12.7)
POTASSIUM SERPL-SCNC: 4.4 MMOL/L (ref 3.5–5.3)
PROTHROMBIN TIME: 12.2 SECONDS (ref 11.6–14.5)
RBC # BLD AUTO: 4 MILLION/UL (ref 3.88–5.62)
RH BLD: POSITIVE
SODIUM SERPL-SCNC: 140 MMOL/L (ref 135–147)
SPECIMEN EXPIRATION DATE: NORMAL
WBC # BLD AUTO: 6.72 THOUSAND/UL (ref 4.31–10.16)

## 2023-08-15 PROCEDURE — 80048 BASIC METABOLIC PNL TOTAL CA: CPT | Performed by: RADIOLOGY

## 2023-08-15 PROCEDURE — 88346 IMFLUOR 1ST 1ANTB STAIN PX: CPT | Performed by: INTERNAL MEDICINE

## 2023-08-15 PROCEDURE — 88300 SURGICAL PATH GROSS: CPT | Performed by: PATHOLOGY

## 2023-08-15 PROCEDURE — 50200 RENAL BIOPSY PERQ: CPT

## 2023-08-15 PROCEDURE — 88350 IMFLUOR EA ADDL 1ANTB STN PX: CPT | Performed by: INTERNAL MEDICINE

## 2023-08-15 PROCEDURE — 86901 BLOOD TYPING SEROLOGIC RH(D): CPT | Performed by: RADIOLOGY

## 2023-08-15 PROCEDURE — 99152 MOD SED SAME PHYS/QHP 5/>YRS: CPT

## 2023-08-15 PROCEDURE — 86900 BLOOD TYPING SEROLOGIC ABO: CPT | Performed by: RADIOLOGY

## 2023-08-15 PROCEDURE — 88348 ELECTRON MICROSCOPY DX: CPT | Performed by: INTERNAL MEDICINE

## 2023-08-15 PROCEDURE — 88313 SPECIAL STAINS GROUP 2: CPT | Performed by: INTERNAL MEDICINE

## 2023-08-15 PROCEDURE — 88305 TISSUE EXAM BY PATHOLOGIST: CPT | Performed by: INTERNAL MEDICINE

## 2023-08-15 PROCEDURE — 85025 COMPLETE CBC W/AUTO DIFF WBC: CPT | Performed by: RADIOLOGY

## 2023-08-15 PROCEDURE — 86850 RBC ANTIBODY SCREEN: CPT | Performed by: RADIOLOGY

## 2023-08-15 PROCEDURE — 85610 PROTHROMBIN TIME: CPT | Performed by: RADIOLOGY

## 2023-08-15 RX ORDER — FENTANYL CITRATE 50 UG/ML
INJECTION, SOLUTION INTRAMUSCULAR; INTRAVENOUS AS NEEDED
Status: COMPLETED | OUTPATIENT
Start: 2023-08-15 | End: 2023-08-15

## 2023-08-15 RX ORDER — LIDOCAINE WITH 8.4% SOD BICARB 0.9%(10ML)
SYRINGE (ML) INJECTION AS NEEDED
Status: COMPLETED | OUTPATIENT
Start: 2023-08-15 | End: 2023-08-15

## 2023-08-15 RX ORDER — SODIUM CHLORIDE 9 MG/ML
75 INJECTION, SOLUTION INTRAVENOUS CONTINUOUS
Status: DISCONTINUED | OUTPATIENT
Start: 2023-08-15 | End: 2023-08-16 | Stop reason: HOSPADM

## 2023-08-15 RX ORDER — MIDAZOLAM HYDROCHLORIDE 2 MG/2ML
INJECTION, SOLUTION INTRAMUSCULAR; INTRAVENOUS AS NEEDED
Status: COMPLETED | OUTPATIENT
Start: 2023-08-15 | End: 2023-08-15

## 2023-08-15 RX ADMIN — FENTANYL CITRATE 50 MCG: 50 INJECTION, SOLUTION INTRAMUSCULAR; INTRAVENOUS at 09:31

## 2023-08-15 RX ADMIN — MIDAZOLAM 1 MG: 1 INJECTION INTRAMUSCULAR; INTRAVENOUS at 09:31

## 2023-08-15 RX ADMIN — FENTANYL CITRATE 50 MCG: 50 INJECTION, SOLUTION INTRAMUSCULAR; INTRAVENOUS at 09:28

## 2023-08-15 RX ADMIN — Medication 10 ML: at 09:31

## 2023-08-15 RX ADMIN — SODIUM CHLORIDE 75 ML/HR: 0.9 INJECTION, SOLUTION INTRAVENOUS at 08:23

## 2023-08-15 RX ADMIN — MIDAZOLAM 1 MG: 1 INJECTION INTRAMUSCULAR; INTRAVENOUS at 09:28

## 2023-08-15 NOTE — NURSING NOTE
Patient transferred to [facility], report given to [facility].

## 2023-08-15 NOTE — H&P
Interventional Radiology  History and Physical 8/15/2023     Laura Jones   1970   [id number]    Assessment/Plan:  Acute on chronic kidney disease with nephrotic range proteinuria. Plan renal core biopsy today. Problem List Items Addressed This Visit        Genitourinary    CKD (chronic kidney disease) stage 4, GFR 15-29 ml/min ([facility])    Relevant Orders    IR biopsy kidney [place] kit no laterality          Subjective:     Patient ID: Laura Jones is a 47 y.o. male. 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 He has also developed nephrotic range proteinuria. Patient has been referred for a renal core biopsy for further evaluation.       Past Medical History:   Diagnosis Date   • Hypertension    • Mood disorder ([address])    • Psychiatric disorder    • Schizophrenia ([address])         Past Surgical History:   Procedure Laterality Date   • NO PAST SURGERIES          Social History     Tobacco Use   Smoking Status Every Day   • Packs/day: 1.00   • Years: 32.00   • Total pack years: 32.00   • Types: Cigarettes   • Start date: 7/14/1988   Smokeless Tobacco Never        Social History     Substance and Sexual Activity   Alcohol Use Yes    Comment: weekends        Social History     Substance and Sexual Activity   Drug Use No        Allergies   Allergen Reactions   • Geodon [Ziprasidone] Fatigue   • Haldol [Haloperidol] Fatigue       Current Outpatient Medications   Medication Sig Dispense Refill   • amLODIPine (NORVASC) 5 mg tablet TAKE 1 TABLET BY MOUTH EVERY DAY 90 tablet 1   • ergocalciferol (VITAMIN D2) 50,000 units TAKE 1 CAPSULE BY MOUTH ONE TIME PER WEEK 12 capsule 1   • ezetimibe (ZETIA) 10 mg tablet TAKE 1 TABLET BY MOUTH EVERY DAY 30 tablet 5   • metoprolol succinate (TOPROL-XL) 50 mg 24 hr tablet TAKE 1 TABLET BY MOUTH EVERY DAY 30 tablet 5   • paliperidone (INVEGA) 6 MG 24 hr tablet Take 6 mg by mouth every morning     • rosuvastatin (CRESTOR) 10 MG tablet TAKE 1 TABLET BY MOUTH EVERY DAY 90 tablet 1   • sodium bicarbonate 650 mg tablet Take 2 tablets (1,300 mg total) by mouth 3 (three) times a day 90 tablet 5   • losartan (COZAAR) 25 mg tablet Take 1 tablet (25 mg total) by mouth daily (Patient not taking: Reported on 7/11/2023) 90 tablet 3   • Omega-3 Fatty Acids (Fish Oil) 1200 MG CAPS Take by mouth in the morning (Patient not taking: Reported on 7/11/2023)       Current Facility-Administered Medications   Medication Dose Route Frequency Provider Last Rate Last Admin   • fentanyl citrate (PF) 100 MCG/2ML   Intravenous PRN Janel Esquivel MD   50 mcg at 08/15/23 0931   • midazolam (VERSED) injection    PRN Janel Esquivel MD   1 mg at 08/15/23 0931   • sodium chloride 0.9 % infusion  75 mL/hr Intravenous Continuous Danya MD Roberto 75 mL/hr at 08/15/23 0823 75 mL/hr at 08/15/23 0823          Objective:    Vitals:    08/15/23 0930 08/15/23 0935 08/15/23 0940 08/15/23 0945   BP: 147/92 153/74 136/88 138/90   Pulse: 72 69 78 76   Resp: 16 16 16 16   Temp:       TempSrc:       SpO2: 100% 99% 98% 99%   Weight:       Height:            Physical Exam  Cardiovascular:      Rate and Rhythm: Normal rate and regular rhythm. Heart sounds: Normal heart sounds. Pulmonary:      Effort: Pulmonary effort is normal.      Breath sounds: Normal breath sounds. Skin:     General: Skin is warm and dry. Neurological:      Mental Status: He is alert and oriented to person, place, and time.    Psychiatric:         Behavior: Behavior normal.           No results found for: "BNP"   Lab Results   Component Value Date    WBC 6.72 08/15/2023    HGB 13.1 08/15/2023    HCT 39.5 08/15/2023    MCV 99 (H) 08/15/2023     08/15/2023     Lab Results   Component Value Date    INR 0.89 08/15/2023    PROTIME 12.2 08/15/2023     No results found for: "PTT"      I have personally reviewed pertinent imaging and laboratory results. Code Status: No Order  Advance Directive and Living Will:      Power of :    POLST:      This text is generated with voice recognition software. There may be translation, syntax,  or grammatical errors. If you have any questions, please contact the dictating provider.

## 2023-08-15 NOTE — DISCHARGE INSTRUCTIONS
Percutaneous Kidney Biopsy   WHAT YOU NEED TO KNOW:   A percutaneous kidney biopsy is a procedure to remove a small sample of kidney tissue. It may also be done to check for kidney disease or cancer. DISCHARGE INSTRUCTIONS:   Follow up with your healthcare provider as directed:  Write down your questions so you remember to ask them during your visits. Wound care: The Band-Aid may be removed in 24 hours. For more information:   National Kidney and U[employer] Information [employer] [address]  Phone: 5- 456 - [phone]  Web Address: http://kidney. niddk.nih.gov/   Care after your procedure:    1. Limit your activities for 36 hours after your biopsy. 2. No driving day of biopsy. 3. Return to your normal diet. Flat sips of flat soda helps with mild nausea. 4. Remove band-aid or dressing 24 hours after procedure. Contact Interventional Radiology at [phone]    [place] PATIENTS: Contact Interventional Radiology at [phone]) Guillermina Roblero PATIENTS: Contact Interventional Radiology at [phone]) if:    1. Difficulty breathing, nausea or vomiting. 2  You feel weak or dizzy. 3. Chills or fever above 101 degrees F. You have persistent nausea or vomiting    4. You have severe pain in your abdomen or where You feel weak or dizzy. your           procedure was done. 5  You have blood in your urine. You urinate small amounts or not at all. 4. Develop any redness, swelling, heat, unusual drainage, heavy bruising or bleeding     from biopsy site. Procedural Sedation   WHAT YOU NEED TO KNOW:   Procedural sedation is medicine used during procedures to help you feel relaxed and calm. You will remember little to none of the procedure. After sedation you may feel tired, weak, or unsteady on your feet.  You may also have trouble concentrating or short-term memory loss. These symptoms should go away in 24 hours or less. DISCHARGE INSTRUCTIONS:     Call 911 or have someone else call for any of the following: You have sudden trouble breathing. You cannot be woken. Contact Interventional Radiology at [phone]   [place] PATIENTS: Contact Interventional Radiology at [phone]) Rekha Green PATIENTS: Contact Interventional Radiology at [phone]) if any of the following occur: You have a severe headache or dizziness. Your heart is beating faster than usual.    You have a fever or chills. Your skin is itchy, swollen, or you have a rash. You have nausea or are vomiting for more than 8 hours after the procedure. You have questions or concerns about your condition or care. Self-care:   Have someone stay with you for 24 hours. This person can drive you to errands and help you do things around the house. This person can also watch for problems. Rest and do quiet activities for 24 hours. Do not exercise, ride a bike, or play sports. Stand up slowly to prevent dizziness and falls. Take short walks around the house with another person. Slowly return to your usual activities the next day. Do not drive or use dangerous machines or tools for 24 hours. You may injure yourself or others. Examples include a lawnmower, saw, or drill. Do not return to work for 24 hours if you use dangerous machines or tools for work. Do not make important decisions for 24 hours. For example, do not sign important papers or invest money. Drink liquids as directed. Liquids help flush the sedation medicine out of your body. Ask how much liquid to drink each day and which liquids are best for you. Eat small, frequent meals to prevent nausea and vomiting. Start with clear liquids such as juice or broth. If you do not vomit after clear liquids, you can eat your usual foods. Do not drink alcohol or take medicines that make you drowsy. This includes medicines that help you sleep and anxiety medicines. Ask your healthcare provider if it is safe for you to take pain medicine. Follow up with your healthcare provider as directed: Write down your questions so you remember to ask them during your visits.

## 2023-08-15 NOTE — PERIOPERATIVE NURSING NOTE
Pt d/c to home at this time with [facility] Via w/c. Pt left with all belongings. Iv was D/C intact with dry sterile dressing. Bandade to flank area clean dry and intact. D/C instructions reviewed and explained with patient and family member. Verbalized understanding.

## 2023-08-15 NOTE — SEDATION DOCUMENTATION
Biopsy obtained from L kidney by Dr. Yesenia Dorsey. Pt tolerated without issues. Destat to puncture site. Education provided and reviewed with pt.

## 2023-08-15 NOTE — BRIEF OP NOTE (RAD/CATH)
INTERVENTIONAL RADIOLOGY PROCEDURE NOTE    Date: 8/15/2023    Procedure:   Procedure Summary     Date: 08/15/23 Room / Location: [address] CAT Scan    Anesthesia Start:  Anesthesia Stop:     Procedure: IR BIOPSY KIDNEY RANDOM Diagnosis:       CKD (chronic kidney disease) stage 4, GFR 15-29 ml/min (HCC)      (progression of CKD)    Scheduled Providers:  Responsible Provider:     Anesthesia Type: Not recorded ASA Status: Not recorded          Preoperative diagnosis:   1. CKD (chronic kidney disease) stage 4, GFR 15-29 ml/min (HCC)         Postoperative diagnosis: Same. Surgeon: Manda Barragan MD     Assistant: None. No qualified resident was available. Blood loss: 1 ml    Specimens: four 18-gauge core biopsies    Findings: Successful image guided renal core biopsy. Complications: None immediate.     Anesthesia: conscious sedation

## 2023-08-16 ENCOUNTER — HOSPITAL ENCOUNTER (OUTPATIENT)
Dept: RADIOLOGY | Facility: HOSPITAL | Age: 53
Discharge: HOME | End: 2023-08-16
Attending: RADIOLOGY

## 2023-08-24 DIAGNOSIS — E78.2 MIXED HYPERLIPIDEMIA: ICD-10-CM

## 2023-08-24 LAB — SCAN RESULT: NORMAL

## 2023-08-24 RX ORDER — EZETIMIBE 10 MG/1
TABLET ORAL
Qty: 30 TABLET | Refills: 5 | Status: SHIPPED | OUTPATIENT
Start: 2023-08-24

## 2023-08-25 ENCOUNTER — TELEPHONE (OUTPATIENT)
Dept: NEPHROLOGY | Facility: CLINIC | Age: 53
End: 2023-08-25

## 2023-08-25 NOTE — TELEPHONE ENCOUNTER
Patient called to check if his sodium bicarbonate dosage should be decreased as his 8/15 hospital labs show an increase in CO2. Informed him that 24 is within the normal ranges and not out of his norm, and that a dosage change is probably unnecessary. Asked patient if hospitalist changed his dosage or mentioned it at all. Patient stated that they do not do that and requests that Jostin Hargrove review his 8/15 hospital labs to see if a dosage change is necessary.

## 2023-08-29 ENCOUNTER — OFFICE VISIT (OUTPATIENT)
Dept: FAMILY MEDICINE CLINIC | Facility: CLINIC | Age: 53
End: 2023-08-29
Payer: COMMERCIAL

## 2023-08-29 VITALS
RESPIRATION RATE: 16 BRPM | WEIGHT: 201 LBS | HEART RATE: 70 BPM | DIASTOLIC BLOOD PRESSURE: 84 MMHG | HEIGHT: 68 IN | TEMPERATURE: 98.4 F | SYSTOLIC BLOOD PRESSURE: 128 MMHG | BODY MASS INDEX: 30.46 KG/M2

## 2023-08-29 DIAGNOSIS — F17.200 TOBACCO DEPENDENCE: ICD-10-CM

## 2023-08-29 DIAGNOSIS — I10 ESSENTIAL HYPERTENSION: Primary | ICD-10-CM

## 2023-08-29 DIAGNOSIS — N18.4 CKD (CHRONIC KIDNEY DISEASE) STAGE 4, GFR 15-29 ML/MIN (HCC): ICD-10-CM

## 2023-08-29 DIAGNOSIS — E78.01 FAMILIAL HYPERCHOLESTEROLEMIA: ICD-10-CM

## 2023-08-29 PROCEDURE — 99214 OFFICE O/P EST MOD 30 MIN: CPT | Performed by: FAMILY MEDICINE

## 2023-08-29 RX ORDER — ROSUVASTATIN CALCIUM 10 MG/1
10 TABLET, COATED ORAL DAILY
Qty: 90 TABLET | Refills: 1 | Status: SHIPPED | OUTPATIENT
Start: 2023-08-29

## 2023-08-31 LAB
ALBUMIN SERPL-MCNC: 4.1 G/DL (ref 3.8–4.9)
ALBUMIN/GLOB SERPL: 1.9 {RATIO} (ref 1.2–2.2)
ALP SERPL-CCNC: 54 IU/L (ref 44–121)
ALT SERPL-CCNC: 14 IU/L (ref 0–44)
AST SERPL-CCNC: 10 IU/L (ref 0–40)
BILIRUB SERPL-MCNC: <0.2 MG/DL (ref 0–1.2)
BUN SERPL-MCNC: 29 MG/DL (ref 6–24)
BUN/CREAT SERPL: 6 (ref 9–20)
CALCIUM SERPL-MCNC: 9.2 MG/DL (ref 8.7–10.2)
CHLORIDE SERPL-SCNC: 105 MMOL/L (ref 96–106)
CO2 SERPL-SCNC: 23 MMOL/L (ref 20–29)
CREAT SERPL-MCNC: 4.67 MG/DL (ref 0.76–1.27)
CREAT UR-MCNC: 113.5 MG/DL
EGFR: 14 ML/MIN/1.73
GLOBULIN SER-MCNC: 2.2 G/DL (ref 1.5–4.5)
GLUCOSE SERPL-MCNC: 111 MG/DL (ref 70–99)
POTASSIUM SERPL-SCNC: 4.3 MMOL/L (ref 3.5–5.2)
PROT SERPL-MCNC: 6.3 G/DL (ref 6–8.5)
PROT UR-MCNC: 550.1 MG/DL
PROT/CREAT UR: 4847 MG/G CREAT (ref 0–200)
SODIUM SERPL-SCNC: 143 MMOL/L (ref 134–144)

## 2023-09-05 ENCOUNTER — TELEPHONE (OUTPATIENT)
Dept: NEPHROLOGY | Facility: CLINIC | Age: 53
End: 2023-09-05

## 2023-09-05 DIAGNOSIS — I12.9 BENIGN HYPERTENSION WITH CKD (CHRONIC KIDNEY DISEASE) STAGE III (HCC): ICD-10-CM

## 2023-09-05 DIAGNOSIS — N18.30 BENIGN HYPERTENSION WITH CKD (CHRONIC KIDNEY DISEASE) STAGE III (HCC): ICD-10-CM

## 2023-09-05 DIAGNOSIS — E55.9 VITAMIN D DEFICIENCY: ICD-10-CM

## 2023-09-05 DIAGNOSIS — R79.89 ELEVATED SERUM CREATININE: ICD-10-CM

## 2023-09-05 DIAGNOSIS — R31.21 ASYMPTOMATIC MICROSCOPIC HEMATURIA: ICD-10-CM

## 2023-09-05 DIAGNOSIS — R80.1 PERSISTENT PROTEINURIA: ICD-10-CM

## 2023-09-05 RX ORDER — SODIUM BICARBONATE 650 MG/1
1300 TABLET ORAL 3 TIMES DAILY
Qty: 540 TABLET | Refills: 3 | Status: SHIPPED | OUTPATIENT
Start: 2023-09-05

## 2023-09-05 NOTE — TELEPHONE ENCOUNTER
Called and lm for pt, Pt returned call and I advised pt that the pharmacy will refill his prescription for sodium bicarbonate. He thanked for the call and getting things situated. Nothing further needed at this time.

## 2023-09-05 NOTE — TELEPHONE ENCOUNTER
Received call from pt stating the pharmacy wont approve pt refill of sodium bicarbonate ,stating refill is to soon. I advised Stacie Edgar and she is placing call to pharmacy.

## 2023-09-05 NOTE — TELEPHONE ENCOUNTER
Called and spoke to the pharmacist at patient's [facility] pharmacy. Was informed that there was a previous cancellation order, but they would correct it and refill the sodium bicarbonate.

## 2023-09-17 NOTE — PROGRESS NOTES
Assessment/Plan:    1. Essential hypertension  Assessment & Plan:  BP in range--cont. current mgt  Maintain yearly eye exam.      2. Familial hypercholesterolemia  Comments:  encouraged low chol diet, regular exercise    Assessment & Plan:  Encouraged low chol diet, regular exercise. Orders:  -     rosuvastatin (CRESTOR) 10 MG tablet; Take 1 tablet (10 mg total) by mouth daily    3. Tobacco dependence    4. CKD (chronic kidney disease) stage 4, GFR 15-29 ml/min [facility])  Assessment & Plan:  Lab Results   Component Value Date    EGFR 14 (L) 08/29/2023    EGFR 12 08/15/2023    EGFR 15 (L) 08/10/2023    CREATININE 4.67 (H) 08/29/2023    CREATININE 4.86 (H) 08/15/2023    CREATININE 4.41 (H) 08/10/2023   Follows w nephrologist      5. BMI 30.0-30.9,adult        Tobacco Cessation Counseling: Tobacco cessation counseling was provided. The patient is sincerely urged to quit consumption of tobacco. He is not ready to quit tobacco.       Subjective:      Patient ID: Saurabh Layne is a 47 y.o. male. Chief Complaint   Patient presents with   • Follow-up     Bp check    • Hyperlipidemia   • Blood Pressure Check   • Nicotine Dependence       HPI    Follow-up  Interval hx reviewed  Nephrology input appreciated  BP in range  Overall pt feeling well--no acute c/o  Cont to smoke--trying to cut down but not ready to quit    The following portions of the patient's history were reviewed and updated as appropriate: allergies, current medications, past family history, past medical history, past social history, past surgical history and problem list.    Review of Systems   Constitutional: Positive for fatigue. Negative for fever. Eyes:        Wears glasses   Respiratory: Negative. Cardiovascular: Negative. Gastrointestinal: Negative. Musculoskeletal: Positive for arthralgias. Skin: Negative for rash. Allergic/Immunologic: Positive for environmental allergies. Neurological: Negative for seizures and syncope. Psychiatric/Behavioral: Positive for decreased concentration and sleep disturbance. The patient is nervous/anxious. Current Outpatient Medications   Medication Sig Dispense Refill   • amLODIPine (NORVASC) 5 mg tablet TAKE 1 TABLET BY MOUTH EVERY DAY 90 tablet 1   • ergocalciferol (VITAMIN D2) 50,000 units TAKE 1 CAPSULE BY MOUTH ONE TIME PER WEEK 12 capsule 1   • ezetimibe (ZETIA) 10 mg tablet TAKE 1 TABLET BY MOUTH EVERY DAY 30 tablet 5   • metoprolol succinate (TOPROL-XL) 50 mg 24 hr tablet TAKE 1 TABLET BY MOUTH EVERY DAY 30 tablet 5   • paliperidone (INVEGA) 6 MG 24 hr tablet Take 6 mg by mouth every morning     • rosuvastatin (CRESTOR) 10 MG tablet Take 1 tablet (10 mg total) by mouth daily 90 tablet 1   • Omega-3 Fatty Acids (Fish Oil) 1200 MG CAPS Take by mouth in the morning (Patient not taking: Reported on 7/11/2023)     • sodium bicarbonate 650 mg tablet Take 2 tablets (1,300 mg total) by mouth 3 (three) times a day 540 tablet 3     No current facility-administered medications for this visit. Objective:    /84 (BP Location: Left arm, Patient Position: Sitting, Cuff Size: Large)   Pulse 70   Temp 98.4 °F (36.9 °C)   Resp 16   Ht 5' 8" (1.727 m)   Wt 91.2 kg (201 lb)   BMI 30.56 kg/m²        Physical Exam  Vitals and nursing note reviewed. Constitutional:       General: He is not in acute distress. Appearance: He is well-developed. HENT:      Mouth/Throat:      Pharynx: No oropharyngeal exudate. Eyes:      General: No scleral icterus. Conjunctiva/sclera: Conjunctivae normal.   Cardiovascular:      Rate and Rhythm: Normal rate and regular rhythm. Pulses: Normal pulses. Pulmonary:      Effort: Pulmonary effort is normal. No respiratory distress. Breath sounds: Normal breath sounds. Abdominal:      General: Bowel sounds are normal.      Palpations: Abdomen is soft. Tenderness: There is no abdominal tenderness.  There is no right CVA tenderness or left CVA tenderness. Musculoskeletal:         General: No tenderness. Normal range of motion. Cervical back: Neck supple. Skin:     General: Skin is warm and dry. Capillary Refill: Capillary refill takes less than 2 seconds. Coloration: Skin is not jaundiced. Neurological:      Mental Status: He is alert and oriented to person, place, and time. Cranial Nerves: No cranial nerve deficit.       Comments: No acute focal deficit   Psychiatric:      Comments: Mood stable, pleasant, cooperative           Roseanna Wooten MD

## 2023-09-17 NOTE — ASSESSMENT & PLAN NOTE
Lab Results   Component Value Date    EGFR 14 (L) 08/29/2023    EGFR 12 08/15/2023    EGFR 15 (L) 08/10/2023    CREATININE 4.67 (H) 08/29/2023    CREATININE 4.86 (H) 08/15/2023    CREATININE 4.41 (H) 08/10/2023   Follows w nephrologist

## 2023-09-18 ENCOUNTER — TELEPHONE (OUTPATIENT)
Age: 53
End: 2023-09-18

## 2023-09-18 NOTE — TELEPHONE ENCOUNTER
[phone]    Wants to schedule a flu shot apt. Also,   Cholesterol test [facility].  Does Dr. Dionisio Mckee want him to get bloodwork?     Also, patient wants the doctor to be aware that he is on garlique cholesterol one tab before bedtime

## 2023-09-19 DIAGNOSIS — I10 ESSENTIAL HYPERTENSION: Primary | ICD-10-CM

## 2023-09-19 DIAGNOSIS — Z13.1 SCREENING FOR DIABETES MELLITUS: ICD-10-CM

## 2023-09-19 DIAGNOSIS — Z12.5 PROSTATE CANCER SCREENING: ICD-10-CM

## 2023-09-19 DIAGNOSIS — E78.01 FAMILIAL HYPERCHOLESTEROLEMIA: ICD-10-CM

## 2023-09-19 DIAGNOSIS — N18.4 CKD (CHRONIC KIDNEY DISEASE) STAGE 4, GFR 15-29 ML/MIN (HCC): ICD-10-CM

## 2023-09-19 NOTE — TELEPHONE ENCOUNTER
Lab orders in chart-please ask pt to have done and schedule appt for week after to review results--can give flu shot at visit--thanks

## 2023-09-20 NOTE — TELEPHONE ENCOUNTER
Pt said he would like to  his lab slip at office.    He will call back to schedule follow up with Dr. Aldo Steele

## 2023-09-26 LAB
ALBUMIN SERPL-MCNC: 4.3 G/DL (ref 3.8–4.9)
ALBUMIN/GLOB SERPL: 2 {RATIO} (ref 1.2–2.2)
ALP SERPL-CCNC: 55 IU/L (ref 44–121)
ALT SERPL-CCNC: 11 IU/L (ref 0–44)
AST SERPL-CCNC: 10 IU/L (ref 0–40)
BILIRUB SERPL-MCNC: 0.2 MG/DL (ref 0–1.2)
BUN SERPL-MCNC: 34 MG/DL (ref 6–24)
BUN/CREAT SERPL: 7 (ref 9–20)
CALCIUM SERPL-MCNC: 9.5 MG/DL (ref 8.7–10.2)
CHLORIDE SERPL-SCNC: 104 MMOL/L (ref 96–106)
CHOLEST SERPL-MCNC: 163 MG/DL (ref 100–199)
CO2 SERPL-SCNC: 19 MMOL/L (ref 20–29)
CREAT SERPL-MCNC: 4.74 MG/DL (ref 0.76–1.27)
EGFR: 14 ML/MIN/1.73
ERYTHROCYTE [DISTWIDTH] IN BLOOD BY AUTOMATED COUNT: 13.2 % (ref 11.6–15.4)
EST. AVERAGE GLUCOSE BLD GHB EST-MCNC: 120 MG/DL
GLOBULIN SER-MCNC: 2.1 G/DL (ref 1.5–4.5)
GLUCOSE SERPL-MCNC: 88 MG/DL (ref 70–99)
HBA1C MFR BLD: 5.8 % (ref 4.8–5.6)
HCT VFR BLD AUTO: 38 % (ref 37.5–51)
HDLC SERPL-MCNC: 51 MG/DL
HGB BLD-MCNC: 13 G/DL (ref 13–17.7)
LDLC SERPL CALC-MCNC: 74 MG/DL (ref 0–99)
LDLC/HDLC SERPL: 1.5 RATIO (ref 0–3.6)
LIPASE SERPL-CCNC: 48 U/L (ref 13–78)
MAGNESIUM SERPL-MCNC: 2.3 MG/DL (ref 1.6–2.3)
MCH RBC QN AUTO: 33.2 PG (ref 26.6–33)
MCHC RBC AUTO-ENTMCNC: 34.2 G/DL (ref 31.5–35.7)
MCV RBC AUTO: 97 FL (ref 79–97)
MICRODELETION SYND BLD/T FISH: NORMAL
MICRODELETION SYND BLD/T FISH: NORMAL
PLATELET # BLD AUTO: 171 X10E3/UL (ref 150–450)
POTASSIUM SERPL-SCNC: 5.5 MMOL/L (ref 3.5–5.2)
PROT SERPL-MCNC: 6.4 G/DL (ref 6–8.5)
PSA SERPL-MCNC: 1.8 NG/ML (ref 0–4)
RBC # BLD AUTO: 3.91 X10E6/UL (ref 4.14–5.8)
SL AMB VLDL CHOLESTEROL CALC: 38 MG/DL (ref 5–40)
SODIUM SERPL-SCNC: 139 MMOL/L (ref 134–144)
TRIGL SERPL-MCNC: 236 MG/DL (ref 0–149)
TSH SERPL DL<=0.005 MIU/L-ACNC: 1.19 UIU/ML (ref 0.45–4.5)
WBC # BLD AUTO: 6.7 X10E3/UL (ref 3.4–10.8)

## 2023-09-29 ENCOUNTER — TELEPHONE (OUTPATIENT)
Dept: NEPHROLOGY | Facility: CLINIC | Age: 53
End: 2023-09-29

## 2023-09-29 DIAGNOSIS — E87.5 HYPERKALEMIA: Primary | ICD-10-CM

## 2023-09-29 NOTE — TELEPHONE ENCOUNTER
Lab order mailed to patient for repeat BMP in 1-2 weeks per Dr. Andrew July.    ----- Message from Soraya Donahue MD sent at 9/29/2023  9:22 AM EDT -----  Left a voicemail. Wanted to repeat another BMP in 1 to 2 weeks please.   Thank you  ----- Message -----  From: [facility] Lab Results In  Sent: 9/27/2023  11:05 AM EDT  To: Soraya Donahue MD

## 2023-09-29 NOTE — TELEPHONE ENCOUNTER
Nephrology    Called and left a voicemail. Renal function overall appears to have stabilized in the high fours. Potassium 5.5 recommended a low potassium diet. I will recommend a repeat BMP in 1 to 2 weeks. Encouraged to continue oral sodium bicarb and as bicarb level has dropped.

## 2023-10-04 ENCOUNTER — TELEPHONE (OUTPATIENT)
Dept: NEPHROLOGY | Facility: CLINIC | Age: 53
End: 2023-10-04

## 2023-10-04 NOTE — TELEPHONE ENCOUNTER
Pt called to let us know last night pt went to bed around 10 pm and woke up around 1 am-3 am because he had the urge to vomit. He had a pot of coffee and a cigarette to help him have a bowel movement. He fell back to sleep and woke up at 10 am and had more coffee and another bowel movement which he said was bloody. He states it was "pretty bloody" in the toilet. He's had hemorrhoids before and thinks it could be that but just wanted Dr Elisha Dhillon to know what's going on. he's requesting a call back on his cell.

## 2023-10-05 NOTE — PROGRESS NOTES
Nephrology    Patient called late after hours regarding bowel movements with blood. Patient does have a history of hemorrhoids. Called him this morning and strongly recommended he go to the emergency room. He did not  the phone I have to leave a voicemail. He will likely require a GI evaluation. I strongly recommended he go to the emergency room for further evaluation any stat hemoglobin and hematocrit.

## 2023-10-05 NOTE — TELEPHONE ENCOUNTER
Patient called back, he does not want to go to the hospital.  He is going to continue with his coffee and smoking and try to ride it out. He said that if it doesn't get better in the next couple of days then he will go. I did advise that we are worried about hemoglobin/hematocrit. He expressed understanding and will go to the hospital if it does not get better.

## 2023-10-05 NOTE — TELEPHONE ENCOUNTER
Pt called back. Had another bowel movement and is free of blood. States he has history of hemorrhoids. Does not want to see GI. Advised pt he should check in with PCP.

## 2023-10-11 ENCOUNTER — OFFICE VISIT (OUTPATIENT)
Dept: FAMILY MEDICINE CLINIC | Facility: CLINIC | Age: 53
End: 2023-10-11
Payer: COMMERCIAL

## 2023-10-11 ENCOUNTER — TELEPHONE (OUTPATIENT)
Age: 53
End: 2023-10-11

## 2023-10-11 VITALS
HEIGHT: 68 IN | WEIGHT: 200.4 LBS | HEART RATE: 66 BPM | DIASTOLIC BLOOD PRESSURE: 80 MMHG | TEMPERATURE: 98.4 F | BODY MASS INDEX: 30.37 KG/M2 | RESPIRATION RATE: 16 BRPM | SYSTOLIC BLOOD PRESSURE: 134 MMHG

## 2023-10-11 DIAGNOSIS — E78.2 MIXED HYPERLIPIDEMIA: ICD-10-CM

## 2023-10-11 DIAGNOSIS — F20.9 SCHIZOPHRENIA, UNSPECIFIED TYPE (HCC): ICD-10-CM

## 2023-10-11 DIAGNOSIS — K21.00 GASTROESOPHAGEAL REFLUX DISEASE WITH ESOPHAGITIS WITHOUT HEMORRHAGE: ICD-10-CM

## 2023-10-11 DIAGNOSIS — Z23 ENCOUNTER FOR IMMUNIZATION: ICD-10-CM

## 2023-10-11 DIAGNOSIS — I10 ESSENTIAL HYPERTENSION: Primary | ICD-10-CM

## 2023-10-11 DIAGNOSIS — N18.4 CKD (CHRONIC KIDNEY DISEASE) STAGE 4, GFR 15-29 ML/MIN (HCC): ICD-10-CM

## 2023-10-11 DIAGNOSIS — K64.9 HEMORRHOIDS, UNSPECIFIED HEMORRHOID TYPE: ICD-10-CM

## 2023-10-11 DIAGNOSIS — K64.9 HEMORRHOIDS, UNSPECIFIED HEMORRHOID TYPE: Primary | ICD-10-CM

## 2023-10-11 PROCEDURE — 90471 IMMUNIZATION ADMIN: CPT | Performed by: FAMILY MEDICINE

## 2023-10-11 PROCEDURE — 99214 OFFICE O/P EST MOD 30 MIN: CPT | Performed by: FAMILY MEDICINE

## 2023-10-11 PROCEDURE — 90686 IIV4 VACC NO PRSV 0.5 ML IM: CPT | Performed by: FAMILY MEDICINE

## 2023-10-11 RX ORDER — HYDROCORTISONE ACETATE 25 MG/1
25 SUPPOSITORY RECTAL 2 TIMES DAILY
Qty: 12 SUPPOSITORY | Refills: 0 | Status: SHIPPED | OUTPATIENT
Start: 2023-10-11 | End: 2023-10-11 | Stop reason: ALTCHOICE

## 2023-10-11 RX ORDER — PANTOPRAZOLE SODIUM 40 MG/1
40 TABLET, DELAYED RELEASE ORAL DAILY
Qty: 90 TABLET | Refills: 1 | Status: SHIPPED | OUTPATIENT
Start: 2023-10-11

## 2023-10-11 NOTE — TELEPHONE ENCOUNTER
The patient is calling to check the statues of this request. Please follow up     Contact the patient when the order has been change

## 2023-10-11 NOTE — PATIENT INSTRUCTIONS
Hemorrhoids   AMBULATORY CARE:   Hemorrhoids  are swollen blood vessels inside your rectum (internal hemorrhoids) or on your anus (external hemorrhoids). Sometimes a hemorrhoid may prolapse. This means it extends out of your anus. Common symptoms include the following:   Pain or itching around your anus or inside your rectum    Swelling or bumps around your anus    Bright red blood in your bowel movement, on the toilet paper, or in the toilet bowl    Tissue bulging out of your anus (prolapsed hemorrhoids)    Incontinence (poor control over urine or bowel movements)    Seek care immediately if:   You have severe pain in your rectum or around your anus. You have severe pain in your abdomen and you are vomiting. You have bleeding from your anus that soaks through your underwear. Contact your healthcare provider if:   You have frequent and painful bowel movements. Your hemorrhoid looks or feels more swollen than usual.     You do not have a bowel movement for 2 days or more. You see or feel tissue coming through your anus. You have questions or concerns about your condition or care. Treatment for hemorrhoids  may include medicines to decrease pain, swelling, and itching. The medicine may be a pill, pad, cream, or ointment. Medicine may also be given to soften your bowel movement. Surgery and other procedures may be needed to shrink or remove your hemorrhoids. Manage your symptoms:   Apply ice on your anus for 15 to 20 minutes every hour or as directed. Use an ice pack, or put crushed ice in a plastic bag. Cover it with a towel before you apply it to your anus. Ice helps prevent tissue damage and decreases swelling and pain. Take a sitz bath. Fill a bathtub with 4 to 6 inches of warm water. You may also use a sitz bath pan that fits inside a toilet bowl. Sit in the sitz bath for 15 minutes. Do this 3 times a day, and after each bowel movement.  The warm water can help decrease pain and swelling. Keep your anal area clean. Gently wash the area with warm water daily. Soap may irritate the area. After a bowel movement, wipe with moist towelettes or wet toilet paper. Dry toilet paper can irritate the area. Prevent hemorrhoids:   Do not strain to have a bowel movement. Do not sit on the toilet too long. These actions can increase pressure on the tissues in your rectum and anus. Drink plenty of liquids. Liquids can help prevent constipation. Ask how much liquid to drink each day and which liquids are best for you. Eat a variety of high-fiber foods. Examples include fruits, vegetables, and whole grains. Ask your healthcare provider how much fiber you need each day. You may need to take a fiber supplement. Exercise as directed. Exercise, such as walking, may make it easier to have a bowel movement. Ask your healthcare provider to help you create an exercise plan. Do not have anal sex. Anal sex can weaken the skin around your rectum and anus. Avoid heavy lifting. This can cause straining and increase your risk for another hemorrhoid. Follow up with your doctor as directed:  Write down your questions so you remember to ask them during your visits. © Copyright [employer] 2023 Information is for End User's use only and may not be sold, redistributed or otherwise used for commercial purposes. The above information is an  only. It is not intended as medical advice for individual conditions or treatments. Talk to your doctor, nurse or pharmacist before following any medical regimen to see if it is safe and effective for you. Constipation   AMBULATORY CARE:   Constipation  means you have hard, dry bowel movements, or you go longer than usual between bowel movements. Constipation may be caused by a lack of water or high-fiber foods. Certain medicines, or a lack of fiber or physical activity may also cause constipation.   Common signs and symptoms:   Trouble pushing out your bowel movement    Pain or bleeding during your bowel movement    A feeling that you did not finish having your bowel movement    Bloating    Call your doctor if:   You have blood in your bowel movements. You have a fever and abdominal pain with the constipation. Your constipation gets worse. You start to vomit. You have questions or concerns about your condition or care. Relieve constipation:  Medicine can keep you have a bowel movement more easily. Medicines may increase moisture in your bowel movement or increase the motion of your intestines. A suppository  may be used to help soften your bowel movements. This may make them easier to pass. A suppository is guided into your rectum through your anus. Laxatives  can help stimulate your bowels to have a bowel movement. Your provider may recommend you only use laxatives for a short time. Long-term use can damage your bowel function over time. An enema  is liquid medicine used to clear bowel movement from your rectum. The medicine is put into your rectum through your anus. Prevent constipation:   Drink liquids as directed. You may need to drink extra liquids to help soften and move your bowels. Ask how much liquid to drink each day and which liquids are best for you. Eat high-fiber foods. This may help decrease constipation by adding bulk to your bowel movements. High-fiber foods include fruit, vegetables, whole-grain breads and cereals, and beans. Your healthcare provider or dietitian can help you create a high-fiber meal plan. Your provider may also recommend a fiber supplement if you cannot get enough fiber from food. Exercise regularly. Regular physical activity can help stimulate your intestines. Walking is a good exercise to prevent or relieve constipation. Ask which exercises are best for you. Schedule a time each day to have a bowel movement.   This may help train your body to have regular bowel movements. Bend forward while you are on the toilet to help move the bowel movement out. Sit on the toilet for at least 10 minutes, even if you do not have a bowel movement. Talk to your healthcare provider about your medicines. Certain medicines, such as opioids, can cause constipation. Your provider may be able to make medicine changes. For example, he or she may change the kind of medicine, or change when you take it. Follow up with your doctor as directed:  Write down your questions so you remember to ask them during your visits. © Copyright [facility] 2023 Information is for End User's use only and may not be sold, redistributed or otherwise used for commercial purposes. The above information is an  only. It is not intended as medical advice for individual conditions or treatments. Talk to your doctor, nurse or pharmacist before following any medical regimen to see if it is safe and effective for you.

## 2023-10-11 NOTE — TELEPHONE ENCOUNTER
Pt called in stating the hydrocortisone suppository is not covered by his insurance, but Preparation H suppositories are covered.

## 2023-10-11 NOTE — PROGRESS NOTES
Assessment/Plan:    1. Essential hypertension    2. Mixed hyperlipidemia    3. CKD (chronic kidney disease) stage 4, GFR 15-29 ml/min ([facility])    4. Encounter for immunization  -     influenza vaccine, quadrivalent, 0.5 mL, preservative-free, for adult and pediatric patients 6 mos+ (AFLURIA, FLUARIX, FLULAVAL, FLUZONE)    5. Schizophrenia, unspecified type ([address])  Comments:  cont care thru [facility]  Dr. Carmen Summers and Melissa Woods    6. Hemorrhoids, unspecified hemorrhoid type  Comments:  inc fiber, fruit-pears/plums/prunes  avoid straingin-otc stool softener if needed  sitz baths, prep-h/witch hazel wipes    7. Gastroesophageal reflux disease with esophagitis without hemorrhage  -     pantoprazole (PROTONIX) 40 mg tablet; Take 1 tablet (40 mg total) by mouth daily    8. BMI 30.0-30.9,adult          Medications reconciled  BP in range  Nephrology input appreciated  Cont psychiatric care w Dr. Carmen Summers and therapist Anish Plaza low chol diet  Smoking cessation encouraged    Subjective:      Patient ID: Tiago Cabrales is a 47 y.o. male. Chief Complaint   Patient presents with   • Follow-up     Results       HPI    Interval hx reviewed  Overall feels well  BP in range  Trying to cut down on smoking  Follow-up on labs  Kev8x=9.8%, TG above range  Req flu vacc    The following portions of the patient's history were reviewed and updated as appropriate: allergies, current medications, past family history, past medical history, past social history, past surgical history and problem list.    Review of Systems   Constitutional:  Positive for fatigue. Negative for fever. Eyes:         Wears glasses   Respiratory: Negative. Cardiovascular: Negative. Gastrointestinal: Negative. Musculoskeletal:  Positive for arthralgias. Skin:  Negative for rash. Allergic/Immunologic: Positive for environmental allergies. Neurological:  Negative for seizures and syncope.    Psychiatric/Behavioral:  Positive for decreased concentration and sleep disturbance. The patient is nervous/anxious. Current Outpatient Medications   Medication Sig Dispense Refill   • amLODIPine (NORVASC) 5 mg tablet TAKE 1 TABLET BY MOUTH EVERY DAY 90 tablet 1   • ergocalciferol (VITAMIN D2) 50,000 units TAKE 1 CAPSULE BY MOUTH ONE TIME PER WEEK 12 capsule 1   • ezetimibe (ZETIA) 10 mg tablet TAKE 1 TABLET BY MOUTH EVERY DAY 30 tablet 5   • Garlic (GARLIQUE PO) Take by mouth     • paliperidone (INVEGA) 6 MG 24 hr tablet Take 6 mg by mouth every morning     • pantoprazole (PROTONIX) 40 mg tablet Take 1 tablet (40 mg total) by mouth daily 90 tablet 1   • rosuvastatin (CRESTOR) 10 MG tablet Take 1 tablet (10 mg total) by mouth daily 90 tablet 1   • shark liver oil-cocoa butter (PREPARATION H) 0.25-3-85.5 % suppository Insert 1 suppository into the rectum 2 (two) times a day as needed for hemorrhoids 12 suppository 0   • sodium bicarbonate 650 mg tablet Take 2 tablets (1,300 mg total) by mouth 3 (three) times a day 540 tablet 3   • metoprolol succinate (TOPROL-XL) 50 mg 24 hr tablet TAKE 1 TABLET BY MOUTH EVERY DAY 30 tablet 5     No current facility-administered medications for this visit. Objective:    /80   Pulse 66   Temp 98.4 °F (36.9 °C)   Resp 16   Ht 5' 8" (1.727 m)   Wt 90.9 kg (200 lb 6.4 oz)   BMI 30.47 kg/m²        Physical Exam  Vitals and nursing note reviewed. Constitutional:       General: He is not in acute distress. Appearance: He is well-developed. HENT:      Mouth/Throat:      Pharynx: No oropharyngeal exudate. Eyes:      General: No scleral icterus. Conjunctiva/sclera: Conjunctivae normal.   Cardiovascular:      Rate and Rhythm: Normal rate and regular rhythm. Pulses: Normal pulses. Pulmonary:      Effort: Pulmonary effort is normal. No respiratory distress. Breath sounds: Normal breath sounds. Abdominal:      General: Bowel sounds are normal.      Palpations: Abdomen is soft. Tenderness: There is no abdominal tenderness. There is no right CVA tenderness or left CVA tenderness. Musculoskeletal:         General: No tenderness. Normal range of motion. Cervical back: Neck supple. Skin:     General: Skin is warm and dry. Capillary Refill: Capillary refill takes less than 2 seconds. Coloration: Skin is not jaundiced. Neurological:      Mental Status: He is alert and oriented to person, place, and time. Cranial Nerves: No cranial nerve deficit.       Comments: No acute focal deficit   Psychiatric:      Comments: Mood stable, pleasant, cooperative                Morena Del Toro MD

## 2023-10-16 ENCOUNTER — TELEPHONE (OUTPATIENT)
Dept: NEPHROLOGY | Facility: CLINIC | Age: 53
End: 2023-10-16

## 2023-10-16 NOTE — TELEPHONE ENCOUNTER
Patient called office stating he went for blood work today and was not fasting, but he told the phlebotomist he was. Patient wanted to make Dr. Isaac Dawson aware in the event results come back odd.

## 2023-10-17 LAB
BUN SERPL-MCNC: 37 MG/DL (ref 6–24)
BUN/CREAT SERPL: 7 (ref 9–20)
CALCIUM SERPL-MCNC: 9.5 MG/DL (ref 8.7–10.2)
CHLORIDE SERPL-SCNC: 106 MMOL/L (ref 96–106)
CO2 SERPL-SCNC: 19 MMOL/L (ref 20–29)
CREAT SERPL-MCNC: 5.37 MG/DL (ref 0.76–1.27)
EGFR: 12 ML/MIN/1.73
GLUCOSE SERPL-MCNC: 106 MG/DL (ref 70–99)
POTASSIUM SERPL-SCNC: 4.8 MMOL/L (ref 3.5–5.2)
SODIUM SERPL-SCNC: 140 MMOL/L (ref 134–144)

## 2023-10-19 DIAGNOSIS — N18.4 CHRONIC KIDNEY DISEASE (CKD) STAGE G4/A3, SEVERELY DECREASED GLOMERULAR FILTRATION RATE (GFR) BETWEEN 15-29 ML/MIN/1.73 SQUARE METER AND ALBUMINURIA CREATININE RATIO GREATER THAN 300 MG/G (HCC): Primary | ICD-10-CM

## 2023-10-23 ENCOUNTER — TELEPHONE (OUTPATIENT)
Age: 53
End: 2023-10-23

## 2023-10-23 NOTE — TELEPHONE ENCOUNTER
Patient called because he got the flu shot a few weeks ago but he wants to know if  recommends him to get the Covid shot also. Would like a call back.

## 2023-10-25 NOTE — TELEPHONE ENCOUNTER
Pt +Smoker, BMI=30--please advise recommend he receive both COVID shot and pneumonia (prev 20) vaccines.

## 2023-10-31 LAB
BUN SERPL-MCNC: 43 MG/DL (ref 6–24)
BUN/CREAT SERPL: 9 (ref 9–20)
CALCIUM SERPL-MCNC: 9.7 MG/DL (ref 8.7–10.2)
CHLORIDE SERPL-SCNC: 101 MMOL/L (ref 96–106)
CO2 SERPL-SCNC: 21 MMOL/L (ref 20–29)
CREAT SERPL-MCNC: 5.01 MG/DL (ref 0.76–1.27)
EGFR: 13 ML/MIN/1.73
GLUCOSE SERPL-MCNC: 123 MG/DL (ref 70–99)
POTASSIUM SERPL-SCNC: 4.5 MMOL/L (ref 3.5–5.2)
SODIUM SERPL-SCNC: 139 MMOL/L (ref 134–144)

## 2023-11-05 DIAGNOSIS — E55.9 VITAMIN D DEFICIENCY: ICD-10-CM

## 2023-11-06 ENCOUNTER — OFFICE VISIT (OUTPATIENT)
Dept: NEPHROLOGY | Facility: CLINIC | Age: 53
End: 2023-11-06
Payer: COMMERCIAL

## 2023-11-06 VITALS
DIASTOLIC BLOOD PRESSURE: 80 MMHG | HEART RATE: 70 BPM | OXYGEN SATURATION: 96 % | BODY MASS INDEX: 30.31 KG/M2 | WEIGHT: 200 LBS | SYSTOLIC BLOOD PRESSURE: 130 MMHG | HEIGHT: 68 IN

## 2023-11-06 DIAGNOSIS — R31.21 ASYMPTOMATIC MICROSCOPIC HEMATURIA: ICD-10-CM

## 2023-11-06 DIAGNOSIS — R80.1 PERSISTENT PROTEINURIA: ICD-10-CM

## 2023-11-06 DIAGNOSIS — R80.9 NEPHROTIC RANGE PROTEINURIA: ICD-10-CM

## 2023-11-06 DIAGNOSIS — F17.200 TOBACCO DEPENDENCE: ICD-10-CM

## 2023-11-06 DIAGNOSIS — N18.4 BENIGN HYPERTENSION WITH CHRONIC KIDNEY DISEASE, STAGE IV (HCC): Primary | ICD-10-CM

## 2023-11-06 DIAGNOSIS — E55.9 VITAMIN D DEFICIENCY: ICD-10-CM

## 2023-11-06 DIAGNOSIS — N18.4 CKD (CHRONIC KIDNEY DISEASE) STAGE 4, GFR 15-29 ML/MIN (HCC): ICD-10-CM

## 2023-11-06 DIAGNOSIS — I12.9 BENIGN HYPERTENSION WITH CHRONIC KIDNEY DISEASE, STAGE IV (HCC): Primary | ICD-10-CM

## 2023-11-06 DIAGNOSIS — E87.8 LOW BICARBONATE LEVEL: ICD-10-CM

## 2023-11-06 PROCEDURE — 99214 OFFICE O/P EST MOD 30 MIN: CPT | Performed by: INTERNAL MEDICINE

## 2023-11-06 PROCEDURE — 99406 BEHAV CHNG SMOKING 3-10 MIN: CPT | Performed by: INTERNAL MEDICINE

## 2023-11-06 RX ORDER — ERGOCALCIFEROL 1.25 MG/1
CAPSULE ORAL
Qty: 12 CAPSULE | Refills: 1 | Status: SHIPPED | OUTPATIENT
Start: 2023-11-06

## 2023-11-06 NOTE — PATIENT INSTRUCTIONS
1. )  Low 2 g sodium diet    2.)  Monitor weights at home    3.)  Avoid NSAIDs (ibuprofen, Motrin, Advil, Aleve, naproxen)    4.)  Monitor blood pressure at home, call if blood pressure greater than 150/90 persistently    5.) I will plan to discuss all results including blood work, and/or imaging at our next visit, unless there is an urgent indication, in which case I will call you earlier. If you have any questions or concerns about your results, please feel free to call our office.     6.) Cut back on smoking.    7.) Recommend HD access placement    8.) Repeat blood work in 6-8 week, see me in [place]

## 2023-11-06 NOTE — PROGRESS NOTES
NEPHROLOGY OFFICE VISIT   Kimani Davison 47 y.o. male MRN: [id number]  11/6/2023    Reason for Visit: Advanced chronic kidney disease    History of Present Illness (HPI):    I had the pleasure of seeing Lottie Verduzco today in the renal clinic for the continued management of chronic kidney disease. Since our last visit, there has been no ER visits or hospitilizations. He currently has no complaints at this time and is feeling well. Patient denies any chest pain, shortness of breath and swelling. The last blood work was done on October 30 which included a CBC, CMP and a urine protein creatinine ratio, which we have reviewed together. Creatinine currently stable at 4.9 mg/dL. Electrolytes are stable. No uremic symptoms reported. Still has worsening nephrotic range proteinuria. Unfortunately off angiotensin receptor blocker due to worsening renal function over the last few months. Discussed renal placement therapy. Preferred modality is hemodialysis. I did recommend having access placement but he would like to hold off at this time. He would like to go to the [address] unit. ASSESSMENT and PLAN:    Chronic kidney disease stage IV/V (N5A3)  --Repeat renal ultrasound. 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 Consistent to a secondary FSGS versus genetic FSGS  -- Nephrotic range proteinuria greater than 6 g/g  --Off ARB/ACE inhibitor due to worsening renal function  -- Preferred modality is in center hemodialysis. Declined HD access placement at this time. -- Creatinine currently stable at 4.9 mg/dL with a GFR of 13 mL/min. -- Left limb alert. We will continue to discuss access placement with the patient. Smoker/nicotine dependence  -- 1 pack/day smoker. -- Spent 3 to 5 minutes discussing smoking cessation.     Obesity  -- BMI 30.4  --Recommend decreasing portion sizes, encouraging healthy choices of fruits and vegetables, consuming healthier snacks and moderation in carbohydrate intake. Exercise recommendations; 3-5 times a week, the American Heart Association recommends 150 minutes a week moderate exercise  --Strongly recommend evaluation by nutritionist  --Overweight and obesity have been associated with increased mortality and morbidity. Associated with a reduction in life expectancy, associated with increased all cause and cardiovascular mortality  --Metabolic risks, including increased risk of diabetes type 2, insulin resistance with hyperinsulinemia (weight loss of 5 to 7% associated with a decreased risk of type 2 diabetes among individuals with prediabetes and weight loss of 15% can lead to dramatic improvement of diabetes in nearly half of people). Dyslipidemia, hypertension and heart disease are all increased in patients with obesity. Along with increased risk of stroke increased risk of multiple cancer types. Can also be associated with increased renal dysfunction, strongest risk factor for new onset chronic kidney disease. There is also a degree of compensatory hyperfiltration to meet high in the metabolic demands of increased body weight. This can also result in increased increased risk for nephrolithiasis.     Hypertension  -- Blood pressure at target, target blood pressure less than 130/80  -- Not on ARB or ACE inhibitor due to worsening renal function  -- Continue amlodipine 5 mg daily, metoprolol XL 50 mg daily    Mineral bone disorder-chronic kidney disease  -- Calcium level is normal.  -- Check phosphorus, PTH and vitamin D  -- Ergocalciferol 50,000 units weekly    Nephrotic syndrome  -- Secondary to biopsy-proven FSGS    Low bicarbonate level  -- Continue oral sodium bicarbonate    PATIENT INSTRUCTIONS:    Patient Instructions   1.)  Low 2 g sodium diet    2.)  Monitor weights at home    3.)  Avoid NSAIDs (ibuprofen, Motrin, Advil, Aleve, naproxen)    4.)  Monitor blood pressure at home, call if blood pressure greater than 150/90 persistently    5.) I will plan to discuss all results including blood work, and/or imaging at our next visit, unless there is an urgent indication, in which case I will call you earlier. If you have any questions or concerns about your results, please feel free to call our office. 6.) Cut back on smoking.    7.) Recommend HD access placement    8.) Repeat blood work in 6-8 week, see me in [place]          Orders Placed This Encounter   Procedures    Comprehensive metabolic panel     This is a patient instruction: Patient fasting for 8 hours or longer recommended. Standing Status:   Future     Number of Occurrences:   1     Standing Expiration Date:   11/6/2024    Cystatin C With eGFR     Standing Status:   Future     Number of Occurrences:   1     Standing Expiration Date:   11/6/2024    PTH, intact     Standing Status:   Future     Number of Occurrences:   1     Standing Expiration Date:   11/6/2024    Phosphorus     Standing Status:   Future     Number of Occurrences:   1     Standing Expiration Date:   11/6/2024    Vitamin D 25 hydroxy     Standing Status:   Future     Number of Occurrences:   1     Standing Expiration Date:   11/6/2024    CBC     Standing Status:   Future     Number of Occurrences:   1     Standing Expiration Date:   11/6/2024    Protein / creatinine ratio, urine       OBJECTIVE:  Current Weight: Weight - Scale: 90.7 kg (200 lb)  Vitals:    11/06/23 1354 11/06/23 1409   BP: 148/92 130/80   BP Location: Left arm    Patient Position: Sitting    Cuff Size: Standard    Pulse: 70    SpO2: 96%    Weight: 90.7 kg (200 lb)    Height: 5' 8" (1.727 m)     Body mass index is 30.41 kg/m². REVIEW OF SYSTEMS:    Review of Systems   Constitutional:  Negative for activity change and fever. Respiratory:  Negative for cough, chest tightness, shortness of breath and wheezing. Cardiovascular:  Negative for chest pain and leg swelling. Gastrointestinal:  Negative for abdominal pain, diarrhea, nausea and vomiting. Endocrine: Negative for polyuria. Genitourinary:  Negative for difficulty urinating, dysuria, flank pain, frequency and urgency. Skin:  Negative for rash. Neurological:  Negative for dizziness, syncope, light-headedness and headaches. PHYSICAL EXAM:      Physical Exam  Vitals and nursing note reviewed. Constitutional:       General: He is not in acute distress. Appearance: He is well-developed. HENT:      Head: Normocephalic and atraumatic. Eyes:      General: No scleral icterus. Conjunctiva/sclera: Conjunctivae normal.      Pupils: Pupils are equal, round, and reactive to light. Cardiovascular:      Rate and Rhythm: Normal rate and regular rhythm. Heart sounds: S1 normal and S2 normal. No murmur heard. No friction rub. No gallop. Pulmonary:      Effort: Pulmonary effort is normal. No respiratory distress. Breath sounds: Normal breath sounds. No wheezing or rales. Abdominal:      General: Bowel sounds are normal.      Palpations: Abdomen is soft. Tenderness: There is no abdominal tenderness. There is no rebound. Musculoskeletal:         General: Normal range of motion. Cervical back: Normal range of motion and neck supple. Skin:     Findings: No rash. Neurological:      Mental Status: He is alert and oriented to person, place, and time.    Psychiatric:         Behavior: Behavior normal.         Medications:    Current Outpatient Medications:     amLODIPine (NORVASC) 5 mg tablet, TAKE 1 TABLET BY MOUTH EVERY DAY, Disp: 90 tablet, Rfl: 1    ergocalciferol (VITAMIN D2) 50,000 units, TAKE 1 CAPSULE BY MOUTH ONE TIME PER WEEK, Disp: 12 capsule, Rfl: 1    ezetimibe (ZETIA) 10 mg tablet, TAKE 1 TABLET BY MOUTH EVERY DAY, Disp: 30 tablet, Rfl: 5    metoprolol succinate (TOPROL-XL) 50 mg 24 hr tablet, TAKE 1 TABLET BY MOUTH EVERY DAY, Disp: 30 tablet, Rfl: 5    paliperidone (INVEGA) 6 MG 24 hr tablet, Take 6 mg by mouth every morning, Disp: , Rfl:     rosuvastatin (CRESTOR) 10 MG tablet, Take 1 tablet (10 mg total) by mouth daily, Disp: 90 tablet, Rfl: 1    sodium bicarbonate 650 mg tablet, Take 2 tablets (1,300 mg total) by mouth 3 (three) times a day, Disp: 540 tablet, Rfl: 3    Garlic (GARLIQUE PO), Take by mouth (Patient not taking: Reported on 11/6/2023), Disp: , Rfl:     pantoprazole (PROTONIX) 40 mg tablet, Take 1 tablet (40 mg total) by mouth daily (Patient not taking: Reported on 11/6/2023), Disp: 90 tablet, Rfl: 1    shark liver oil-cocoa butter (PREPARATION H) 0.25-3-85.5 % suppository, Insert 1 suppository into the rectum 2 (two) times a day as needed for hemorrhoids, Disp: 12 suppository, Rfl: 0    Laboratory Results:        Invalid input(s): "ALBUMIN"    Results for orders placed or performed in visit on 88/66/70   Basic metabolic panel   Result Value Ref Range    Glucose, Random 123 (H) 70 - 99 mg/dL    BUN 43 (H) 6 - 24 mg/dL    Creatinine 5.01 (H) 0.76 - 1.27 mg/dL    eGFR 13 (L) >59 mL/min/1.73    SL AMB BUN/CREATININE RATIO 9 9 - 20    Sodium 139 134 - 144 mmol/L    Potassium 4.5 3.5 - 5.2 mmol/L    Chloride 101 96 - 106 mmol/L    CO2 21 20 - 29 mmol/L    CALCIUM 9.7 8.7 - 10.2 mg/dL

## 2023-11-10 ENCOUNTER — CLINICAL SUPPORT (OUTPATIENT)
Dept: FAMILY MEDICINE CLINIC | Facility: CLINIC | Age: 53
End: 2023-11-10
Payer: COMMERCIAL

## 2023-11-10 DIAGNOSIS — Z23 ENCOUNTER FOR IMMUNIZATION: Primary | ICD-10-CM

## 2023-11-10 PROCEDURE — 90677 PCV20 VACCINE IM: CPT

## 2023-11-10 PROCEDURE — 90471 IMMUNIZATION ADMIN: CPT

## 2024-01-06 LAB
25(OH)D3+25(OH)D2 SERPL-MCNC: 37.2 NG/ML (ref 30–100)
ALBUMIN SERPL-MCNC: 4.1 G/DL (ref 3.8–4.9)
ALBUMIN/GLOB SERPL: 1.9 {RATIO} (ref 1.2–2.2)
ALP SERPL-CCNC: 60 IU/L (ref 44–121)
ALT SERPL-CCNC: 7 IU/L (ref 0–44)
AST SERPL-CCNC: 8 IU/L (ref 0–40)
BILIRUB SERPL-MCNC: <0.2 MG/DL (ref 0–1.2)
BUN SERPL-MCNC: 51 MG/DL (ref 6–24)
BUN/CREAT SERPL: 8 (ref 9–20)
CALCIUM SERPL-MCNC: 9.5 MG/DL (ref 8.7–10.2)
CHLORIDE SERPL-SCNC: 104 MMOL/L (ref 96–106)
CO2 SERPL-SCNC: 20 MMOL/L (ref 20–29)
CREAT SERPL-MCNC: 6.14 MG/DL (ref 0.76–1.27)
CREAT UR-MCNC: 48.2 MG/DL
CYSTATIN C SERPL-MCNC: 3.69 MG/L (ref 0.67–1.14)
EGFR: 10 ML/MIN/1.73
ERYTHROCYTE [DISTWIDTH] IN BLOOD BY AUTOMATED COUNT: 12.8 % (ref 11.6–15.4)
GFR/BSA.PRED SERPLBLD CYS-BASED-ARV: 14 ML/MIN/1.73
GLOBULIN SER-MCNC: 2.2 G/DL (ref 1.5–4.5)
GLUCOSE SERPL-MCNC: 82 MG/DL (ref 70–99)
HCT VFR BLD AUTO: 34.7 % (ref 37.5–51)
HGB BLD-MCNC: 11.3 G/DL (ref 13–17.7)
MCH RBC QN AUTO: 32 PG (ref 26.6–33)
MCHC RBC AUTO-ENTMCNC: 32.6 G/DL (ref 31.5–35.7)
MCV RBC AUTO: 98 FL (ref 79–97)
PHOSPHATE SERPL-MCNC: 6.5 MG/DL (ref 2.8–4.1)
PLATELET # BLD AUTO: 161 X10E3/UL (ref 150–450)
POTASSIUM SERPL-SCNC: 4.7 MMOL/L (ref 3.5–5.2)
PROT SERPL-MCNC: 6.3 G/DL (ref 6–8.5)
PROT UR-MCNC: 281.4 MG/DL
PROT/CREAT UR: 5838 MG/G CREAT (ref 0–200)
PTH-INTACT SERPL-MCNC: 135 PG/ML (ref 15–65)
RBC # BLD AUTO: 3.53 X10E6/UL (ref 4.14–5.8)
SODIUM SERPL-SCNC: 141 MMOL/L (ref 134–144)
WBC # BLD AUTO: 6.6 X10E3/UL (ref 3.4–10.8)

## 2024-01-18 ENCOUNTER — OFFICE VISIT (OUTPATIENT)
Dept: NEPHROLOGY | Facility: CLINIC | Age: 54
End: 2024-01-18
Payer: COMMERCIAL

## 2024-01-18 VITALS
OXYGEN SATURATION: 98 % | BODY MASS INDEX: 29.7 KG/M2 | HEART RATE: 74 BPM | HEIGHT: 68 IN | SYSTOLIC BLOOD PRESSURE: 144 MMHG | DIASTOLIC BLOOD PRESSURE: 98 MMHG | WEIGHT: 196 LBS

## 2024-01-18 DIAGNOSIS — K64.9 BLEEDING HEMORRHOIDS: ICD-10-CM

## 2024-01-18 DIAGNOSIS — E87.8 LOW BICARBONATE LEVEL: ICD-10-CM

## 2024-01-18 DIAGNOSIS — D64.9 ACUTE ANEMIA: ICD-10-CM

## 2024-01-18 DIAGNOSIS — R80.9 NEPHROTIC RANGE PROTEINURIA: ICD-10-CM

## 2024-01-18 DIAGNOSIS — N18.5 CKD (CHRONIC KIDNEY DISEASE) STAGE 5, GFR LESS THAN 15 ML/MIN (HCC): ICD-10-CM

## 2024-01-18 DIAGNOSIS — N18.4 BENIGN HYPERTENSION WITH CHRONIC KIDNEY DISEASE, STAGE IV (HCC): Primary | ICD-10-CM

## 2024-01-18 DIAGNOSIS — I10 ESSENTIAL HYPERTENSION: ICD-10-CM

## 2024-01-18 DIAGNOSIS — N28.1 RENAL CYST: ICD-10-CM

## 2024-01-18 DIAGNOSIS — E55.9 VITAMIN D DEFICIENCY: ICD-10-CM

## 2024-01-18 DIAGNOSIS — N17.9 AKI (ACUTE KIDNEY INJURY) (HCC): ICD-10-CM

## 2024-01-18 DIAGNOSIS — N28.9 WORSENING RENAL FUNCTION: ICD-10-CM

## 2024-01-18 DIAGNOSIS — I12.9 BENIGN HYPERTENSION WITH CHRONIC KIDNEY DISEASE, STAGE IV (HCC): Primary | ICD-10-CM

## 2024-01-18 DIAGNOSIS — N18.4 CKD (CHRONIC KIDNEY DISEASE) STAGE 4, GFR 15-29 ML/MIN (HCC): ICD-10-CM

## 2024-01-18 PROBLEM — E87.5 HYPERKALEMIA: Status: RESOLVED | Noted: 2023-02-14 | Resolved: 2024-01-18

## 2024-01-18 PROBLEM — R79.89 ELEVATED SERUM CREATININE: Status: RESOLVED | Noted: 2023-03-21 | Resolved: 2024-01-18

## 2024-01-18 PROCEDURE — 99215 OFFICE O/P EST HI 40 MIN: CPT | Performed by: INTERNAL MEDICINE

## 2024-01-18 PROCEDURE — 99406 BEHAV CHNG SMOKING 3-10 MIN: CPT | Performed by: INTERNAL MEDICINE

## 2024-01-18 NOTE — PATIENT INSTRUCTIONS
1.)  Low 2 g sodium diet    2.)  Monitor weights at home    3.)  Avoid NSAIDs (ibuprofen, Motrin, Advil, Aleve, naproxen)    4.)  Monitor blood pressure at home, call if blood pressure greater than 150/90 persistently    5.) I will plan to discuss all results including blood work, and/or imaging at our next visit, unless there is an urgent indication, in which case I will call you earlier. If you have any questions or concerns about your results, please feel free to call our office.    6.) Repeat blood work in the next 1-2 weeks    7.) See Vascular Surery and GI    8.) If get chest pain or SOB, dizziness, light headed, faint feeling, more blood in stool go to ER

## 2024-01-18 NOTE — PROGRESS NOTES
NEPHROLOGY OFFICE VISIT   Jordy Tidwell 54 y.o. male MRN: [id number]  1/18/2024    Reason for Visit: Worsening renal function with advanced chronic kidney disease    History of Present Illness (HPI):    I had the pleasure of seeing Jordy today in the renal clinic for the continued management of worsening renal function with underlying chronic kidney disease. Since our last visit, there has been no ER visits or hospitilizations. He currently has no complaints at this time and is feeling well. Patient denies any chest pain, shortness of breath and swelling. The last blood work was done on January 4, which we have reviewed together.    His creatinine is worsening to 6.1 mg/dL but thankfully his electrolytes and acid-base status are stable.  His BUN is 51.  He does have some mild nausea but no vomiting good oral intake.  No diarrhea no vomiting.  He is having stools with blood in it and does have bleeding hemorrhoids.  He reports it still present sometimes the toilet bowl is red.  I strongly advised him to go to the emergency room for full evaluation.  He declined at this time though I gave him recommendations of the symptoms worse that he should go to the emergency room yearly.  I will from the GI though he may possibly need colorectal surgery evaluation.    I discussed vascular surgery referral and vein mapping with him.  He is agreeable to this plan.    We will need to get repeat blood work and hemoglobin hematocrit to see that it is stable versus if it is acutely dropping or worsening.    ASSESSMENT and PLAN:    Acute kidney injury/worsening renal function  -- Creatinine has increased to 6.1 mg/dL after being 4.9 mg/dL at the last visit  -- No overt uremic symptoms though he is having some mild nausea  -- This could be in the setting of acute anemia and blood loss from bleeding hemorrhoids versus progression of his underlying advanced chronic kidney disease  -- His acid-base status is stable and his  electrolytes are stable  -- He does not examine volume overloaded  -- I will refer him to vascular surgery for evaluation of the fistula  -- I initially recommended him go to the hospital due to my concern of the bleeding hemorrhoids but he wants to hold off at this time  -- Check a repeat hemoglobin and hematocrit and BMP  -- Continue close renal follow-up  -- Vein mapping for access placement    Chronic kidney disease stage V (G5A3)  --Ultrasound shows bilateral echogenicity of the kidneys  --Serologies negative  -- 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  Consistent to a secondary FSGS versus genetic FSGS  -- Nephrotic range proteinuria greater than 6 g/g  --Off ARB/ACE inhibitor due to worsening renal function  -- Preferred modality is in center hemodialysis.  --Refer to vascular surgery for placement of an access  --Vein mapping for access placement.  --Left limb alert  --Declined nephrology advanced care meeting  --No urgent indication for renal placement therapy  --Worsening renal function as detailed above     Smoker/nicotine dependence  -- 1 pack/day smoker.  -- Discussed 3 minutes about smoking cessation    Anemia  --Acute, usually his hemoglobin is normal despite his advanced CKD  --This could be in the setting of bleeding hemorrhoids  --I did recommend my concerns and expressed risks that could go on if he continues to bleed profusely.  I strongly recommend he go to the emergency room but he declined.  I recommended that if his symptoms are as detailed in the instructions then he should go to the emergency room immediately  --Refer to GI though he may need possibly colorectal surgery  --Repeat hemoglobin and hematocrit     Obesity  -- BMI 29.8, is a slight improvement  --Recommend decreasing portion sizes, encouraging healthy choices of fruits and vegetables, consuming healthier snacks and moderation in carbohydrate intake. Exercise recommendations; 3-5 times a  week, the American Heart Association recommends 150 minutes a week moderate exercise  --Strongly recommend evaluation by nutritionist  --Overweight and obesity have been associated with increased mortality and morbidity.  Associated with a reduction in life expectancy, associated with increased all cause and cardiovascular mortality  --Metabolic risks, including increased risk of diabetes type 2, insulin resistance with hyperinsulinemia (weight loss of 5 to 7% associated with a decreased risk of type 2 diabetes among individuals with prediabetes and weight loss of 15% can lead to dramatic improvement of diabetes in nearly half of people).  Dyslipidemia, hypertension and heart disease are all increased in patients with obesity.  Along with increased risk of stroke increased risk of multiple cancer types.  Can also be associated with increased renal dysfunction, strongest risk factor for new onset chronic kidney disease.  There is also a degree of compensatory hyperfiltration to meet high in the metabolic demands of increased body weight.  This can also result in increased increased risk for nephrolithiasis.     Hypertension  --Blood pressure usually at target slightly above target today but I will not alter antihypertensive agents given his drop in hemoglobin at this time  -- Not on ARB or ACE inhibitor due to worsening renal function  -- Continue amlodipine 5 mg daily, metoprolol XL 50 mg daily     Mineral bone disorder-chronic kidney disease  -- Calcium level is normal  --Continue vitamin D 50,000 units weekly     Nephrotic syndrome  -- Secondary to biopsy-proven FSGS     Low bicarbonate level--resolved  --Continue oral sodium bicarbonate 1300 mg twice a day    Over 35 minutes was spent in coronation of care discussion and counseling the patient my recommendations plans orders and answering his questions.      PATIENT INSTRUCTIONS:    Patient Instructions   1.)  Low 2 g sodium diet    2.)  Monitor weights at  home    3.)  Avoid NSAIDs (ibuprofen, Motrin, Advil, Aleve, naproxen)    4.)  Monitor blood pressure at home, call if blood pressure greater than 150/90 persistently    5.) I will plan to discuss all results including blood work, and/or imaging at our next visit, unless there is an urgent indication, in which case I will call you earlier. If you have any questions or concerns about your results, please feel free to call our office.    6.) Repeat blood work in the next 1-2 weeks    7.) See Vascular Surery and GI    8.) If get chest pain or SOB, dizziness, light headed, faint feeling, more blood in stool go to ER        Orders Placed This Encounter   Procedures    Hemoglobin and hematocrit, blood     Standing Status:   Future     Number of Occurrences:   1     Standing Expiration Date:   1/18/2025    Basic metabolic panel     This is a patient instruction: Patient fasting for 8 hours or longer recommended.     Standing Status:   Future     Number of Occurrences:   1     Standing Expiration Date:   1/18/2025    Basic metabolic panel     This is a patient instruction: Patient fasting for 8 hours or longer recommended.     Standing Status:   Future     Standing Expiration Date:   1/18/2025    CBC     Standing Status:   Future     Standing Expiration Date:   1/18/2025    Ambulatory Referral to Gastroenterology     Standing Status:   Future     Standing Expiration Date:   1/18/2025     Referral Priority:   ASAP     Referral Type:   Consult - AMB     Referral Reason:   Specialty Services Required     Requested Specialty:   Gastroenterology     Number of Visits Requested:   1     Expiration Date:   1/18/2025    Ambulatory Referral to Vascular Surgery     Standing Status:   Future     Standing Expiration Date:   1/18/2025     Referral Priority:   Routine     Referral Type:   Consult - AMB     Referral Reason:   Specialty Services Required     Requested Specialty:   Vascular Surgery     Number of Visits Requested:   1      "Expiration Date:   1/18/2025       OBJECTIVE:  Current Weight: Weight - Scale: 88.9 kg (196 lb)  Vitals:    01/18/24 0926   BP: 144/98   BP Location: Left arm   Patient Position: Sitting   Cuff Size: Large   Pulse: 74   SpO2: 98%   Weight: 88.9 kg (196 lb)   Height: 5' 8\" (1.727 m)    Body mass index is 29.8 kg/m².      REVIEW OF SYSTEMS:    Review of Systems   Constitutional:  Negative for activity change and fever.   Respiratory:  Negative for cough, chest tightness, shortness of breath and wheezing.    Cardiovascular:  Negative for chest pain and leg swelling.   Gastrointestinal:  Positive for blood in stool. Negative for abdominal pain, diarrhea, nausea and vomiting.   Endocrine: Negative for polyuria.   Genitourinary:  Negative for difficulty urinating, dysuria, flank pain, frequency and urgency.   Skin:  Negative for rash.   Neurological:  Negative for dizziness, syncope, light-headedness and headaches.   All other systems reviewed and are negative.      PHYSICAL EXAM:      Physical Exam  Vitals and nursing note reviewed.   Constitutional:       General: He is not in acute distress.     Appearance: He is well-developed.   HENT:      Head: Normocephalic and atraumatic.   Eyes:      General: No scleral icterus.     Conjunctiva/sclera: Conjunctivae normal.      Pupils: Pupils are equal, round, and reactive to light.   Cardiovascular:      Rate and Rhythm: Normal rate and regular rhythm.      Heart sounds: S1 normal and S2 normal. No murmur heard.     No friction rub. No gallop.   Pulmonary:      Effort: Pulmonary effort is normal. No respiratory distress.      Breath sounds: Normal breath sounds. No wheezing or rales.   Abdominal:      General: Bowel sounds are normal.      Palpations: Abdomen is soft.      Tenderness: There is no abdominal tenderness. There is no rebound.   Musculoskeletal:         General: Normal range of motion.      Cervical back: Normal range of motion and neck supple.   Skin:     Findings: " "No rash.   Neurological:      Mental Status: He is alert and oriented to person, place, and time.   Psychiatric:         Behavior: Behavior normal.         Medications:    Current Outpatient Medications:     amLODIPine (NORVASC) 5 mg tablet, TAKE 1 TABLET BY MOUTH EVERY DAY, Disp: 90 tablet, Rfl: 1    ergocalciferol (VITAMIN D2) 50,000 units, TAKE 1 CAPSULE BY MOUTH ONE TIME PER WEEK, Disp: 12 capsule, Rfl: 1    ezetimibe (ZETIA) 10 mg tablet, TAKE 1 TABLET BY MOUTH EVERY DAY, Disp: 30 tablet, Rfl: 5    metoprolol succinate (TOPROL-XL) 50 mg 24 hr tablet, TAKE 1 TABLET BY MOUTH EVERY DAY, Disp: 30 tablet, Rfl: 5    paliperidone (INVEGA) 6 MG 24 hr tablet, Take 6 mg by mouth every morning, Disp: , Rfl:     rosuvastatin (CRESTOR) 10 MG tablet, Take 1 tablet (10 mg total) by mouth daily, Disp: 90 tablet, Rfl: 1    sodium bicarbonate 650 mg tablet, Take 2 tablets (1,300 mg total) by mouth 3 (three) times a day, Disp: 540 tablet, Rfl: 3    Garlic (GARLIQUE PO), Take by mouth (Patient not taking: Reported on 11/6/2023), Disp: , Rfl:     pantoprazole (PROTONIX) 40 mg tablet, Take 1 tablet (40 mg total) by mouth daily (Patient not taking: Reported on 11/6/2023), Disp: 90 tablet, Rfl: 1    shark liver oil-cocoa butter (PREPARATION H) 0.25-3-85.5 % suppository, Insert 1 suppository into the rectum 2 (two) times a day as needed for hemorrhoids, Disp: 12 suppository, Rfl: 0    Laboratory Results:        Invalid input(s): \"ALBUMIN\"    Results for orders placed or performed in visit on 11/06/23   Protein / creatinine ratio, urine   Result Value Ref Range    Creatinine, Urine 48.2 Not Estab. mg/dL    Total Protein, Urine 281.4 Not Estab. mg/dL    Prot/Creat Ratio, Ur 5,838 (H) 0 - 200 mg/g creat   Comprehensive metabolic panel   Result Value Ref Range    Glucose, Random 82 70 - 99 mg/dL    BUN 51 (H) 6 - 24 mg/dL    Creatinine 6.14 (H) 0.76 - 1.27 mg/dL    eGFR 10 (L) >59 mL/min/1.73    SL AMB BUN/CREATININE RATIO 8 (L) 9 - 20    " Sodium 141 134 - 144 mmol/L    Potassium 4.7 3.5 - 5.2 mmol/L    Chloride 104 96 - 106 mmol/L    CO2 20 20 - 29 mmol/L    CALCIUM 9.5 8.7 - 10.2 mg/dL    Protein, Total 6.3 6.0 - 8.5 g/dL    Albumin 4.1 3.8 - 4.9 g/dL    Globulin, Total 2.2 1.5 - 4.5 g/dL    Albumin/Globulin Ratio 1.9 1.2 - 2.2    TOTAL BILIRUBIN <0.2 0.0 - 1.2 mg/dL    Alk Phos Isoenzymes 60 44 - 121 IU/L    AST 8 0 - 40 IU/L    ALT 7 0 - 44 IU/L   Cystatin C With eGFR   Result Value Ref Range    CYSTATIN C 3.69 (H) 0.67 - 1.14 mg/L    eGFR 14 (L) >59 mL/min/1.73   PTH, intact   Result Value Ref Range    PTH, Intact 135 (H) 15 - 65 pg/mL   Phosphorus   Result Value Ref Range    Phosphorus, Serum 6.5 (H) 2.8 - 4.1 mg/dL   Vitamin D 25 hydroxy   Result Value Ref Range    25-HYDROXY VIT D 37.2 30.0 - 100.0 ng/mL   CBC   Result Value Ref Range    White Blood Cell Count 6.6 3.4 - 10.8 x10E3/uL    Red Blood Cell Count 3.53 (L) 4.14 - 5.80 x10E6/uL    Hemoglobin 11.3 (L) 13.0 - 17.7 g/dL    HCT 34.7 (L) 37.5 - 51.0 %    MCV 98 (H) 79 - 97 fL    MCH 32.0 26.6 - 33.0 pg    MCHC 32.6 31.5 - 35.7 g/dL    RDW 12.8 11.6 - 15.4 %    Platelet Count 161 150 - 450 x10E3/uL

## 2024-01-19 ENCOUNTER — TELEPHONE (OUTPATIENT)
Age: 54
End: 2024-01-19

## 2024-01-19 NOTE — TELEPHONE ENCOUNTER
Patients GI provider:  Dr. Stephens    Number to return call: [phone]    Reason for call: Pt calling to setup apt. Wanted to let doc and team know that he has vas hemo mapping for fistula on 01/30/2024.     Scheduled procedure/appointment date if applicable: Apt 02/07/2024

## 2024-01-30 ENCOUNTER — HOSPITAL ENCOUNTER (OUTPATIENT)
Dept: RADIOLOGY | Facility: HOSPITAL | Age: 54
Discharge: HOME | End: 2024-01-30
Attending: INTERNAL MEDICINE
Payer: COMMERCIAL

## 2024-01-30 DIAGNOSIS — I10 ESSENTIAL HYPERTENSION: ICD-10-CM

## 2024-01-30 DIAGNOSIS — E87.8 LOW BICARBONATE LEVEL: ICD-10-CM

## 2024-01-30 DIAGNOSIS — N17.9 AKI (ACUTE KIDNEY INJURY) (HCC): ICD-10-CM

## 2024-01-30 DIAGNOSIS — N18.4 CKD (CHRONIC KIDNEY DISEASE) STAGE 4, GFR 15-29 ML/MIN (HCC): ICD-10-CM

## 2024-01-30 DIAGNOSIS — N28.1 RENAL CYST: ICD-10-CM

## 2024-01-30 DIAGNOSIS — K64.9 BLEEDING HEMORRHOIDS: ICD-10-CM

## 2024-01-30 DIAGNOSIS — R80.9 NEPHROTIC RANGE PROTEINURIA: ICD-10-CM

## 2024-01-30 DIAGNOSIS — E55.9 VITAMIN D DEFICIENCY: ICD-10-CM

## 2024-01-30 DIAGNOSIS — N28.9 WORSENING RENAL FUNCTION: ICD-10-CM

## 2024-01-30 DIAGNOSIS — I12.9 BENIGN HYPERTENSION WITH CHRONIC KIDNEY DISEASE, STAGE IV (HCC): ICD-10-CM

## 2024-01-30 DIAGNOSIS — N18.4 BENIGN HYPERTENSION WITH CHRONIC KIDNEY DISEASE, STAGE IV (HCC): ICD-10-CM

## 2024-01-30 DIAGNOSIS — N18.5 CKD (CHRONIC KIDNEY DISEASE) STAGE 5, GFR LESS THAN 15 ML/MIN (HCC): ICD-10-CM

## 2024-01-30 PROCEDURE — 93985 DUP-SCAN HEMO COMPL BI STD: CPT

## 2024-01-30 PROCEDURE — 93985 DUP-SCAN HEMO COMPL BI STD: CPT | Performed by: SURGERY

## 2024-02-05 DIAGNOSIS — E78.2 MIXED HYPERLIPIDEMIA: ICD-10-CM

## 2024-02-05 RX ORDER — EZETIMIBE 10 MG/1
TABLET ORAL
Qty: 30 TABLET | Refills: 5 | Status: SHIPPED | OUTPATIENT
Start: 2024-02-05

## 2024-02-07 ENCOUNTER — TELEPHONE (OUTPATIENT)
Dept: NEPHROLOGY | Facility: CLINIC | Age: 54
End: 2024-02-07

## 2024-02-07 ENCOUNTER — OFFICE VISIT (OUTPATIENT)
Dept: GASTROENTEROLOGY | Facility: CLINIC | Age: 54
End: 2024-02-07
Payer: COMMERCIAL

## 2024-02-07 ENCOUNTER — TELEPHONE (OUTPATIENT)
Age: 54
End: 2024-02-07

## 2024-02-07 ENCOUNTER — TELEPHONE (OUTPATIENT)
Dept: GASTROENTEROLOGY | Facility: CLINIC | Age: 54
End: 2024-02-07

## 2024-02-07 VITALS
HEIGHT: 68 IN | DIASTOLIC BLOOD PRESSURE: 88 MMHG | WEIGHT: 193.2 LBS | HEART RATE: 79 BPM | SYSTOLIC BLOOD PRESSURE: 139 MMHG | BODY MASS INDEX: 29.28 KG/M2

## 2024-02-07 DIAGNOSIS — K62.5 RECTAL BLEEDING: Primary | ICD-10-CM

## 2024-02-07 DIAGNOSIS — K62.5 RECTAL BLEEDING: ICD-10-CM

## 2024-02-07 DIAGNOSIS — Z86.010 HISTORY OF COLON POLYPS: ICD-10-CM

## 2024-02-07 DIAGNOSIS — K64.9 BLEEDING HEMORRHOIDS: ICD-10-CM

## 2024-02-07 PROBLEM — Z86.0100 HISTORY OF COLON POLYPS: Status: ACTIVE | Noted: 2024-02-07

## 2024-02-07 PROCEDURE — 99214 OFFICE O/P EST MOD 30 MIN: CPT | Performed by: INTERNAL MEDICINE

## 2024-02-07 RX ORDER — POLYETHYLENE GLYCOL-3350 AND ELECTROLYTES 236; 6.74; 5.86; 2.97; 22.74 G/274.31G; G/274.31G; G/274.31G; G/274.31G; G/274.31G
POWDER, FOR SOLUTION ORAL
Qty: 4000 ML | Refills: 0 | Status: SHIPPED | OUTPATIENT
Start: 2024-02-07

## 2024-02-07 RX ORDER — BISACODYL 5 MG/1
10 TABLET, DELAYED RELEASE ORAL ONCE
Qty: 2 TABLET | Refills: 0 | Status: SHIPPED | OUTPATIENT
Start: 2024-02-07 | End: 2024-02-07

## 2024-02-07 RX ORDER — HYDROCORTISONE ACETATE 25 MG/1
25 SUPPOSITORY RECTAL 2 TIMES DAILY PRN
Qty: 12 SUPPOSITORY | Refills: 1 | Status: SHIPPED | OUTPATIENT
Start: 2024-02-07 | End: 2024-02-19

## 2024-02-07 RX ORDER — BISACODYL 5 MG
TABLET, DELAYED RELEASE (ENTERIC COATED) ORAL
Qty: 2 TABLET | Refills: 0 | OUTPATIENT
Start: 2024-02-07

## 2024-02-07 NOTE — ASSESSMENT & PLAN NOTE
Bleeding appears to most likely from hemorrhoids.  Rule out colorectal lesions.    -Advised to straining hard during defecation    -High-fiber diet, fiber supplements    -Schedule for colonoscopy.    -Patient was given instructions about the colonoscopy prep.    -Patient was explained about the risks and benefits of the procedure. Risks including but not limited to bleeding, infection, perforation were explained in detail. Also explained about less than 100% sensitivity with the exam and other alternatives.

## 2024-02-07 NOTE — PROGRESS NOTES
Follow-up Note -  [facility]  Jordy Tidwell 1970 male         ASSESSMENT & PLAN:    Rectal bleeding  Bleeding appears to most likely from hemorrhoids.  Rule out colorectal lesions.    -Advised to straining hard during defecation    -High-fiber diet, fiber supplements    -Schedule for colonoscopy.    -Patient was given instructions about the colonoscopy prep.    -Patient was explained about the risks and benefits of the procedure. Risks including but not limited to bleeding, infection, perforation were explained in detail. Also explained about less than 100% sensitivity with the exam and other alternatives.    History of colon polyps  Personal history of colon polyps- patient is at increased risk for colon cancer screening.  Rule out colorectal lesions including polyps or malignancy.    -Colonoscopy      Reason: Rectal bleeding    HPI:  Mr. Spence came in because of rectal bleeding for couple of weeks last month.  Denies any more bleeding in the past week.  Reports the stools are normal brown-colored and denies any straining or constipation.  Good appetite, no recent weight loss.  Reports having some discomfort in the lower abdomen that is improving.  Denies any heartburn or acid.  Has not difficulty swallowing.    He has history of chronic kidney disease and scheduled for fistula next week    History of colon polyps and the last colonoscopy was in 2021    Chaperon: Ms. Cullen    REVIEW OF SYSTEMS: Review of Systems   Constitutional:  Negative for activity change, appetite change, chills, diaphoresis, fatigue, fever and unexpected weight change.   HENT:  Negative for ear discharge, ear pain, facial swelling, hearing loss, nosebleeds, sore throat, tinnitus and voice change.    Eyes:  Negative for pain, discharge, redness, itching and visual disturbance.   Respiratory:  Negative for apnea, cough, chest tightness, shortness of breath and wheezing.    Cardiovascular:  Negative for chest pain  and palpitations.   Gastrointestinal:         As noted in HPI   Endocrine: Negative for cold intolerance, heat intolerance and polyuria.   Genitourinary:  Negative for difficulty urinating, dysuria, flank pain, hematuria and urgency.   Musculoskeletal:  Negative for arthralgias, back pain, gait problem, joint swelling and myalgias.   Skin:  Negative for rash and wound.   Neurological:  Negative for dizziness, tremors, seizures, speech difficulty, light-headedness, numbness and headaches.   Hematological:  Negative for adenopathy. Does not bruise/bleed easily.   Psychiatric/Behavioral:  Negative for agitation, behavioral problems and confusion. The patient is not nervous/anxious.         Past Medical History:   Diagnosis Date    Hypertension     Mood disorder (HCC)     Psychiatric disorder     Schizophrenia (HCC)       Past Surgical History:   Procedure Laterality Date    COLONOSCOPY      IR BIOPSY KIDNEY RANDOM  08/15/2023    NO PAST SURGERIES       Social History     Socioeconomic History    Marital status: Single     Spouse name: Not on file    Number of children: Not on file    Years of education: Not on file    Highest education level: Not on file   Occupational History    Not on file   Tobacco Use    Smoking status: Every Day     Current packs/day: 1.00     Average packs/day: 1 pack/day for 35.6 years (35.6 ttl pk-yrs)     Types: Cigarettes     Start date: 7/14/1988    Smokeless tobacco: Never   Vaping Use    Vaping status: Never Used   Substance and Sexual Activity    Alcohol use: Yes     Comment: weekends    Drug use: No    Sexual activity: Not on file   Other Topics Concern    Not on file   Social History Narrative    Not on file     Social Determinants of Health     Financial Resource Strain: Not on file   Food Insecurity: Not on file   Transportation Needs: Not on file   Physical Activity: Not on file   Stress: Not on file   Social Connections: Not on file   Intimate Partner Violence: Not on file   Housing  "Stability: Not on file     Family History   Problem Relation Age of Onset    Breast cancer Mother     Cancer Father         bone/spinal     Geodon [ziprasidone] and Haldol [haloperidol]  Current Outpatient Medications   Medication Sig Dispense Refill    amLODIPine (NORVASC) 5 mg tablet TAKE 1 TABLET BY MOUTH EVERY DAY 90 tablet 1    bisacodyl (DULCOLAX) 5 mg EC tablet Take 2 tablets (10 mg total) by mouth once for 1 dose 2 tablet 0    ergocalciferol (VITAMIN D2) 50,000 units TAKE 1 CAPSULE BY MOUTH ONE TIME PER WEEK 12 capsule 1    ezetimibe (ZETIA) 10 mg tablet TAKE 1 TABLET BY MOUTH EVERY DAY 30 tablet 5    hydrocortisone (ANUSOL-HC) 25 mg suppository Insert 1 suppository (25 mg total) into the rectum 2 (two) times a day as needed for hemorrhoids for up to 12 days 12 suppository 1    metoprolol succinate (TOPROL-XL) 50 mg 24 hr tablet TAKE 1 TABLET BY MOUTH EVERY DAY 30 tablet 5    paliperidone (INVEGA) 6 MG 24 hr tablet Take 6 mg by mouth every morning      polyethylene glycol (GOLYTELY) 4000 mL solution Take 4,000 mL by mouth once for 1 dose 4000 mL 0    rosuvastatin (CRESTOR) 10 MG tablet Take 1 tablet (10 mg total) by mouth daily 90 tablet 1    sodium bicarbonate 650 mg tablet Take 2 tablets (1,300 mg total) by mouth 3 (three) times a day 540 tablet 3    Garlic (GARLIQUE PO) Take by mouth (Patient not taking: Reported on 11/6/2023)      pantoprazole (PROTONIX) 40 mg tablet Take 1 tablet (40 mg total) by mouth daily (Patient not taking: Reported on 11/6/2023) 90 tablet 1    shark liver oil-cocoa butter (PREPARATION H) 0.25-3-85.5 % suppository Insert 1 suppository into the rectum 2 (two) times a day as needed for hemorrhoids (Patient not taking: Reported on 2/7/2024) 12 suppository 0     No current facility-administered medications for this visit.       Blood pressure 139/88, pulse 79, height 5' 8\" (1.727 m), weight 87.6 kg (193 lb 3.2 oz).    PHYSICAL EXAM: Physical Exam  Constitutional:       Appearance: He " is well-developed.   HENT:      Head: Normocephalic and atraumatic.   Eyes:      General: No scleral icterus.        Right eye: No discharge.         Left eye: No discharge.      Conjunctiva/sclera: Conjunctivae normal.      Pupils: Pupils are equal, round, and reactive to light.   Neck:      Thyroid: No thyromegaly.      Vascular: No JVD.      Trachea: No tracheal deviation.   Cardiovascular:      Rate and Rhythm: Normal rate and regular rhythm.      Heart sounds: Normal heart sounds. No murmur heard.     No friction rub. No gallop.   Pulmonary:      Effort: Pulmonary effort is normal. No respiratory distress.      Breath sounds: Normal breath sounds. No wheezing or rales.   Chest:      Chest wall: No tenderness.   Abdominal:      General: Bowel sounds are normal. There is no distension.      Palpations: Abdomen is soft. There is no mass.      Tenderness: There is no abdominal tenderness. There is no guarding or rebound.      Hernia: No hernia is present.   Musculoskeletal:      Cervical back: Neck supple.   Lymphadenopathy:      Cervical: No cervical adenopathy.   Skin:     General: Skin is warm and dry.      Findings: No erythema or rash.   Neurological:      Mental Status: He is alert and oriented to person, place, and time.   Psychiatric:         Behavior: Behavior normal.         Thought Content: Thought content normal.          Lab Results   Component Value Date    WBC 6.6 01/04/2024    HGB 11.3 (L) 01/04/2024    HCT 34.7 (L) 01/04/2024    MCV 98 (H) 01/04/2024     01/04/2024     Lab Results   Component Value Date    CALCIUM 9.9 08/15/2023    K 4.7 01/04/2024    CO2 20 01/04/2024     01/04/2024    BUN 51 (H) 01/04/2024    CREATININE 6.14 (H) 01/04/2024     Lab Results   Component Value Date    ALT 7 01/04/2024    AST 8 01/04/2024     Lab Results   Component Value Date    INR 0.89 08/15/2023    PROTIME 12.2 08/15/2023       No results found.

## 2024-02-07 NOTE — TELEPHONE ENCOUNTER
Patients GI provider:  Dr. Stephens    Number to return call: [phone]    Reason for call: Pt calling wanting to know if he can take an uber to his colonoscopy procedure. I informed pt he could as long as he has someone with him. Pt would like a call from the office on this. Please reach out to pt.    Scheduled procedure/appointment date if applicable: n/a

## 2024-02-07 NOTE — TELEPHONE ENCOUNTER
Pt called asking to  speak with Bing or Dr. Costello as soon as possible. He was told by his psychiatrist that he can get a kidney transplant  very quick and doesn't need to start dialysis. Pt aware of the upcoming appointment with Bing on 2/20 but he would like a call today to discuss his options.

## 2024-02-07 NOTE — TELEPHONE ENCOUNTER
Please advise the reason why pt needs to have ducoloax so that a prior auth can be done vs another prep that is covered or is ovtc.  Thank you

## 2024-02-07 NOTE — TELEPHONE ENCOUNTER
Patients GI provider:  Dr. Stephens    Number to return call: [phone]    Reason for call: Pt calling asking if he can take an uber to his colonoscopy procedure. I informed pt that he could but he needs someone with him. Pt wants office to call him back on this. Please reach out to pt.    Scheduled procedure/appointment date if applicable: n/a

## 2024-02-07 NOTE — TELEPHONE ENCOUNTER
Prep instructions given for Gol[facility] and went  over with him. No blood thinners not diabetic, ASC assessment done in office. Due to insurance needs to be scheduled at [place].

## 2024-02-07 NOTE — ASSESSMENT & PLAN NOTE
Personal history of colon polyps- patient is at increased risk for colon cancer screening.  Rule out colorectal lesions including polyps or malignancy.    -Colonoscopy

## 2024-02-07 NOTE — TELEPHONE ENCOUNTER
Reason for call: prior auth bisacodyl   [] Refill   [x] Prior Auth  [] Other:     Office:   [] PCP/Provider -   [x] Specialty/Provider - GI/ [facility]    Medication: bisacodyl     Dose/Frequency: 5 mg/ 2 tabs 1 dose     Quantity: 2 tabs     Pharmacy: [facility]

## 2024-02-07 NOTE — TELEPHONE ENCOUNTER
Nephrology    I placed a second call to the patient and left a voicemail.    I told him that I do not find what his reported psychiatrist saying to be true at all unless that psychiatrist is willing to donate his own kidney.    I told him he should follow-up on February 20.  We can address any further questions at that time.    He will still need to go through the protocol of transplant and they would be the best people to give him a timeframe on when he could get a potential kidney.  As well as proceeding with renal replacement therapy as a bridge to transplant in the future.

## 2024-02-07 NOTE — TELEPHONE ENCOUNTER
Nephrology    I received the message about the patient wanting to talk because his psychiatrist told him he could get a kidney fast.  I am not sure what basis the psychiatrist is able to provide this information or whether Jordy misunderstood that information from his psychiatrist.    I did call the patient 11:34 AM and left a voicemail.

## 2024-02-08 NOTE — TELEPHONE ENCOUNTER
02/08/24  Screened by: Muna Cheng    Referring Provider Dr. Tidwell    Pre- Screening:     There is no height or weight on file to calculate BMI.  Has patient been referred for a routine screening Colonoscopy?   Is the patient between 45-75 years old?       Previous Colonoscopy    If yes:    Date: 2.5 years ago    Facility:     Reason:       Does the patient want to see a Gastroenterologist prior to their procedure OR are they having any GI symptoms? no    Has the patient been hospitalized or had abdominal surgery in the past 6 months? no    Does the patient use supplemental oxygen? no    Does the patient take Coumadin, Lovenox, Plavix, Elliquis, Xarelto, or other blood thinning medication? no    Has the patient had a stroke, cardiac event, or stent placed in the past year? no

## 2024-02-08 NOTE — TELEPHONE ENCOUNTER
ASC Screening    ASC Screening  BMI > than 45: No  Are you currently pregnant?: No  Do you rely on a wheelchair for mobility?: No  Do you need oxygen during the day?: No  Have you ever been informed by anesthesia that you have a difficult airway?: No  Have you been diagnosed with End Stage Renal Disease (ESRD)?: Yes  Are you actively on dialysis?: No  Have you been diagnosed with Pulmonary Hypertension?: No  Do you have a pacemaker or an Automatic Implantable Cardioverter Defibrillator (AICD)?: No  Have you ever had an organ transplant?: No  Have you had a stroke, heart attack, myocardial infarction (MI) within the last 6 months?: No  Have you ever been diagnosed with Aortic Stenosis?: No  Have you ever been diagnosed  with Congestive Heart Failure?: No  Have you ever been diagnosed with a heart valve disease?: No  Are you Diabetic?: No  If you are Diabetic, has your A1C been greater than 12 within the last six months?: N/A

## 2024-02-08 NOTE — TELEPHONE ENCOUNTER
Scheduled date of colonoscopy (as of today): 5/3/24    Physician performing colonoscopy: Dr. Stephens    Location of colonoscopy: [facility]    Pt rescheduled and states his room mate will give him a ride to and from the procedure

## 2024-02-09 ENCOUNTER — TELEPHONE (OUTPATIENT)
Dept: NEPHROLOGY | Facility: CLINIC | Age: 54
End: 2024-02-09

## 2024-02-09 DIAGNOSIS — E78.01 FAMILIAL HYPERCHOLESTEROLEMIA: ICD-10-CM

## 2024-02-09 RX ORDER — ROSUVASTATIN CALCIUM 10 MG/1
10 TABLET, COATED ORAL DAILY
Qty: 90 TABLET | Refills: 1 | Status: SHIPPED | OUTPATIENT
Start: 2024-02-09

## 2024-02-16 ENCOUNTER — TELEPHONE (OUTPATIENT)
Dept: VASCULAR SURGERY | Facility: CLINIC | Age: 54
End: 2024-02-16

## 2024-02-16 ENCOUNTER — TELEPHONE (OUTPATIENT)
Dept: ENDOCRINOLOGY | Facility: CLINIC | Age: 54
End: 2024-02-16

## 2024-02-16 ENCOUNTER — CONSULT (OUTPATIENT)
Dept: VASCULAR SURGERY | Facility: CLINIC | Age: 54
End: 2024-02-16
Payer: COMMERCIAL

## 2024-02-16 VITALS
HEIGHT: 68 IN | SYSTOLIC BLOOD PRESSURE: 162 MMHG | BODY MASS INDEX: 28.87 KG/M2 | WEIGHT: 190.5 LBS | DIASTOLIC BLOOD PRESSURE: 90 MMHG | HEART RATE: 82 BPM

## 2024-02-16 DIAGNOSIS — I10 ESSENTIAL HYPERTENSION: ICD-10-CM

## 2024-02-16 DIAGNOSIS — E55.9 VITAMIN D DEFICIENCY: ICD-10-CM

## 2024-02-16 DIAGNOSIS — N17.9 AKI (ACUTE KIDNEY INJURY) (HCC): ICD-10-CM

## 2024-02-16 DIAGNOSIS — R80.9 NEPHROTIC RANGE PROTEINURIA: ICD-10-CM

## 2024-02-16 DIAGNOSIS — N18.5 CKD (CHRONIC KIDNEY DISEASE) STAGE 5, GFR LESS THAN 15 ML/MIN (HCC): Primary | ICD-10-CM

## 2024-02-16 DIAGNOSIS — N18.4 BENIGN HYPERTENSION WITH CHRONIC KIDNEY DISEASE, STAGE IV (HCC): ICD-10-CM

## 2024-02-16 DIAGNOSIS — E87.8 LOW BICARBONATE LEVEL: ICD-10-CM

## 2024-02-16 DIAGNOSIS — K64.9 BLEEDING HEMORRHOIDS: ICD-10-CM

## 2024-02-16 DIAGNOSIS — N18.5 CKD (CHRONIC KIDNEY DISEASE) STAGE 5, GFR LESS THAN 15 ML/MIN (HCC): ICD-10-CM

## 2024-02-16 DIAGNOSIS — N18.4 CKD (CHRONIC KIDNEY DISEASE) STAGE 4, GFR 15-29 ML/MIN (HCC): ICD-10-CM

## 2024-02-16 DIAGNOSIS — N28.1 RENAL CYST: ICD-10-CM

## 2024-02-16 DIAGNOSIS — I12.9 BENIGN HYPERTENSION WITH CHRONIC KIDNEY DISEASE, STAGE IV (HCC): ICD-10-CM

## 2024-02-16 DIAGNOSIS — N28.9 WORSENING RENAL FUNCTION: ICD-10-CM

## 2024-02-16 PROCEDURE — 99215 OFFICE O/P EST HI 40 MIN: CPT | Performed by: SURGERY

## 2024-02-16 RX ORDER — CHLORHEXIDINE GLUCONATE ORAL RINSE 1.2 MG/ML
15 SOLUTION DENTAL ONCE
OUTPATIENT
Start: 2024-02-16 | End: 2024-02-16

## 2024-02-16 NOTE — TELEPHONE ENCOUNTER
REMINDER: Under Reason For Call, comments MUST be formatted as:   (Surgeon's Initials) / (Procedure)      Special Instructions / FYI: LEVEL 4  PATIENT WOULD LIKE TO SCHEDULE AVF BEFORE PLANNED COLONOSCOPY ON MAY 3RD      Consent: I certify that patient has signed, printed, timed, and dated their surgery consent.  I certify that the patient's LEGAL NAME and DATE OF BIRTH are written in the upper left corner on BOTH sides of the consent.  I certify that BOTH sides of the completed surgery consent have been scanned into the patient's Epic chart by myself on 2/16/2024.  Yes, I have LABELED the consent in Epic as Consent for Vascular Procedure.     For Surgical Clearances     Levels   1-3   ROUTE this encounter to [facility] Clearance Pool (AND)   [facility] Surgery Coordinator Pool     Level   4   ROUTE this encounter to [facility] Surgery Coordinator Pool       HYDRATION CLEARANCES   ONLY ROUTE TO  [facility] Clearance Pool       Yes, I have ROUTED this encounter to [facility] Surgery Coordinator and/or [facility] Clearance Pool.

## 2024-02-16 NOTE — TELEPHONE ENCOUNTER
Verified patient's insurance   CONFIRMED - Patient's insurance is [employer]  Is patient requesting a call when authorization has been obtained? Patient did not request a call.    Surgery Date: 3/21/24  Primary Surgeon: KELLY // Ward Guthrie (NPI: [id number])  Assisting Surgeon: Not Applicable (N/A)  Facility: [facility] (Tax: [id number] / NPI: [id number])  Inpatient / Outpatient: Outpatient  Level: 3    Clearance Received: No clearance ordered.  Consent Received: Yes, scanned into Epic on 2/16/24.  Medication Hold / Last Dose:  not holding meds  IR Notified: Not Applicable (N/A)  Rep. Notified: Not Applicable (N/A)  Equipment Needs: Not Applicable (N/A)  Vas Lab Requested: Not Applicable (N/A)  Patient Contacted: 2/16/24    Diagnosis: N18.4   Procedure/ CPT Code(s): (AV) Arteriovenous Fistula // CPT: 18015    For varicose vein related procedures:   Last LEVDR: Not Applicable (N/A)  CEAP Classification: Not Applicable (N/A)  VCSS: Not Applicable (N/A)    Post Operative Date/ Time: TBD , TBD [facility] ([place]) with Ward Guthrie (NPI: [id number])     *Please review medication hold(s), PATs, and check H&P with patient.*  PATIENT WAS MAILED SURGERY/SHOWERING/DISCHARGE/COVID INSTRUCTIONS AFTER REVIEWING WITH THEM VIA PHONE CALL. PATIENT WAS MAILED INFORMATION W/DATE/TIME/SITE PER HIS REQUEST/LAB SCRIPTS & EKG. PATIENT WILL NEED A LYFT RIDE. REMINDER WAS SENT TO POOL TO REQUEST 1 WEEK PRIOR TO SURGERY .

## 2024-02-16 NOTE — ASSESSMENT & PLAN NOTE
Lab Results   Component Value Date    EGFR 10 (L) 01/04/2024    EGFR 14 (L) 01/04/2024    EGFR 13 (L) 10/30/2023    CREATININE 6.14 (H) 01/04/2024    CREATININE 4.90 (H) 10/30/2023    CREATININE 5.01 (H) 10/30/2023     54-year-old male referred for dialysis access creation.  He is right-hand dominant no prior access no prior dialysis.  He sees Dr. Isabel who believes he will be starting dialysis imminently.  He did have multiple questions about renal transplant which I generally answered and recommended that he further discuss with his nephrologist.  He has had no prior pacemaker centerline sports he had vein mapping done prior to his office visit which demonstrated an adequate caliber basilic vein in the upper arm and a marginal caliber cephalic vein in the upper arm.  The veins in the forearm are small.  I discussed access creation with him in general including AV fistula creation and potential AV graft if necessary we will plan to proceed with left arm AV fistula creation and remap his arm in the operating room and potentially proceed with an AV graft if necessary.  Risks benefits and alternative therapies were discussed with him in detail and he consented to proceed.

## 2024-02-16 NOTE — PROGRESS NOTES
Assessment/Plan:    CKD (chronic kidney disease) stage 5, GFR less than 15 ml/min ([facility])  Lab Results   Component Value Date    EGFR 10 (L) 01/04/2024    EGFR 14 (L) 01/04/2024    EGFR 13 (L) 10/30/2023    CREATININE 6.14 (H) 01/04/2024    CREATININE 4.90 (H) 10/30/2023    CREATININE 5.01 (H) 10/30/2023     54-year-old male referred for dialysis access creation.  He is right-hand dominant no prior access no prior dialysis.  He sees Dr. Isabel who believes he will be starting dialysis imminently.  He did have multiple questions about renal transplant which I generally answered and recommended that he further discuss with his nephrologist.  He has had no prior pacemaker centerline sports he had vein mapping done prior to his office visit which demonstrated an adequate caliber basilic vein in the upper arm and a marginal caliber cephalic vein in the upper arm.  The veins in the forearm are small.  I discussed access creation with him in general including AV fistula creation and potential AV graft if necessary we will plan to proceed with left arm AV fistula creation and remap his arm in the operating room and potentially proceed with an AV graft if necessary.  Risks benefits and alternative therapies were discussed with him in detail and he consented to proceed.       Diagnoses and all orders for this visit:    Benign hypertension with chronic kidney disease, stage IV ([facility])  -     Ambulatory Referral to Vascular Surgery    CKD (chronic kidney disease) stage 4, GFR 15-29 ml/min ([facility])  -     Ambulatory Referral to Vascular Surgery  -     Case request operating room: left arm AVF creation, possible AVG; Standing  -     EKG 12 lead; Future  -     Case request operating room: left arm AVF creation, possible AVG    Nephrotic range proteinuria  -     Ambulatory Referral to Vascular Surgery    Low bicarbonate level  -     Ambulatory Referral to Vascular Surgery    Vitamin D deficiency  -     Ambulatory Referral to Vascular  Surgery    CKD (chronic kidney disease) stage 5, GFR less than 15 ml/min ([facility])  -     Ambulatory Referral to Vascular Surgery    Essential hypertension  -     Ambulatory Referral to Vascular Surgery    Renal cyst  -     Ambulatory Referral to Vascular Surgery    Worsening renal function  -     Ambulatory Referral to Vascular Surgery    NAVEEN (acute kidney injury) ([facility])  -     Ambulatory Referral to Vascular Surgery    Bleeding hemorrhoids  -     Ambulatory Referral to Vascular Surgery    Other orders  -     Diet NPO; Sips with meds; Standing  -     Void on call to OR; Standing  -     Insert peripheral IV; Standing  -     Nursing Communication CHG bath, have staff wash entire body (neck down) per pre op bathing protocol. Routine, evening prior to, and day of surgery.; Standing  -     Nursing Communication Swab both nares with Povidone-Iodine solution, EXCLUDE if patient has shellfish/Iodine allergy, and replace with nasal alcohol swabstick. Routine, day of surgery, on call to OR.; Standing  -     chlorhexidine (PERIDEX) 0.12 % oral rinse 15 mL  -     Place sequential compression device; Standing          Subjective:      Patient ID: Jordy Tidwell is a 54 y.o. male.    Patient presents to review the vein mapping done 1/30/24. Pt is R handed and denies injuries, surgeries or IV drug use in either upper extremity. He denies pain, swelling, numbness or tingling in either arm or hand. He is taking Zetia and Rosuvastatin and smokes 1ppd.     HPI    The following portions of the patient's history were reviewed and updated as appropriate: allergies, current medications, past family history, past medical history, past social history, past surgical history, and problem list.    Review of Systems   All other systems reviewed and are negative.      I have reviewed the ROS above and made changes as needed.      Objective:      /90 (BP Location: Right arm, Patient Position: Sitting, Cuff Size: Standard)   Pulse 82   Ht  "5' 8\" (1.727 m)   Wt 86.4 kg (190 lb 8 oz)   BMI 28.97 kg/m²          Physical Exam      General  Exam: alert, awake, oriented, no distress, consistent with stated age    Integumentary  Exam: warm, dry, no gross lesions, no bruises and normal color    Head and Neck  Exam: supple, no bruits, trachea midline, no JVD, no mass or palpable nodes    Eye  Exam: extraoccular movements intact, no scleral icterus, sclera clear, pupils equal round and reactive to light    ENMT  Exam: oral mucosa pink and moist    Chest and Lung  Exam: chest normal without deformity, bilaterally expansive, clear to auscultation    Cardiovascular  Exam: regular rate, regular rhythm, no murmurs, no rubs or gallops    Adbomen  Exam: soft, non-tender, non-distended, no pulsatile abdominal masses, no abdominal bruit    Peripheral Vascular  Exam: no clubbing of the digits of the upper extremity, no cyanosis, no edema, both feet are warm, radial pulses 2+ bilaterally, skin well perfused, without and no varicosities.    No widened popliteal pulse noted bilaterally    Upper Extremity:  Palpation: Radial pulse- Bilateral 2+    Neurologic  Exam:alert, non-focal, oriented x 3, cranial nerves II-XII grossly intact        Operative Scheduling Information:    Hospital:  [facility]    Physician:  [place]    Surgery: Left arm AVF creation, possible AV graft    Urgency:  Standard    Level:  Level 4: Outpatients to be scheduled for screening procedures and elective surgery that can be delayed for longer than one month without reasonable expectation of detriment to patient.    Case Length:  Normal    Post-op Bed:  Outpatient    OR Table:  Standard    Equipment Needs:  None    Medication Instructions:  None    Hydration:  No    Contrast Allergy:  no                  "

## 2024-02-16 NOTE — TELEPHONE ENCOUNTER
Called Jordy to see if he can move to 11:00 visit with Bing. Left voicemail to call us to discuss changes.

## 2024-02-17 LAB
BASOPHILS # BLD AUTO: 0.1 X10E3/UL (ref 0–0.2)
BASOPHILS NFR BLD AUTO: 1 %
BUN SERPL-MCNC: 56 MG/DL (ref 6–24)
BUN/CREAT SERPL: 8 (ref 9–20)
CALCIUM SERPL-MCNC: 10.1 MG/DL (ref 8.7–10.2)
CHLORIDE SERPL-SCNC: 103 MMOL/L (ref 96–106)
CO2 SERPL-SCNC: 19 MMOL/L (ref 20–29)
CREAT SERPL-MCNC: 6.59 MG/DL (ref 0.76–1.27)
EGFR: 9 ML/MIN/1.73
EOSINOPHIL # BLD AUTO: 0.2 X10E3/UL (ref 0–0.4)
EOSINOPHIL NFR BLD AUTO: 2 %
ERYTHROCYTE [DISTWIDTH] IN BLOOD BY AUTOMATED COUNT: 13 % (ref 11.6–15.4)
GLUCOSE SERPL-MCNC: 80 MG/DL (ref 70–99)
HCT VFR BLD AUTO: 31.9 % (ref 37.5–51)
HGB BLD-MCNC: 10.4 G/DL (ref 13–17.7)
IMM GRANULOCYTES # BLD: 0.1 X10E3/UL (ref 0–0.1)
IMM GRANULOCYTES NFR BLD: 1 %
LYMPHOCYTES # BLD AUTO: 1.3 X10E3/UL (ref 0.7–3.1)
LYMPHOCYTES NFR BLD AUTO: 15 %
MCH RBC QN AUTO: 31.6 PG (ref 26.6–33)
MCHC RBC AUTO-ENTMCNC: 32.6 G/DL (ref 31.5–35.7)
MCV RBC AUTO: 97 FL (ref 79–97)
MONOCYTES # BLD AUTO: 1 X10E3/UL (ref 0.1–0.9)
MONOCYTES NFR BLD AUTO: 11 %
NEUTROPHILS # BLD AUTO: 6.2 X10E3/UL (ref 1.4–7)
NEUTROPHILS NFR BLD AUTO: 70 %
PLATELET # BLD AUTO: 259 X10E3/UL (ref 150–450)
POTASSIUM SERPL-SCNC: 6.4 MMOL/L (ref 3.5–5.2)
RBC # BLD AUTO: 3.29 X10E6/UL (ref 4.14–5.8)
SODIUM SERPL-SCNC: 138 MMOL/L (ref 134–144)
WBC # BLD AUTO: 8.7 X10E3/UL (ref 3.4–10.8)

## 2024-02-19 PROBLEM — D63.1 ANEMIA DUE TO STAGE 5 CHRONIC KIDNEY DISEASE, NOT ON CHRONIC DIALYSIS: Status: ACTIVE | Noted: 2024-01-18

## 2024-02-19 PROBLEM — M89.9 CHRONIC KIDNEY DISEASE-MINERAL AND BONE DISORDER: Status: ACTIVE | Noted: 2024-02-19

## 2024-02-19 PROBLEM — I12.0 BENIGN HYPERTENSION WITH CHRONIC KIDNEY DISEASE, STAGE V (HCC): Status: ACTIVE | Noted: 2018-12-11

## 2024-02-19 PROBLEM — N18.9 CHRONIC KIDNEY DISEASE-MINERAL AND BONE DISORDER: Status: ACTIVE | Noted: 2024-02-19

## 2024-02-19 PROBLEM — E87.20 METABOLIC ACIDOSIS: Status: ACTIVE | Noted: 2024-02-19

## 2024-02-19 PROBLEM — N18.5 BENIGN HYPERTENSION WITH CHRONIC KIDNEY DISEASE, STAGE V (HCC): Status: ACTIVE | Noted: 2018-12-11

## 2024-02-19 PROBLEM — N18.5 ANEMIA DUE TO STAGE 5 CHRONIC KIDNEY DISEASE, NOT ON CHRONIC DIALYSIS: Status: ACTIVE | Noted: 2024-01-18

## 2024-02-19 PROBLEM — E83.9 CHRONIC KIDNEY DISEASE-MINERAL AND BONE DISORDER: Status: ACTIVE | Noted: 2024-02-19

## 2024-02-19 NOTE — PROGRESS NOTES
Assessment and Plan:  Chronic kidney disease V, not on dialysis:    -Etiology of NAVEEN: Suspect progression of underlying disease  -Etiology of CKD: Biopsy-proven FSGS  -With nephrotic range proteinuria  -Baseline creatinine previously in the mid 3s since February 2023  -Renal ultrasound with echogenic kidneys consistent with underlying CKD  -Renal biopsy: FSGS with severe interstitial fibrosis and tubular atrophy.  Consistent to a secondary FSGS versus genetic FSGS  -Follows with Dr. Costello  -recent creatinine 6.59, eGFR 9.  Steady progression since March '23  -Cystatin C 3.69, eGFR 14 on 1/4/2024  -UPCr 5.8 g on 1/4/2024, not at goal  -Renal function continuing to worsen with severe hyperkalemia and refractory metabolic acidosis  -mild uremic symptoms of fatigue and intermittent tremor.  -avoid ACEi/ARB  -avoid nephrotoxins, NSAIDs, hypotension and IV contrast if possible.  -Kidney Smart/CKD education: Completed 8/11/2023  -Modality of choice: ICHD  -Access: Scheduled for LUE AVF, possible AVG by Dr. Guthrie on 3/21/24. L arm alert  -Transplant: Referral placed.  -ACP:not scheduled  -recommend patient go to the ER for evaluation and admission due to significant hyperkalemia and refractory acidosis.  Discussed at length with patient, including the significant risks associated with hyperkalemia.  Patient agreeable to go to the ER at [facility].  Contacted triage at [facility].  Contacted Dr. Rojo who will be rounding at [facility]  -May need to consider initiating renal replacement therapy during hospitalization if no improvement.  Patient expresses understanding and agreement with plan.  -d/w Dr. Reyes and Dr. Costello during office appointment who agree patient needs to be evaluated in the ER.    Nephrotic range proteinuria:  -In the setting of biopsy-proven FSGS  -UPCR 5.8 g on 1/4/24, previously 6.9 g in Oct '23  -Prior serologies, paraproteinemia workup negative  -Not on RAAS blockade or SGLT2 inhibitor due to advanced CKD  -Optimize glycemic  and BP control    Hyperkalemia:  -Due to advanced CKD  -K+ 6.4 on 2/16/24  -Currently asymptomatic  -Recommend evaluation in ER for treatment.  Patient agreeable  -Low potassium diet  -continue to monitor    Metabolic acidosis:  -Due to advanced kidney disease  -Serum bicarb 19  -Continue sodium bicarbonate tablets 1300 mg 3 times daily  -Continue to monitor closely    Hypertension:  -BP acceptable and at goal  -volume status euvolemic  -current medications: Amlodipine 5 mg daily, Toprol-XL 50 mg daily  -No ACEi/ARB due to worsening kidney function  -changes: None  -Avoid NSAIDs  -Avoid hypotension or fluctuations in blood pressure.   -low sodium (2 gm) diet.  Encourage regular exercise  -continue to monitor BP at home    Anemia of CKD:  -Hgb 10.4, at goal.  Goal >10  -continue to monitor    Bone Mineral Disease of CKD:  -Calcium 10.1, stable but high normal  -Hyperphosphatemia: in the setting of worsening advanced CKD. Phosphorus 6.5 on 1/4/2024, not at goal.  Continue low phosphorus diet.  Follow closely  -Secondary hyperparathyroidism of renal origin: , elevated.  Continue vitamin D3.  Continue to monitor  -Vitamin D deficiency: Vitamin D 25 37.2, at goal.  Continue ergocalciferol 50,000 units weekly  -continue to monitor    Dyslipidemia:  -stable  -continue statin and Zetia  -low-cholesterol and low-fat diet, aerobic exercise  -management per PCP    Microscopic hematuria:  -S/p cystoscopy with negative findings  -Serologies previously negative  -Renal biopsy as noted above  -Encourage smoking cessation    Nicotine dependence:  -+1 ppd ongoing  -Discussed the pathophysiology and relationship of smoking renal disease, including CKD/nephritis and renal cell carcinoma  -Continued to encourage smoking cessation    Obesity:  -improved  -BMI 28.9  -continue to encourage weight management, lifestyle modifications and diet modifications  -continue follow-up and management with PCP    Age related screening:   Your  primary caregiver may do yearly screening for colorectal cancer. It is recommended in all men and women over 50 years old. You may have screening earlier if you have colon disease or a family history of colorectal cancer      Jordy was seen today for follow-up.    Diagnoses and all orders for this visit:    NAVEEN (acute kidney injury) (HCC)  -     Transfer to other facility    CKD (chronic kidney disease) stage 5, GFR less than 15 ml/min (HCC)    Benign hypertension with chronic kidney disease, stage V (HCC)    Asymptomatic microscopic hematuria    Vitamin D deficiency    Tobacco dependence    Nephrotic range proteinuria    Persistent proteinuria    Dyslipidemia    Anemia due to stage 5 chronic kidney disease, not on chronic dialysis     Low bicarbonate level    Chronic kidney disease-mineral and bone disorder    Hyperkalemia  -     Transfer to other facility    Metabolic acidosis  -     Transfer to other facility        Recommend evaluation in ER due to hyperkalemia and refractory acidosis.  Follow up with Dr. Costello in 4-6 weeks with repeat blood and urine studies.  Please call the office with any questions or concerns.      Reason for Visit: Follow-up (CKD.4/)    HPI: Jordy Tidwell is a 54 y.o. right-hand-dominant male smoker with CKD 5 with biopsy-proven FSGS, metabolic acidosis, HTN, HLD, microscopic hematuria, schizophrenia who presents for follow up of CKD. Patient followed by Dr. Costello, last seen 1/18/2024.  Most recent creatinine 6.59, GFR 9.  Renal function continues to worsen.  Now with hyperkalemia with potassium 6.4 and refractory acidosis with bicarb 19 despite sodium bicarbonate tablets..  Patient denies recent hospitalizations or ER visits.  Patient denies NSAID use.  Patient denies nausea, vomiting, diarrhea, poor appetite, dyspnea, orthopnea, edema, hematuria or foamy urine.  Patient reports fatigue over the last several weeks.  Mild tremors noted in office.  Patient denies weakness or falls.   Patient evaluated by vascular surgery and scheduled for LUE AVF 3/21/2024.  Patient interested in evaluation for renal transplant.      ROS: A complete review of systems was performed and was negative unless otherwise noted in the history of present illness.    Allergies:   Geodon [ziprasidone] and Haldol [haloperidol]    Medications:     Current Outpatient Medications:     amLODIPine (NORVASC) 5 mg tablet, TAKE 1 TABLET BY MOUTH EVERY DAY, Disp: 90 tablet, Rfl: 1    ergocalciferol (VITAMIN D2) 50,000 units, TAKE 1 CAPSULE BY MOUTH ONE TIME PER WEEK, Disp: 12 capsule, Rfl: 1    ezetimibe (ZETIA) 10 mg tablet, TAKE 1 TABLET BY MOUTH EVERY DAY, Disp: 30 tablet, Rfl: 5    metoprolol succinate (TOPROL-XL) 50 mg 24 hr tablet, TAKE 1 TABLET BY MOUTH EVERY DAY, Disp: 30 tablet, Rfl: 5    paliperidone (INVEGA) 6 MG 24 hr tablet, Take 6 mg by mouth every morning, Disp: , Rfl:     rosuvastatin (CRESTOR) 10 MG tablet, TAKE 1 TABLET BY MOUTH EVERY DAY, Disp: 90 tablet, Rfl: 1    sodium bicarbonate 650 mg tablet, Take 2 tablets (1,300 mg total) by mouth 3 (three) times a day, Disp: 540 tablet, Rfl: 3    bisacodyl (DULCOLAX) 5 mg EC tablet, Take 2 tablets (10 mg total) by mouth once for 1 dose, Disp: 2 tablet, Rfl: 0    Garlic (GARLIQUE PO), Take by mouth (Patient not taking: Reported on 11/6/2023), Disp: , Rfl:     hydrocortisone (ANUSOL-HC) 25 mg suppository, Insert 1 suppository (25 mg total) into the rectum 2 (two) times a day as needed for hemorrhoids for up to 12 days, Disp: 12 suppository, Rfl: 1    polyethylene glycol (GaviLyte-G) 4000 mL solution, TAKE 4,000 ML BY MOUTH ONCE FOR 1 DOSE. (Patient not taking: Reported on 2/20/2024), Disp: 4000 mL, Rfl: 0    shark liver oil-cocoa butter (PREPARATION H) 0.25-3-85.5 % suppository, Insert 1 suppository into the rectum 2 (two) times a day as needed for hemorrhoids (Patient not taking: Reported on 2/7/2024), Disp: 12 suppository, Rfl: 0    Past Medical History:   Diagnosis Date  "   Hypertension     Mood disorder (HCC)     Psychiatric disorder     Schizophrenia (HCC)      Past Surgical History:   Procedure Laterality Date    COLONOSCOPY      IR BIOPSY KIDNEY RANDOM  08/15/2023    NO PAST SURGERIES       Family History   Problem Relation Age of Onset    Breast cancer Mother     Cancer Father         bone/spinal      reports that he has been smoking cigarettes. He started smoking about 35 years ago. He has a 35.6 pack-year smoking history. He has never used smokeless tobacco. He reports current alcohol use. He reports that he does not use drugs.    Physical Exam:   Vitals:    02/20/24 1123   BP: 128/90   BP Location: Right arm   Patient Position: Sitting   Cuff Size: Large   Pulse: 74   SpO2: 97%   Weight: 86.6 kg (191 lb)   Height: 5' 8\" (1.727 m)     Body mass index is 29.04 kg/m².    General:  Awake, alert, appears comfortable and in no acute distress.  Nontoxic.  Skin:  No rash, warm, good skin turgor   Eyes:  PERRL, EOMI, sclerae nonicteric.  no periorbital edema   ENT:  Moist mucous membranes  Neck:  Trachea midline, symmetric.  No JVD.  No carotid bruits.  Chest:  Clear to auscultation bilaterally without wheezes, crackles or rhonchi  CVS:  Regular rate and rhythm without murmur, gallop or rub.  S1 and S2 identified and normal.  No S3, S4.   Abdomen:  Soft, nontender, nondistended without masses.  Normal bowel sounds x 4 quadrants.  No bruit.  Extremities:  Warm, pink, motor and sensory intact and well perfused.  No cyanosis, pallor.  No BLE edema.  Mild asterixis  Neuro:  Awake, alert, oriented x3.  Grossly intact  Psych:  Appropriate affect.  Mentating appropriately.  Normal mental status exam      Procedure:  No results found for this or any previous visit.    Lab Results   Component Value Date    CALCIUM 9.9 08/15/2023    K 6.4 (H) 02/16/2024    CO2 19 (L) 02/16/2024     02/16/2024    BUN 56 (H) 02/16/2024    CREATININE 6.59 (H) 02/16/2024       Results from last 7 days   Lab " Units 02/16/24  1027   WHITE BLOOD CELL COUNT. x10E3/uL 8.7   HEMOGLOBIN. g/dL 10.4*   HEMATOCRIT. % 31.9*   PLATELETS. x10E3/uL 259   POTASSIUM mmol/L 6.4*   CHLORIDE mmol/L 103   CO2 mmol/L 19*   BUN mg/dL 56*   CREATININE mg/dL 6.59*   Imaging study:  NIKUNJ vessel mapping 1/30/2024:  Imaging study and images reviewed.  Left cephalic and basilic veins >2mm with patent brachial artery.  Cephalic and basilic veins communicate with the median cubital vein.  Greater on the left upper extremity measures 3.9 mm and connects to the ulnar vein.  Right cephalic vein small with basilic vein >2mm.   R brachial artery bifurcation at that axillary region and not measured.    EMR, including Epic, Care Everywhere and outside scanned documents reviewed.  I have personally reviewed the blood work as stated above and in my note.  I have personally reviewed vessel mapping bilateral upper extremities imaging studies.  I have personally reviewed PCP, consultants and prior nephrology notes.

## 2024-02-19 NOTE — PATIENT INSTRUCTIONS
Your potassium levels are dangerously high.  This is being caused by worsening of your kidney function.  Recommend evaluation emergency room for treatment.  If unable to be controlled or recurrent, this may be an indication to start dialysis  Acid levels in your blood now remain high despite being on sodium bicarbonate tablets.  If worsens, this may be an indication to start dialysis.  Your kidney function continues to worsen now with elevated potassium and acid levels in your blood despite treatment.  Most recent creatinine 6.59, continues to worsen..  We will continue routine surveillance as outlined below.  Avoid all NSAIDs to include ibuprofen, Motrin, Aleve, Advil, Naproxen, Celebrex, Indomethacin, Toradol.  Stay well hydrated.   Continue all prescribed medications.  Call if blood pressure consistently more than 140/90 or less than 110/50s.  High and low blood pressures may affect your kidney function.  Recommend low salt diet (2 gm sodium diet).  Please let us know if you are scheduled for any studies with IV contrast (ex: CT scan, arteriogram or cardiac catheterization)   Follow up in 4-6 weeks with Dr. Costello with repeat labs prior to appointment.  Referral placed for transplant evaluation.  Please contact the office with new symptoms or concerns.

## 2024-02-20 ENCOUNTER — APPOINTMENT (EMERGENCY)
Dept: RADIOLOGY | Facility: HOSPITAL | Age: 54
DRG: 470 | End: 2024-02-20
Payer: COMMERCIAL

## 2024-02-20 ENCOUNTER — OFFICE VISIT (OUTPATIENT)
Dept: NEPHROLOGY | Facility: CLINIC | Age: 54
End: 2024-02-20

## 2024-02-20 ENCOUNTER — HOSPITAL ENCOUNTER (INPATIENT)
Facility: HOSPITAL | Age: 54
LOS: 6 days | Discharge: HOME | DRG: 470 | End: 2024-02-26
Attending: EMERGENCY MEDICINE | Admitting: FAMILY MEDICINE
Payer: COMMERCIAL

## 2024-02-20 VITALS
BODY MASS INDEX: 28.95 KG/M2 | OXYGEN SATURATION: 97 % | SYSTOLIC BLOOD PRESSURE: 128 MMHG | DIASTOLIC BLOOD PRESSURE: 90 MMHG | HEIGHT: 68 IN | WEIGHT: 191 LBS | HEART RATE: 74 BPM

## 2024-02-20 DIAGNOSIS — R80.9 NEPHROTIC RANGE PROTEINURIA: ICD-10-CM

## 2024-02-20 DIAGNOSIS — N18.9 CKD (CHRONIC KIDNEY DISEASE): ICD-10-CM

## 2024-02-20 DIAGNOSIS — E78.5 DYSLIPIDEMIA: ICD-10-CM

## 2024-02-20 DIAGNOSIS — E87.20 METABOLIC ACIDOSIS: ICD-10-CM

## 2024-02-20 DIAGNOSIS — I12.0 BENIGN HYPERTENSION WITH CHRONIC KIDNEY DISEASE, STAGE V (HCC): ICD-10-CM

## 2024-02-20 DIAGNOSIS — E87.8 LOW BICARBONATE LEVEL: ICD-10-CM

## 2024-02-20 DIAGNOSIS — R79.89 ELEVATED SERUM CREATININE: Primary | ICD-10-CM

## 2024-02-20 DIAGNOSIS — F20.9 SCHIZOPHRENIA, UNSPECIFIED TYPE (HCC): ICD-10-CM

## 2024-02-20 DIAGNOSIS — E83.9 CHRONIC KIDNEY DISEASE-MINERAL AND BONE DISORDER: ICD-10-CM

## 2024-02-20 DIAGNOSIS — N18.9 CHRONIC KIDNEY DISEASE-MINERAL AND BONE DISORDER: ICD-10-CM

## 2024-02-20 DIAGNOSIS — F17.200 TOBACCO DEPENDENCE: ICD-10-CM

## 2024-02-20 DIAGNOSIS — R31.21 ASYMPTOMATIC MICROSCOPIC HEMATURIA: ICD-10-CM

## 2024-02-20 DIAGNOSIS — R80.1 PERSISTENT PROTEINURIA: ICD-10-CM

## 2024-02-20 DIAGNOSIS — N18.5 BENIGN HYPERTENSION WITH CHRONIC KIDNEY DISEASE, STAGE V (HCC): ICD-10-CM

## 2024-02-20 DIAGNOSIS — M89.9 CHRONIC KIDNEY DISEASE-MINERAL AND BONE DISORDER: ICD-10-CM

## 2024-02-20 DIAGNOSIS — N17.9 AKI (ACUTE KIDNEY INJURY) (HCC): Primary | ICD-10-CM

## 2024-02-20 DIAGNOSIS — D63.1 ANEMIA DUE TO STAGE 5 CHRONIC KIDNEY DISEASE, NOT ON CHRONIC DIALYSIS: ICD-10-CM

## 2024-02-20 DIAGNOSIS — E87.5 HYPERKALEMIA: ICD-10-CM

## 2024-02-20 DIAGNOSIS — E83.39 HYPERPHOSPHATEMIA: ICD-10-CM

## 2024-02-20 DIAGNOSIS — N18.5 CKD (CHRONIC KIDNEY DISEASE) STAGE 5, GFR LESS THAN 15 ML/MIN (HCC): ICD-10-CM

## 2024-02-20 DIAGNOSIS — N18.5 ANEMIA DUE TO STAGE 5 CHRONIC KIDNEY DISEASE, NOT ON CHRONIC DIALYSIS: ICD-10-CM

## 2024-02-20 DIAGNOSIS — E55.9 VITAMIN D DEFICIENCY: ICD-10-CM

## 2024-02-20 PROBLEM — E87.4 ACID-BASE DISORDER, MIXED: Status: ACTIVE | Noted: 2024-02-20

## 2024-02-20 LAB
ALBUMIN SERPL BCP-MCNC: 3.8 G/DL (ref 3.5–5)
ALP SERPL-CCNC: 52 U/L (ref 34–104)
ALT SERPL W P-5'-P-CCNC: 7 U/L (ref 7–52)
ANION GAP SERPL CALCULATED.3IONS-SCNC: 9 MMOL/L
APTT PPP: 29 SECONDS (ref 23–37)
AST SERPL W P-5'-P-CCNC: 5 U/L (ref 13–39)
ATRIAL RATE: 74 BPM
BASOPHILS # BLD AUTO: 0.04 THOUSANDS/ÂΜL (ref 0–0.1)
BASOPHILS NFR BLD AUTO: 1 % (ref 0–1)
BILIRUB SERPL-MCNC: 0.24 MG/DL (ref 0.2–1)
BUN SERPL-MCNC: 61 MG/DL (ref 5–25)
CALCIUM SERPL-MCNC: 9.5 MG/DL (ref 8.4–10.2)
CHLORIDE SERPL-SCNC: 106 MMOL/L (ref 96–108)
CK SERPL-CCNC: 79 U/L (ref 39–308)
CO2 SERPL-SCNC: 22 MMOL/L (ref 21–32)
CREAT SERPL-MCNC: 7.36 MG/DL (ref 0.6–1.3)
EOSINOPHIL # BLD AUTO: 0.21 THOUSAND/ÂΜL (ref 0–0.61)
EOSINOPHIL NFR BLD AUTO: 3 % (ref 0–6)
ERYTHROCYTE [DISTWIDTH] IN BLOOD BY AUTOMATED COUNT: 12.9 % (ref 11.6–15.1)
GFR SERPL CREATININE-BSD FRML MDRD: 7 ML/MIN/1.73SQ M
GLUCOSE SERPL-MCNC: 101 MG/DL (ref 65–140)
HCT VFR BLD AUTO: 31.9 % (ref 36.5–49.3)
HGB BLD-MCNC: 10.4 G/DL (ref 12–17)
IMM GRANULOCYTES # BLD AUTO: 0.04 THOUSAND/UL (ref 0–0.2)
IMM GRANULOCYTES NFR BLD AUTO: 1 % (ref 0–2)
INR PPP: 0.96 (ref 0.84–1.19)
LYMPHOCYTES # BLD AUTO: 1.19 THOUSANDS/ÂΜL (ref 0.6–4.47)
LYMPHOCYTES NFR BLD AUTO: 17 % (ref 14–44)
MAGNESIUM SERPL-MCNC: 2.5 MG/DL (ref 1.9–2.7)
MCH RBC QN AUTO: 32.5 PG (ref 26.8–34.3)
MCHC RBC AUTO-ENTMCNC: 32.6 G/DL (ref 31.4–37.4)
MCV RBC AUTO: 100 FL (ref 82–98)
MONOCYTES # BLD AUTO: 0.9 THOUSAND/ÂΜL (ref 0.17–1.22)
MONOCYTES NFR BLD AUTO: 13 % (ref 4–12)
NEUTROPHILS # BLD AUTO: 4.78 THOUSANDS/ÂΜL (ref 1.85–7.62)
NEUTS SEG NFR BLD AUTO: 65 % (ref 43–75)
NRBC BLD AUTO-RTO: 0 /100 WBCS
P AXIS: 57 DEGREES
PHOSPHATE SERPL-MCNC: 7.2 MG/DL (ref 2.7–4.5)
PLATELET # BLD AUTO: 209 THOUSANDS/UL (ref 149–390)
PMV BLD AUTO: 10.4 FL (ref 8.9–12.7)
POTASSIUM SERPL-SCNC: 5.1 MMOL/L (ref 3.5–5.3)
PR INTERVAL: 176 MS
PROT SERPL-MCNC: 6.8 G/DL (ref 6.4–8.4)
PROTHROMBIN TIME: 13 SECONDS (ref 11.6–14.5)
QRS AXIS: 8 DEGREES
QRSD INTERVAL: 92 MS
QT INTERVAL: 350 MS
QTC INTERVAL: 388 MS
RBC # BLD AUTO: 3.2 MILLION/UL (ref 3.88–5.62)
SODIUM SERPL-SCNC: 137 MMOL/L (ref 135–147)
T WAVE AXIS: 59 DEGREES
VENTRICULAR RATE: 74 BPM
WBC # BLD AUTO: 7.16 THOUSAND/UL (ref 4.31–10.16)

## 2024-02-20 PROCEDURE — 85025 COMPLETE CBC W/AUTO DIFF WBC: CPT | Performed by: EMERGENCY MEDICINE

## 2024-02-20 PROCEDURE — 80053 COMPREHEN METABOLIC PANEL: CPT | Performed by: EMERGENCY MEDICINE

## 2024-02-20 PROCEDURE — 99223 1ST HOSP IP/OBS HIGH 75: CPT | Performed by: FAMILY MEDICINE

## 2024-02-20 PROCEDURE — 93010 ELECTROCARDIOGRAM REPORT: CPT | Performed by: INTERNAL MEDICINE

## 2024-02-20 PROCEDURE — 99285 EMERGENCY DEPT VISIT HI MDM: CPT | Performed by: EMERGENCY MEDICINE

## 2024-02-20 PROCEDURE — 84100 ASSAY OF PHOSPHORUS: CPT | Performed by: EMERGENCY MEDICINE

## 2024-02-20 PROCEDURE — 99222 1ST HOSP IP/OBS MODERATE 55: CPT | Performed by: NURSE PRACTITIONER

## 2024-02-20 PROCEDURE — 85730 THROMBOPLASTIN TIME PARTIAL: CPT | Performed by: EMERGENCY MEDICINE

## 2024-02-20 PROCEDURE — NC001 PR NO CHARGE: Performed by: INTERNAL MEDICINE

## 2024-02-20 PROCEDURE — 85610 PROTHROMBIN TIME: CPT | Performed by: EMERGENCY MEDICINE

## 2024-02-20 PROCEDURE — PBNCHG PB NO CHARGE PLACEHOLDER: Performed by: PHYSICIAN ASSISTANT

## 2024-02-20 PROCEDURE — 71046 X-RAY EXAM CHEST 2 VIEWS: CPT

## 2024-02-20 PROCEDURE — 93005 ELECTROCARDIOGRAM TRACING: CPT

## 2024-02-20 PROCEDURE — 83735 ASSAY OF MAGNESIUM: CPT | Performed by: EMERGENCY MEDICINE

## 2024-02-20 PROCEDURE — 36415 COLL VENOUS BLD VENIPUNCTURE: CPT | Performed by: EMERGENCY MEDICINE

## 2024-02-20 PROCEDURE — 99284 EMERGENCY DEPT VISIT MOD MDM: CPT

## 2024-02-20 PROCEDURE — 82550 ASSAY OF CK (CPK): CPT | Performed by: EMERGENCY MEDICINE

## 2024-02-20 RX ORDER — HEPARIN SODIUM 5000 [USP'U]/ML
5000 INJECTION, SOLUTION INTRAVENOUS; SUBCUTANEOUS EVERY 8 HOURS SCHEDULED
Status: DISCONTINUED | OUTPATIENT
Start: 2024-02-20 | End: 2024-02-26 | Stop reason: HOSPADM

## 2024-02-20 RX ORDER — AMLODIPINE BESYLATE 5 MG/1
5 TABLET ORAL DAILY
Status: DISCONTINUED | OUTPATIENT
Start: 2024-02-20 | End: 2024-02-26 | Stop reason: HOSPADM

## 2024-02-20 RX ORDER — PALIPERIDONE 3 MG/1
3 TABLET, EXTENDED RELEASE ORAL EVERY MORNING
Status: DISCONTINUED | OUTPATIENT
Start: 2024-02-21 | End: 2024-02-22

## 2024-02-20 RX ORDER — EZETIMIBE 10 MG/1
10 TABLET ORAL DAILY
Status: DISCONTINUED | OUTPATIENT
Start: 2024-02-20 | End: 2024-02-26 | Stop reason: HOSPADM

## 2024-02-20 RX ORDER — PRAVASTATIN SODIUM 80 MG/1
80 TABLET ORAL
Status: DISCONTINUED | OUTPATIENT
Start: 2024-02-21 | End: 2024-02-26 | Stop reason: HOSPADM

## 2024-02-20 RX ORDER — METOPROLOL SUCCINATE 50 MG/1
50 TABLET, EXTENDED RELEASE ORAL DAILY
Status: DISCONTINUED | OUTPATIENT
Start: 2024-02-21 | End: 2024-02-26 | Stop reason: HOSPADM

## 2024-02-20 RX ORDER — SEVELAMER HYDROCHLORIDE 800 MG/1
800 TABLET, FILM COATED ORAL
Status: DISCONTINUED | OUTPATIENT
Start: 2024-02-21 | End: 2024-02-26 | Stop reason: HOSPADM

## 2024-02-20 RX ORDER — NICOTINE 21 MG/24HR
1 PATCH, TRANSDERMAL 24 HOURS TRANSDERMAL DAILY
Status: DISCONTINUED | OUTPATIENT
Start: 2024-02-21 | End: 2024-02-26 | Stop reason: HOSPADM

## 2024-02-20 RX ADMIN — SODIUM BICARBONATE: 84 INJECTION, SOLUTION INTRAVENOUS at 17:20

## 2024-02-20 RX ADMIN — HEPARIN SODIUM 5000 UNITS: 5000 INJECTION INTRAVENOUS; SUBCUTANEOUS at 22:29

## 2024-02-20 RX ADMIN — EZETIMIBE 10 MG: 10 TABLET ORAL at 22:29

## 2024-02-20 RX ADMIN — AMLODIPINE BESYLATE 5 MG: 5 TABLET ORAL at 22:29

## 2024-02-20 NOTE — LETTER
To: [facility]  From: Tatiana Yair, [facility] [phone]    Re: Jordy Tidwell    Hepatits results, dialysis order, treatment sheets    Thank you

## 2024-02-20 NOTE — H&P
54 y.o. 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 after midnight for possible permacath placement  Recheck labs in a.m.    Acid-base disorder, mixed  Assessment & Plan  Metabolic acidosis with low serum bicarb, on sodium bicarb tablets at home  Per nephrology started on IV fluids with bicarb  Repeat lab work in a.m.    Hyperphosphatemia  Assessment & Plan  Phosphorus 7.2.  Patient placed on low phosphorus diet      Anemia due to stage 5 chronic kidney disease, not on chronic dialysis   Assessment & Plan  Hemoglobin stable.  Repeat lab work in a.m.    Results from last 7 days   Lab Units 02/20/24  1409 02/16/24  1027   HEMOGLOBIN g/dL 10.4*  --    HEMOGLOBIN. g/dL  --  10.4*   HEMATOCRIT % 31.9*  --    HEMATOCRIT. %  --  31.9*   MCV fL 100*  --    MCV. fL  --  97        Dyslipidemia  Assessment & Plan  Continue statin    Benign hypertension with chronic kidney disease, stage V (HCC)  Assessment & Plan  Continue Norvasc, Toprol-XL and monitor blood pressures       VTE Pharmacologic Prophylaxis:    Heparin  Code Status: DNR/DNI  Discussion with family: Patient declined call to .     Anticipated Length of Stay: Patient will be admitted on an inpatient  basis with an anticipated length of stay of greater than 2 midnights secondary to chronic kidney disease with worsening creatinine, metabolic acidosis.    Total Time Spent on Date of Encounter in care of patient: This time was spent on one or more of the following: performing physical exam; counseling and coordination of care; obtaining or reviewing history; documenting in the medical record; reviewing/ordering tests, medications or procedures; communicating with other healthcare professionals and discussing with patient's family/caregivers.    Chief Complaint: Worsening creatinine, hyperkalemia    History of Present Illness:  Jordy Tidwell is a 54 y.o. male with a PMH of CKD, hypertension who was advised to come to the ER by the nephrology office due to worsening creatinine and hyperkalemia on outpatient lab work.  Patient reports feeling tired and fatigued over the last 2 to 3 weeks but denies any chest pain, shortness of breath, abdominal pain.  Patient reports good urine output at his baseline.  On lab work in the ER potassium level has improved with creatinine trending up.  Nephrology was consulted and they saw the patient in the ER    Review of Systems:  Review of Systems   Constitutional:  Negative for appetite change, chills and fever.   HENT:  Negative for congestion and trouble swallowing.    Eyes:  Negative for photophobia and visual disturbance.   Respiratory:  Negative for chest tightness and shortness of breath.    Cardiovascular:  Negative for chest pain and leg swelling.   Gastrointestinal:  Positive for diarrhea (mild, intermittent). Negative for abdominal pain, nausea and vomiting.   Genitourinary:  Negative for dysuria, frequency and hematuria.   Musculoskeletal:  Negative for back pain.   Skin:  Negative for wound.   Neurological:  Negative for headaches.   Hematological:  Does not bruise/bleed easily.   Psychiatric/Behavioral:  Negative for agitation.        Past Medical and Surgical  History:   Past Medical History:   Diagnosis Date    Hypertension     Mood disorder (HCC)     Psychiatric disorder     Schizophrenia (HCC)        Past Surgical History:   Procedure Laterality Date    COLONOSCOPY      IR BIOPSY KIDNEY RANDOM  08/15/2023    NO PAST SURGERIES         Meds/Allergies:  Prior to Admission medications    Medication Sig Start Date End Date Taking? Authorizing Provider   amLODIPine (NORVASC) 5 mg tablet TAKE 1 TABLET BY MOUTH EVERY DAY 7/21/23  Yes Sheila Roach MD   ergocalciferol (VITAMIN D2) 50,000 units TAKE 1 CAPSULE BY MOUTH ONE TIME PER WEEK 11/6/23  Yes Sheila Roach MD   ezetimibe (ZETIA) 10 mg tablet TAKE 1 TABLET BY MOUTH EVERY DAY 2/5/24  Yes Sheila Roach MD   metoprolol succinate (TOPROL-XL) 50 mg 24 hr tablet TAKE 1 TABLET BY MOUTH EVERY DAY 10/23/23  Yes Sheila Roach MD   paliperidone (INVEGA) 6 MG 24 hr tablet Take 6 mg by mouth every morning   Yes Historical Provider, MD   rosuvastatin (CRESTOR) 10 MG tablet TAKE 1 TABLET BY MOUTH EVERY DAY 2/9/24  Yes Sheila Roach MD   sodium bicarbonate 650 mg tablet Take 2 tablets (1,300 mg total) by mouth 3 (three) times a day 9/5/23  Yes Judith Costello MD   bisacodyl (DULCOLAX) 5 mg EC tablet Take 2 tablets (10 mg total) by mouth once for 1 dose 2/7/24 2/7/24  Randall Stephens MD   Garlic (GARLIQUE PO) Take by mouth  Patient not taking: Reported on 11/6/2023    Historical Provider, MD   polyethylene glycol (GaviLyte-G) 4000 mL solution TAKE 4,000 ML BY MOUTH ONCE FOR 1 DOSE.  Patient not taking: Reported on 2/20/2024 2/7/24   Sofi Martinez PA-C   hydrocortisone (ANUSOL-HC) 25 mg suppository Insert 1 suppository (25 mg total) into the rectum 2 (two) times a day as needed for hemorrhoids for up to 12 days 2/7/24 2/20/24  Randall Stephens MD   shark liver oil-cocoa butter (PREPARATION H) 0.25-3-85.5 % suppository Insert 1 suppository into the rectum 2 (two) times a day as needed for hemorrhoids  Patient not  "taking: Reported on 2/7/2024 10/11/23 2/20/24  Sheila Roach MD     I have reviewed home medications using recent Epic encounter.    Allergies:   Allergies   Allergen Reactions    Geodon [Ziprasidone] Fatigue    Haldol [Haloperidol] Fatigue       Social History:  Marital Status: Single   Occupation: none  Patient Pre-hospital Living Situation: Alone  Patient Pre-hospital Level of Mobility: walks  Patient Pre-hospital Diet Restrictions: none  Substance Use History:   Social History     Substance and Sexual Activity   Alcohol Use Yes    Comment: weekends     Social History     Tobacco Use   Smoking Status Every Day    Current packs/day: 1.00    Average packs/day: 1 pack/day for 35.6 years (35.6 ttl pk-yrs)    Types: Cigarettes    Start date: 7/14/1988   Smokeless Tobacco Never     Social History     Substance and Sexual Activity   Drug Use No       Family History:  Family History   Problem Relation Age of Onset    Breast cancer Mother     Cancer Father         bone/spinal       Physical Exam:     Vitals:   Blood Pressure: 163/92 (02/20/24 1957)  Pulse: 77 (02/20/24 1957)  Temperature: 97.9 °F (36.6 °C) (02/20/24 1957)  Temp Source: Oral (02/20/24 1725)  Respirations: 16 (02/20/24 1957)  Height: 5' 8\" (172.7 cm) (02/20/24 1331)  Weight - Scale: 86.6 kg (191 lb) (02/20/24 1331)  SpO2: 95 % (02/20/24 1957)    Physical Exam  Vitals reviewed.   Constitutional:       General: He is not in acute distress.     Appearance: He is not ill-appearing.   HENT:      Head: Normocephalic and atraumatic.   Eyes:      General:         Right eye: No discharge.         Left eye: No discharge.      Extraocular Movements: Extraocular movements intact.   Cardiovascular:      Rate and Rhythm: Normal rate and regular rhythm.   Pulmonary:      Effort: Pulmonary effort is normal. No respiratory distress.      Breath sounds: Normal breath sounds. No wheezing or rales.   Abdominal:      General: Bowel sounds are normal. There is no " distension.      Palpations: Abdomen is soft.      Tenderness: There is no abdominal tenderness.   Musculoskeletal:      Right lower leg: No edema.      Left lower leg: No edema.   Neurological:      Mental Status: He is alert and oriented to person, place, and time.   Psychiatric:         Mood and Affect: Affect is flat.          Additional Data:     Lab Results:  Results from last 7 days   Lab Units 02/20/24  1409   WBC Thousand/uL 7.16   HEMOGLOBIN g/dL 10.4*   HEMATOCRIT % 31.9*   PLATELETS Thousands/uL 209   NEUTROS PCT % 65   LYMPHS PCT % 17   MONOS PCT % 13*   EOS PCT % 3     Results from last 7 days   Lab Units 02/20/24  1409   SODIUM mmol/L 137   POTASSIUM mmol/L 5.1   CHLORIDE mmol/L 106   CO2 mmol/L 22   BUN mg/dL 61*   CREATININE mg/dL 7.36*   ANION GAP mmol/L 9   CALCIUM mg/dL 9.5   ALBUMIN g/dL 3.8   TOTAL BILIRUBIN mg/dL 0.24   ALK PHOS U/L 52   ALT U/L 7   AST U/L 5*   GLUCOSE RANDOM mg/dL 101     Results from last 7 days   Lab Units 02/20/24  1409   INR  0.96                   Lines/Drains:  Invasive Devices       Peripheral Intravenous Line  Duration             Peripheral IV 02/20/24 Left Antecubital <1 day                        Imaging: Personally reviewed the following imaging: chest xray  XR chest 2 views    (Results Pending)       EKG and Other Studies Reviewed on Admission:   EKG:  Sinus rhythm with no acute ST elevations.    ** Please Note: This note has been constructed using a voice recognition system. **

## 2024-02-20 NOTE — ED PROVIDER NOTES
History  Chief Complaint   Patient presents with    Abnormal Lab     Was at PCP office and told his potassium level and creatinine level were high and to come here for eval     Pt is a 55yo M who presents for truly abnormality.  Patient reports he was sent from the nephrology office due to elevated potassium and increasing creatinine.  Patient states his creatinine previously was stable around 4 and recently has increased into the 6 range.  Patient states he recently had blood work done 4 days ago that showed it was continuing to increase and he had elevated potassium as well.  Patient was therefore sent in for further evaluation.  Patient states he has been feeling fatigued over the past 2 to 3 weeks but denies any other complaints.  Patient denies any shortness of breath or palpitations.  Patient denies any lower extremity swelling.  Patient reports he has been urinating and has had normal urine output.  Patient reports he takes all of his medications regularly with no recent changes.  Patient reports he is a daily tobacco smoker.  Patient also reports he takes 3 shots every day of alcohol.  Denies any history of alcohol withdrawal.        Prior to Admission Medications   Prescriptions Last Dose Informant Patient Reported? Taking?   Garlic (GARLIQUE PO)  Self Yes No   Sig: Take by mouth   Patient not taking: Reported on 11/6/2023   amLODIPine (NORVASC) 5 mg tablet  Self No No   Sig: TAKE 1 TABLET BY MOUTH EVERY DAY   bisacodyl (DULCOLAX) 5 mg EC tablet   No No   Sig: Take 2 tablets (10 mg total) by mouth once for 1 dose   ergocalciferol (VITAMIN D2) 50,000 units  Self No No   Sig: TAKE 1 CAPSULE BY MOUTH ONE TIME PER WEEK   ezetimibe (ZETIA) 10 mg tablet  Self No No   Sig: TAKE 1 TABLET BY MOUTH EVERY DAY   hydrocortisone (ANUSOL-HC) 25 mg suppository   No No   Sig: Insert 1 suppository (25 mg total) into the rectum 2 (two) times a day as needed for hemorrhoids for up to 12 days   metoprolol succinate (TOPROL-XL)  50 mg 24 hr tablet  Self No No   Sig: TAKE 1 TABLET BY MOUTH EVERY DAY   paliperidone (INVEGA) 6 MG 24 hr tablet  Self Yes No   Sig: Take 6 mg by mouth every morning   polyethylene glycol (GaviLyte-G) 4000 mL solution  Self No No   Sig: TAKE 4,000 ML BY MOUTH ONCE FOR 1 DOSE.   Patient not taking: Reported on 2/20/2024   rosuvastatin (CRESTOR) 10 MG tablet  Self No No   Sig: TAKE 1 TABLET BY MOUTH EVERY DAY   shark liver oil-cocoa butter (PREPARATION H) 0.25-3-85.5 % suppository  Self No No   Sig: Insert 1 suppository into the rectum 2 (two) times a day as needed for hemorrhoids   Patient not taking: Reported on 2/7/2024   sodium bicarbonate 650 mg tablet  Self No No   Sig: Take 2 tablets (1,300 mg total) by mouth 3 (three) times a day      Facility-Administered Medications: None       Past Medical History:   Diagnosis Date    Hypertension     Mood disorder (HCC)     Psychiatric disorder     Schizophrenia (HCC)        Past Surgical History:   Procedure Laterality Date    COLONOSCOPY      IR BIOPSY KIDNEY RANDOM  08/15/2023    NO PAST SURGERIES         Family History   Problem Relation Age of Onset    Breast cancer Mother     Cancer Father         bone/spinal     I have reviewed and agree with the history as documented.    E-Cigarette/Vaping    E-Cigarette Use Never User      E-Cigarette/Vaping Substances    Nicotine No     THC No     CBD No     Flavoring No     Other No     Unknown No      Social History     Tobacco Use    Smoking status: Every Day     Current packs/day: 1.00     Average packs/day: 1 pack/day for 35.6 years (35.6 ttl pk-yrs)     Types: Cigarettes     Start date: 7/14/1988    Smokeless tobacco: Never   Vaping Use    Vaping status: Never Used   Substance Use Topics    Alcohol use: Yes     Comment: weekends    Drug use: No       Review of Systems   Constitutional:  Positive for fatigue.   All other systems reviewed and are negative.      Physical Exam  Physical Exam  Vitals reviewed.   Constitutional:        General: He is not in acute distress.     Appearance: He is well-developed. He is not toxic-appearing or diaphoretic.   HENT:      Head: Normocephalic and atraumatic.      Right Ear: External ear normal.      Left Ear: External ear normal.      Nose: Nose normal.      Mouth/Throat:      Pharynx: Oropharynx is clear.   Eyes:      Extraocular Movements: Extraocular movements intact.      Pupils: Pupils are equal, round, and reactive to light.   Cardiovascular:      Rate and Rhythm: Normal rate and regular rhythm.      Heart sounds: Normal heart sounds.   Pulmonary:      Effort: Pulmonary effort is normal. No respiratory distress.      Breath sounds: No stridor. Rhonchi (trace, bilateral) present. No rales.   Abdominal:      General: Bowel sounds are normal. There is no distension.      Palpations: Abdomen is soft. There is no mass.      Tenderness: There is no abdominal tenderness. There is no guarding.   Musculoskeletal:         General: Normal range of motion.      Cervical back: Neck supple.      Right lower leg: No edema.      Left lower leg: No edema.   Skin:     General: Skin is warm and dry.      Capillary Refill: Capillary refill takes less than 2 seconds.      Coloration: Skin is not pale.      Findings: No erythema or rash.   Neurological:      General: No focal deficit present.      Mental Status: He is alert and oriented to person, place, and time.   Psychiatric:         Speech: Speech normal.         Behavior: Behavior is cooperative.         Vital Signs  ED Triage Vitals   Temperature Pulse Respirations Blood Pressure SpO2   02/20/24 1331 02/20/24 1333 02/20/24 1331 02/20/24 1333 02/20/24 1333   98.9 °F (37.2 °C) 83 18 154/88 95 %      Temp Source Heart Rate Source Patient Position - Orthostatic VS BP Location FiO2 (%)   02/20/24 1725 02/20/24 1725 02/20/24 1725 02/20/24 1725 --   Oral Monitor Lying Right arm       Pain Score       02/20/24 1331       No Pain           Vitals:    02/20/24 1333  02/20/24 1725   BP: 154/88 167/95   Pulse: 83 78   Patient Position - Orthostatic VS:  Lying         Visual Acuity      ED Medications  Medications   sodium bicarbonate 75 mEq in sodium chloride 0.45 % 1,000 mL infusion ( Intravenous New Bag 2/20/24 1720)       Diagnostic Studies  Results Reviewed       Procedure Component Value Units Date/Time    Comprehensive metabolic panel [[id number]]  (Abnormal) Collected: 02/20/24 1409    Lab Status: Final result Specimen: Blood from Arm, Left Updated: 02/20/24 1439     Sodium 137 mmol/L      Potassium 5.1 mmol/L      Chloride 106 mmol/L      CO2 22 mmol/L      ANION GAP 9 mmol/L      BUN 61 mg/dL      Creatinine 7.36 mg/dL      Glucose 101 mg/dL      Calcium 9.5 mg/dL      AST 5 U/L      ALT 7 U/L      Alkaline Phosphatase 52 U/L      Total Protein 6.8 g/dL      Albumin 3.8 g/dL      Total Bilirubin 0.24 mg/dL      eGFR 7 ml/min/1.73sq m     Narrative:      National Kid[employer] Foundation guidelines for Chronic Kidney Disease (CKD):     Stage 1 with normal or high GFR (GFR > 90 mL/min/1.73 square meters)    Stage 2 Mild CKD (GFR = 60-89 mL/min/1.73 square meters)    Stage 3A Moderate CKD (GFR = 45-59 mL/min/1.73 square meters)    Stage 3B Moderate CKD (GFR = 30-44 mL/min/1.73 square meters)    Stage 4 Severe CKD (GFR = 15-29 mL/min/1.73 square meters)    Stage 5 End Stage CKD (GFR <15 mL/min/1.73 square meters)  Note: GFR calculation is accurate only with a steady state creatinine    CK [[id number]]  (Normal) Collected: 02/20/24 1409    Lab Status: Final result Specimen: Blood from Arm, Left Updated: 02/20/24 1439     Total CK 79 U/L     Magnesium [[id number]]  (Normal) Collected: 02/20/24 1409    Lab Status: Final result Specimen: Blood from Arm, Left Updated: 02/20/24 1439     Magnesium 2.5 mg/dL     Phosphorus [[id number]]  (Abnormal) Collected: 02/20/24 1409    Lab Status: Final result Specimen: Blood from Arm, Left Updated: 02/20/24 1439     Phosphorus 7.2 mg/dL      Protime-INR [[id number]]  (Normal) Collected: 02/20/24 1409    Lab Status: Final result Specimen: Blood from Arm, Left Updated: 02/20/24 1436     Protime 13.0 seconds      INR 0.96    APTT [[id number]]  (Normal) Collected: 02/20/24 1409    Lab Status: Final result Specimen: Blood from Arm, Left Updated: 02/20/24 1436     PTT 29 seconds     CBC and differential [[id number]]  (Abnormal) Collected: 02/20/24 1409    Lab Status: Final result Specimen: Blood from Arm, Left Updated: 02/20/24 1419     WBC 7.16 Thousand/uL      RBC 3.20 Million/uL      Hemoglobin 10.4 g/dL      Hematocrit 31.9 %       fL      MCH 32.5 pg      MCHC 32.6 g/dL      RDW 12.9 %      MPV 10.4 fL      Platelets 209 Thousands/uL      nRBC 0 /100 WBCs      Neutrophils Relative 65 %      Immat GRANS % 1 %      Lymphocytes Relative 17 %      Monocytes Relative 13 %      Eosinophils Relative 3 %      Basophils Relative 1 %      Neutrophils Absolute 4.78 Thousands/µL      Immature Grans Absolute 0.04 Thousand/uL      Lymphocytes Absolute 1.19 Thousands/µL      Monocytes Absolute 0.90 Thousand/µL      Eosinophils Absolute 0.21 Thousand/µL      Basophils Absolute 0.04 Thousands/µL     UA (URINE) with reflex to Scope [[id number]]     Lab Status: No result Specimen: Urine                    XR chest 2 views    (Results Pending)              Procedures  Procedures         ED Course  ED Course as of 02/20/24 1736   Tue Feb 20, 2024   1348 Nephrology made aware via TT as sent from their office.    1406 ECG 12 lead  Procedure Note: EKG  Date/Time: 02/20/24 2:06 PM   Interpreted by: Christine Suarez MD  Indications / Diagnosis: Electrolyte abnormality  ECG reviewed by me, the ED Physician: yes   The EKG demonstrates:  Rhythm: normal sinus  Intervals: normal intervals  Axis: normal axis  QRS/Blocks: normal QRS  ST Changes: No acute ST Changes, no STD/MARTA.  Flattened T waves in I and aVL.   Unable to view priors for comparison.    1423 Hemoglobin(!):  10.4  Anemia new when compared to prior. No evidence of active bleeding. Likely anemia of chronic disease.    1423 CBC and differential(!)  Reviewed and without actionable derangement.    1437 PTT: 29  WNL   1437 POCT INR: 0.96  WNL   1441 Creatinine(!): 7.36  Continued increase in creatinine. Most recent prior (4 days ago) 6.59.   1442 Potassium: 5.1  WNL   1442 Carbon Dioxide: 22  WNL   1442 Total CK: 79  WNL   1442 MAGNESIUM: 2.5  WNL   1442 Phosphorus(!): 7.2  Elevated to a greater extent than most recent prior of 6.5 1 month ago.    1501 XR chest 2 views  No acute findings on wet read.    1527 Pt made aware of all results and plan for admission. Pt agreeable.    1528 SLIM contacted for admission via TT.                                SBIRT 22yo+      Flowsheet Row Most Recent Value   Initial Alcohol Screen: US AUDIT-C     1. How often do you have a drink containing alcohol? 0 Filed at: 02/20/2024 1331   2. How many drinks containing alcohol do you have on a typical day you are drinking?  0 Filed at: 02/20/2024 1331   3a. Male UNDER 65: How often do you have five or more drinks on one occasion? 0 Filed at: 02/20/2024 1331   3b. FEMALE Any Age, or MALE 65+: How often do you have 4 or more drinks on one occassion? 0 Filed at: 02/20/2024 1331   Audit-C Score 0 Filed at: 02/20/2024 1331   VICTOR MANUEL: How many times in the past year have you...    Used an illegal drug or used a prescription medication for non-medical reasons? Never Filed at: 02/20/2024 1331                      Medical Decision Making  Pt is a 55yo M who presents with abnormal lab.     Differential diagnosis to include but not limited to electrolyte abnormality, arrhythmia, EKG changes, NAVEEN.  Will plan for labs and inevitable admission.  See ED course for results and details.    Plan to admit pt to [facility]. Pt discussed with admitting team and admission orders placed. Pt admitted without incident.       Amount and/or Complexity of Data Reviewed  Labs:  ordered. Decision-making details documented in ED Course.  Radiology: ordered. Decision-making details documented in ED Course.  ECG/medicine tests:  Decision-making details documented in ED Course.    Risk  Decision regarding hospitalization.             Disposition  Final diagnoses:   Elevated serum creatinine   Hyperphosphatemia   CKD (chronic kidney disease)     Time reflects when diagnosis was documented in both MDM as applicable and the Disposition within this note       Time User Action Codes Description Comment    2/20/2024  3:33 PM Christine Suarez [R79.89] Elevated serum creatinine     2/20/2024  3:33 PM Christine Suarez [E83.39] Hyperphosphatemia     2/20/2024  3:33 PM Christine Suarez [N18.9] CKD (chronic kidney disease)           ED Disposition       ED Disposition   Admit    Condition   Stable    Date/Time   Tue Feb 20, 2024 1533    Comment   Case was discussed with DHEERAJ and the patient's admission status was agreed to be Admission Status: inpatient status to the service of Dr. Carbone.               Follow-up Information    None         Patient's Medications   Discharge Prescriptions    No medications on file       No discharge procedures on file.    PDMP Review       None            ED Provider  Electronically Signed by             Christine Suarez MD  02/20/24 9458

## 2024-02-20 NOTE — CONSULTS
NEPHROLOGY HOSPITAL CONSULTATION   Jordy Tidwell 54 y.o. male MRN: [id number]  Unit/Bed#: ED HW2 Encounter: [id number]    ASSESSMENT and PLAN  Chronic kidney disease, stage V:  Progressive decline in renal function due to biopsy-proven FSGS with nephrotic range proteinuria.    Baseline creatinine in the mid threes as of June 2023.  In the fall creatinine near 5.0 and in 2024 creatinine initially 6.1 in January, 6.6 2/16/2024.    Patient sent to the emergency room by nephrology for evaluation on 2/20 due to increasing creatinine, hyperkalemia and metabolic acidosis:   Current creatinine 7.36.  Etiology: Biopsy-proven FSGS secondary versus genetic.  Biopsy showed severe interstitial fibrosis and tubular atrophy.    Preparing for eventual hemodialysis.  Plan in place for vascular follow-up for access creation.  Scheduled with Dr. Guthrie on 3/21/2024.  Mild uremic signs and symptoms: Fatigue, intermittent nausea, tremors.  Patient also states he has been trying to lose weight therefore lost a few pounds.  Drinking less than usual since he is sleeping more.  Prior renal ultrasound: Echogenic kidneys consistent with medical renal disease  Recommendations:  Patient reports mild uremic signs and symptoms such as increased fatigue and tremor.  He has also been having a.m. nausea  Patient believes he is drinking less because he is sleeping more therefore will provide with IV fluids overnight and check labs in the a.m.  Start half-normal saline +75 mill equivalents of sodium bicarbonate at 75 mL/h  Low phosphorus diet.  N.p.o. past midnight in case PermCath needs to be placed  No fluid restriction  Check labs in the a.m.  Save nondominant arm-left arm for access placement    Nephrotic range proteinuria:  UPCR consistently near 5 to 6 g and last summer up to 7 g  Prior workup unrevealing    Hyperkalemia:  Improved from outpatient level.    Currently acceptable  Bicarbonate containing IV which will continue to help with  kaliuresis    Acid-base disorder:  Metabolic acidosis with low serum bicarbonate level: Related to severe renal insufficiency  Improved with sodium bicarbonate tablets  Will place patient on bicarbonate containing IV fluids    Hypertension/volume status:  No evidence of volume overload  Medications: Outpatient medications on amlodipine 5 mg daily, Toprol-XL 50 mg daily  No changes, blood pressure acceptable.  Monitor    CKD-MBD:  Hyperphosphatemia: Current phosphorus level 7.2.  Low phosphorus diet..  Calcium level currently acceptable although prior levels have been high normal therefore we will start 9 calcium based binder.  On ergocalciferol for treatment of vitamin D deficiency.  Continue weekly dosing  Last  which is acceptable for current level of CKD    CKD anemia:  Hemoglobin acceptable, 10.4    Dyslipidemia  : On statin and Zetia    History of microscopic hematuria: Prior workup negative    Tobacco dependence: Smokes 1 pack of cigarettes per day.  Encouraged to stop smoking.  Patient is trying to pursue renal transplant therefore needs to stop    SUMMARY OF RECOMMENDATIONS:  At this time start IV fluids for hydration overnight.  Provide diet but make patient n.p.o. after midnight and lieu of possible need for dialysis access placement.  Check labs in the a.m.    HISTORY OF PRESENT ILLNESS:  Requesting Physician: Stefany Carbone DO  Reason for Consult: CKD stage V    Jordy Tidwell is a 54 y.o. male with a past medical history of CKD stage V due to biopsy-proven FSGS, nephrotic range proteinuria, hypertension, secondary hyperparathyroidism of renal origin, dyslipidemia who was admitted to [facility] after presenting with mild uremic signs and symptoms, progressively worsening renal function, hyperkalemia and metabolic acidosis.  Patient was seen in the clinic and due to mild uremic signs and symptoms and the aforementioned findings was sent to the hospital for further evaluation and possible  need for dialysis.  Patient was seen and examined in the emergency room.  He is mentating appropriately.  States that he is not having any difficulty urinating.  He is losing some weight but intentionally since he is reducing intake and trying to walk more.  He continues to smoke.  He does not use NSAIDs..  Labs reveal progressive worsening of renal function with creatinine above 7.  A renal consultation is requested today for assistance in the management of chronic kidney disease, stage V.    PAST MEDICAL HISTORY:  Past Medical History:   Diagnosis Date    Hypertension     Mood disorder (HCC)     Psychiatric disorder     Schizophrenia (HCC)        PAST SURGICAL HISTORY:  Past Surgical History:   Procedure Laterality Date    COLONOSCOPY      IR BIOPSY KIDNEY RANDOM  08/15/2023    NO PAST SURGERIES         ALLERGIES:  Allergies   Allergen Reactions    Geodon [Ziprasidone] Fatigue    Haldol [Haloperidol] Fatigue       SOCIAL HISTORY:  Social History     Substance and Sexual Activity   Alcohol Use Yes    Comment: weekends     Social History     Substance and Sexual Activity   Drug Use No     Social History     Tobacco Use   Smoking Status Every Day    Current packs/day: 1.00    Average packs/day: 1 pack/day for 35.6 years (35.6 ttl pk-yrs)    Types: Cigarettes    Start date: 7/14/1988   Smokeless Tobacco Never       FAMILY HISTORY:  Family History   Problem Relation Age of Onset    Breast cancer Mother     Cancer Father         bone/spinal       MEDICATIONS:  No current facility-administered medications for this encounter.    Current Outpatient Medications:     amLODIPine (NORVASC) 5 mg tablet, TAKE 1 TABLET BY MOUTH EVERY DAY, Disp: 90 tablet, Rfl: 1    bisacodyl (DULCOLAX) 5 mg EC tablet, Take 2 tablets (10 mg total) by mouth once for 1 dose, Disp: 2 tablet, Rfl: 0    ergocalciferol (VITAMIN D2) 50,000 units, TAKE 1 CAPSULE BY MOUTH ONE TIME PER WEEK, Disp: 12 capsule, Rfl: 1    ezetimibe (ZETIA) 10 mg tablet, TAKE 1  TABLET BY MOUTH EVERY DAY, Disp: 30 tablet, Rfl: 5    Garlic (GARLIQUE PO), Take by mouth (Patient not taking: Reported on 11/6/2023), Disp: , Rfl:     hydrocortisone (ANUSOL-HC) 25 mg suppository, Insert 1 suppository (25 mg total) into the rectum 2 (two) times a day as needed for hemorrhoids for up to 12 days, Disp: 12 suppository, Rfl: 1    metoprolol succinate (TOPROL-XL) 50 mg 24 hr tablet, TAKE 1 TABLET BY MOUTH EVERY DAY, Disp: 30 tablet, Rfl: 5    paliperidone (INVEGA) 6 MG 24 hr tablet, Take 6 mg by mouth every morning, Disp: , Rfl:     polyethylene glycol (GaviLyte-G) 4000 mL solution, TAKE 4,000 ML BY MOUTH ONCE FOR 1 DOSE. (Patient not taking: Reported on 2/20/2024), Disp: 4000 mL, Rfl: 0    rosuvastatin (CRESTOR) 10 MG tablet, TAKE 1 TABLET BY MOUTH EVERY DAY, Disp: 90 tablet, Rfl: 1    shark liver oil-cocoa butter (PREPARATION H) 0.25-3-85.5 % suppository, Insert 1 suppository into the rectum 2 (two) times a day as needed for hemorrhoids (Patient not taking: Reported on 2/7/2024), Disp: 12 suppository, Rfl: 0    sodium bicarbonate 650 mg tablet, Take 2 tablets (1,300 mg total) by mouth 3 (three) times a day, Disp: 540 tablet, Rfl: 3    REVIEW OF SYSTEMS:  Constitutional: Positive for fatigue, sleeping more than usual  HENT: Negative for postnasal drip  Eyes: Negative for visual disturbance.   Respiratory: Negative for cough, shortness of breath and wheezing.   Cardiovascular: Negative for chest pain, palpitations and leg swelling.   Gastrointestinal: Complains of intermittent nausea particularly in when he awakens from sleep and in the morning  Genitourinary: No dysuria, hematuria  Musculoskeletal: Negative for unusual arthralgia or myalgia  Skin: Negative for rash.   Neurological: Negative for focal weakness, headaches, dizziness..  Positive for tremor  Hematological: Negative for easy bruising or bleeding.  Psychiatric/Behavioral: Negative for confusion  All the systems were reviewed and were  "negative except as documented on the HPI.    PHYSICAL EXAM:  Current Weight: Weight - Scale: 86.6 kg (191 lb)  First Weight: Weight - Scale: 86.6 kg (191 lb)  Vitals:    02/20/24 1331 02/20/24 1333   BP:  154/88   Pulse:  83   Resp: 18    Temp: 98.9 °F (37.2 °C)    SpO2:  95%   Weight: 86.6 kg (191 lb)    Height: 5' 8\" (1.727 m)      No intake or output data in the 24 hours ending 02/20/24 1614  Physical Exam  Constitutional:       General: He is not in acute distress.     Appearance: He is well-developed. He is not ill-appearing, toxic-appearing or diaphoretic.   HENT:      Head: Normocephalic and atraumatic.      Nose: Nose normal. No congestion.      Mouth/Throat:      Mouth: Mucous membranes are dry.   Eyes:      General: No scleral icterus.        Right eye: No discharge.         Left eye: No discharge.      Extraocular Movements: Extraocular movements intact.      Conjunctiva/sclera: Conjunctivae normal.      Pupils: Pupils are equal, round, and reactive to light.   Neck:      Thyroid: No thyromegaly.      Vascular: No JVD.      Trachea: No tracheal deviation.   Cardiovascular:      Rate and Rhythm: Normal rate and regular rhythm.      Heart sounds: No murmur heard.     No friction rub. No gallop.   Pulmonary:      Effort: Pulmonary effort is normal.      Breath sounds: Normal breath sounds.   Abdominal:      General: Bowel sounds are normal. There is no distension.      Palpations: Abdomen is soft. There is no mass.      Tenderness: There is no abdominal tenderness. There is no guarding or rebound.   Musculoskeletal:         General: No swelling, tenderness or signs of injury. Normal range of motion.      Cervical back: Normal range of motion and neck supple.      Right lower leg: No edema.      Left lower leg: No edema.   Skin:     General: Skin is warm and dry.      Capillary Refill: Capillary refill takes less than 2 seconds.      Coloration: Skin is pale. Skin is not jaundiced.      Findings: No erythema " or rash.   Neurological:      Mental Status: He is alert and oriented to person, place, and time.   Psychiatric:         Attention and Perception: Attention normal.         Mood and Affect: Affect is flat.         Speech: Speech is delayed.         Behavior: Behavior normal.         Thought Content: Thought content normal.         Cognition and Memory: Cognition normal.         Judgment: Judgment normal.           Invasive Devices:      Lab Results:   Results from last 7 days   Lab Units 02/20/24  1409 02/16/24  1027   WBC Thousand/uL 7.16  --    WHITE BLOOD CELL COUNT. x10E3/uL  --  8.7   HEMOGLOBIN g/dL 10.4*  --    HEMOGLOBIN. g/dL  --  10.4*   HEMATOCRIT % 31.9*  --    HEMATOCRIT. %  --  31.9*   PLATELETS Thousands/uL 209  --    PLATELETS. x10E3/uL  --  259   POTASSIUM mmol/L 5.1 6.4*   CHLORIDE mmol/L 106 103   CO2 mmol/L 22 19*   BUN mg/dL 61* 56*   CREATININE mg/dL 7.36* 6.59*   CALCIUM mg/dL 9.5  --    MAGNESIUM mg/dL 2.5  --    PHOSPHORUS mg/dL 7.2*  --    ALK PHOS U/L 52  --    ALT U/L 7  --    AST U/L 5*  --      Other Studies:

## 2024-02-21 ENCOUNTER — APPOINTMENT (INPATIENT)
Dept: RADIOLOGY | Facility: HOSPITAL | Age: 54
DRG: 470 | End: 2024-02-21
Payer: COMMERCIAL

## 2024-02-21 LAB
ANION GAP SERPL CALCULATED.3IONS-SCNC: 8 MMOL/L
BACTERIA UR QL AUTO: ABNORMAL /HPF
BILIRUB UR QL STRIP: NEGATIVE
BUN SERPL-MCNC: 57 MG/DL (ref 5–25)
CALCIUM SERPL-MCNC: 9.1 MG/DL (ref 8.4–10.2)
CHLORIDE SERPL-SCNC: 109 MMOL/L (ref 96–108)
CLARITY UR: CLEAR
CO2 SERPL-SCNC: 23 MMOL/L (ref 21–32)
COLOR UR: ABNORMAL
CREAT SERPL-MCNC: 7.24 MG/DL (ref 0.6–1.3)
ERYTHROCYTE [DISTWIDTH] IN BLOOD BY AUTOMATED COUNT: 12.8 % (ref 11.6–15.1)
GFR SERPL CREATININE-BSD FRML MDRD: 7 ML/MIN/1.73SQ M
GLUCOSE SERPL-MCNC: 80 MG/DL (ref 65–140)
GLUCOSE UR STRIP-MCNC: ABNORMAL MG/DL
HCT VFR BLD AUTO: 31.3 % (ref 36.5–49.3)
HGB BLD-MCNC: 10.4 G/DL (ref 12–17)
HGB UR QL STRIP.AUTO: ABNORMAL
INR PPP: 1.08 (ref 0.84–1.19)
KETONES UR STRIP-MCNC: NEGATIVE MG/DL
LEUKOCYTE ESTERASE UR QL STRIP: NEGATIVE
MAGNESIUM SERPL-MCNC: 2.3 MG/DL (ref 1.9–2.7)
MCH RBC QN AUTO: 32.8 PG (ref 26.8–34.3)
MCHC RBC AUTO-ENTMCNC: 33.2 G/DL (ref 31.4–37.4)
MCV RBC AUTO: 99 FL (ref 82–98)
NITRITE UR QL STRIP: NEGATIVE
NON-SQ EPI CELLS URNS QL MICRO: ABNORMAL /HPF
OTHER STN SPEC: ABNORMAL
PH UR STRIP.AUTO: 7.5 [PH]
PHOSPHATE SERPL-MCNC: 6.3 MG/DL (ref 2.7–4.5)
PLATELET # BLD AUTO: 206 THOUSANDS/UL (ref 149–390)
PMV BLD AUTO: 10.9 FL (ref 8.9–12.7)
POTASSIUM SERPL-SCNC: 4.9 MMOL/L (ref 3.5–5.3)
PROT UR STRIP-MCNC: ABNORMAL MG/DL
PROTHROMBIN TIME: 14.2 SECONDS (ref 11.6–14.5)
RBC # BLD AUTO: 3.17 MILLION/UL (ref 3.88–5.62)
RBC #/AREA URNS AUTO: ABNORMAL /HPF
SODIUM SERPL-SCNC: 140 MMOL/L (ref 135–147)
SP GR UR STRIP.AUTO: 1.01 (ref 1–1.03)
UROBILINOGEN UR QL STRIP.AUTO: 0.2 E.U./DL
WBC # BLD AUTO: 6.76 THOUSAND/UL (ref 4.31–10.16)
WBC #/AREA URNS AUTO: ABNORMAL /HPF

## 2024-02-21 PROCEDURE — 85610 PROTHROMBIN TIME: CPT | Performed by: INTERNAL MEDICINE

## 2024-02-21 PROCEDURE — 85027 COMPLETE CBC AUTOMATED: CPT | Performed by: FAMILY MEDICINE

## 2024-02-21 PROCEDURE — 84100 ASSAY OF PHOSPHORUS: CPT | Performed by: FAMILY MEDICINE

## 2024-02-21 PROCEDURE — 80048 BASIC METABOLIC PNL TOTAL CA: CPT | Performed by: FAMILY MEDICINE

## 2024-02-21 PROCEDURE — 83735 ASSAY OF MAGNESIUM: CPT | Performed by: FAMILY MEDICINE

## 2024-02-21 PROCEDURE — 99232 SBSQ HOSP IP/OBS MODERATE 35: CPT | Performed by: INTERNAL MEDICINE

## 2024-02-21 PROCEDURE — 76775 US EXAM ABDO BACK WALL LIM: CPT

## 2024-02-21 PROCEDURE — 81001 URINALYSIS AUTO W/SCOPE: CPT | Performed by: FAMILY MEDICINE

## 2024-02-21 PROCEDURE — 99232 SBSQ HOSP IP/OBS MODERATE 35: CPT | Performed by: FAMILY MEDICINE

## 2024-02-21 RX ORDER — SODIUM BICARBONATE 650 MG/1
1300 TABLET ORAL
Status: DISCONTINUED | OUTPATIENT
Start: 2024-02-21 | End: 2024-02-23

## 2024-02-21 RX ADMIN — AMLODIPINE BESYLATE 5 MG: 5 TABLET ORAL at 08:35

## 2024-02-21 RX ADMIN — SEVELAMER HYDROCHLORIDE 800 MG: 800 TABLET, FILM COATED ORAL at 09:45

## 2024-02-21 RX ADMIN — PRAVASTATIN SODIUM 80 MG: 80 TABLET ORAL at 16:40

## 2024-02-21 RX ADMIN — SEVELAMER HYDROCHLORIDE 800 MG: 800 TABLET, FILM COATED ORAL at 12:25

## 2024-02-21 RX ADMIN — NICOTINE 1 PATCH: 14 PATCH, EXTENDED RELEASE TRANSDERMAL at 09:44

## 2024-02-21 RX ADMIN — METOPROLOL SUCCINATE 50 MG: 50 TABLET, EXTENDED RELEASE ORAL at 08:35

## 2024-02-21 RX ADMIN — SEVELAMER HYDROCHLORIDE 800 MG: 800 TABLET, FILM COATED ORAL at 16:39

## 2024-02-21 RX ADMIN — HEPARIN SODIUM 5000 UNITS: 5000 INJECTION INTRAVENOUS; SUBCUTANEOUS at 06:33

## 2024-02-21 RX ADMIN — SODIUM BICARBONATE 650 MG TABLET 1300 MG: at 12:25

## 2024-02-21 RX ADMIN — HEPARIN SODIUM 5000 UNITS: 5000 INJECTION INTRAVENOUS; SUBCUTANEOUS at 14:34

## 2024-02-21 RX ADMIN — SODIUM BICARBONATE 650 MG TABLET 1300 MG: at 16:39

## 2024-02-21 RX ADMIN — EZETIMIBE 10 MG: 10 TABLET ORAL at 08:35

## 2024-02-21 RX ADMIN — HEPARIN SODIUM 5000 UNITS: 5000 INJECTION INTRAVENOUS; SUBCUTANEOUS at 21:18

## 2024-02-21 NOTE — UTILIZATION REVIEW
Initial Clinical Review    Admission: Date/Time/Statement:   Admission Orders (From admission, onward)       Ordered        02/20/24 1604  INPATIENT ADMISSION  Once                          Orders Placed This Encounter   Procedures    INPATIENT ADMISSION     Standing Status:   Standing     Number of Occurrences:   1     Order Specific Question:   Level of Care     Answer:   Med Surg [16]     Order Specific Question:   Estimated length of stay     Answer:   More than 2 Midnights     Order Specific Question:   Certification     Answer:   I certify that inpatient services are medically necessary for this patient for a duration of greater than two midnights. See H&P and MD Progress Notes for additional information about the patient's course of treatment.     ED Arrival Information       Expected   2/20/2024     Arrival   2/20/2024 13:25    Acuity   Urgent              Means of arrival   Walk-In    Escorted by   Self    Service   Hospitalist    Admission type   Emergency              Arrival complaint   NAVEEN/CKD/Hyperkalemia/Refractory Acidosis             Chief Complaint   Patient presents with    Abnormal Lab     Was at PCP office and told his potassium level and creatinine level were high and to come here for eval       Initial Presentation:   54 yom to ER from nephrology office for elevated K & climbing creatinine. Hx smoker, daily drinker @ 3 shots/day, HTN, schizophrenia. Presents fatigued with bilateral rhonchi. Admission work-up showing Cr>7, elevated phosphorus.  Admitted to inpatient status for CKD.     Per renal:   CKD stage V   Native disease biopsy-proven FSGS   Plan  - Patient was reported to have mild uremic symptoms. Patient does not emergently require dialysis but may be volume depleted and agree with starting half-normal saline with 75 mEq of bicarbonate  - N.p.o. after midnight in case patient needs tunneled dialysis catheter placement to initiate dialysis in the coming 24 hours.  Will reevaluate in the  morning based on patient's symptoms and lab work to make determination if the patient needs a catheter placement  - If potassium rises above goal, would recommend giving Lokelma     2-acid/base-bicarbonate currently is improved but recent acidosis and hyperkalemia.  Agree with low-dose saline half-normal with 75 mg once bicarbonate tonight      Date: 2/21/24   Day 2:   Uremic symptoms improving. No HD needs at this time. Trial off IVF & start bicarb tabs per renal with close watch of labs. Scheduled for US kidneys & bladder.    ED Triage Vitals   Temperature Pulse Respirations Blood Pressure SpO2   02/20/24 1331 02/20/24 1333 02/20/24 1331 02/20/24 1333 02/20/24 1333   98.9 °F (37.2 °C) 83 18 154/88 95 %      Temp Source Heart Rate Source Patient Position - Orthostatic VS BP Location FiO2 (%)   02/20/24 1725 02/20/24 1725 02/20/24 1725 02/20/24 1725 --   Oral Monitor Lying Right arm       Pain Score       02/20/24 1331       No Pain          Wt Readings from Last 1 Encounters:   02/20/24 86.7 kg (191 lb 2.2 oz)     Additional Vital Signs:   Date/Time Temp Pulse Resp BP MAP (mmHg) SpO2 O2 Device Patient Position - Orthostatic VS   02/21/24 04:20:43 98.3 °F (36.8 °C) 92 20 146/95 112 94 % -- --   02/21/24 04:19:31 98.3 °F (36.8 °C) 104 20 148/103 Abnormal  118 93 % -- --   02/20/24 22:33:47 98.1 °F (36.7 °C) 81 20 152/92 112 96 % -- --   02/20/24 19:57:50 97.9 °F (36.6 °C) 77 16 163/92 116 95 % -- --   02/20/24 1930 -- 76 14 -- -- 97 % None (Room air) Lying   02/20/24 1845 -- 80 20 160/93 120 98 % -- --   02/20/24 1725 98.1 °F (36.7 °C) 78 18 167/95 124 97 % None (Room air) Lying   02/20/24 1333 -- 83 -- 154/88 -- 95 % -- --   02/20/24 1331 98.9 °F (37.2 °C) -- 18 -- -- -- -- --     Pertinent Labs/Diagnostic Test Results:   XR chest 2 views   Final Result  (02/21 0810)      No acute cardiopulmonary disease.      Findings are stable      Workstation performed: [id number]         US kidney and bladder    (Results  Pending)     2/20 EKG=  Normal sinus rhythm  Nonspecific T wave abnormality    Results from last 7 days   Lab Units 02/21/24 0440 02/20/24  1409 02/16/24  1027   WBC Thousand/uL 6.76 7.16  --    WHITE BLOOD CELL COUNT. x10E3/uL  --   --  8.7   HEMOGLOBIN g/dL 10.4* 10.4*  --    HEMOGLOBIN. g/dL  --   --  10.4*   HEMATOCRIT % 31.3* 31.9*  --    HEMATOCRIT. %  --   --  31.9*   PLATELETS Thousands/uL 206 209  --    PLATELETS. x10E3/uL  --   --  259   NEUTROS ABS Thousands/µL  --  4.78  --    NEUTROS ABS. x10E3/uL  --   --  6.2     Results from last 7 days   Lab Units 02/21/24 0440 02/20/24  1409 02/16/24  1027   SODIUM mmol/L 140 137 138   POTASSIUM mmol/L 4.9 5.1 6.4*   CHLORIDE mmol/L 109* 106 103   CO2 mmol/L 23 22 19*   ANION GAP mmol/L 8 9  --    BUN mg/dL 57* 61* 56*   CREATININE mg/dL 7.24* 7.36* 6.59*   EGFR ml/min/1.73sq m 7 7 9*   CALCIUM mg/dL 9.1 9.5  --    MAGNESIUM mg/dL 2.3 2.5  --    PHOSPHORUS mg/dL 6.3* 7.2*  --      Results from last 7 days   Lab Units 02/20/24  1409   AST U/L 5*   ALT U/L 7   ALK PHOS U/L 52   TOTAL PROTEIN g/dL 6.8   ALBUMIN g/dL 3.8   TOTAL BILIRUBIN mg/dL 0.24     Results from last 7 days   Lab Units 02/21/24  0440 02/20/24  1409 02/16/24  1027   GLUCOSE RANDOM mg/dL 80 101 80     Results from last 7 days   Lab Units 02/20/24  1409   CK TOTAL U/L 79     Results from last 7 days   Lab Units 02/21/24  0440 02/20/24  1409   PROTIME seconds 14.2 13.0   INR  1.08 0.96   PTT seconds  --  29     Results from last 7 days   Lab Units 02/21/24  0057   CLARITY UA  Clear   COLOR UA  Light Yellow   SPEC GRAV UA  1.010   PH UA  7.5   GLUCOSE UA mg/dl 100 (1/10%)*   KETONES UA mg/dl Negative   BLOOD UA  Moderate*   PROTEIN UA mg/dl 100 (2+)*   NITRITE UA  Negative   BILIRUBIN UA  Negative   UROBILINOGEN UA E.U./dl 0.2   LEUKOCYTES UA  Negative   WBC UA /hpf 1-2   RBC UA /hpf 10-20*   BACTERIA UA /hpf Occasional   EPITHELIAL CELLS WET PREP /hpf None Seen       ED Treatment:   Medication  Administration from 02/20/2024 1153 to 02/20/2024 1955         Date/Time Order Dose Route Action     02/20/2024 1720 EST sodium bicarbonate 75 mEq in sodium chloride 0.45 % 1,000 mL infusion -- Intravenous New Bag          Past Medical History:   Diagnosis Date    Hypertension     Mood disorder (HCC)     Psychiatric disorder     Schizophrenia (HCC)      Present on Admission:   CKD (chronic kidney disease) stage 5, GFR less than 15 ml/min (HCC)   Anemia due to stage 5 chronic kidney disease, not on chronic dialysis    Benign hypertension with chronic kidney disease, stage V (HCC)   Dyslipidemia   Hyperphosphatemia   Acid-base disorder, mixed      Admitting Diagnosis: Hyperphosphatemia [E83.39]  CKD (chronic kidney disease) [N18.9]  Elevated serum creatinine [R79.89]  Abnormal laboratory test [R89.9]  Age/Sex: 54 y.o. male  Admission Orders:  Consult renal  Scd/foot pumps    Scheduled Medications:  Medications 02/12 02/13 02/14 02/15 02/16 02/17 02/18 02/19 02/20 02/21   amLODIPine (NORVASC) tablet 5 mg  Dose: 5 mg  Freq: Daily Route: PO  Start: 02/20/24 2130   Admin Instructions:      Order specific questions:               2229      0835        ezetimibe (ZETIA) tablet 10 mg  Dose: 10 mg  Freq: Daily Route: PO  Start: 02/20/24 2130   Admin Instructions:               2229      0835         heparin (porcine) subcutaneous injection 5,000 Units  Dose: 5,000 Units  Freq: Every 8 hours scheduled Route: SC  Start: 02/20/24 2200   Admin Instructions:               2229      0633     1400     2200        metoprolol succinate (TOPROL-XL) 24 hr tablet 50 mg  Dose: 50 mg  Freq: Daily Route: PO  Start: 02/21/24 0900   Admin Instructions:      Order specific questions:                0835        nicotine (NICODERM CQ) 14 mg/24hr TD 24 hr patch 1 patch  Dose: 1 patch  Freq: Daily Route: TD  Start: 02/21/24 0900   Admin Instructions:                0944        paliperidone (INVEGA) 24 hr tablet 3 mg  Dose: 3 mg  Freq: Every  morning Route: PO  Start: 02/21/24 0900   Admin Instructions:                0836        pravastatin (PRAVACHOL) tablet 80 mg  Dose: 80 mg  Freq: Daily with dinner Route: PO  Start: 02/21/24 1630             1630        sevelamer (RENAGEL) tablet 800 mg  Dose: 800 mg  Freq: 3 times daily with meals Route: PO  Start: 02/21/24 0730   Admin Instructions:                0945     1225     1630        sodium bicarbonate tablet 1,300 mg  Dose: 1,300 mg  Freq: 3 times daily after meals Route: PO  Start: 02/21/24 1230             1225     1730        Legend:       [id number]/2002/21        Continuous Meds Sorted by Name  for Renatoruelbinu Jordy as of 02/12/24 through 2/21/24  Legend:       Medications 02/12 02/13 02/14 02/15 02/16 02/17 02/18 02/19 02/20 02/21   sodium bicarbonate 75 mEq in sodium chloride 0.45 % 1,000 mL infusion  Rate: 75 mL/hr Freq: Continuous Route: IV  Last Dose: Stopped (02/21/24 0832)  Start: 02/20/24 1645 End: 02/21/24 0806            1720      0806-D/C'd  0832            Network Utilization Review Department  ATTENTION: Please call with any questions or concerns to [phone] and carefully listen to the prompts so that you are directed to the right person. All voicemails are confidential.   For Discharge needs, contact Care Management DC Support Team at [phone] opt. 2  Send all requests for admission clinical reviews, approved or denied determinations and any other requests to dedicated fax number below belonging to the campus where the patient is receiving treatment. List of dedicated fax numbers for the Facilities:  FACILITY NAME UR FAX NUMBER   ADMISSION DENIALS (Administrative/Medical Necessity) [phone]   DISCHARGE SUPPORT TEAM (NETWORK) [phone]   PARENT CHILD HEALTH (Maternity/NICU/Pediatrics) [phone]   [facility] [phone]   [facility] [phone]   [facility]  [facility] [phone]   [facility] [phone]   [facility] [phone]   [facility] [phone]   [facility] [phone]   [facility] [phone]   [facility] [phone]   [facility] [phone]   [facility] [phone]   [facility] [phone]

## 2024-02-21 NOTE — ASSESSMENT & PLAN NOTE
Metabolic acidosis with low serum bicarb, on sodium bicarb tablets at home  Was started on IV fluids with bicarb on admission which has been discontinued today  Started on bicarb tablets  Repeat lab work in a.m.

## 2024-02-21 NOTE — ASSESSMENT & PLAN NOTE
Hemoglobin stable.  Repeat lab work in a.m.    Results from last 7 days   Lab Units 02/21/24  0440 02/20/24  1409 02/16/24  1027   HEMOGLOBIN g/dL 10.4* 10.4*  --    HEMOGLOBIN. g/dL  --   --  10.4*   HEMATOCRIT % 31.3* 31.9*  --    HEMATOCRIT. %  --   --  31.9*   MCV fL 99* 100*  --    MCV. fL  --   --  97

## 2024-02-21 NOTE — ASSESSMENT & PLAN NOTE
Phosphorus 7.2.  Patient placed on low phosphorus diet     If you are a smoker, it is important for your health to stop smoking. Please be aware that second hand smoke is also harmful.

## 2024-02-21 NOTE — PROGRESS NOTES
NEPHROLOGY HOSPITAL PROGRESS NOTE   Jordy Tidwell 54 y.o. male MRN: [id number]  Unit/Bed#: 2 South 202 Encounter: [id number]  Reason for Consult: CKD V    ASSESSMENT and PLAN:    54-year-old male with past medical history of CKD stage V with biopsy-proven FSGS, proteinuria, hypertension, secondary hyperparathyroidism, hyperlipidemia, nicotine dependence with ongoing smoking, who is advised to come to the hospital by outpatient nephrology team due to progression of renal  dysfunction along with hyperkalemia noted on lab work from 2/16.     1-CKD stage V     - Outpatient nephrologist Dr. Costello and patient saw advanced practitioner Bing BERRIOS today in the renal office  - Patient's renal function has been progressively declining  -Patient's creatinine 6.6 on 2/16 prompting recommendations for the patient to come to the hospital  - Admission creatinine 2/27.4 mg/dL  - Native disease biopsy-proven FSGS  - Access-patient was scheduled to see vascular surgery team on March 21 for access creation evaluation surgery  - 2/21 - creat stable 7.2 mg/dL, K ok 4.9. bicarb 23.    Overall, patient has progression of kidney dysfunction.Patient likely needs to initiate dialysis soon and that could also include this admission.  Patient's uremic symptoms that he had prior to admission are improved completely per his report today.  He did have symptoms of weakness, fatigue, nausea prior to admission.  On my exam on 2/21, patient is not uremic, no asterixis and clinically states he feels back to baseline.  I did review the progression of renal dysfunction but bicarbonate has improved and potassium is improved with low-dose bicarbonate based fluids overnight.  Will plan to change the patient to sodium bicarbonate tablets, continue sevelamer.  May need to consider Lokelma but will restart diet for now and place patient is n.p.o. past midnight in case we need to initiate dialysis in the coming 24 to 48 hours and patient states  understanding.  I have also reached out to the outpatient vascular surgeon who is going to be performing the vascular access surgery for the patient to initiate discussions regarding timing of dialysis access if it can be moved sooner than another option could be for the patient to have weekly or every other week lab work and close monitoring as outpatient until access surgery is completed and fistula is ready to use.  Patient states understanding to both options.     Plan  - No urgent indication for dialysis today  - Hold intravenous fluids  - Start bicarbonate tablets  - Low potassium diet  - May need to consider Lokelma  - Continue sevelamer  - No uremic symptoms today.  - Place n.p.o. after midnight tonight in case needs dialysis access tomorrow  - Patient is not yet stable for final disposition from renal standpoint  - I/os; avoid nephrotoxic agents  - Avoid hypotension  -check Cystatin C with GFR in a.m.  - I reached out to vascular surgery team outpatient to discuss case and I have discussed the case with outpatient surgeon, patient's fistula will likely require 2 stages to surgery.  Therefore we will have further prolonged time for healing and maturation.  Therefore from our end, I will plan to rereview with the patient depending on his blood work in the morning, symptoms, cystatin C regarding placement of tunneled catheter versus no catheter for now and follow closely as outpatient potentially  - Reviewed case primary team attending were in agreement with renal plan including kidney and bladder ultrasound and rest of plan above.     2-acid/base-bicarbonate level is improved.  Start bicarbonate tablets as noted     3-hypertension-avoid hypotension and monitor closely with current regimen     4-MBD-agree with starting phosphorus based binders and started on sevelamer 2/20     5-anemia-monitoring for now     6-history of microscopic hematuria-this appears to be a chronic issue.    SUBJECTIVE / 24H INTERVAL  "HISTORY:  Patient denies complaints today.  No fatigue.  No nausea no vomiting..  No shortness of breath.    OBJECTIVE:  Current Weight: Weight - Scale: 86.7 kg (191 lb 2.2 oz)  Vitals:    02/20/24 2233 02/21/24 0419 02/21/24 0420 02/21/24 0829   BP: 152/92 (!) 148/103 146/95 143/94   BP Location:       Pulse: 81 104 92 92   Resp: 20 20 20    Temp: 98.1 °F (36.7 °C) 98.3 °F (36.8 °C) 98.3 °F (36.8 °C) 98 °F (36.7 °C)   TempSrc:       SpO2: 96% 93% 94% 95%   Weight: 86.7 kg (191 lb 2.2 oz)      Height: 5' 8.5\" (1.74 m)          Intake/Output Summary (Last 24 hours) at 2/21/2024 0849  Last data filed at 2/21/2024 0636  Gross per 24 hour   Intake --   Output 1050 ml   Net -1050 ml     General: NAD  Skin: no rash  Eyes: anicteric sclera  ENT: moist mucous membrane  Neck: supple  Chest: CTA b/l, no ronchii, no wheeze, no rubs, no rales  CVS: s1s2, no murmur, no gallop, no rub  Abdomen: soft, nontender, nl sounds  Extremities: no edema LE b/l, no asterixis  : no lee  Neuro: AAOX3  Psych: normal affect    Medications:    Current Facility-Administered Medications:     amLODIPine (NORVASC) tablet 5 mg, 5 mg, Oral, Daily, Stefany Revankar, DO, 5 mg at 02/20/24 2229    ezetimibe (ZETIA) tablet 10 mg, 10 mg, Oral, Daily, Stefany Revankar, DO, 10 mg at 02/20/24 2229    heparin (porcine) subcutaneous injection 5,000 Units, 5,000 Units, Subcutaneous, Q8H CODY, 5,000 Units at 02/21/24 0633 **AND** Platelet count, , , Once, Stefany Revankar, DO    metoprolol succinate (TOPROL-XL) 24 hr tablet 50 mg, 50 mg, Oral, Daily, Stefany Revankar, DO    nicotine (NICODERM CQ) 14 mg/24hr TD 24 hr patch 1 patch, 1 patch, Transdermal, Daily, Stefany Carbone,     paliperidone (INVEGA) 24 hr tablet 3 mg, 3 mg, Oral, QAM, Stefany Carbone, DO    pravastatin (PRAVACHOL) tablet 80 mg, 80 mg, Oral, Daily With Dinner, Stefany Carbone,     sevelamer (RENAGEL) tablet 800 mg, 800 mg, Oral, TID With Meals, Segun Rojo MD    Laboratory " Results:  Results from last 7 days   Lab Units 02/21/24  0440 02/20/24  1409 02/16/24  1027   WBC Thousand/uL 6.76 7.16  --    WHITE BLOOD CELL COUNT. x10E3/uL  --   --  8.7   HEMOGLOBIN g/dL 10.4* 10.4*  --    HEMOGLOBIN. g/dL  --   --  10.4*   HEMATOCRIT % 31.3* 31.9*  --    HEMATOCRIT. %  --   --  31.9*   PLATELETS Thousands/uL 206 209  --    PLATELETS. x10E3/uL  --   --  259   POTASSIUM mmol/L 4.9 5.1 6.4*   CHLORIDE mmol/L 109* 106 103   CO2 mmol/L 23 22 19*   BUN mg/dL 57* 61* 56*   CREATININE mg/dL 7.24* 7.36* 6.59*   CALCIUM mg/dL 9.1 9.5  --    MAGNESIUM mg/dL 2.3 2.5  --    PHOSPHORUS mg/dL 6.3* 7.2*  --

## 2024-02-21 NOTE — ASSESSMENT & PLAN NOTE
Lab Results   Component Value Date    EGFR 7 02/21/2024    EGFR 7 02/20/2024    EGFR 9 (L) 02/16/2024    CREATININE 7.24 (H) 02/21/2024    CREATININE 7.36 (H) 02/20/2024    CREATININE 6.59 (H) 02/16/2024     Patient with CKD stage V due to FSGS proven by biopsy he was seen by nephrology today and advised to come to the ER due to worsening creatinine, hyperkalemia, metabolic acidosis on outpatient lab work  Creatinine on admission 7.36 but potassium level improved  Creatinine today continues to remain stable.  Per nephrology no urgent indication for dialysis today  N.p.o. after midnight for possible permacath placement  Recheck labs in a.m.

## 2024-02-21 NOTE — PLAN OF CARE
Problem: RESPIRATORY - ADULT  Goal: Achieves optimal ventilation and oxygenation  Description: INTERVENTIONS:  - Assess for changes in respiratory status  - Assess for changes in mentation and behavior  - Position to facilitate oxygenation and minimize respiratory effort  - Oxygen administered by appropriate delivery if ordered  - Initiate smoking cessation education as indicated  - Encourage broncho-pulmonary hygiene including cough, deep breathe, Incentive Spirometry  - Assess the need for suctioning and aspirate as needed  - Assess and instruct to report SOB or any respiratory difficulty  - Respiratory Therapy support as indicated  Outcome: Progressing     Problem: GASTROINTESTINAL - ADULT  Goal: Minimal or absence of nausea and/or vomiting  Description: INTERVENTIONS:  - Administer IV fluids if ordered to ensure adequate hydration  - Maintain NPO status until nausea and vomiting are resolved  - Nasogastric tube if ordered  - Administer ordered antiemetic medications as needed  - Provide nonpharmacologic comfort measures as appropriate  - Advance diet as tolerated, if ordered  - Consider nutrition services referral to assist patient with adequate nutrition and appropriate food choices  Outcome: Progressing  Goal: Maintains or returns to baseline bowel function  Description: INTERVENTIONS:  - Assess bowel function  - Encourage oral fluids to ensure adequate hydration  - Administer IV fluids if ordered to ensure adequate hydration  - Administer ordered medications as needed  - Encourage mobilization and activity  - Consider nutritional services referral to assist patient with adequate nutrition and appropriate food choices  Outcome: Progressing  Goal: Maintains adequate nutritional intake  Description: INTERVENTIONS:  - Monitor percentage of each meal consumed  - Identify factors contributing to decreased intake, treat as appropriate  - Assist with meals as needed  - Monitor I&O, weight, and lab values if  indicated  - Obtain nutrition services referral as needed  Outcome: Progressing     Problem: GENITOURINARY - ADULT  Goal: Maintains or returns to baseline urinary function  Description: INTERVENTIONS:  - Assess urinary function  - Encourage oral fluids to ensure adequate hydration if ordered  - Administer IV fluids as ordered to ensure adequate hydration  - Administer ordered medications as needed  - Offer frequent toileting  - Follow urinary retention protocol if ordered  Outcome: Progressing  Goal: Absence of urinary retention  Description: INTERVENTIONS:  - Assess patient’s ability to void and empty bladder  - Monitor I/O  - Bladder scan as needed  - Discuss with physician/AP medications to alleviate retention as needed  - Discuss catheterization for long term situations as appropriate  Outcome: Progressing     Problem: METABOLIC, FLUID AND ELECTROLYTES - ADULT  Goal: Electrolytes maintained within normal limits  Description: INTERVENTIONS:  - Monitor labs and assess patient for signs and symptoms of electrolyte imbalances  - Administer electrolyte replacement as ordered  - Monitor response to electrolyte replacements, including repeat lab results as appropriate  - Instruct patient on fluid and nutrition as appropriate  Outcome: Progressing  Goal: Fluid balance maintained  Description: INTERVENTIONS:  - Monitor labs   - Monitor I/O and WT  - Instruct patient on fluid and nutrition as appropriate  - Assess for signs & symptoms of volume excess or deficit  Outcome: Progressing  Goal: Glucose maintained within target range  Description: INTERVENTIONS:  - Monitor Blood Glucose as ordered  - Assess for signs and symptoms of hyperglycemia and hypoglycemia  - Administer ordered medications to maintain glucose within target range  - Assess nutritional intake and initiate nutrition service referral as needed  Outcome: Progressing     Problem: HEMATOLOGIC - ADULT  Goal: Maintains hematologic stability  Description:  INTERVENTIONS  - Assess for signs and symptoms of bleeding or hemorrhage  - Monitor labs  - Administer supportive blood products/factors as ordered and appropriate  Outcome: Progressing

## 2024-02-21 NOTE — PROGRESS NOTES
[facility]  Progress Note  Name: Jordy Tidwell I  MRN: [id number]  Unit/Bed#: 2 South 202 I Date of Admission: 2/20/2024   Date of Service: 2/21/2024 I Hospital Day: 1    Assessment/Plan   * CKD (chronic kidney disease) stage 5, GFR less than 15 ml/min (HCC)  Assessment & Plan  Lab Results   Component Value Date    EGFR 7 02/21/2024    EGFR 7 02/20/2024    EGFR 9 (L) 02/16/2024    CREATININE 7.24 (H) 02/21/2024    CREATININE 7.36 (H) 02/20/2024    CREATININE 6.59 (H) 02/16/2024     Patient with CKD stage V due to FSGS proven by biopsy he was seen by nephrology today and advised to come to the ER due to worsening creatinine, hyperkalemia, metabolic acidosis on outpatient lab work  Creatinine on admission 7.36 but potassium level improved  Creatinine today continues to remain stable.  Per nephrology no urgent indication for dialysis today  N.p.o. after midnight for possible permacath placement  Recheck labs in a.m.    Acid-base disorder, mixed  Assessment & Plan  Metabolic acidosis with low serum bicarb, on sodium bicarb tablets at home  Was started on IV fluids with bicarb on admission which has been discontinued today  Started on bicarb tablets  Repeat lab work in a.m.    Hyperphosphatemia  Assessment & Plan  Phosphorus 7.2 --> 6.3  Patient placed on low phosphorus diet      Anemia due to stage 5 chronic kidney disease, not on chronic dialysis   Assessment & Plan  Hemoglobin stable.  Repeat lab work in a.m.    Results from last 7 days   Lab Units 02/21/24  0440 02/20/24  1409 02/16/24  1027   HEMOGLOBIN g/dL 10.4* 10.4*  --    HEMOGLOBIN. g/dL  --   --  10.4*   HEMATOCRIT % 31.3* 31.9*  --    HEMATOCRIT. %  --   --  31.9*   MCV fL 99* 100*  --    MCV. fL  --   --  97        Dyslipidemia  Assessment & Plan  Continue statin.    Benign hypertension with chronic kidney disease, stage V (HCC)  Assessment & Plan  Continue Norvasc, Toprol-XL and monitor blood pressures.               VTE  Pharmacologic Prophylaxis:    Heparin    Mobility:   Basic Mobility Inpatient Raw Score: 23  -HLM Goal: 7: Walk 25 feet or more  JH-HLM Achieved: 5: Stand (1 or more minutes)  HLM Goal NOT achieved. Continue with multidisciplinary rounding and encourage appropriate mobility to improve upon HLM goals.    Patient Centered Rounds: I performed bedside rounds with nursing staff today.   Discussions with Specialists or Other Care Team Provider: yes - nephrology    Education and Discussions with Family / Patient: yes    Total Time Spent on Date of Encounter in care of patient: This time was spent on one or more of the following: performing physical exam; counseling and coordination of care; obtaining or reviewing history; documenting in the medical record; reviewing/ordering tests, medications or procedures; communicating with other healthcare professionals and discussing with patient's family/caregivers.    Current Length of Stay: 1 day(s)  Current Patient Status: Inpatient   Certification Statement: The patient will continue to require additional inpatient hospital stay due to his CKD stage V requiring monitoring of lab work  Discharge Plan: Anticipate discharge in 48-72 hrs to home.    Code Status: Level 3 - DNAR and DNI    Subjective:     Patient denies any abdominal pain, nausea, vomiting.  Continues to report fatigue    Objective:     Vitals:   Temp (24hrs), Av.2 °F (36.8 °C), Min:97.9 °F (36.6 °C), Max:98.9 °F (37.2 °C)    Temp:  [97.9 °F (36.6 °C)-98.9 °F (37.2 °C)] 98 °F (36.7 °C)  HR:  [] 92  Resp:  [14-20] 20  BP: (128-167)/() 143/94  SpO2:  [93 %-98 %] 95 %  Body mass index is 28.64 kg/m².     Input and Output Summary (last 24 hours):     Intake/Output Summary (Last 24 hours) at 2024 0932  Last data filed at 2024 0636  Gross per 24 hour   Intake --   Output 1050 ml   Net -1050 ml       Physical Exam:   Physical Exam  Vitals reviewed.   Constitutional:       General: He is not in  acute distress.     Appearance: He is not ill-appearing.   HENT:      Head: Normocephalic and atraumatic.   Eyes:      General:         Right eye: No discharge.         Left eye: No discharge.      Extraocular Movements: Extraocular movements intact.   Cardiovascular:      Rate and Rhythm: Normal rate and regular rhythm.   Pulmonary:      Effort: Pulmonary effort is normal. No respiratory distress.      Breath sounds: Normal breath sounds. No wheezing or rales.   Abdominal:      General: Bowel sounds are normal. There is no distension.      Palpations: Abdomen is soft.      Tenderness: There is no abdominal tenderness.   Musculoskeletal:      Right lower leg: No edema.      Left lower leg: No edema.   Neurological:      Mental Status: He is alert and oriented to person, place, and time.   Psychiatric:         Mood and Affect: Affect is flat.          Additional Data:     Labs:  Results from last 7 days   Lab Units 02/21/24  0440 02/20/24  1409   WBC Thousand/uL 6.76 7.16   HEMOGLOBIN g/dL 10.4* 10.4*   HEMATOCRIT % 31.3* 31.9*   PLATELETS Thousands/uL 206 209   NEUTROS PCT %  --  65   LYMPHS PCT %  --  17   MONOS PCT %  --  13*   EOS PCT %  --  3     Results from last 7 days   Lab Units 02/21/24  0440 02/20/24  1409   SODIUM mmol/L 140 137   POTASSIUM mmol/L 4.9 5.1   CHLORIDE mmol/L 109* 106   CO2 mmol/L 23 22   BUN mg/dL 57* 61*   CREATININE mg/dL 7.24* 7.36*   ANION GAP mmol/L 8 9   CALCIUM mg/dL 9.1 9.5   ALBUMIN g/dL  --  3.8   TOTAL BILIRUBIN mg/dL  --  0.24   ALK PHOS U/L  --  52   ALT U/L  --  7   AST U/L  --  5*   GLUCOSE RANDOM mg/dL 80 101     Results from last 7 days   Lab Units 02/21/24  0440   INR  1.08                   Lines/Drains:  Invasive Devices       Peripheral Intravenous Line  Duration             Peripheral IV 02/20/24 Left Antecubital <1 day                          Imaging: Reviewed radiology reports from this admission including: chest xray    Recent Cultures (last 7 days):         Last  24 Hours Medication List:   Current Facility-Administered Medications   Medication Dose Route Frequency Provider Last Rate    amLODIPine  5 mg Oral Daily Stefany Carbone DO      ezetimibe  10 mg Oral Daily Stefany Carbone DO      heparin (porcine)  5,000 Units Subcutaneous Q8H [facility] Stefany Carbone DO      metoprolol succinate  50 mg Oral Daily Stefany Carbone DO      nicotine  1 patch Transdermal Daily Stefany Carbone DO      paliperidone  3 mg Oral QAM Stefany Carbone DO      pravastatin  80 mg Oral Daily With Dinner Stefany Carbone DO      sevelamer  800 mg Oral TID With Meals Segun Rojo MD      sodium bicarbonate  1,300 mg Oral TID after meals Segun Rojo MD          Today, Patient Was Seen By: Stefany Carbone DO    **Please Note: This note may have been constructed using a voice recognition system.**

## 2024-02-21 NOTE — ASSESSMENT & PLAN NOTE
Hemoglobin stable.  Repeat lab work in a.m.    Results from last 7 days   Lab Units 02/20/24  1409 02/16/24  1027   HEMOGLOBIN g/dL 10.4*  --    HEMOGLOBIN. g/dL  --  10.4*   HEMATOCRIT % 31.9*  --    HEMATOCRIT. %  --  31.9*   MCV fL 100*  --    MCV. fL  --  97

## 2024-02-21 NOTE — ASSESSMENT & PLAN NOTE
Metabolic acidosis with low serum bicarb, on sodium bicarb tablets at home  Per nephrology started on IV fluids with bicarb  Repeat lab work in a.m.

## 2024-02-21 NOTE — ASSESSMENT & PLAN NOTE
Lab Results   Component Value Date    EGFR 7 02/20/2024    EGFR 9 (L) 02/16/2024    EGFR 10 (L) 01/04/2024    EGFR 14 (L) 01/04/2024    CREATININE 7.36 (H) 02/20/2024    CREATININE 6.59 (H) 02/16/2024    CREATININE 6.14 (H) 01/04/2024     Patient with CKD stage V due to FSGS proven by biopsy he was seen by nephrology today and advised to come to the ER due to worsening creatinine, hyperkalemia, metabolic acidosis on outpatient lab work  Creatinine on admission 7.36 but potassium level has improved  Patient seen by nephrology while in the ER and will be kept n.p.o. after midnight for possible permacath placement  Recheck labs in a.m.

## 2024-02-21 NOTE — TELEPHONE ENCOUNTER
Authorization requirements reviewed.  Please refer to Auth / Cert number [id number]  for case updates.

## 2024-02-22 ENCOUNTER — APPOINTMENT (OUTPATIENT)
Dept: NON INVASIVE DIAGNOSTICS | Facility: HOSPITAL | Age: 54
DRG: 470 | End: 2024-02-22
Attending: RADIOLOGY
Payer: COMMERCIAL

## 2024-02-22 LAB
ANION GAP SERPL CALCULATED.3IONS-SCNC: 8 MMOL/L
BUN SERPL-MCNC: 55 MG/DL (ref 5–25)
CALCIUM SERPL-MCNC: 9.6 MG/DL (ref 8.4–10.2)
CHLORIDE SERPL-SCNC: 109 MMOL/L (ref 96–108)
CO2 SERPL-SCNC: 22 MMOL/L (ref 21–32)
CREAT SERPL-MCNC: 7.32 MG/DL (ref 0.6–1.3)
ERYTHROCYTE [DISTWIDTH] IN BLOOD BY AUTOMATED COUNT: 12.8 % (ref 11.6–15.1)
GFR SERPL CREATININE-BSD FRML MDRD: 7 ML/MIN/1.73SQ M
GLUCOSE SERPL-MCNC: 88 MG/DL (ref 65–140)
HCT VFR BLD AUTO: 32.7 % (ref 36.5–49.3)
HGB BLD-MCNC: 10.4 G/DL (ref 12–17)
MAGNESIUM SERPL-MCNC: 2.3 MG/DL (ref 1.9–2.7)
MCH RBC QN AUTO: 31.7 PG (ref 26.8–34.3)
MCHC RBC AUTO-ENTMCNC: 31.8 G/DL (ref 31.4–37.4)
MCV RBC AUTO: 100 FL (ref 82–98)
PHOSPHATE SERPL-MCNC: 5.9 MG/DL (ref 2.7–4.5)
PLATELET # BLD AUTO: 210 THOUSANDS/UL (ref 149–390)
PMV BLD AUTO: 10.8 FL (ref 8.9–12.7)
POTASSIUM SERPL-SCNC: 4.8 MMOL/L (ref 3.5–5.3)
RBC # BLD AUTO: 3.28 MILLION/UL (ref 3.88–5.62)
SODIUM SERPL-SCNC: 139 MMOL/L (ref 135–147)
WBC # BLD AUTO: 7.14 THOUSAND/UL (ref 4.31–10.16)

## 2024-02-22 PROCEDURE — 83735 ASSAY OF MAGNESIUM: CPT | Performed by: INTERNAL MEDICINE

## 2024-02-22 PROCEDURE — 99232 SBSQ HOSP IP/OBS MODERATE 35: CPT | Performed by: FAMILY MEDICINE

## 2024-02-22 PROCEDURE — 84100 ASSAY OF PHOSPHORUS: CPT | Performed by: INTERNAL MEDICINE

## 2024-02-22 PROCEDURE — C1894 INTRO/SHEATH, NON-LASER: HCPCS | Performed by: RADIOLOGY

## 2024-02-22 PROCEDURE — 36558 INSERT TUNNELED CV CATH: CPT

## 2024-02-22 PROCEDURE — 99153 MOD SED SAME PHYS/QHP EA: CPT

## 2024-02-22 PROCEDURE — 99233 SBSQ HOSP IP/OBS HIGH 50: CPT | Performed by: INTERNAL MEDICINE

## 2024-02-22 PROCEDURE — 82610 CYSTATIN C: CPT | Performed by: INTERNAL MEDICINE

## 2024-02-22 PROCEDURE — C1750 CATH, HEMODIALYSIS,LONG-TERM: HCPCS | Performed by: RADIOLOGY

## 2024-02-22 PROCEDURE — 99152 MOD SED SAME PHYS/QHP 5/>YRS: CPT

## 2024-02-22 PROCEDURE — 0JH63XZ INSERTION OF TUNNELED VASCULAR ACCESS DEVICE INTO CHEST SUBCUTANEOUS TISSUE AND FASCIA, PERCUTANEOUS APPROACH: ICD-10-PCS | Performed by: RADIOLOGY

## 2024-02-22 PROCEDURE — 80048 BASIC METABOLIC PNL TOTAL CA: CPT | Performed by: INTERNAL MEDICINE

## 2024-02-22 PROCEDURE — 85027 COMPLETE CBC AUTOMATED: CPT | Performed by: INTERNAL MEDICINE

## 2024-02-22 RX ORDER — MIDAZOLAM HYDROCHLORIDE 2 MG/2ML
INJECTION, SOLUTION INTRAMUSCULAR; INTRAVENOUS AS NEEDED
Status: DISCONTINUED | OUTPATIENT
Start: 2024-02-22 | End: 2024-02-26 | Stop reason: HOSPADM

## 2024-02-22 RX ORDER — FENTANYL CITRATE 50 UG/ML
INJECTION, SOLUTION INTRAMUSCULAR; INTRAVENOUS AS NEEDED
Status: DISCONTINUED | OUTPATIENT
Start: 2024-02-22 | End: 2024-02-26 | Stop reason: HOSPADM

## 2024-02-22 RX ORDER — ACETAMINOPHEN 325 MG/1
650 TABLET ORAL EVERY 6 HOURS PRN
Status: DISCONTINUED | OUTPATIENT
Start: 2024-02-22 | End: 2024-02-26 | Stop reason: HOSPADM

## 2024-02-22 RX ORDER — PALIPERIDONE 3 MG/1
6 TABLET, EXTENDED RELEASE ORAL EVERY MORNING
Status: DISCONTINUED | OUTPATIENT
Start: 2024-02-22 | End: 2024-02-23 | Stop reason: CLARIF

## 2024-02-22 RX ORDER — AMOXICILLIN 250 MG
2 CAPSULE ORAL ONCE
Status: COMPLETED | OUTPATIENT
Start: 2024-02-22 | End: 2024-02-22

## 2024-02-22 RX ORDER — CEFAZOLIN SODIUM 2 G/50ML
SOLUTION INTRAVENOUS
Status: DISCONTINUED | OUTPATIENT
Start: 2024-02-22 | End: 2024-02-26 | Stop reason: HOSPADM

## 2024-02-22 RX ADMIN — HEPARIN SODIUM 5000 UNITS: 5000 INJECTION INTRAVENOUS; SUBCUTANEOUS at 21:31

## 2024-02-22 RX ADMIN — HEPARIN SODIUM 5000 UNITS: 5000 INJECTION INTRAVENOUS; SUBCUTANEOUS at 05:58

## 2024-02-22 RX ADMIN — SODIUM BICARBONATE 650 MG TABLET 1300 MG: at 08:15

## 2024-02-22 RX ADMIN — CEFAZOLIN SODIUM 2000 MG: 2 SOLUTION INTRAVENOUS at 14:00

## 2024-02-22 RX ADMIN — SEVELAMER HYDROCHLORIDE 800 MG: 800 TABLET, FILM COATED ORAL at 08:15

## 2024-02-22 RX ADMIN — MIDAZOLAM 0.5 MG: 1 INJECTION INTRAMUSCULAR; INTRAVENOUS at 14:08

## 2024-02-22 RX ADMIN — PALIPERIDONE 6 MG: 3 TABLET, EXTENDED RELEASE ORAL at 12:58

## 2024-02-22 RX ADMIN — PRAVASTATIN SODIUM 80 MG: 80 TABLET ORAL at 16:59

## 2024-02-22 RX ADMIN — ACETAMINOPHEN 650 MG: 325 TABLET ORAL at 21:32

## 2024-02-22 RX ADMIN — NICOTINE 1 PATCH: 14 PATCH, EXTENDED RELEASE TRANSDERMAL at 08:15

## 2024-02-22 RX ADMIN — METOPROLOL SUCCINATE 50 MG: 50 TABLET, EXTENDED RELEASE ORAL at 08:15

## 2024-02-22 RX ADMIN — SENNOSIDES AND DOCUSATE SODIUM 2 TABLET: 50; 8.6 TABLET ORAL at 08:15

## 2024-02-22 RX ADMIN — EZETIMIBE 10 MG: 10 TABLET ORAL at 08:15

## 2024-02-22 RX ADMIN — FENTANYL CITRATE 25 MCG: 50 INJECTION, SOLUTION INTRAMUSCULAR; INTRAVENOUS at 14:08

## 2024-02-22 RX ADMIN — AMLODIPINE BESYLATE 5 MG: 5 TABLET ORAL at 08:15

## 2024-02-22 RX ADMIN — Medication 5 ML: at 14:10

## 2024-02-22 RX ADMIN — SODIUM BICARBONATE 650 MG TABLET 1300 MG: at 12:19

## 2024-02-22 RX ADMIN — SEVELAMER HYDROCHLORIDE 800 MG: 800 TABLET, FILM COATED ORAL at 16:59

## 2024-02-22 RX ADMIN — SEVELAMER HYDROCHLORIDE 800 MG: 800 TABLET, FILM COATED ORAL at 12:19

## 2024-02-22 RX ADMIN — SODIUM BICARBONATE 650 MG TABLET 1300 MG: at 16:59

## 2024-02-22 NOTE — PROGRESS NOTES
NEPHROLOGY HOSPITAL PROGRESS NOTE   Jordy Tidwell 54 y.o. male MRN: [id number]  Unit/Bed#: 2 South 202 Encounter: [id number]  Reason for Consult: CKD V    ASSESSMENT and PLAN:    54-year-old male with past medical history of CKD stage V with biopsy-proven FSGS, proteinuria, hypertension, secondary hyperparathyroidism, hyperlipidemia, nicotine dependence with ongoing smoking, who is advised to come to the hospital by outpatient nephrology team due to progression of renal  dysfunction along with hyperkalemia noted on lab work from 2/16.     1-CKD stage V     - Outpatient nephrologist Dr. Costello and patient saw advanced practitioner Bing BERRIOS today in the renal office  - Patient's renal function has been progressively declining  -Patient's creatinine 6.6 on 2/16 prompting recommendations for the patient to come to the hospital  - Admission creatinine 2/27.4 mg/dL  - Native disease biopsy-proven FSGS  - renal u/s with diffuse echogenicity on parenchyma   - Access-patient was scheduled to see vascular surgery team on March 21 for access creation evaluation surgery  - 2/21 - creat stable 7.2 mg/dL, K ok 4.9. bicarb 23.  - 2/22 - creat unchanged 7.32 mg/dL. Na, K, bicarb appropriate. Phos improving. Dialysis consent obtained and placed on chart for scanning     Overall, patient has progression of kidney dysfunction. And creat is now 7.32. today pt reports some fatigue and nausea again. Not SOB. O2 on RA is mid 90s while I am at bedside with pt. Not overloaded. Will plan to initiate dialysis     Plan  - IR consulted for TDC placement. Discussed with IR Attending who is agreeable to consult  - cont bicarb tabs  - cont sevelamer  - no urgent indication for lokelma  - hold IVF as doing  - BMP in AM  - HD consent signed by patient  - cystatin C is pending, but given uremic symptoms, will plan to initiate dialysis after TDC. TDC goal today and HD start tomorrow AM  - I/os; avoid nephrotoxic agents  - Avoid  hypotension  - renal hep panel in AM  - CM for HD outpt placement  - reviewed case with vascular attending outpt yesterday. Pt will likely require staged procedure for fistula. Therefore, attempting to move sooner will not avoid TDC  - I have communicated with the hemodialysis unit regarding potential new start for dialysis tomorrow morning  - Reviewed case primary team attending were in agreement renal plan to initiate dialysis planning and also I have reviewed about psychiatric medication concerned  - would discuss with psychiatry about invega. In literature, use is not recommending in ESRD but would benefit from psychiatry evaluation to determine if other options available.  Would not hold until we can review with psychiatry and I reviewed this with primary team attending    I called the psychiatrist office at the request of primary team.  [phone].  The psychiatrist is away today.  But they would not release any information and could not talk to me further without her consent.  I have messaged primary team to assist in obtaining a consent of release of information.     2-acid/base-bicarbonate level is improved.  bicarbonate tablets as noted     3-hypertension-avoid hypotension and monitor closely with current regimen     4-MBD-agree with starting phosphorus based binders and started on sevelamer 2/20 with improving phos     5-anemia-monitoring for now     6-history of microscopic hematuria-this appears to be a chronic issue.       SUBJECTIVE / 24H INTERVAL HISTORY:     syst. Pt with fatigue. Nausea. No SOB.     OBJECTIVE:  Current Weight: Weight - Scale: 86.7 kg (191 lb 2.2 oz)  Vitals:    02/21/24 2000 02/21/24 2118 02/21/24 2311 02/22/24 0740   BP:  157/100 140/80 140/82   BP Location:  Left arm     Pulse:  86 82 84   Resp:  16  16   Temp:  98.2 °F (36.8 °C) 97.7 °F (36.5 °C) 98.1 °F (36.7 °C)   TempSrc:  Oral     SpO2: 90% 94% 96% 94%   Weight:       Height:           Intake/Output Summary (Last 24  hours) at 2/22/2024 0804  Last data filed at 2/21/2024 0945  Gross per 24 hour   Intake --   Output 200 ml   Net -200 ml     General: NAD  Skin: no rash  Eyes: anicteric sclera  ENT: moist mucous membrane  Neck: supple  Chest: CTA b/l, no ronchii, no wheeze, no rubs, no rales  CVS: s1s2, no murmur, no gallop, no rub  Abdomen: soft, nontender, nl sounds  Extremities: no edema LE b/l  : no lee  Neuro: AAOX3  Psych: normal affect    Medications:    Current Facility-Administered Medications:     amLODIPine (NORVASC) tablet 5 mg, 5 mg, Oral, Daily, Stefany Revankar, DO, 5 mg at 02/21/24 0835    ezetimibe (ZETIA) tablet 10 mg, 10 mg, Oral, Daily, Stefany Revankar, DO, 10 mg at 02/21/24 0835    heparin (porcine) subcutaneous injection 5,000 Units, 5,000 Units, Subcutaneous, Q8H [facility], 5,000 Units at 02/22/24 0558 **AND** Platelet count, , , Once, Stefany Revankar, DO    metoprolol succinate (TOPROL-XL) 24 hr tablet 50 mg, 50 mg, Oral, Daily, Stefany Revankar, DO, 50 mg at 02/21/24 0835    nicotine (NICODERM CQ) 14 mg/24hr TD 24 hr patch 1 patch, 1 patch, Transdermal, Daily, Stefany Revankar, DO, 1 patch at 02/21/24 0944    paliperidone (INVEGA) 24 hr tablet 3 mg, 3 mg, Oral, QAM, Stefany Revankar, DO    pravastatin (PRAVACHOL) tablet 80 mg, 80 mg, Oral, Daily With Dinner, Stefany Revankar, DO, 80 mg at 02/21/24 1640    sevelamer (RENAGEL) tablet 800 mg, 800 mg, Oral, TID With Meals, Segun Rojo MD, 800 mg at 02/21/24 1639    sodium bicarbonate tablet 1,300 mg, 1,300 mg, Oral, TID after meals, Segun Rojo MD, 1,300 mg at 02/21/24 1639    Laboratory Results:  Results from last 7 days   Lab Units 02/22/24  0630 02/21/24  0440 02/20/24  1409 02/16/24  1027   WBC Thousand/uL 7.14 6.76 7.16  --    WHITE BLOOD CELL COUNT. x10E3/uL  --   --   --  8.7   HEMOGLOBIN g/dL 10.4* 10.4* 10.4*  --    HEMOGLOBIN. g/dL  --   --   --  10.4*   HEMATOCRIT % 32.7* 31.3* 31.9*  --    HEMATOCRIT. %  --   --   --  31.9*   PLATELETS  Thousands/uL 210 206 209  --    PLATELETS. x10E3/uL  --   --   --  259   POTASSIUM mmol/L 4.8 4.9 5.1 6.4*   CHLORIDE mmol/L 109* 109* 106 103   CO2 mmol/L 22 23 22 19*   BUN mg/dL 55* 57* 61* 56*   CREATININE mg/dL 7.32* 7.24* 7.36* 6.59*   CALCIUM mg/dL 9.6 9.1 9.5  --    MAGNESIUM mg/dL 2.3 2.3 2.5  --    PHOSPHORUS mg/dL 5.9* 6.3* 7.2*  --

## 2024-02-22 NOTE — SEDATION DOCUMENTATION
Tunneled dialysis catheter placed in the right IJ by Dr. Akers. CHG dressing to site. Both lumens capped and have blood return. Patient tolerated well.

## 2024-02-22 NOTE — ASSESSMENT & PLAN NOTE
Hemoglobin remained stable    Results from last 7 days   Lab Units 02/22/24  0630 02/21/24  0440 02/20/24  1409 02/16/24  1027   HEMOGLOBIN g/dL 10.4* 10.4* 10.4*  --    HEMOGLOBIN. g/dL  --   --   --  10.4*   HEMATOCRIT % 32.7* 31.3* 31.9*  --    HEMATOCRIT. %  --   --   --  31.9*   MCV fL 100* 99* 100*  --    MCV. fL  --   --   --  97

## 2024-02-22 NOTE — ASSESSMENT & PLAN NOTE
Patient is on Invega. Per discussion with nephrology, use is not recommended in ESRD  Discussed with patient option of getting psychiatrist involved regarding further management but patient stated that he did not want this medications change without speaking with his psychiatrist first as this medication has been keeping his symptoms under control  Nephrology did reach out to psychiatry office but psychiatrist is not available today.  Discussed with nephrology and for now we will continue medication

## 2024-02-22 NOTE — NURSING NOTE
During the night patient's oxygen saturation dropped multiple times into the 60's, low 70's. I entered the patient's room to place nasal canula on patient with 2L of oxygen to assist with keeping patient's oxygen saturation at a minimum of 90%, patient refused.   While observing patient, he would stop breathing for approximately 15-20 seconds then gasp to catch his breath.

## 2024-02-22 NOTE — CONSULTS
e-Consult (IPC)  - Interventional Radiology  Jordy Tidwell 54 y.o. male MRN: [id number]  Unit/Bed#: 2 South 202 Encounter: [id number]          Interventional Radiology has been consulted to evaluate Jordy Tidwell    We were consulted by nephrology concerning this patient with ESRD.    Inpatient Consult to IR  Consult performed by: Dain Akers MD  Consult ordered by: Segun Rojo MD        02/22/24    Assessment/Recommendation:   53 yo male with stage V CKD (FSGS) with progressive renal dysfunction/hyperkalemia to begin HD tomorrow. Tunneled HD catheter requested, which we will attempt to place today, pending schedule.    11-20 minutes, >50% of the total time devoted to medical consultative verbal/EMR discussion between providers. Written report will be generated in the EMR.     Thank you for allowing Interventional Radiology to participate in the care of Jodry Tidwell. Please don't hesitate to call or TigerText us with any questions.     Dain Akers MD

## 2024-02-22 NOTE — BRIEF OP NOTE (RAD/CATH)
INTERVENTIONAL RADIOLOGY PROCEDURE NOTE    Date: 2/22/2024    Procedure:   Procedure Summary       Date:  Room / Location:     Anesthesia Start:  Anesthesia Stop:     Procedure:  Diagnosis:     Scheduled Providers:  Responsible Provider:     Anesthesia Type: Not recorded ASA Status: Not recorded            Preoperative diagnosis:   1. Elevated serum creatinine    2. Hyperphosphatemia    3. CKD (chronic kidney disease)         Postoperative diagnosis: Same.    Surgeon: Dain Akers MD     Assistant: None. No qualified resident was available.    Blood loss: None    Specimens: None     Findings: Placement of 16F x 28cm (23cm tip-to-cuff) tunneled right IJV chest wall HD catheter with tips in prox RA, available for immediate use.    Complications: None immediate.    Anesthesia: conscious sedation and local

## 2024-02-22 NOTE — DISCHARGE INSTRUCTIONS
Perma-cath Placement   WHAT YOU NEED TO KNOW:   A perma-cath is a catheter placed through a vein into or near your right atrium. Your right atrium is the right upper chamber of your heart. A perma-cath is used for dialysis in an emergency or until a long-term device is ready to use. After your procedure, you will have some pain and swelling on your chest and neck. You may have some bruises on your chest and neck. You may also have 2 dressings, one on your chest and one on your neck.   DISCHARGE INSTRUCTIONS:   Call 911 for any of the following:   You feel lightheaded, short of breath, and have chest pain.    Your catheter comes out   Contact Interventional Radiology at [phone] ([facility] PATIENTS: Contact Interventional Radiology at [phone]) ([facility] PATIENTS: Contact Interventional Radiology at [phone]) if:  Blood soaks through your bandage.   You have new swelling in your arm, neck, face, or chest on your right side.  Your catheter gets wet.    Your bruises or pain get worse.   You have a fever or chills.  Persistent nausea or vomiting.   Your incision is red, swollen, or draining pus.   You have questions or concerns about your condition or care.  Self-care:       Resume your normal diet.  Keep your dressings dry. Do not take a shower or swim. You may take a tub bath, but do not get your dressings wet. Water in your wound can cause bacteria to grow and cause an infection. If your dressing gets wet, dry it off and cover it with dry sterile gauze. Call your healthcare provider. Do not use soaps or ointments.  Do not change your dressings. Your healthcare provider or dialysis nurse will change your dressings. Your dressings should stay in place until your healthcare provider removes them. The dressing on your chest will stay as long as you have the catheter in place. The dressing prevents infection.    Do not remove the red and blue caps from the end of your catheter. The caps prevent air from getting  into your catheter.  Follow up with your healthcare provider as directed: Write down your questions so you remember to ask them during your visits.

## 2024-02-22 NOTE — PROGRESS NOTES
[facility]  Progress Note  Name: Jordy Tidwell I  MRN: [id number]  Unit/Bed#: 2 South 202 I Date of Admission: 2/20/2024   Date of Service: 2/22/2024 I Hospital Day: 2    Assessment/Plan   * CKD (chronic kidney disease) stage 5, GFR less than 15 ml/min (HCC)  Assessment & Plan  Lab Results   Component Value Date    EGFR 7 02/22/2024    EGFR 7 02/21/2024    EGFR 7 02/20/2024    CREATININE 7.32 (H) 02/22/2024    CREATININE 7.24 (H) 02/21/2024    CREATININE 7.36 (H) 02/20/2024     Patient with CKD stage V due to FSGS proven by biopsy he was seen by nephrology today and advised to come to the ER due to worsening creatinine, hyperkalemia, metabolic acidosis on outpatient lab work  Creatinine on admission 7.36 but potassium level improved  Creatinine today continues to remain stable in 7s range patient continues to report feeling tired and fatigued.  Per nephrology no urgent indication for dialysis today  Plan for TDC placement today with IR and tentative plan to start hemodialysis tomorrow  Recheck labs in a.m.    Acid-base disorder, mixed  Assessment & Plan  Metabolic acidosis with low serum bicarb, on sodium bicarb tablets at home  Was started on IV fluids with bicarb on admission which has been discontinued on 2/21  Continue bicarb tablets  Repeat lab work in a.m.    Hyperphosphatemia  Assessment & Plan  Phosphorus 7.2 --> 5.9.    Continue low phosphorus diet      Anemia due to stage 5 chronic kidney disease, not on chronic dialysis   Assessment & Plan  Hemoglobin remained stable    Results from last 7 days   Lab Units 02/22/24  0630 02/21/24  0440 02/20/24  1409 02/16/24  1027   HEMOGLOBIN g/dL 10.4* 10.4* 10.4*  --    HEMOGLOBIN. g/dL  --   --   --  10.4*   HEMATOCRIT % 32.7* 31.3* 31.9*  --    HEMATOCRIT. %  --   --   --  31.9*   MCV fL 100* 99* 100*  --    MCV. fL  --   --   --  97        Dyslipidemia  Assessment & Plan  Continue statin    Schizophrenia (HCC)  Assessment &  Plan  Patient is on Invega. Per discussion with nephrology, use is not recommended in ESRD  Discussed with patient option of getting psychiatrist involved regarding further management but patient stated that he did not want this medications change without speaking with his psychiatrist first as this medication has been keeping his symptoms under control  Nephrology did reach out to psychiatry office but psychiatrist is not available today.  Discussed with nephrology and for now we will continue medication    Benign hypertension with chronic kidney disease, stage V (HCC)  Assessment & Plan  Continue Norvasc, Toprol-XL and monitor blood pressures           VTE Pharmacologic Prophylaxis:    heparin    Mobility:   Basic Mobility Inpatient Raw Score: 24  JH-HLM Goal: 8: Walk 250 feet or more  JH-HLM Achieved: 8: Walk 250 feet ot more  HLM Goal achieved. Continue to encourage appropriate mobility.    Patient Centered Rounds: I performed bedside rounds with nursing staff today.   Discussions with Specialists or Other Care Team Provider: yes - renal    Education and Discussions with Family / Patient: Patient declined call to .     Total Time Spent on Date of Encounter in care of patient: This time was spent on one or more of the following: performing physical exam; counseling and coordination of care; obtaining or reviewing history; documenting in the medical record; reviewing/ordering tests, medications or procedures; communicating with other healthcare professionals and discussing with patient's family/caregivers.    Current Length of Stay: 2 day(s)  Current Patient Status: Inpatient   Certification Statement: The patient will continue to require additional inpatient hospital stay due to CKD stage V requiring dialysis  Discharge Plan: Anticipate discharge in >72 hrs to home.    Code Status: Level 3 - DNAR and DNI    Subjective:     Patient reports feeling tired and fatigued.  Denies any nausea,  vomiting    Objective:     Vitals:   Temp (24hrs), Av °F (36.7 °C), Min:97.7 °F (36.5 °C), Max:98.2 °F (36.8 °C)    Temp:  [97.7 °F (36.5 °C)-98.2 °F (36.8 °C)] 98.1 °F (36.7 °C)  HR:  [82-93] 84  Resp:  [16] 16  BP: (140-157)/() 140/82  SpO2:  [90 %-96 %] 94 %  Body mass index is 28.64 kg/m².     Input and Output Summary (last 24 hours):     Intake/Output Summary (Last 24 hours) at 2024 0912  Last data filed at 2024 0945  Gross per 24 hour   Intake --   Output 200 ml   Net -200 ml       Physical Exam:   Physical Exam  Vitals reviewed.   Constitutional:       General: He is not in acute distress.     Appearance: He is not toxic-appearing.   HENT:      Head: Normocephalic and atraumatic.   Eyes:      General:         Right eye: No discharge.         Left eye: No discharge.      Extraocular Movements: Extraocular movements intact.   Cardiovascular:      Rate and Rhythm: Normal rate and regular rhythm.   Pulmonary:      Effort: Pulmonary effort is normal. No respiratory distress.      Breath sounds: Normal breath sounds. No wheezing or rales.   Abdominal:      General: Bowel sounds are normal. There is no distension.      Palpations: Abdomen is soft.      Tenderness: There is no abdominal tenderness.   Musculoskeletal:      Right lower leg: No edema.      Left lower leg: No edema.   Neurological:      Mental Status: He is alert and oriented to person, place, and time.          Additional Data:     Labs:  Results from last 7 days   Lab Units 24  0630 24  0440 24  1409   WBC Thousand/uL 7.14   < > 7.16   HEMOGLOBIN g/dL 10.4*   < > 10.4*   HEMATOCRIT % 32.7*   < > 31.9*   PLATELETS Thousands/uL 210   < > 209   NEUTROS PCT %  --   --  65   LYMPHS PCT %  --   --  17   MONOS PCT %  --   --  13*   EOS PCT %  --   --  3    < > = values in this interval not displayed.     Results from last 7 days   Lab Units 24  0630 24  0440 24  1409   SODIUM mmol/L 139   < > 137    POTASSIUM mmol/L 4.8   < > 5.1   CHLORIDE mmol/L 109*   < > 106   CO2 mmol/L 22   < > 22   BUN mg/dL 55*   < > 61*   CREATININE mg/dL 7.32*   < > 7.36*   ANION GAP mmol/L 8   < > 9   CALCIUM mg/dL 9.6   < > 9.5   ALBUMIN g/dL  --   --  3.8   TOTAL BILIRUBIN mg/dL  --   --  0.24   ALK PHOS U/L  --   --  52   ALT U/L  --   --  7   AST U/L  --   --  5*   GLUCOSE RANDOM mg/dL 88   < > 101    < > = values in this interval not displayed.     Results from last 7 days   Lab Units 02/21/24  0440   INR  1.08                   Lines/Drains:  Invasive Devices       Peripheral Intravenous Line  Duration             Peripheral IV 02/20/24 Left Antecubital 1 day                          Imaging: No pertinent imaging reviewed.    Recent Cultures (last 7 days):         Last 24 Hours Medication List:   Current Facility-Administered Medications   Medication Dose Route Frequency Provider Last Rate    amLODIPine  5 mg Oral Daily Stefany Carbone DO      ezetimibe  10 mg Oral Daily Stefany Carbone DO      heparin (porcine)  5,000 Units Subcutaneous Q8H [facility] Stefany Carbone DO      metoprolol succinate  50 mg Oral Daily Stefany Carbone, DO      nicotine  1 patch Transdermal Daily Stefany Carbone,       paliperidone  3 mg Oral QAM Stefany Carbone DO      pravastatin  80 mg Oral Daily With Dinner Stefany Carbone DO      sevelamer  800 mg Oral TID With Meals Segun Rojo MD      sodium bicarbonate  1,300 mg Oral TID after meals Segun Rojo MD          Today, Patient Was Seen By: Stefany Carbone DO    **Please Note: This note may have been constructed using a voice recognition system.**

## 2024-02-22 NOTE — CASE MANAGEMENT
Case Management Assessment & Discharge Planning Note    Patient name Jordy Tidwell  Location 2 South 202/2 South 202 MRN [id number]  : 1970 Date 2024       Current Admission Date: 2024  Current Admission Diagnosis:CKD (chronic kidney disease) stage 5, GFR less than 15 ml/min ([facility])   Patient Active Problem List    Diagnosis Date Noted    Hyperphosphatemia 2024    Acid-base disorder, mixed 2024    Chronic kidney disease-mineral and bone disorder 2024    Metabolic acidosis 2024    Rectal bleeding 2024    History of colon polyps 2024    CKD (chronic kidney disease) stage 5, GFR less than 15 ml/min ([facility]) 2024    NAVEEN (acute kidney injury) ([facility]) 2024    Anemia due to stage 5 chronic kidney disease, not on chronic dialysis  2024    Bleeding hemorrhoids 10/11/2023    Gastroesophageal reflux disease with esophagitis without hemorrhage 10/11/2023    Dyslipidemia 07/10/2023    Nephrotic range proteinuria 2023    Hyperkalemia 2023    Worsening renal function 2023    Low bicarbonate level 2022    Asymptomatic microscopic hematuria 2022    Persistent proteinuria 2022    Renal cyst 2022    BMI 30.0-30.9,adult 2022    Tobacco dependence 2021    Elevated PSA 2021    Encounter for immunization 2020    Dysuria 2020    Vitamin D deficiency 2020    Familial hypercholesterolemia 2020    Benign hypertension with chronic kidney disease, stage V ([facility]) 2018      LOS (days): 2  Geometric Mean LOS (GMLOS) (days):   Days to GMLOS:     OBJECTIVE:    Risk of Unplanned Readmission Score: 17.59     Current admission status: Inpatient    Preferred Pharmacy:   [id number], [address] [place] [id number]  Phone: [phone] Fax: [phone]    [id number] - [place], [place] - [address]  [address]  [place]  [facility] [address]  Phone: [phone] Fax: [phone]    Primary Care Provider: Sheila Roach MD    [place] Insurance[place] [employer] [facility]  Secondary Insurance:     ASSESSMENT:  Active Health Care Proxies    There are no active Health Care Proxies on file.       Advance Directives  Does patient currently have a Health Care decision maker?: Yes, please see Health Care Proxy section  Primary Contact: Peter Tidwell    Readmission Root Cause  30 Day Readmission: No    Patient Information  Admitted from:: Home  Mental Status: Alert  During Assessment patient was accompanied by: Not accompanied during assessment  Assessment information provided by:: Patient  Primary Caregiver: Self  Support Systems: Family members, Other (Comment) (brother)  County of Residence: [place]  What city do you live in?: [place]  Home entry access options. Select all that apply.: Stairs  Number of steps to enter home.: 3  Type of Current Residence: Apartment  Floor Level: 1  Upon entering residence, is there a bedroom on the main floor (no further steps)?: Yes  Upon entering residence, is there a bathroom on the main floor (no further steps)?: Yes  Living Arrangements: Lives Alone    Activities of Daily Living Prior to Admission  Functional Status: Independent  Completes ADLs independently?: Yes  Ambulates independently?: Yes  Does patient use assisted devices?: No  Does patient currently own DME?: No  Does patient have a history of Outpatient Therapy (PT/OT)?: No  Does the patient have a history of Short-Term Rehab?: No  Does patient have a history of HHC?: No  Does patient currently have HHC?: No    Patient Information Continued  Income Source: SSI/SSD  Does patient have prescription coverage?: Yes ([facility])  Does patient receive dialysis treatments?: No (new start dialysis this admission)  Does patient have a history of substance abuse?: No  Does patient have a history of Mental Health Diagnosis?: Yes  (Schizophrenia)  Is patient receiving treatment for mental health?: Yes (Dr. [facility] - [phone])    Means of Transportation  Means of Transport to Appts:: Drives Self      Social Determinants of Health (SDOH)      Flowsheet Row Most Recent Value   Housing Stability    In the last 12 months, was there a time when you were not able to pay the mortgage or rent on time? N   In the last 12 months, how many places have you lived? 1   In the last 12 months, was there a time when you did not have a steady place to sleep or slept in a shelter (including now)? N   Transportation Needs    In the past 12 months, has lack of transportation kept you from medical appointments or from getting medications? no   In the past 12 months, has lack of transportation kept you from meetings, work, or from getting things needed for daily living? No   Food Insecurity    Within the past 12 months, you worried that your food would run out before you got the money to buy more. Never true   Within the past 12 months, the food you bought just didn't last and you didn't have money to get more. Never true   Utilities    In the past 12 months has the electric, gas, oil, or water company threatened to shut off services in your home? No            DISCHARGE DETAILS:    Discharge planning discussed with:: Patient  Freedom of Choice: Yes  Comments - Freedom of Choice: SW met with pt to assess needs and discuss plans.  Pt's plan is to return home when discharged.  Pt's car is in parking lot so he plans on driving himself home.  Per nephrology pt will be starting new dialysis for ESRD tomorrow.  SW talked with pt about dialysis, clinic options and chair times.  Per pt either [facility], [facility] or [facility], would be accessible to him.  With regard to chair times pt said he prefers second or third shift if possible.  SW offered to initiate referrals and provide updates to pt when available. Pt also expressed  concern about getting consent for medical staff to talk with his psychitrist, Dr. Booth.  Per pt Dr. Booth has been helping pt manage his schizophrenia and pt is under the impression there is some question as to whether pt can continue the psychiatric medication that is helping him.  Pt said he did bring in the medication and it is currently in the hospital pharmacy.  SW assisted US in getting consent form from [place] for [facility] signed by pt and faxed back.  SW notified Dr. Carbone and pt's nurse of pt's concerns.  SW will continue to follow to monitor progress and assist as needed.  SW placed call to pt's brother to discuss plans as well.  Brother has  number for future reference if needed.  CM contacted family/caregiver?: Yes  Were Treatment Team discharge recommendations reviewed with patient/caregiver?: Yes  Did patient/caregiver verbalize understanding of patient care needs?: Yes    Contacts  Patient Contacts: Peter Tidwell  Relationship to Patient:: Family (brother)  Contact Method: Phone  Phone Number: [phone]  Reason/Outcome: Discharge Planning    Requested Home Health Care         Is the patient interested in HHC at discharge?: No    DME Referral Provided  Referral made for DME?: No    Other Referral/Resources/Interventions Provided:  Interventions: Dialysis  Referral Comments: New dialysis referrals will be initiated and additional medical information will be provided as it becomes available.    Treatment Team Recommendation: Home  Discharge Destination Plan:: Home  Transport at Discharge : Self

## 2024-02-22 NOTE — PLAN OF CARE
Problem: RESPIRATORY - ADULT  Goal: Achieves optimal ventilation and oxygenation  Description: INTERVENTIONS:  - Assess for changes in respiratory status  - Assess for changes in mentation and behavior  - Position to facilitate oxygenation and minimize respiratory effort  - Oxygen administered by appropriate delivery if ordered  - Initiate smoking cessation education as indicated  - Encourage broncho-pulmonary hygiene including cough, deep breathe, Incentive Spirometry  - Assess the need for suctioning and aspirate as needed  - Assess and instruct to report SOB or any respiratory difficulty  - Respiratory Therapy support as indicated  Outcome: Progressing     Problem: GASTROINTESTINAL - ADULT  Goal: Minimal or absence of nausea and/or vomiting  Description: INTERVENTIONS:  - Administer IV fluids if ordered to ensure adequate hydration  - Maintain NPO status until nausea and vomiting are resolved  - Nasogastric tube if ordered  - Administer ordered antiemetic medications as needed  - Provide nonpharmacologic comfort measures as appropriate  - Advance diet as tolerated, if ordered  - Consider nutrition services referral to assist patient with adequate nutrition and appropriate food choices  Outcome: Progressing     Problem: METABOLIC, FLUID AND ELECTROLYTES - ADULT  Goal: Electrolytes maintained within normal limits  Description: INTERVENTIONS:  - Monitor labs and assess patient for signs and symptoms of electrolyte imbalances  - Administer electrolyte replacement as ordered  - Monitor response to electrolyte replacements, including repeat lab results as appropriate  - Instruct patient on fluid and nutrition as appropriate  Outcome: Progressing     Problem: HEMATOLOGIC - ADULT  Goal: Maintains hematologic stability  Description: INTERVENTIONS  - Assess for signs and symptoms of bleeding or hemorrhage  - Monitor labs  - Administer supportive blood products/factors as ordered and appropriate  Outcome: Progressing

## 2024-02-22 NOTE — ASSESSMENT & PLAN NOTE
Metabolic acidosis with low serum bicarb, on sodium bicarb tablets at home  Was started on IV fluids with bicarb on admission which has been discontinued on 2/21  Continue bicarb tablets  Repeat lab work in a.m.

## 2024-02-22 NOTE — ASSESSMENT & PLAN NOTE
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  Per nephrology no urgent indication for dialysis today  Plan for TDC placement today with IR and tentative plan to start hemodialysis tomorrow  Recheck labs in a.m.

## 2024-02-23 ENCOUNTER — APPOINTMENT (INPATIENT)
Dept: DIALYSIS | Facility: HOSPITAL | Age: 54
DRG: 470 | End: 2024-02-23
Payer: COMMERCIAL

## 2024-02-23 LAB
ANION GAP SERPL CALCULATED.3IONS-SCNC: 9 MMOL/L
BUN SERPL-MCNC: 60 MG/DL (ref 5–25)
CALCIUM SERPL-MCNC: 9.4 MG/DL (ref 8.4–10.2)
CHLORIDE SERPL-SCNC: 109 MMOL/L (ref 96–108)
CO2 SERPL-SCNC: 22 MMOL/L (ref 21–32)
CREAT SERPL-MCNC: 7.51 MG/DL (ref 0.6–1.3)
ERYTHROCYTE [DISTWIDTH] IN BLOOD BY AUTOMATED COUNT: 12.6 % (ref 11.6–15.1)
GFR SERPL CREATININE-BSD FRML MDRD: 7 ML/MIN/1.73SQ M
GLUCOSE SERPL-MCNC: 85 MG/DL (ref 65–140)
HBV CORE AB SER QL: NORMAL
HBV CORE IGM SER QL: NORMAL
HBV SURFACE AB SER-ACNC: 311 MIU/ML
HBV SURFACE AG SER QL: NORMAL
HCT VFR BLD AUTO: 32.9 % (ref 36.5–49.3)
HCV AB SER QL: NORMAL
HGB BLD-MCNC: 10.6 G/DL (ref 12–17)
MAGNESIUM SERPL-MCNC: 2.3 MG/DL (ref 1.9–2.7)
MCH RBC QN AUTO: 31.9 PG (ref 26.8–34.3)
MCHC RBC AUTO-ENTMCNC: 32.2 G/DL (ref 31.4–37.4)
MCV RBC AUTO: 99 FL (ref 82–98)
PHOSPHATE SERPL-MCNC: 6.4 MG/DL (ref 2.7–4.5)
PLATELET # BLD AUTO: 189 THOUSANDS/UL (ref 149–390)
PLATELET # BLD AUTO: 200 THOUSANDS/UL (ref 149–390)
PMV BLD AUTO: 10.5 FL (ref 8.9–12.7)
PMV BLD AUTO: 10.7 FL (ref 8.9–12.7)
POTASSIUM SERPL-SCNC: 4.9 MMOL/L (ref 3.5–5.3)
RBC # BLD AUTO: 3.32 MILLION/UL (ref 3.88–5.62)
SODIUM SERPL-SCNC: 140 MMOL/L (ref 135–147)
WBC # BLD AUTO: 8.21 THOUSAND/UL (ref 4.31–10.16)

## 2024-02-23 PROCEDURE — 80048 BASIC METABOLIC PNL TOTAL CA: CPT | Performed by: INTERNAL MEDICINE

## 2024-02-23 PROCEDURE — 5A1D70Z PERFORMANCE OF URINARY FILTRATION, INTERMITTENT, LESS THAN 6 HOURS PER DAY: ICD-10-PCS | Performed by: INTERNAL MEDICINE

## 2024-02-23 PROCEDURE — 86706 HEP B SURFACE ANTIBODY: CPT | Performed by: INTERNAL MEDICINE

## 2024-02-23 PROCEDURE — 85027 COMPLETE CBC AUTOMATED: CPT | Performed by: INTERNAL MEDICINE

## 2024-02-23 PROCEDURE — 99232 SBSQ HOSP IP/OBS MODERATE 35: CPT | Performed by: FAMILY MEDICINE

## 2024-02-23 PROCEDURE — 86803 HEPATITIS C AB TEST: CPT | Performed by: INTERNAL MEDICINE

## 2024-02-23 PROCEDURE — 86704 HEP B CORE ANTIBODY TOTAL: CPT | Performed by: INTERNAL MEDICINE

## 2024-02-23 PROCEDURE — 83735 ASSAY OF MAGNESIUM: CPT | Performed by: INTERNAL MEDICINE

## 2024-02-23 PROCEDURE — 84100 ASSAY OF PHOSPHORUS: CPT | Performed by: INTERNAL MEDICINE

## 2024-02-23 PROCEDURE — 85049 AUTOMATED PLATELET COUNT: CPT | Performed by: FAMILY MEDICINE

## 2024-02-23 PROCEDURE — 90935 HEMODIALYSIS ONE EVALUATION: CPT | Performed by: INTERNAL MEDICINE

## 2024-02-23 PROCEDURE — 87340 HEPATITIS B SURFACE AG IA: CPT | Performed by: INTERNAL MEDICINE

## 2024-02-23 PROCEDURE — 86705 HEP B CORE ANTIBODY IGM: CPT | Performed by: INTERNAL MEDICINE

## 2024-02-23 RX ORDER — PALIPERIDONE 3 MG/1
6 TABLET, EXTENDED RELEASE ORAL EVERY OTHER DAY
Status: DISCONTINUED | OUTPATIENT
Start: 2024-02-25 | End: 2024-02-25

## 2024-02-23 RX ORDER — PALIPERIDONE 3 MG/1
3 TABLET, EXTENDED RELEASE ORAL DAILY
Status: DISCONTINUED | OUTPATIENT
Start: 2024-02-24 | End: 2024-02-23

## 2024-02-23 RX ORDER — PALIPERIDONE 6 MG/1
6 TABLET, EXTENDED RELEASE ORAL DAILY
Status: DISCONTINUED | OUTPATIENT
Start: 2024-02-23 | End: 2024-02-23

## 2024-02-23 RX ADMIN — METOPROLOL SUCCINATE 50 MG: 50 TABLET, EXTENDED RELEASE ORAL at 11:43

## 2024-02-23 RX ADMIN — HEPARIN SODIUM 5000 UNITS: 5000 INJECTION INTRAVENOUS; SUBCUTANEOUS at 21:07

## 2024-02-23 RX ADMIN — HEPARIN SODIUM 5000 UNITS: 5000 INJECTION INTRAVENOUS; SUBCUTANEOUS at 13:36

## 2024-02-23 RX ADMIN — EZETIMIBE 10 MG: 10 TABLET ORAL at 11:43

## 2024-02-23 RX ADMIN — NICOTINE 1 PATCH: 14 PATCH, EXTENDED RELEASE TRANSDERMAL at 08:02

## 2024-02-23 RX ADMIN — SODIUM BICARBONATE 650 MG TABLET 1300 MG: at 08:02

## 2024-02-23 RX ADMIN — PRAVASTATIN SODIUM 80 MG: 80 TABLET ORAL at 15:50

## 2024-02-23 RX ADMIN — PALIPERIDONE 6 MG: 6 TABLET, EXTENDED RELEASE ORAL at 11:43

## 2024-02-23 RX ADMIN — HEPARIN SODIUM 5000 UNITS: 5000 INJECTION INTRAVENOUS; SUBCUTANEOUS at 06:42

## 2024-02-23 RX ADMIN — SEVELAMER HYDROCHLORIDE 800 MG: 800 TABLET, FILM COATED ORAL at 08:02

## 2024-02-23 RX ADMIN — AMLODIPINE BESYLATE 5 MG: 5 TABLET ORAL at 11:43

## 2024-02-23 RX ADMIN — SEVELAMER HYDROCHLORIDE 800 MG: 800 TABLET, FILM COATED ORAL at 15:50

## 2024-02-23 RX ADMIN — SEVELAMER HYDROCHLORIDE 800 MG: 800 TABLET, FILM COATED ORAL at 11:43

## 2024-02-23 NOTE — ASSESSMENT & PLAN NOTE
Patient is on Invega. Per discussion with nephrology, use is not recommended in ESRD  Discussed with patient option of getting psychiatrist involved regarding further management but patient stated that he did not want this medications change without speaking with his psychiatrist first as this medication has been keeping his symptoms under control  Nephrologist spoke with patient's primary psychiatrist.  Unfortunately Invega cannot be split in half so that patient can be placed on 3 mg here but per primary psychiatrist can also change it to every other day dosing instead of 6 mg  Monitor for any decompensation

## 2024-02-23 NOTE — ASSESSMENT & PLAN NOTE
Metabolic acidosis with low serum bicarb, on sodium bicarb tablets at home  Was started on IV fluids with bicarb on admission which has been discontinued on 2/21  Sodium bicarb tablet discontinued  Repeat lab work in a.m.

## 2024-02-23 NOTE — PROGRESS NOTES
NEPHROLOGY HOSPITAL PROGRESS NOTE   Jordy Tidwell 54 y.o. male MRN: [id number]  Unit/Bed#: 2 South 202 Encounter: [id number]  Reason for Consult: CKD V    ASSESSMENT and PLAN:    54-year-old male with past medical history of CKD stage V with biopsy-proven FSGS, proteinuria, hypertension, secondary hyperparathyroidism, hyperlipidemia, nicotine dependence with ongoing smoking, who is advised to come to the hospital by outpatient nephrology team due to progression of renal  dysfunction along with hyperkalemia noted on lab work from 2/16.     1-CKD stage V     - Outpatient nephrologist Dr. Costello and patient saw advanced practitioner Bing BERRIOS today in the renal office  - Patient's renal function has been progressively declining  -Patient's creatinine 6.6 on 2/16 prompting recommendations for the patient to come to the hospital  - Admission creatinine 2/27.4 mg/dL  - Native disease biopsy-proven FSGS  - renal u/s with diffuse echogenicity on parenchyma   - Access-patient was scheduled to see vascular surgery team on March 21 for access creation evaluation surgery  - 2/21 - creat stable 7.2 mg/dL, K ok 4.9. bicarb 23.  - 2/22 - creat unchanged 7.32 mg/dL. Na, K, bicarb appropriate. Phos improving. Dialysis consent obtained and placed on chart for scanning.  Dialysis catheter placed.  - 2/23 initiation treatment #1 dialysis     Overall, patient has progression of kidney dysfunction. And creat is now 7.32. today pt reports some fatigue and nausea again. Not SOB. O2 on RA is mid 90s while I am at bedside with pt. Not overloaded. Will plan to initiate dialysis    Access-  - Tunneled dialysis catheter placed 2/23  - Fistula to be placed March 21     Plan    - Initiation dialysis treatment #1 today  - Dialysis treatment number Saturday 2/24  - Case management team is aware regarding patient needing outpatient dialysis placement  - Vascular surgery team outpatient is concerned patient will need 2 separate procedures  "for fistula based on current veins.  Fistula surgery scheduled for March 21  - Current access is tunneled dialysis catheter  - Hold bicarbonate tablets  - Continue sevelamer  - BMP in a.m.  - Hepatitis panel pending  - To be placed on telemetry while on dialysis today and tomorrow-reviewed with nursing team who is aware  - Follow-up Cystatin C this will not  of starting dialysis as the patient is showing signs of uremia  - With regards to patient's psychiatric medication Invega-review of pharmacy literature states that the drug is not well studied in ESRD and should not be used along with the  recommendations on their website.  But when I reviewed the drug further, it appears to be hepatically cleared and it is not dialyzable.  There is literature in  Journal of psychiatry from January 2021-antipsychotics and hemodialysis-a systematic review-which states that this drug may be able to be used at half conventional dose and is \" well-tolerated\".  I have called the patient's psychiatric office yesterday and they have requested a consent to be signed which was obtained by the inpatient primary team's.  I tried calling the psychiatric office again this morning and no answer  - Increase amlodipine if blood pressures remain above 160 systolic    Update - spoke to Dr Booth Psych -I reviewed the literature that I could locate regarding the psychiatric medication.  Psychiatrist agrees that there is limited literature and agrees that the drug company information packet states that it is not used in dialysis patients because it is not well studied and therefore is not recommended.  It is not a dialyzable drug but it is hepatically metabolized.  We also located the same literature stating that it could be well-tolerated in patients on ESRD at half the normal dose.  Therefore the psychiatrist is agreeable to continue the drug at 3 mg/day rather than 6 mg/day but she would also prefer to have the " patient see inpatient psychiatry to monitor for any decompensation from a mental health standpoint.  I have reached out to primary team attending to discuss and awaiting callback.    Update-patient seen at the start of dialysis-tolerating treatment.  200 blood flow, 2-hour treatment, no ultrafiltration.    2-acid/base-on bicarbonate tablets will hold as we are initiating dialysis     3-hypertension-avoid hypotension and monitor closely with current regimen     4-MBD-agree with starting phosphorus based binders and started on sevelamer 2/20 with improving phos     5-anemia-monitoring for now     6-history of microscopic hematuria-this appears to be a chronic issue.    SUBJECTIVE / 24H INTERVAL HISTORY:  Blood pressure is appropriate/borderline elevated for now.  Patient denies complaints with the exception of pain at tunneled dialysis catheter site.  Which improved with Tylenol overnight.    OBJECTIVE:  Current Weight: Weight - Scale: 86.7 kg (191 lb 2.2 oz)  Vitals:    02/22/24 1940 02/22/24 1950 02/22/24 2235 02/23/24 0731   BP: 157/98  157/97 158/99   Pulse: 85  88 91   Resp: 20  17 18   Temp: 98.5 °F (36.9 °C)  98.9 °F (37.2 °C) 98.4 °F (36.9 °C)   TempSrc:       SpO2: 93% 95% 94% 93%   Weight:       Height:           Intake/Output Summary (Last 24 hours) at 2/23/2024 0829  Last data filed at 2/22/2024 1611  Gross per 24 hour   Intake --   Output 450 ml   Net -450 ml     General: NAD  Skin: no rash  Eyes: anicteric sclera  ENT: moist mucous membrane  Neck: supple  Chest: CTA b/l, no ronchii, no wheeze, no rubs, no rales  CVS: s1s2, no murmur, no gallop, no rub  Abdomen: soft, nontender, nl sounds  Extremities: no edema LE b/l Alysis catheter site with some blood noted but not actively bleeding currently.  : no lee  Neuro: AAOX3  Psych: normal affect    Medications:    Current Facility-Administered Medications:     acetaminophen (TYLENOL) tablet 650 mg, 650 mg, Oral, Q6H PRN, Vanessa Cadnea PA-C, 650 mg  at 02/22/24 2132    amLODIPine (NORVASC) tablet 5 mg, 5 mg, Oral, Daily, Stefany Carbone DO, 5 mg at 02/22/24 0815    ceFAZolin (ANCEF) IVPB (premix in dextrose), , Intravenous, Continuous PRN, Dain Akers MD, 2,000 mg at 02/22/24 1400    ezetimibe (ZETIA) tablet 10 mg, 10 mg, Oral, Daily, Stefany Carbone DO, 10 mg at 02/22/24 0815    fentanyl citrate (PF) 100 MCG/2ML, , Intravenous, PRN, Dain Akers MD, 25 mcg at 02/22/24 1408    heparin (porcine) subcutaneous injection 5,000 Units, 5,000 Units, Subcutaneous, Q8H CODY, 5,000 Units at 02/23/24 0642 **AND** Platelet count, , , Once, Stefany Carbone DO    lidocaine-epinephrine 1%-1:982834 buffered, , Infiltration, PRN, Dain Akers MD, 5 mL at 02/22/24 1410    metoprolol succinate (TOPROL-XL) 24 hr tablet 50 mg, 50 mg, Oral, Daily, Stefany Carbone DO, 50 mg at 02/22/24 0815    midazolam (VERSED) injection, , , PRN, Dain Akers MD, 0.5 mg at 02/22/24 1408    nicotine (NICODERM CQ) 14 mg/24hr TD 24 hr patch 1 patch, 1 patch, Transdermal, Daily, Stefany Carbone DO, 1 patch at 02/23/24 0802    paliperidone (INVEGA) 24 hr tablet 6 mg, 6 mg, Oral, Daily, Stefany Carbone DO    pravastatin (PRAVACHOL) tablet 80 mg, 80 mg, Oral, Daily With Dinner, Stefany Carbone DO, 80 mg at 02/22/24 1659    sevelamer (RENAGEL) tablet 800 mg, 800 mg, Oral, TID With Meals, Segun Rojo MD, 800 mg at 02/23/24 0802    sodium bicarbonate tablet 1,300 mg, 1,300 mg, Oral, TID after meals, Segun Rojo MD, 1,300 mg at 02/23/24 0802    Laboratory Results:  Results from last 7 days   Lab Units 02/23/24  0458 02/22/24  0630 02/21/24  0440 02/20/24  1409 02/16/24  1027   WBC Thousand/uL 8.21 7.14 6.76 7.16  --    WHITE BLOOD CELL COUNT. x10E3/uL  --   --   --   --  8.7   HEMOGLOBIN g/dL 10.6* 10.4* 10.4* 10.4*  --    HEMOGLOBIN. g/dL  --   --   --   --  10.4*   HEMATOCRIT % 32.9* 32.7* 31.3* 31.9*  --    HEMATOCRIT. %  --   --   --   --  31.9*    PLATELETS Thousands/uL 200 210 206 209  --    PLATELETS. x10E3/uL  --   --   --   --  259   POTASSIUM mmol/L 4.9 4.8 4.9 5.1 6.4*   CHLORIDE mmol/L 109* 109* 109* 106 103   CO2 mmol/L 22 22 23 22 19*   BUN mg/dL 60* 55* 57* 61* 56*   CREATININE mg/dL 7.51* 7.32* 7.24* 7.36* 6.59*   CALCIUM mg/dL 9.4 9.6 9.1 9.5  --    MAGNESIUM mg/dL 2.3 2.3 2.3 2.5  --    PHOSPHORUS mg/dL 6.4* 5.9* 6.3* 7.2*  --

## 2024-02-23 NOTE — PLAN OF CARE
1st HDTX. Okay to use HDCVC as per 2/22/24 radiology note. TX plan reviewed with Dr KATHLEEN Rojo and he is agreeable to the following: Goal is to run the pt even on a 2 K+ bath for a morning serum K+ of 4.9, 2 HR TX.     Problem: METABOLIC, FLUID AND ELECTROLYTES - ADULT  Goal: Electrolytes maintained within normal limits  Description: INTERVENTIONS:  - Monitor labs and assess patient for signs and symptoms of electrolyte imbalances  - Administer electrolyte replacement as ordered  - Monitor response to electrolyte replacements, including repeat lab results as appropriate  - Instruct patient on fluid and nutrition as appropriate  Outcome: Progressing  Goal: Fluid balance maintained  Description: INTERVENTIONS:  - Monitor labs   - Monitor I/O and WT  - Instruct patient on fluid and nutrition as appropriate  - Assess for signs & symptoms of volume excess or deficit  Outcome: Progressing     Post-Dialysis RN Treatment Note    Blood Pressure:  Pre 150/97 mm/Hg  Post 139/98 mmHg   EDW  TBD kg    Weight:  Pre 84 kg   Post 84 kg   Mode of weight measurement: Standing Scale   Volume Removed  0 ml    Treatment duration 120 minutes    NS given  No    Treatment shortened? No   Medications given during Rx None Reported   Estimated Kt/V  None Reported   Access type: Permacath/TDC   Access Issues: No    Report called to primary nurse   Yes, Rekha Garcia, RN

## 2024-02-23 NOTE — ASSESSMENT & PLAN NOTE
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  Patient was reporting feeling tired and fatigued and TDC placed on 2/22  Started on HD today per nephrology  Recheck labs in a.m.

## 2024-02-23 NOTE — PLAN OF CARE
Problem: RESPIRATORY - ADULT  Goal: Achieves optimal ventilation and oxygenation  Description: INTERVENTIONS:  - Assess for changes in respiratory status  - Assess for changes in mentation and behavior  - Position to facilitate oxygenation and minimize respiratory effort  - Oxygen administered by appropriate delivery if ordered  - Initiate smoking cessation education as indicated  - Encourage broncho-pulmonary hygiene including cough, deep breathe, Incentive Spirometry  - Assess the need for suctioning and aspirate as needed  - Assess and instruct to report SOB or any respiratory difficulty  - Respiratory Therapy support as indicated  Outcome: Progressing     Problem: GASTROINTESTINAL - ADULT  Goal: Minimal or absence of nausea and/or vomiting  Description: INTERVENTIONS:  - Administer IV fluids if ordered to ensure adequate hydration  - Maintain NPO status until nausea and vomiting are resolved  - Nasogastric tube if ordered  - Administer ordered antiemetic medications as needed  - Provide nonpharmacologic comfort measures as appropriate  - Advance diet as tolerated, if ordered  - Consider nutrition services referral to assist patient with adequate nutrition and appropriate food choices  Outcome: Progressing     Problem: GENITOURINARY - ADULT  Goal: Maintains or returns to baseline urinary function  Description: INTERVENTIONS:  - Assess urinary function  - Encourage oral fluids to ensure adequate hydration if ordered  - Administer IV fluids as ordered to ensure adequate hydration  - Administer ordered medications as needed  - Offer frequent toileting  - Follow urinary retention protocol if ordered  Outcome: Progressing     Problem: METABOLIC, FLUID AND ELECTROLYTES - ADULT  Goal: Electrolytes maintained within normal limits  Description: INTERVENTIONS:  - Monitor labs and assess patient for signs and symptoms of electrolyte imbalances  - Administer electrolyte replacement as ordered  - Monitor response to  electrolyte replacements, including repeat lab results as appropriate  - Instruct patient on fluid and nutrition as appropriate  Outcome: Progressing     Problem: HEMATOLOGIC - ADULT  Goal: Maintains hematologic stability  Description: INTERVENTIONS  - Assess for signs and symptoms of bleeding or hemorrhage  - Monitor labs  - Administer supportive blood products/factors as ordered and appropriate  Outcome: Progressing

## 2024-02-23 NOTE — PROGRESS NOTES
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  Patient was reporting feeling tired and fatigued and TDC placed on 2/22  Started on HD today per nephrology  Recheck labs in a.m.    Acid-base disorder, mixed  Assessment & Plan  Metabolic acidosis with low serum bicarb, on sodium bicarb tablets at home  Was started on IV fluids with bicarb on admission which has been discontinued on 2/21  Sodium bicarb tablet discontinued  Repeat lab work in a.m.    Hyperphosphatemia  Assessment & Plan  Phosphorus 7.2 --> 6.4    Continue low phosphorus diet      Anemia due to stage 5 chronic kidney disease, not on chronic dialysis   Assessment & Plan  Hemoglobin stable    Results from last 7 days   Lab Units 02/23/24  0458 02/22/24  0630 02/21/24  0440 02/20/24  1409   HEMOGLOBIN g/dL 10.6* 10.4* 10.4* 10.4*   HEMATOCRIT % 32.9* 32.7* 31.3* 31.9*   MCV fL 99* 100* 99* 100*        Dyslipidemia  Assessment & Plan  Continue statin.    Schizophrenia (HCC)  Assessment & Plan  Patient is on Invega. Per discussion with nephrology, use is not recommended in ESRD  Discussed with patient option of getting psychiatrist involved regarding further management but patient stated that he did not  want this medications change without speaking with his psychiatrist first as this medication has been keeping his symptoms under control  Nephrologist spoke with patient's primary psychiatrist.  Unfortunately Invega cannot be split in half so that patient can be placed on 3 mg here but per primary psychiatrist can also change it to every other day dosing instead of 6 mg  Monitor for any decompensation    Benign hypertension with chronic kidney disease, stage V (HCC)  Assessment & Plan  Continue Toprol-XL, Norvasc and monitor blood pressures               VTE Pharmacologic Prophylaxis:    Heparin    Mobility:   Basic Mobility Inpatient Raw Score: 24  JH-HLM Goal: 8: Walk 250 feet or more  JH-HLM Achieved: 7: Walk 25 feet or more  HLM Goal NOT achieved. Continue with multidisciplinary rounding and encourage appropriate mobility to improve upon HLM goals.    Patient Centered Rounds: I performed bedside rounds with nursing staff today.   Discussions with Specialists or Other Care Team Provider: yes - renal    Education and Discussions with Family / Patient: Patient declined call to .     Total Time Spent on Date of Encounter in care of patient: This time was spent on one or more of the following: performing physical exam; counseling and coordination of care; obtaining or reviewing history; documenting in the medical record; reviewing/ordering tests, medications or procedures; communicating with other healthcare professionals and discussing with patient's family/caregivers.    Current Length of Stay: 3 day(s)  Current Patient Status: Inpatient   Certification Statement: The patient will continue to require additional inpatient hospital stay due to CKD stage V now on dialysis  Discharge Plan: Anticipate discharge in >72 hrs to home.    Code Status: Level 3 - DNAR and DNI    Subjective:     Patient states he tolerated dialysis well.  Denies any shortness of breath, pain    Objective:     Vitals:   Temp  (24hrs), Av.5 °F (36.9 °C), Min:98.2 °F (36.8 °C), Max:98.9 °F (37.2 °C)    Temp:  [98.2 °F (36.8 °C)-98.9 °F (37.2 °C)] 98.6 °F (37 °C)  HR:  [68-91] 79  Resp:  [13-20] 18  BP: (139-162)/() 146/98  SpO2:  [93 %-100 %] 93 %  Body mass index is 27.75 kg/m².     Input and Output Summary (last 24 hours):     Intake/Output Summary (Last 24 hours) at 2024 0937  Last data filed at 2024 1611  Gross per 24 hour   Intake --   Output 450 ml   Net -450 ml       Physical Exam:   Physical Exam  Vitals reviewed.   Constitutional:       General: He is not in acute distress.     Appearance: He is not toxic-appearing.   HENT:      Head: Normocephalic and atraumatic.   Eyes:      General:         Right eye: No discharge.         Left eye: No discharge.   Cardiovascular:      Rate and Rhythm: Normal rate and regular rhythm.   Pulmonary:      Effort: Pulmonary effort is normal. No respiratory distress.      Breath sounds: Normal breath sounds. No wheezing or rales.   Abdominal:      General: Bowel sounds are normal. There is no distension.      Palpations: Abdomen is soft.      Tenderness: There is no abdominal tenderness.   Musculoskeletal:      Right lower leg: No edema.      Left lower leg: No edema.   Neurological:      Mental Status: He is alert and oriented to person, place, and time.   Psychiatric:         Mood and Affect: Affect is flat.          Additional Data:     Labs:  Results from last 7 days   Lab Units 24  0906 24  0458 24  0440 24  1409   WBC Thousand/uL  --  8.21   < > 7.16   HEMOGLOBIN g/dL  --  10.6*   < > 10.4*   HEMATOCRIT %  --  32.9*   < > 31.9*   PLATELETS Thousands/uL 189 200   < > 209   NEUTROS PCT %  --   --   --  65   LYMPHS PCT %  --   --   --  17   MONOS PCT %  --   --   --  13*   EOS PCT %  --   --   --  3    < > = values in this interval not displayed.     Results from last 7 days   Lab Units 24  0458 24  0440 24  1409   SODIUM mmol/L 140   <  > 137   POTASSIUM mmol/L 4.9   < > 5.1   CHLORIDE mmol/L 109*   < > 106   CO2 mmol/L 22   < > 22   BUN mg/dL 60*   < > 61*   CREATININE mg/dL 7.51*   < > 7.36*   ANION GAP mmol/L 9   < > 9   CALCIUM mg/dL 9.4   < > 9.5   ALBUMIN g/dL  --   --  3.8   TOTAL BILIRUBIN mg/dL  --   --  0.24   ALK PHOS U/L  --   --  52   ALT U/L  --   --  7   AST U/L  --   --  5*   GLUCOSE RANDOM mg/dL 85   < > 101    < > = values in this interval not displayed.     Results from last 7 days   Lab Units 02/21/24  0440   INR  1.08                   Lines/Drains:  Invasive Devices       Peripheral Intravenous Line  Duration             Peripheral IV 02/20/24 Left Antecubital 2 days              Hemodialysis Catheter  Duration             HD Permanent Double Catheter <1 day                  Imaging: Reviewed radiology reports from this admission including: procedure reports and ultrasound(s)    Recent Cultures (last 7 days):         Last 24 Hours Medication List:   Current Facility-Administered Medications   Medication Dose Route Frequency Provider Last Rate    acetaminophen  650 mg Oral Q6H PRN Vanessa Cadena PA-C      amLODIPine  5 mg Oral Daily Stefany Carbone, DO      ceFAZolin   Intravenous Continuous PRN Dain Akers MD      ezetimibe  10 mg Oral Daily Stefany Carbone, DO      fentanyl citrate (PF)   Intravenous PRN Dain Akers MD      heparin (porcine)  5,000 Units Subcutaneous Q8H [facility] Stefany Carbone, DO      lidocaine-epinephrine 1%-1:484579 buffered   Infiltration PRN Dain Akers MD      metoprolol succinate  50 mg Oral Daily Stefany Carbone, DO      midazolam    PRN Dain Akers MD      nicotine  1 patch Transdermal Daily Stefany Carbone, DO      paliperidone  6 mg Oral Daily Stefany Carbone, DO      pravastatin  80 mg Oral Daily With Dinner Stefany Carbone, DO      sevelamer  800 mg Oral TID With Meals Segun Rojo MD          Today, Patient Was Seen By: Stefany Carbone,  DO    **Please Note: This note may have been constructed using a voice recognition system.**

## 2024-02-23 NOTE — ASSESSMENT & PLAN NOTE
Hemoglobin stable    Results from last 7 days   Lab Units 02/23/24  0458 02/22/24  0630 02/21/24  0440 02/20/24  1409   HEMOGLOBIN g/dL 10.6* 10.4* 10.4* 10.4*   HEMATOCRIT % 32.9* 32.7* 31.3* 31.9*   MCV fL 99* 100* 99* 100*

## 2024-02-24 ENCOUNTER — APPOINTMENT (INPATIENT)
Dept: DIALYSIS | Facility: HOSPITAL | Age: 54
DRG: 470 | End: 2024-02-24
Attending: INTERNAL MEDICINE
Payer: COMMERCIAL

## 2024-02-24 LAB
ANION GAP SERPL CALCULATED.3IONS-SCNC: 12 MMOL/L
BUN SERPL-MCNC: 51 MG/DL (ref 5–25)
CALCIUM SERPL-MCNC: 9 MG/DL (ref 8.4–10.2)
CHLORIDE SERPL-SCNC: 103 MMOL/L (ref 96–108)
CO2 SERPL-SCNC: 25 MMOL/L (ref 21–32)
CREAT SERPL-MCNC: 6.73 MG/DL (ref 0.6–1.3)
ERYTHROCYTE [DISTWIDTH] IN BLOOD BY AUTOMATED COUNT: 12.7 % (ref 11.6–15.1)
GFR SERPL CREATININE-BSD FRML MDRD: 8 ML/MIN/1.73SQ M
GLUCOSE SERPL-MCNC: 178 MG/DL (ref 65–140)
HCT VFR BLD AUTO: 31.7 % (ref 36.5–49.3)
HGB BLD-MCNC: 10.5 G/DL (ref 12–17)
MAGNESIUM SERPL-MCNC: 2.2 MG/DL (ref 1.9–2.7)
MCH RBC QN AUTO: 32.5 PG (ref 26.8–34.3)
MCHC RBC AUTO-ENTMCNC: 33.1 G/DL (ref 31.4–37.4)
MCV RBC AUTO: 98 FL (ref 82–98)
PHOSPHATE SERPL-MCNC: 5.5 MG/DL (ref 2.7–4.5)
PLATELET # BLD AUTO: 163 THOUSANDS/UL (ref 149–390)
PMV BLD AUTO: 11.3 FL (ref 8.9–12.7)
POTASSIUM SERPL-SCNC: 3.6 MMOL/L (ref 3.5–5.3)
RBC # BLD AUTO: 3.23 MILLION/UL (ref 3.88–5.62)
SODIUM SERPL-SCNC: 140 MMOL/L (ref 135–147)
WBC # BLD AUTO: 6.81 THOUSAND/UL (ref 4.31–10.16)

## 2024-02-24 PROCEDURE — 99232 SBSQ HOSP IP/OBS MODERATE 35: CPT | Performed by: INTERNAL MEDICINE

## 2024-02-24 PROCEDURE — 83735 ASSAY OF MAGNESIUM: CPT | Performed by: INTERNAL MEDICINE

## 2024-02-24 PROCEDURE — 5A1D70Z PERFORMANCE OF URINARY FILTRATION, INTERMITTENT, LESS THAN 6 HOURS PER DAY: ICD-10-PCS | Performed by: INTERNAL MEDICINE

## 2024-02-24 PROCEDURE — 84100 ASSAY OF PHOSPHORUS: CPT | Performed by: INTERNAL MEDICINE

## 2024-02-24 PROCEDURE — 80048 BASIC METABOLIC PNL TOTAL CA: CPT | Performed by: INTERNAL MEDICINE

## 2024-02-24 PROCEDURE — 87081 CULTURE SCREEN ONLY: CPT | Performed by: FAMILY MEDICINE

## 2024-02-24 PROCEDURE — 85027 COMPLETE CBC AUTOMATED: CPT | Performed by: INTERNAL MEDICINE

## 2024-02-24 RX ADMIN — HEPARIN SODIUM 5000 UNITS: 5000 INJECTION INTRAVENOUS; SUBCUTANEOUS at 14:57

## 2024-02-24 RX ADMIN — PRAVASTATIN SODIUM 80 MG: 80 TABLET ORAL at 17:16

## 2024-02-24 RX ADMIN — EZETIMIBE 10 MG: 10 TABLET ORAL at 12:36

## 2024-02-24 RX ADMIN — METOPROLOL SUCCINATE 50 MG: 50 TABLET, EXTENDED RELEASE ORAL at 12:35

## 2024-02-24 RX ADMIN — NICOTINE 1 PATCH: 14 PATCH, EXTENDED RELEASE TRANSDERMAL at 12:34

## 2024-02-24 RX ADMIN — HEPARIN SODIUM 5000 UNITS: 5000 INJECTION INTRAVENOUS; SUBCUTANEOUS at 21:41

## 2024-02-24 RX ADMIN — HEPARIN SODIUM 5000 UNITS: 5000 INJECTION INTRAVENOUS; SUBCUTANEOUS at 06:30

## 2024-02-24 RX ADMIN — SEVELAMER HYDROCHLORIDE 800 MG: 800 TABLET, FILM COATED ORAL at 17:16

## 2024-02-24 RX ADMIN — SEVELAMER HYDROCHLORIDE 800 MG: 800 TABLET, FILM COATED ORAL at 12:39

## 2024-02-24 NOTE — PROGRESS NOTES
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 Remains in 6-7 range  TDC placed 2/224/24  Nephrology following  First HD on 2/23/24. Will have additional HD on 2/24/24  Vascular surgery scheduled fistula date for 3/21/24  Continue sevelamer  Repeat AM labs    Acid-base disorder, mixed  Assessment & Plan  Metabolic acidosis with low serum bicarb, on sodium bicarb tablets at home  Was started on IV fluids with bicarb on admission which has been discontinued on 2/21  Sodium bicarb tablet discontinued  Repeat daily labs    Hyperphosphatemia  Assessment & Plan  Phosphorus 7.2> 6.4>5.5  Continue low phosphorus diet      Anemia due to stage 5 chronic kidney disease, not on chronic dialysis   Assessment & Plan  Hemoglobin stable  Daily CBC    Results from last 7 days   Lab Units 02/24/24  0945 02/23/24  0458 02/22/24  0630 02/21/24  0440 02/20/24  1409   HEMOGLOBIN g/dL 10.5* 10.6* 10.4* 10.4* 10.4*   HEMATOCRIT % 31.7* 32.9* 32.7* 31.3* 31.9*   MCV fL 98 99* 100* 99* 100*          Dyslipidemia  Assessment & Plan  Continue statin    Schizophrenia (HCC)  Assessment & Plan  Patient is on Invega. Per discussion with nephrology, use is not recommended in ESRD  Discussed with patient option of getting psychiatrist involved regarding further  management but patient stated that he did not want this medications change without speaking with his psychiatrist first as this medication has been keeping his symptoms under control  Nephrologist spoke with patient's primary psychiatrist.  Unfortunately Invega cannot be split in half so that patient can be placed on 3 mg here but per primary psychiatrist can also change it to every other day dosing instead of 6 mg  Monitor for any decompensation    Benign hypertension with chronic kidney disease, stage V (HCC)  Assessment & Plan  Continue Toprol-XL, Norvasc and monitor blood pressures             VTE Pharmacologic Prophylaxis: VTE Score: 4 Moderate Risk (Score 3-4) - Pharmacological DVT Prophylaxis Ordered: heparin.    Mobility:   Basic Mobility Inpatient Raw Score: 24  JH-HLM Goal: 8: Walk 250 feet or more  JH-HLM Achieved: 7: Walk 25 feet or more  HLM Goal achieved. Continue to encourage appropriate mobility.    Patient Centered Rounds: I performed bedside rounds with nursing staff today.   Discussions with Specialists or Other Care Team Provider: Nursing    Education and Discussions with Family / Patient: Patient declined call to .     Total Time Spent on Date of Encounter in care of patient: 45 mins. This time was spent on one or more of the following: performing physical exam; counseling and coordination of care; obtaining or reviewing history; documenting in the medical record; reviewing/ordering tests, medications or procedures; communicating with other healthcare professionals and discussing with patient's family/caregivers.    Current Length of Stay: 4 day(s)  Current Patient Status: Inpatient   Certification Statement: The patient will continue to require additional inpatient hospital stay due to CKD requiring HD  Discharge Plan: Anticipate discharge in 48-72 hrs to home.    Code Status: Level 3 - DNAR and DNI    Subjective:   Patient feels tired today after dialysis treatment this AM. He  was stating that he was going to get up and eat breakfast shortly. No acute events overnight.     Objective:     Vitals:   Temp (24hrs), Av.4 °F (36.9 °C), Min:97.8 °F (36.6 °C), Max:98.9 °F (37.2 °C)    Temp:  [97.8 °F (36.6 °C)-98.9 °F (37.2 °C)] 98 °F (36.7 °C)  HR:  [75-89] 89  Resp:  [18] 18  BP: (117-141)/(73-98) 117/80  SpO2:  [93 %-95 %] 95 %  Body mass index is 27.75 kg/m².     Input and Output Summary (last 24 hours):     Intake/Output Summary (Last 24 hours) at 2024 1229  Last data filed at 2024 1200  Gross per 24 hour   Intake 700 ml   Output 500 ml   Net 200 ml       Physical Exam:   Physical Exam  Vitals and nursing note reviewed.   Constitutional:       General: He is not in acute distress.     Appearance: He is well-developed.   HENT:      Head: Normocephalic and atraumatic.   Eyes:      Conjunctiva/sclera: Conjunctivae normal.   Cardiovascular:      Rate and Rhythm: Normal rate and regular rhythm.      Heart sounds: No murmur heard.  Pulmonary:      Effort: Pulmonary effort is normal. No respiratory distress.      Breath sounds: Normal breath sounds.   Abdominal:      Palpations: Abdomen is soft.      Tenderness: There is no abdominal tenderness.   Musculoskeletal:         General: No swelling.      Cervical back: Neck supple.   Skin:     General: Skin is warm and dry.      Capillary Refill: Capillary refill takes less than 2 seconds.   Neurological:      Mental Status: He is alert. Mental status is at baseline.   Psychiatric:         Mood and Affect: Mood normal.          Additional Data:     Labs:  Results from last 7 days   Lab Units 24  0945 24  0440 24  1409   WBC Thousand/uL 6.81   < > 7.16   HEMOGLOBIN g/dL 10.5*   < > 10.4*   HEMATOCRIT % 31.7*   < > 31.9*   PLATELETS Thousands/uL 163   < > 209   NEUTROS PCT %  --   --  65   LYMPHS PCT %  --   --  17   MONOS PCT %  --   --  13*   EOS PCT %  --   --  3    < > = values in this interval not displayed.     Results  from last 7 days   Lab Units 02/24/24  0945 02/21/24  0440 02/20/24  1409   SODIUM mmol/L 140   < > 137   POTASSIUM mmol/L 3.6   < > 5.1   CHLORIDE mmol/L 103   < > 106   CO2 mmol/L 25   < > 22   BUN mg/dL 51*   < > 61*   CREATININE mg/dL 6.73*   < > 7.36*   ANION GAP mmol/L 12   < > 9   CALCIUM mg/dL 9.0   < > 9.5   ALBUMIN g/dL  --   --  3.8   TOTAL BILIRUBIN mg/dL  --   --  0.24   ALK PHOS U/L  --   --  52   ALT U/L  --   --  7   AST U/L  --   --  5*   GLUCOSE RANDOM mg/dL 178*   < > 101    < > = values in this interval not displayed.     Results from last 7 days   Lab Units 02/21/24  0440   INR  1.08                   Lines/Drains:  Invasive Devices       Peripheral Intravenous Line  Duration             Peripheral IV 02/20/24 Left Antecubital 3 days              Hemodialysis Catheter  Duration             HD Permanent Double Catheter 1 day                          Imaging: No pertinent imaging reviewed.    Recent Cultures (last 7 days):         Last 24 Hours Medication List:   Current Facility-Administered Medications   Medication Dose Route Frequency Provider Last Rate    acetaminophen  650 mg Oral Q6H PRN Vanessa Cadena PA-C      amLODIPine  5 mg Oral Daily Stefany Carbone, DO      ceFAZolin   Intravenous Continuous PRN Dain Akers MD      ezetimibe  10 mg Oral Daily Stefany Carbone, DO      fentanyl citrate (PF)   Intravenous PRN Dain Akers MD      heparin (porcine)  5,000 Units Subcutaneous Q8H [facility] Stefany Carbone, DO      lidocaine-epinephrine 1%-1:560309 buffered   Infiltration PRN Dain Akers MD      metoprolol succinate  50 mg Oral Daily Stefany Carbone, DO      midazolam    PRN Dain Akers MD      nicotine  1 patch Transdermal Daily Stefany Carbone DO      [START ON 2/25/2024] paliperidone  6 mg Oral Every Other Day Stefany Carbone, DO      pravastatin  80 mg Oral Daily With Dinner Stefany Carbone, DO      sevelamer  800 mg Oral TID With Meals Segun AUGUSTIN  MD Darrel          Today, Patient Was Seen By: Angelita Estrada PA-C    **Please Note: This note may have been constructed using a voice recognition system.**

## 2024-02-24 NOTE — ASSESSMENT & PLAN NOTE
Metabolic acidosis with low serum bicarb, on sodium bicarb tablets at home  Was started on IV fluids with bicarb on admission which has been discontinued on 2/21  Sodium bicarb tablet discontinued  Repeat daily labs

## 2024-02-24 NOTE — ASSESSMENT & PLAN NOTE
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 Remains in 6-7 range  TDC placed 2/224/24  Nephrology following  First HD on 2/23/24. Will have additional HD on 2/24/24  Vascular surgery scheduled fistula date for 3/21/24  Continue sevelamer  Repeat AM labs

## 2024-02-24 NOTE — PROGRESS NOTES
NEPHROLOGY HOSPITAL PROGRESS NOTE   Jordy Tidwell 54 y.o. male MRN: [id number]  Unit/Bed#: 2 [facility] Encounter: [id number]  Reason for Consult: CKD V now ESRD    ASSESSMENT and PLAN:    54-year-old male with past medical history of CKD stage V with biopsy-proven FSGS, proteinuria, hypertension, secondary hyperparathyroidism, hyperlipidemia, nicotine dependence with ongoing smoking, who is advised to come to the hospital by outpatient nephrology team due to progression of renal  dysfunction along with hyperkalemia noted on lab work from 2/16.     1-CKD stage V now ESRD     - Outpatient nephrologist Dr. Costello and patient saw advanced practitioner Bing BERRIOS today in the renal office  - Patient's renal function has been progressively declining  -Patient's creatinine 6.6 on 2/16 prompting recommendations for the patient to come to the hospital  - Admission creatinine 2/27.4 mg/dL  - Native disease biopsy-proven FSGS  - renal u/s with diffuse echogenicity on parenchyma   - Access-patient was scheduled to see vascular surgery team on March 21 for access creation evaluation surgery  - 2/21 - creat stable 7.2 mg/dL, K ok 4.9. bicarb 23.  - 2/22 - creat unchanged 7.32 mg/dL. Na, K, bicarb appropriate. Phos improving. Dialysis consent obtained and placed on chart for scanning.  Dialysis catheter placed.  - 2/23 initiation treatment #1 dialysis  - 2/24-treatment #2 for dialysis     Access-  - Tunneled dialysis catheter placed 2/23  - Fistula to be placed March 21 as outpatient     Plan  -Treatment #2 for dialysis 2/24, next treatment after today is on Monday  - Case management team is aware regarding patient needing outpatient dialysis placement  - Vascular surgery team outpatient is concerned patient will need 2 separate procedures for fistula based on current veins.  Fistula surgery scheduled for March 21  - Current access is tunneled dialysis catheter  - Continue sevelamer  - BMP in a.m.  - Follow-up Cystatin  C this will not  of starting dialysis as the patient is showing signs of uremia  - Increase amlodipine if blood pressures remain above 160 systolic     2-acid/base-on bicarbonate tablets will hold as we are initiating dialysis     3-hypertension-avoid hypotension and monitor closely with current regimen     4-MBD-agree with starting phosphorus based binders and started on sevelamer 2/20 with improving phos     5-anemia-monitoring for now.  Above goal.     6-history of microscopic hematuria-this appears to be a chronic issue.    7-psychiatric disease-see discussion from 2/23.    - For now 6 mg every other day invega as we cannot split the capsule  - I spoke to the patient's outpatient psychiatrist and see full discussion from yesterday.    SUBJECTIVE / 24H INTERVAL HISTORY:  Patient denies complaints.  Seen after dialysis.  Blood pressure is 119 124 systolic.  Afebrile.  On room air.  Urine output minimal 240 cc.    OBJECTIVE:  Current Weight: Weight - Scale: 84 kg (185 lb 3 oz)  Vitals:    02/24/24 1100 02/24/24 1130 02/24/24 1200 02/24/24 1534   BP: 119/73 118/80 117/80 124/91   BP Location: Right arm Right arm Right arm    Pulse: 75 80 89 91   Resp: 18 18 18 16   Temp:   98 °F (36.7 °C) 98.1 °F (36.7 °C)   TempSrc:       SpO2:    95%   Weight:       Height:           Intake/Output Summary (Last 24 hours) at 2/24/2024 1714  Last data filed at 2/24/2024 1200  Gross per 24 hour   Intake 700 ml   Output 500 ml   Net 200 ml     General: NAD  Skin: no rash  Eyes: anicteric sclera  ENT: moist mucous membrane  Neck: supple  Chest: CTA b/l, no ronchii, no wheeze, no rubs, no rales  CVS: s1s2, no murmur, no gallop, no rub  Abdomen: soft, nontender, nl sounds  Extremities: no edema LE b/l, tunneled dialysis catheter site without erythema or drainage.  : no lee  Neuro: AAOX3  Psych: normal affect    Medications:    Current Facility-Administered Medications:     acetaminophen (TYLENOL) tablet 650 mg, 650  mg, Oral, Q6H PRN, Vanessa Cadena PA-C, 650 mg at 02/22/24 2132    amLODIPine (NORVASC) tablet 5 mg, 5 mg, Oral, Daily, Stefany Revshahlaar, DO, 5 mg at 02/23/24 1143    ceFAZolin (ANCEF) IVPB (premix in dextrose), , Intravenous, Continuous PRN, Dain Akers MD, 2,000 mg at 02/22/24 1400    ezetimibe (ZETIA) tablet 10 mg, 10 mg, Oral, Daily, Stefany Revshahlaar, DO, 10 mg at 02/24/24 1236    fentanyl citrate (PF) 100 MCG/2ML, , Intravenous, PRN, Dain Akers MD, 25 mcg at 02/22/24 1408    heparin (porcine) subcutaneous injection 5,000 Units, 5,000 Units, Subcutaneous, Q8H CODY, 5,000 Units at 02/24/24 1457 **AND** [COMPLETED] Platelet count, , , Once, Stefany Carbone DO    lidocaine-epinephrine 1%-1:231478 buffered, , Infiltration, PRN, Dain Akers MD, 5 mL at 02/22/24 1410    metoprolol succinate (TOPROL-XL) 24 hr tablet 50 mg, 50 mg, Oral, Daily, Stefany Revshahlaar, DO, 50 mg at 02/24/24 1235    midazolam (VERSED) injection, , , PRN, Dain Akers MD, 0.5 mg at 02/22/24 1408    nicotine (NICODERM CQ) 14 mg/24hr TD 24 hr patch 1 patch, 1 patch, Transdermal, Daily, Stefany Revshahlaar, DO, 1 patch at 02/24/24 1234    [START ON 2/25/2024] paliperidone (INVEGA) 24 hr tablet 6 mg, 6 mg, Oral, Every Other Day, Stefany Revshahlaar DO    pravastatin (PRAVACHOL) tablet 80 mg, 80 mg, Oral, Daily With Dinner, Stefany Cissear DO, 80 mg at 02/23/24 1550    sevelamer (RENAGEL) tablet 800 mg, 800 mg, Oral, TID With Meals, Segun Rojo MD, 800 mg at 02/24/24 1239    Laboratory Results:  Results from last 7 days   Lab Units 02/24/24  0945 02/23/24  0906 02/23/24  0458 02/22/24  0630 02/21/24  0440 02/20/24  1409   WBC Thousand/uL 6.81  --  8.21 7.14 6.76 7.16   HEMOGLOBIN g/dL 10.5*  --  10.6* 10.4* 10.4* 10.4*   HEMATOCRIT % 31.7*  --  32.9* 32.7* 31.3* 31.9*   PLATELETS Thousands/uL 163 189 200 210 206 209   POTASSIUM mmol/L 3.6  --  4.9 4.8 4.9 5.1   CHLORIDE mmol/L 103  --  109* 109* 109* 106   CO2  mmol/L 25  --  22 22 23 22   BUN mg/dL 51*  --  60* 55* 57* 61*   CREATININE mg/dL 6.73*  --  7.51* 7.32* 7.24* 7.36*   CALCIUM mg/dL 9.0  --  9.4 9.6 9.1 9.5   MAGNESIUM mg/dL 2.2  --  2.3 2.3 2.3 2.5   PHOSPHORUS mg/dL 5.5*  --  6.4* 5.9* 6.3* 7.2*

## 2024-02-24 NOTE — ASSESSMENT & PLAN NOTE
Hemoglobin stable  Daily CBC    Results from last 7 days   Lab Units 02/24/24  0945 02/23/24  0458 02/22/24  0630 02/21/24  0440 02/20/24  1409   HEMOGLOBIN g/dL 10.5* 10.6* 10.4* 10.4* 10.4*   HEMATOCRIT % 31.7* 32.9* 32.7* 31.3* 31.9*   MCV fL 98 99* 100* 99* 100*

## 2024-02-24 NOTE — PLAN OF CARE
Post-Dialysis RN Treatment Note    Blood Pressure:  Pre 132/95 mm/Hg  Post 117/80 mmHg   EDW  TBD kg    Weight:  Pre 84 kg   Post 84 kg   Mode of weight measurement: Bed Scale   Volume Removed  0 ml    Treatment duration 150 minutes    NS given  N/A    Treatment shortened? No   Medications given during Rx None Reported   Estimated Kt/V  Not Applicable   Access type: Permacath/TDC   Access Issues: No    Report called to primary nurse   Yes Valerie Dow LPN      Goal of treatment is the removal of renal toxins and wastes with this second treatment of HD  Problem: METABOLIC, FLUID AND ELECTROLYTES - ADULT  Goal: Electrolytes maintained within normal limits  Description: INTERVENTIONS:  - Monitor labs and assess patient for signs and symptoms of electrolyte imbalances  - Administer electrolyte replacement as ordered  - Monitor response to electrolyte replacements, including repeat lab results as appropriate  - Instruct patient on fluid and nutrition as appropriate  Outcome: Progressing  Goal: Fluid balance maintained  Description: INTERVENTIONS:  - Monitor labs   - Monitor I/O and WT  - Instruct patient on fluid and nutrition as appropriate  - Assess for signs & symptoms of volume excess or deficit  Outcome: Progressing

## 2024-02-25 LAB
ANION GAP SERPL CALCULATED.3IONS-SCNC: 9 MMOL/L
BUN SERPL-MCNC: 43 MG/DL (ref 5–25)
CALCIUM SERPL-MCNC: 9.7 MG/DL (ref 8.4–10.2)
CHLORIDE SERPL-SCNC: 101 MMOL/L (ref 96–108)
CO2 SERPL-SCNC: 25 MMOL/L (ref 21–32)
CREAT SERPL-MCNC: 6.22 MG/DL (ref 0.6–1.3)
CYSTATIN C SERPL-MCNC: 4.23 MG/L (ref 0.67–1.14)
ERYTHROCYTE [DISTWIDTH] IN BLOOD BY AUTOMATED COUNT: 12.4 % (ref 11.6–15.1)
GFR SERPL CREATININE-BSD FRML MDRD: 9 ML/MIN/1.73SQ M
GFR/BSA.PRED SERPLBLD CYS-BASED-ARV: 12 ML/MIN/1.73
GLUCOSE SERPL-MCNC: 94 MG/DL (ref 65–140)
HCT VFR BLD AUTO: 32.6 % (ref 36.5–49.3)
HGB BLD-MCNC: 11 G/DL (ref 12–17)
MAGNESIUM SERPL-MCNC: 2.4 MG/DL (ref 1.9–2.7)
MCH RBC QN AUTO: 32.5 PG (ref 26.8–34.3)
MCHC RBC AUTO-ENTMCNC: 33.7 G/DL (ref 31.4–37.4)
MCV RBC AUTO: 96 FL (ref 82–98)
MRSA NOSE QL CULT: NORMAL
PHOSPHATE SERPL-MCNC: 6.2 MG/DL (ref 2.7–4.5)
PLATELET # BLD AUTO: 157 THOUSANDS/UL (ref 149–390)
PMV BLD AUTO: 11.3 FL (ref 8.9–12.7)
POTASSIUM SERPL-SCNC: 3.9 MMOL/L (ref 3.5–5.3)
RBC # BLD AUTO: 3.38 MILLION/UL (ref 3.88–5.62)
SODIUM SERPL-SCNC: 135 MMOL/L (ref 135–147)
WBC # BLD AUTO: 7.55 THOUSAND/UL (ref 4.31–10.16)

## 2024-02-25 PROCEDURE — 85027 COMPLETE CBC AUTOMATED: CPT | Performed by: INTERNAL MEDICINE

## 2024-02-25 PROCEDURE — 83735 ASSAY OF MAGNESIUM: CPT | Performed by: INTERNAL MEDICINE

## 2024-02-25 PROCEDURE — 80048 BASIC METABOLIC PNL TOTAL CA: CPT | Performed by: INTERNAL MEDICINE

## 2024-02-25 PROCEDURE — 84100 ASSAY OF PHOSPHORUS: CPT | Performed by: INTERNAL MEDICINE

## 2024-02-25 PROCEDURE — 99232 SBSQ HOSP IP/OBS MODERATE 35: CPT | Performed by: FAMILY MEDICINE

## 2024-02-25 RX ORDER — PALIPERIDONE 6 MG/1
6 TABLET, EXTENDED RELEASE ORAL EVERY OTHER DAY
Status: DISCONTINUED | OUTPATIENT
Start: 2024-02-25 | End: 2024-02-26 | Stop reason: HOSPADM

## 2024-02-25 RX ADMIN — AMLODIPINE BESYLATE 5 MG: 5 TABLET ORAL at 09:45

## 2024-02-25 RX ADMIN — HEPARIN SODIUM 5000 UNITS: 5000 INJECTION INTRAVENOUS; SUBCUTANEOUS at 21:15

## 2024-02-25 RX ADMIN — SEVELAMER HYDROCHLORIDE 800 MG: 800 TABLET, FILM COATED ORAL at 09:45

## 2024-02-25 RX ADMIN — PRAVASTATIN SODIUM 80 MG: 80 TABLET ORAL at 16:18

## 2024-02-25 RX ADMIN — METOPROLOL SUCCINATE 50 MG: 50 TABLET, EXTENDED RELEASE ORAL at 09:45

## 2024-02-25 RX ADMIN — EZETIMIBE 10 MG: 10 TABLET ORAL at 09:45

## 2024-02-25 RX ADMIN — HEPARIN SODIUM 5000 UNITS: 5000 INJECTION INTRAVENOUS; SUBCUTANEOUS at 16:18

## 2024-02-25 RX ADMIN — PALIPERIDONE 6 MG: 6 TABLET, FILM COATED, EXTENDED RELEASE ORAL at 21:15

## 2024-02-25 RX ADMIN — NICOTINE 1 PATCH: 14 PATCH, EXTENDED RELEASE TRANSDERMAL at 09:46

## 2024-02-25 RX ADMIN — HEPARIN SODIUM 5000 UNITS: 5000 INJECTION INTRAVENOUS; SUBCUTANEOUS at 05:38

## 2024-02-25 RX ADMIN — SEVELAMER HYDROCHLORIDE 800 MG: 800 TABLET, FILM COATED ORAL at 16:18

## 2024-02-25 NOTE — TREATMENT PLAN
Renal short note    Reviewed chart. Pt vitals, labs appropriate. Next HD tomorrow from renal standpoint. Please call if issues/questions arise today. Thank you

## 2024-02-25 NOTE — ASSESSMENT & PLAN NOTE
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  Next dialysis on 2/26  Vascular surgery scheduled fistula date for 3/21/24  Continue sevelamer  Repeat AM labs

## 2024-02-25 NOTE — ASSESSMENT & PLAN NOTE
Patient is on Invega. Per discussion with nephrology, use is not recommended in ESRD  Discussed with patient option of getting psychiatrist involved regarding further management but patient stated that he did not want this medications changed without speaking with his psychiatrist first as this medication has been keeping his symptoms under control  Nephrologist spoke with patient's primary psychiatrist.  Unfortunately Invega cannot be split in half and therefore currently on every other day dosing of 6 mg per primary psychiatrist  Patient prefers taking it on a daily basis.  Discussed with patient to follow-up with psychiatry after discharge so that he can be started on the 3 mg dose that he can take daily  Monitor for any decompensation

## 2024-02-25 NOTE — ASSESSMENT & PLAN NOTE
Hemoglobin stable  Repeat lab work in a.m.    Results from last 7 days   Lab Units 02/25/24  0434 02/24/24  0945 02/23/24  0458 02/22/24  0630 02/21/24  0440 02/20/24  1409   HEMOGLOBIN g/dL 11.0* 10.5* 10.6* 10.4* 10.4* 10.4*   HEMATOCRIT % 32.6* 31.7* 32.9* 32.7* 31.3* 31.9*   MCV fL 96 98 99* 100* 99* 100*

## 2024-02-25 NOTE — PROGRESS NOTES
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  Next dialysis on 2/26  Vascular surgery scheduled fistula date for 3/21/24  Continue sevelamer  Repeat AM labs    Acid-base disorder, mixed  Assessment & Plan  Metabolic acidosis with low serum bicarb, on sodium bicarb tablets at home  Was started on IV fluids with bicarb on admission which has been discontinued on 2/21  Sodium bicarb tablet discontinued  Repeat lab work in a.m.    Hyperphosphatemia  Assessment & Plan  Phosphorus 7.2> 6.4> 6.2  Continue low phosphorus diet      Anemia due to stage 5 chronic kidney disease, not on chronic dialysis   Assessment & Plan  Hemoglobin stable  Repeat lab work in a.m.    Results from last 7 days   Lab Units 02/25/24  0434 02/24/24  0945 02/23/24  0458 02/22/24  0630 02/21/24  0440 02/20/24  1409   HEMOGLOBIN g/dL 11.0* 10.5* 10.6* 10.4* 10.4* 10.4*   HEMATOCRIT % 32.6* 31.7* 32.9* 32.7* 31.3* 31.9*   MCV fL 96 98 99* 100* 99* 100*        Dyslipidemia  Assessment & Plan  Continue statin    Schizophrenia (HCC)  Assessment & Plan  Patient is on Invega. Per discussion with nephrology, use is not recommended in ESRD  Discussed  with patient option of getting psychiatrist involved regarding further management but patient stated that he did not want this medications changed without speaking with his psychiatrist first as this medication has been keeping his symptoms under control  Nephrologist spoke with patient's primary psychiatrist.  Unfortunately Invega cannot be split in half and therefore currently on every other day dosing of 6 mg per primary psychiatrist  Patient prefers taking it on a daily basis.  Discussed with patient to follow-up with psychiatry after discharge so that he can be started on the 3 mg dose that he can take daily  Monitor for any decompensation    Benign hypertension with chronic kidney disease, stage V (HCC)  Assessment & Plan  Continue Toprol-XL, Norvasc and monitor blood pressures.               VTE Pharmacologic Prophylaxis: VTE Score: 4 Moderate Risk (Score 3-4) - Pharmacological DVT Prophylaxis Ordered: heparin.    Mobility:   Basic Mobility Inpatient Raw Score: 23  JH-HLM Goal: 7: Walk 25 feet or more  JH-HLM Achieved: 7: Walk 25 feet or more  HLM Goal achieved. Continue to encourage appropriate mobility.    Patient Centered Rounds: I performed bedside rounds with nursing staff today.   Discussions with Specialists or Other Care Team Provider: yes - renal    Education and Discussions with Family / Patient: Patient declined call to .     Total Time Spent on Date of Encounter in care of patient: This time was spent on one or more of the following: performing physical exam; counseling and coordination of care; obtaining or reviewing history; documenting in the medical record; reviewing/ordering tests, medications or procedures; communicating with other healthcare professionals and discussing with patient's family/caregivers.    Current Length of Stay: 5 day(s)  Current Patient Status: Inpatient   Certification Statement: The patient will continue to require additional inpatient hospital stay due to  CKD stage V on dialysis requiring dialysis treatment tomorrow and outpatient dialysis set up  Discharge Plan: Anticipate discharge tomorrow to home.    Code Status: Level 3 - DNAR and DNI    Subjective:     Patient states he slept very late and therefore feels tired.  Nausea is almost fully resolved.  No vomiting    Objective:     Vitals:   Temp (24hrs), Av.2 °F (36.8 °C), Min:98 °F (36.7 °C), Max:98.4 °F (36.9 °C)    Temp:  [98 °F (36.7 °C)-98.4 °F (36.9 °C)] 98.1 °F (36.7 °C)  HR:  [71-91] 71  Resp:  [16-18] 16  BP: (110-133)/(73-92) 123/81  SpO2:  [95 %-96 %] 96 %  Body mass index is 27.75 kg/m².     Input and Output Summary (last 24 hours):     Intake/Output Summary (Last 24 hours) at 2024 0856  Last data filed at 2024 1200  Gross per 24 hour   Intake 700 ml   Output 500 ml   Net 200 ml       Physical Exam:   Physical Exam  Vitals reviewed.   Constitutional:       General: He is not in acute distress.     Appearance: He is not toxic-appearing.   HENT:      Head: Normocephalic and atraumatic.   Eyes:      General:         Right eye: No discharge.         Left eye: No discharge.   Cardiovascular:      Rate and Rhythm: Normal rate and regular rhythm.   Pulmonary:      Effort: Pulmonary effort is normal. No respiratory distress.      Breath sounds: Normal breath sounds. No wheezing or rales.   Abdominal:      General: Bowel sounds are normal. There is no distension.      Palpations: Abdomen is soft.      Tenderness: There is no abdominal tenderness.   Musculoskeletal:      Right lower leg: No edema.      Left lower leg: No edema.   Neurological:      Mental Status: He is alert and oriented to person, place, and time.   Psychiatric:         Mood and Affect: Affect is flat.          Additional Data:     Labs:  Results from last 7 days   Lab Units 24  0434 24  0440 24  1409   WBC Thousand/uL 7.55   < > 7.16   HEMOGLOBIN g/dL 11.0*   < > 10.4*   HEMATOCRIT % 32.6*   < > 31.9*   PLATELETS  Thousands/uL 157   < > 209   NEUTROS PCT %  --   --  65   LYMPHS PCT %  --   --  17   MONOS PCT %  --   --  13*   EOS PCT %  --   --  3    < > = values in this interval not displayed.     Results from last 7 days   Lab Units 02/25/24  0434 02/21/24  0440 02/20/24  1409   SODIUM mmol/L 135   < > 137   POTASSIUM mmol/L 3.9   < > 5.1   CHLORIDE mmol/L 101   < > 106   CO2 mmol/L 25   < > 22   BUN mg/dL 43*   < > 61*   CREATININE mg/dL 6.22*   < > 7.36*   ANION GAP mmol/L 9   < > 9   CALCIUM mg/dL 9.7   < > 9.5   ALBUMIN g/dL  --   --  3.8   TOTAL BILIRUBIN mg/dL  --   --  0.24   ALK PHOS U/L  --   --  52   ALT U/L  --   --  7   AST U/L  --   --  5*   GLUCOSE RANDOM mg/dL 94   < > 101    < > = values in this interval not displayed.     Results from last 7 days   Lab Units 02/21/24  0440   INR  1.08                   Lines/Drains:  Invasive Devices       Peripheral Intravenous Line  Duration             Peripheral IV 02/20/24 Left Antecubital 4 days              Hemodialysis Catheter  Duration             HD Permanent Double Catheter 2 days                          Imaging: No pertinent imaging reviewed.    Recent Cultures (last 7 days):         Last 24 Hours Medication List:   Current Facility-Administered Medications   Medication Dose Route Frequency Provider Last Rate    acetaminophen  650 mg Oral Q6H PRN Vanessa Cadena PA-C      amLODIPine  5 mg Oral Daily Stefany Carbone, DO      ceFAZolin   Intravenous Continuous PRN Dain Akers MD      ezetimibe  10 mg Oral Daily Stefany Carbone, DO      fentanyl citrate (PF)   Intravenous PRN Dain Akers MD      heparin (porcine)  5,000 Units Subcutaneous Q8H [facility] Stefany Carbone, DO      lidocaine-epinephrine 1%-1:631319 buffered   Infiltration PRN Dain Akers MD      metoprolol succinate  50 mg Oral Daily Stefany Carbone, DO      midazolam    PRN Dain Akers MD      nicotine  1 patch Transdermal Daily Stefany Carbone, DO      paliperidone   6 mg Oral Every Other Day Stefany Carbone DO      pravastatin  80 mg Oral Daily With Dinner Stefany Carbone DO      sevelamer  800 mg Oral TID With Meals Segun Rojo MD          Today, Patient Was Seen By: Stefany Carbone DO    **Please Note: This note may have been constructed using a voice recognition system.**

## 2024-02-25 NOTE — PLAN OF CARE
Problem: GASTROINTESTINAL - ADULT  Goal: Minimal or absence of nausea and/or vomiting  Description: INTERVENTIONS:  - Administer IV fluids if ordered to ensure adequate hydration  - Maintain NPO status until nausea and vomiting are resolved  - Nasogastric tube if ordered  - Administer ordered antiemetic medications as needed  - Provide nonpharmacologic comfort measures as appropriate  - Advance diet as tolerated, if ordered  - Consider nutrition services referral to assist patient with adequate nutrition and appropriate food choices  Outcome: Progressing     Problem: GASTROINTESTINAL - ADULT  Goal: Maintains or returns to baseline bowel function  Description: INTERVENTIONS:  - Assess bowel function  - Encourage oral fluids to ensure adequate hydration  - Administer IV fluids if ordered to ensure adequate hydration  - Administer ordered medications as needed  - Encourage mobilization and activity  - Consider nutritional services referral to assist patient with adequate nutrition and appropriate food choices  Outcome: Progressing     Problem: METABOLIC, FLUID AND ELECTROLYTES - ADULT  Goal: Fluid balance maintained  Description: INTERVENTIONS:  - Monitor labs   - Monitor I/O and WT  - Instruct patient on fluid and nutrition as appropriate  - Assess for signs & symptoms of volume excess or deficit  Outcome: Progressing     Problem: METABOLIC, FLUID AND ELECTROLYTES - ADULT  Goal: Electrolytes maintained within normal limits  Description: INTERVENTIONS:  - Monitor labs and assess patient for signs and symptoms of electrolyte imbalances  - Administer electrolyte replacement as ordered  - Monitor response to electrolyte replacements, including repeat lab results as appropriate  - Instruct patient on fluid and nutrition as appropriate  Outcome: Progressing

## 2024-02-26 ENCOUNTER — APPOINTMENT (INPATIENT)
Dept: DIALYSIS | Facility: HOSPITAL | Age: 54
DRG: 470 | End: 2024-02-26
Payer: COMMERCIAL

## 2024-02-26 VITALS
HEART RATE: 80 BPM | TEMPERATURE: 97.7 F | HEIGHT: 69 IN | SYSTOLIC BLOOD PRESSURE: 121 MMHG | RESPIRATION RATE: 16 BRPM | OXYGEN SATURATION: 97 % | BODY MASS INDEX: 27.43 KG/M2 | DIASTOLIC BLOOD PRESSURE: 86 MMHG | WEIGHT: 185.19 LBS

## 2024-02-26 PROBLEM — Z99.2 ESRD (END STAGE RENAL DISEASE) ON DIALYSIS (HCC): Status: ACTIVE | Noted: 2024-02-26

## 2024-02-26 PROBLEM — N18.6 ESRD (END STAGE RENAL DISEASE) ON DIALYSIS (HCC): Status: ACTIVE | Noted: 2024-02-26

## 2024-02-26 LAB
ANION GAP SERPL CALCULATED.3IONS-SCNC: 11 MMOL/L
BUN SERPL-MCNC: 65 MG/DL (ref 5–25)
CALCIUM SERPL-MCNC: 9.7 MG/DL (ref 8.4–10.2)
CHLORIDE SERPL-SCNC: 104 MMOL/L (ref 96–108)
CO2 SERPL-SCNC: 22 MMOL/L (ref 21–32)
CREAT SERPL-MCNC: 7.56 MG/DL (ref 0.6–1.3)
ERYTHROCYTE [DISTWIDTH] IN BLOOD BY AUTOMATED COUNT: 12.4 % (ref 11.6–15.1)
GFR SERPL CREATININE-BSD FRML MDRD: 7 ML/MIN/1.73SQ M
GLUCOSE SERPL-MCNC: 87 MG/DL (ref 65–140)
HCT VFR BLD AUTO: 33 % (ref 36.5–49.3)
HGB BLD-MCNC: 11.3 G/DL (ref 12–17)
MCH RBC QN AUTO: 33.2 PG (ref 26.8–34.3)
MCHC RBC AUTO-ENTMCNC: 34.2 G/DL (ref 31.4–37.4)
MCV RBC AUTO: 97 FL (ref 82–98)
PHOSPHATE SERPL-MCNC: 7.7 MG/DL (ref 2.7–4.5)
PLATELET # BLD AUTO: 152 THOUSANDS/UL (ref 149–390)
PMV BLD AUTO: 11.1 FL (ref 8.9–12.7)
POTASSIUM SERPL-SCNC: 4.6 MMOL/L (ref 3.5–5.3)
RBC # BLD AUTO: 3.4 MILLION/UL (ref 3.88–5.62)
SODIUM SERPL-SCNC: 137 MMOL/L (ref 135–147)
WBC # BLD AUTO: 8.2 THOUSAND/UL (ref 4.31–10.16)

## 2024-02-26 PROCEDURE — 85027 COMPLETE CBC AUTOMATED: CPT | Performed by: FAMILY MEDICINE

## 2024-02-26 PROCEDURE — 84100 ASSAY OF PHOSPHORUS: CPT | Performed by: FAMILY MEDICINE

## 2024-02-26 PROCEDURE — 80048 BASIC METABOLIC PNL TOTAL CA: CPT | Performed by: FAMILY MEDICINE

## 2024-02-26 PROCEDURE — 99232 SBSQ HOSP IP/OBS MODERATE 35: CPT | Performed by: INTERNAL MEDICINE

## 2024-02-26 PROCEDURE — 99239 HOSP IP/OBS DSCHRG MGMT >30: CPT | Performed by: FAMILY MEDICINE

## 2024-02-26 PROCEDURE — RECHECK: Performed by: FAMILY MEDICINE

## 2024-02-26 RX ORDER — NICOTINE 21 MG/24HR
1 PATCH, TRANSDERMAL 24 HOURS TRANSDERMAL DAILY
Qty: 28 PATCH | Refills: 0 | Status: SHIPPED | OUTPATIENT
Start: 2024-02-27

## 2024-02-26 RX ORDER — SEVELAMER HYDROCHLORIDE 800 MG/1
800 TABLET, FILM COATED ORAL
Qty: 90 TABLET | Refills: 0 | Status: SHIPPED | OUTPATIENT
Start: 2024-02-26 | End: 2024-03-27

## 2024-02-26 RX ORDER — PALIPERIDONE 6 MG/1
TABLET, EXTENDED RELEASE ORAL
Start: 2024-02-26

## 2024-02-26 RX ADMIN — PRAVASTATIN SODIUM 80 MG: 80 TABLET ORAL at 16:03

## 2024-02-26 RX ADMIN — SEVELAMER HYDROCHLORIDE 800 MG: 800 TABLET, FILM COATED ORAL at 07:37

## 2024-02-26 RX ADMIN — SEVELAMER HYDROCHLORIDE 800 MG: 800 TABLET, FILM COATED ORAL at 16:03

## 2024-02-26 RX ADMIN — EZETIMIBE 10 MG: 10 TABLET ORAL at 13:48

## 2024-02-26 RX ADMIN — HEPARIN SODIUM 5000 UNITS: 5000 INJECTION INTRAVENOUS; SUBCUTANEOUS at 06:38

## 2024-02-26 RX ADMIN — HEPARIN SODIUM 5000 UNITS: 5000 INJECTION INTRAVENOUS; SUBCUTANEOUS at 13:48

## 2024-02-26 RX ADMIN — SEVELAMER HYDROCHLORIDE 800 MG: 800 TABLET, FILM COATED ORAL at 13:48

## 2024-02-26 RX ADMIN — METOPROLOL SUCCINATE 50 MG: 50 TABLET, EXTENDED RELEASE ORAL at 13:48

## 2024-02-26 RX ADMIN — AMLODIPINE BESYLATE 5 MG: 5 TABLET ORAL at 13:48

## 2024-02-26 NOTE — PLAN OF CARE
Target UF Goal  net even  L as tolerated. Patient dialyzing for 3 hours on 2 K bath for serum K of  4.6  per protocol. Treatment plan reviewed with Nephrology.   Problem: METABOLIC, FLUID AND ELECTROLYTES - ADULT  Goal: Electrolytes maintained within normal limits  Description: INTERVENTIONS:  - Monitor labs and assess patient for signs and symptoms of electrolyte imbalances  - Administer electrolyte replacement as ordered  - Monitor response to electrolyte replacements, including repeat lab results as appropriate  - Instruct patient on fluid and nutrition as appropriate  Outcome: Progressing  Goal: Fluid balance maintained  Description: INTERVENTIONS:  - Monitor labs   - Monitor I/O and WT  - Instruct patient on fluid and nutrition as appropriate  - Assess for signs & symptoms of volume excess or deficit  Outcome: Progressing   Post-Dialysis RN Treatment Note    Blood Pressure:  Pre 126/88 mm/Hg  Post 122/82 mmHg   EDW  tbd kg    Weight:  Pre 84.4 kg   Post Not Applicable kg   Mode of weight measurement: Standing Scale   Volume Removed  0 ml    Treatment duration 180 minutes    NS given  No    Treatment shortened? No   Medications given during Rx None Reported   Estimated Kt/V  None Reported   Access type: Permacath/TDC   Access Issues: No    Report called to primary nurse   Yes

## 2024-02-26 NOTE — ASSESSMENT & PLAN NOTE
Metabolic acidosis with low serum bicarb, on sodium bicarb tablets at home  Was started on IV fluids with bicarb on admission which was subsequently discontinued on 2/21  Sodium bicarb tablet discontinued  Repeat lab work in a.m.

## 2024-02-26 NOTE — ASSESSMENT & PLAN NOTE
Patient is on Invega. Per nephrology, use is not recommended in ESRD  Patient was previously given the option of getting psychiatry involved regarding further management but patient stated that he did not want this medications changed without speaking with his psychiatrist first as this medication has been keeping his symptoms under control  Nephrologist spoke with patient's primary psychiatrist.  Unfortunately Invega cannot be split in half and therefore currently on every other day dosing of 6 mg per primary psychiatrist  Patient prefers taking it on a daily basis.  Discussed with patient to follow-up with psychiatry after discharge so that he can be started on the 3 mg dose that he can take daily  Monitor for any decompensation

## 2024-02-26 NOTE — DISCHARGE INSTR - AVS FIRST PAGE
Please continue taking all medications as prescribed. Please follow up with your primary medical doctor within 1 week of discharge. Please follow up with nephrology with scheduled hemodialysis. Please return to the nearest emergency department if you develop any signs or symptoms of worsening disease or infection, including but not limited to chest pain, shortness of breath, abdominal pain, lightheadedness, dizziness, palpitations, fevers or chills.

## 2024-02-26 NOTE — ASSESSMENT & PLAN NOTE
Hemoglobin stable  Repeat lab work in a.m.    Results from last 7 days   Lab Units 02/26/24  0505 02/25/24  0434 02/24/24  0945 02/23/24  0458 02/22/24  0630 02/21/24  0440 02/20/24  1409   HEMOGLOBIN g/dL 11.3* 11.0* 10.5* 10.6* 10.4* 10.4* 10.4*   HEMATOCRIT % 33.0* 32.6* 31.7* 32.9* 32.7* 31.3* 31.9*   MCV fL 97 96 98 99* 100* 99* 100*

## 2024-02-26 NOTE — PROGRESS NOTES
Patient:    MRN:  [id number]    Ilan Request ID:  [id number]    Level of care reserved:  Dialysis    Partner Reserved:  [facility] [place] [id number] [phone]    Clinical needs requested:    Geography searched:  20 miles around [facility]    Start of Service:    Pickup/appointment time:    Request sent:  4:13pm EST on 2/22/2024 by Tatiana Mazariegos    Partner reserved:  3:27pm EST on 2/26/2024 by Tatiana Mazariegos    Choice list shared:  3:08pm EST on 2/26/2024 by Tatiana Mazariegos

## 2024-02-26 NOTE — DISCHARGE SUMMARY
[facility]  Discharge Summary  Name: Jordy Tidwell I  MRN: [id number]  Unit/Bed#: 2 [place] [address] I Date of Admission: 2/20/2024   Date of Service: 2/26/2024 I Hospital Day: 6    Assessment/Plan   ESRD (end stage renal disease) on dialysis (HCC)  Assessment & Plan  Lab Results   Component Value Date    EGFR 7 02/26/2024    EGFR 9 02/25/2024    EGFR 8 02/24/2024    CREATININE 7.56 (H) 02/26/2024    CREATININE 6.22 (H) 02/25/2024    CREATININE 6.73 (H) 02/24/2024       Acid-base disorder, mixed  Assessment & Plan  Metabolic acidosis with low serum bicarb, on sodium bicarb tablets at home  Was started on IV fluids with bicarb on admission which was subsequently discontinued on 2/21  Sodium bicarb tablet discontinued  Repeat lab work in a.m.    Hyperphosphatemia  Assessment & Plan  Continue low phosphorus diet. Will continue to monitor.    Anemia due to stage 5 chronic kidney disease, not on chronic dialysis   Assessment & Plan  Hemoglobin stable  Repeat lab work in a.m.    Results from last 7 days   Lab Units 02/26/24  0505 02/25/24  0434 02/24/24  0945 02/23/24  0458 02/22/24  0630 02/21/24  0440 02/20/24  1409   HEMOGLOBIN g/dL 11.3* 11.0* 10.5* 10.6* 10.4* 10.4* 10.4*   HEMATOCRIT % 33.0* 32.6* 31.7* 32.9* 32.7* 31.3* 31.9*   MCV fL 97 96 98 99* 100* 99* 100*          Dyslipidemia  Assessment & Plan  Continue statin    Schizophrenia (HCC)  Assessment & Plan  Patient is on Invega. Per nephrology, use is not recommended in ESRD  Patient was previously given the option of getting psychiatry involved regarding further management but patient stated that he did not want this medications changed without speaking with his psychiatrist first as this medication has been keeping his symptoms under control  Nephrologist spoke with patient's primary psychiatrist.  Unfortunately Invega cannot be split in half and therefore currently on every other day dosing of 6 mg per primary psychiatrist  Patient  prefers taking it on a daily basis.  Discussed with patient to follow-up with psychiatry after discharge so that he can be started on the 3 mg dose that he can take daily  Monitor for any decompensation    Benign hypertension with chronic kidney disease, stage V ([facility])  Assessment & Plan  Continue Toprol-XL, Norvasc and monitor blood pressures.         Medical Problems       Resolved Problems  Date Reviewed: 2/25/2024   None       Discharging Physician / Practitioner: Chris Stewart MD  PCP: Sheila Roach MD  Admission Date:   Admission Orders (From admission, onward)       Ordered        02/20/24 1604  INPATIENT ADMISSION  Once                          Discharge Date: 02/26/24    Consultations During Hospital Stay:  Nephrology  Psychiatry    HPI: Jordy Tidwell is a 54 y.o. male with a PMH of CKD, hypertension who was advised to come to the ER by the nephrology office due to worsening creatinine and hyperkalemia on outpatient lab work.  Patient reports feeling tired and fatigued over the last 2 to 3 weeks but denies any chest pain, shortness of breath, abdominal pain.  Patient reports good urine output at his baseline.  On lab work in the ER potassium level has improved with creatinine trending up.  Nephrology was consulted and they saw the patient in the ER     Hospital Course:   Patient was seen and examined in the emergency department and was admitted to the hospital for further evaluation and management.  Patient with a history of chronic kidney disease stage V with evidence of progression to end-stage renal disease now necessitating hemodialysis.  Patient was followed by nephrology during the course of his hospitalization.  Patient was started on dialysis treatment and has now been accepted into an outpatient dialysis spot.  Patient was also followed by psychiatry secondary to his history of schizophrenia.  As of right now, patient has a scheduled fistula creation with vascular surgery on March  "21st.  At the time of discharge patient reports feeling significantly improved and currently denies any acute complaints or concerns including chest pain or shortness of breath.    Please see above list of diagnoses and related plan for additional information.     Condition at Discharge: stable    Discharge Day Visit / Exam:   Vitals: Blood Pressure: 121/86 (02/26/24 1457)  Pulse: 80 (02/26/24 1457)  Temperature: 97.7 °F (36.5 °C) (02/26/24 1125)  Temp Source: Oral (02/26/24 1125)  Respirations: 16 (02/26/24 1457)  Height: 5' 8.5\" (174 cm) (02/20/24 2233)  Weight - Scale: 84 kg (185 lb 3 oz) (02/23/24 0905)  SpO2: 97 % (02/26/24 1457)  Exam:   Physical Exam  HENT:      Head: Normocephalic.      Mouth/Throat:      Mouth: Mucous membranes are moist.   Eyes:      Extraocular Movements: Extraocular movements intact.   Cardiovascular:      Rate and Rhythm: Normal rate.   Pulmonary:      Effort: Pulmonary effort is normal.   Abdominal:      Palpations: Abdomen is soft.      Tenderness: There is no abdominal tenderness.   Skin:     General: Skin is warm.   Neurological:      Mental Status: He is alert and oriented to person, place, and time.   Psychiatric:         Mood and Affect: Mood normal.       Discharge instructions/Information to patient and family:   See after visit summary for information provided to patient and family.      Provisions for Follow-Up Care:  See after visit summary for information related to follow-up care and any pertinent home health orders.      Mobility at time of Discharge:   Basic Mobility Inpatient Raw Score: 23  -HLM Goal: 7: Walk 25 feet or more  JH-HLM Achieved: 7: Walk 25 feet or more     Disposition:   Home     Discharge Statement:  I spent 30 minutes discharging the patient. This time was spent on the day of discharge. I had direct contact with the patient on the day of discharge. Greater than 50% of the total time was spent examining patient, answering all patient questions, " arranging and discussing plan of care with patient as well as directly providing post-discharge instructions.  Additional time then spent on discharge activities.    Discharge Medications:  See after visit summary for reconciled discharge medications provided to patient and/or family.      **Please Note: This note may have been constructed using a voice recognition system**

## 2024-02-26 NOTE — ASSESSMENT & PLAN NOTE
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 Now with ESRD being maintaned on hemodialysis.  Creatinine on admission 7.36 and continued to remain elevated  TDC placed 2/224/24  Nephrology following  Continue hemodialysis support per recommendations from nephrology  Vascular surgery scheduled fistula date for 3/21/24  Continue sevelamer  Repeat AM labs  Awaiting outpatient dialysis placement

## 2024-02-26 NOTE — CASE MANAGEMENT
Case Management Discharge Planning Note    Patient name Jordy Tidwell  Location 2 South 211/2 South 211 MRN [id number]  : 1970 Date 2024       Current Admission Date: 2024  Current Admission Diagnosis:CKD (chronic kidney disease) stage 5, GFR less than 15 ml/min ([facility])   Patient Active Problem List    Diagnosis Date Noted    ESRD (end stage renal disease) on dialysis ([facility]) 2024    Hyperphosphatemia 2024    Acid-base disorder, mixed 2024    Chronic kidney disease-mineral and bone disorder 2024    Metabolic acidosis 2024    Rectal bleeding 2024    History of colon polyps 2024    CKD (chronic kidney disease) stage 5, GFR less than 15 ml/min ([facility]) 2024    NAVEEN (acute kidney injury) ([facility]) 2024    Anemia due to stage 5 chronic kidney disease, not on chronic dialysis  2024    Bleeding hemorrhoids 10/11/2023    Gastroesophageal reflux disease with esophagitis without hemorrhage 10/11/2023    Dyslipidemia 07/10/2023    Nephrotic range proteinuria 2023    Hyperkalemia 2023    Worsening renal function 2023    Low bicarbonate level 2022    Asymptomatic microscopic hematuria 2022    Persistent proteinuria 2022    Renal cyst 2022    BMI 30.0-30.9,adult 2022    Tobacco dependence 2021    Elevated PSA 2021    Encounter for immunization 2020    Dysuria 2020    Vitamin D deficiency 2020    Familial hypercholesterolemia 2020    Benign hypertension with chronic kidney disease, stage V ([facility]) 2018    Schizophrenia ([facility]) 2018      LOS (days): 6  Geometric Mean LOS (GMLOS) (days):   Days to GMLOS:     OBJECTIVE:  Risk of Unplanned Readmission Score: 19.2     Current admission status: Inpatient   Preferred Pharmacy:   [id number], [address] [place] [address]  Phone: [phone] Fax:  [phone]    [id number] - [place], [place] - [address]  [address] [address]  Phone: [phone] Fax: [phone]    Primary Care Provider: Sheila Roach MD    Primary Insurance: [facility]  Secondary Insurance:     DISCHARGE DETAILS:    Discharge planning discussed with:: Patient  Freedom of Choice: Yes  Comments - Freedom of Choice: SW following to assist with DCP.  Pt's plan is to return home and begin outpatient dialysis.  Dialysis referrals were initiated last week and additional documentation was provided today.  IGGY spoke with Marcia Zaldivar from [facility].  Pt has been approved for dialysis with [facility] however there are currently no chair times available at [facility] or [facility].  So pt has been offered a chair time at [facility] on Zkn-Btha-Gbgyo-Fri at 11:30 am for 3 hour sessions.  Per Ms. Zaldivar the sessions will provide more one on one care and education about kidney disease and living with it.  IGGY met with pt to review plan.  Pt accepted chair at [facility] and read through the approval letter.  Pt verbalized understanding that tomorrow, 2/27/24, will be his first treatment.  Pt said he has an EZPass so driving himself will work well and he plans to give himself 45 minutes to get to the clinic.  Pt said he is very happy with plan.  Pt will be discharging home today.  IGGY placed call to pt's brother to review plan, message left.  CM contacted family/caregiver?: Yes (message left)    Contacts  Patient Contacts: Peter Tidwell  Relationship to Patient:: Family (brother)  Contact Method: Phone  Phone Number: [phone]  Reason/Outcome: Other (Comment) (message left)    Other Referral/Resources/Interventions Provided:  Interventions: Dialysis  Referral Comments: Pt was accepted for dialysis at [facility] and pt is content with plan.  Clinic will be reserved in [place].  Chair time will be M-T-Th-F at  11:30 am/3 hour sessions.  First treatment is planned for Tues, 2/27/24.    Treatment Team Recommendation: Home  Discharge Destination Plan:: Home  Transport at Discharge : Self

## 2024-02-26 NOTE — PROGRESS NOTES
NEPHROLOGY HOSPITAL PROGRESS NOTE   Jordy Tidwell 54 y.o. male MRN: [id number]  Unit/Bed#: 2 [place] [address] Encounter: [id number]  Reason for Consult: CKD stage V now ESRD    ASSESSMENT and PLAN:    1.  CKD stage V with progression to ESRD  Outpatient nephrologist is Dr. Isabel. Patient was sent in by outpatient nephrology due to increased Cr and hyperkalemia seen on labs from 2/16.   2/16 labs: Creatinine 6.6 and K 6.4  Admission labs from 2/20: Cr 7.36, K 5.1  Access: Tunneled dialysis catheter placed on 2/23. Fistula to be placed on March 21 as outpatient  Plan:  Patient started on dialysis this admission, treatment #3/3 scheduled for today  Outpatient dialysis placement per case management  Fistula surgery scheduled for March 21, patient may need 2 separate procedures for fistula per outpatient vascular surgery team  Continue sevelamer  Cystatin-C elevated at 4.23  Trend BMPs    2.  Electrolytes: Hyperkalemia from 2/16 resolved     3. Acid/base status: Patient on bicarb tablets at home. Continue holding bicarb tablets since patient is on dialysis.     3.  HTN: Avoid relative hypotension and monitor BP closely    4.  MBD: Continue sevelamer and monitor phosphorus levels which have been improving since initiation of treatment.  Continue low phosphorus diet.    5.  Anemia: Hgb stable, continue to monitor    6.  Schizophrenia:   Patient on Invega which is not dialyzable  Currently renally dosing medication at 6 mg every other day.  Patient to follow-up with outpatient psych to determine the proper medication dosage in the context of ESRD.    DISPOSITION: Pending CM for outpatient dialysis placement    SUBJECTIVE / 24H INTERVAL HISTORY:  Patient seen and evaluated at bedside, he was calm and in no acute distress.  He was receiving dialysis during evaluation, denied any acute concerns or complaints today. Urine output 500mL today    OBJECTIVE:  Current Weight: Weight - Scale: 84 kg (185 lb 3 oz)  Vitals:    02/26/24  1130 02/26/24 1348 02/26/24 1350 02/26/24 1457   BP: 126/84 123/86 123/86 121/86   BP Location:       Pulse: 75  83 80   Resp:    16   Temp:       TempSrc:       SpO2: 97%  94% 97%   Weight:       Height:           Intake/Output Summary (Last 24 hours) at 2/26/2024 1534  Last data filed at 2/26/2024 1125  Gross per 24 hour   Intake 500 ml   Output 507 ml   Net -7 ml     General: conscious, coherent, cooperative, not in distress.  Skin: warm, dry  ENT: moist lips and mucous membranes.   Respiratory: equal chest expansion, clear breath sounds.   Cardiovascular: distinct heart sounds, normal rate, regular rhythm  Abdomen: soft, non-tender, non-distended, normoactive bowel sounds  Extremities: no edema.  Genitourinary: no lee catheter.   Neuro: awake, alert, oriented to time, place and person.     Medications:    Current Facility-Administered Medications:     acetaminophen (TYLENOL) tablet 650 mg, 650 mg, Oral, Q6H PRN, Vanessa Cadena PA-C, 650 mg at 02/22/24 2132    amLODIPine (NORVASC) tablet 5 mg, 5 mg, Oral, Daily, Stefany Carbone DO, 5 mg at 02/26/24 1348    ceFAZolin (ANCEF) IVPB (premix in dextrose), , Intravenous, Continuous PRN, Dain Akers MD, 2,000 mg at 02/22/24 1400    ezetimibe (ZETIA) tablet 10 mg, 10 mg, Oral, Daily, Stefany Carbone DO, 10 mg at 02/26/24 1348    fentanyl citrate (PF) 100 MCG/2ML, , Intravenous, PRN, Dain Akers MD, 25 mcg at 02/22/24 1408    heparin (porcine) subcutaneous injection 5,000 Units, 5,000 Units, Subcutaneous, Q8H CODY, 5,000 Units at 02/26/24 1348 **AND** [COMPLETED] Platelet count, , , Once, Stefany Carbone DO    lidocaine-epinephrine 1%-1:103638 buffered, , Infiltration, PRN, Dain Akers MD, 5 mL at 02/22/24 1410    metoprolol succinate (TOPROL-XL) 24 hr tablet 50 mg, 50 mg, Oral, Daily, Stefany Carbone DO, 50 mg at 02/26/24 1348    midazolam (VERSED) injection, , , PRN, Dain Akers MD, 0.5 mg at 02/22/24 1408    nicotine  (NICODERM CQ) 14 mg/24hr TD 24 hr patch 1 patch, 1 patch, Transdermal, Daily, Stefany Revshahlaar , 1 patch at 02/25/24 0946    paliperidone (INVEGA) 24 hr tablet 6 mg, 6 mg, Oral, Every Other Day, Stefany CissearDO, 6 mg at 02/25/24 2115    pravastatin (PRAVACHOL) tablet 80 mg, 80 mg, Oral, Daily With Dinner, Stefanysylvie Carbone DO, 80 mg at 02/25/24 1618    sevelamer (RENAGEL) tablet 800 mg, 800 mg, Oral, TID With Meals, Segun Rojo MD, 800 mg at 02/26/24 1348      Laboratory Results:  Results from last 7 days   Lab Units 02/26/24  0505 02/25/24  0434 02/24/24  0945 02/23/24  0906 02/23/24  0458 02/22/24  0630 02/21/24  0440 02/20/24  1409   WBC Thousand/uL 8.20 7.55 6.81  --  8.21 7.14 6.76 7.16   HEMOGLOBIN g/dL 11.3* 11.0* 10.5*  --  10.6* 10.4* 10.4* 10.4*   HEMATOCRIT % 33.0* 32.6* 31.7*  --  32.9* 32.7* 31.3* 31.9*   PLATELETS Thousands/uL 152 157 163 189 200 210 206 209   POTASSIUM mmol/L 4.6 3.9 3.6  --  4.9 4.8 4.9 5.1   CHLORIDE mmol/L 104 101 103  --  109* 109* 109* 106   CO2 mmol/L 22 25 25  --  22 22 23 22   BUN mg/dL 65* 43* 51*  --  60* 55* 57* 61*   CREATININE mg/dL 7.56* 6.22* 6.73*  --  7.51* 7.32* 7.24* 7.36*   CALCIUM mg/dL 9.7 9.7 9.0  --  9.4 9.6 9.1 9.5   MAGNESIUM mg/dL  --  2.4 2.2  --  2.3 2.3 2.3 2.5   PHOSPHORUS mg/dL 7.7* 6.2* 5.5*  --  6.4* 5.9* 6.3* 7.2*       Rhonda Yen

## 2024-02-26 NOTE — PROGRESS NOTES
[facility]  Progress Note  Name: Jordy Tidwell I  MRN: [id number]  Unit/Bed#: 2 [place] [address] I Date of Admission: 2/20/2024   Date of Service: 2/26/2024 I Hospital Day: 6    Assessment/Plan   Acid-base disorder, mixed  Assessment & Plan  Metabolic acidosis with low serum bicarb, on sodium bicarb tablets at home  Was started on IV fluids with bicarb on admission which was subsequently discontinued on 2/21  Sodium bicarb tablet discontinued  Repeat lab work in a.m.    Hyperphosphatemia  Assessment & Plan  Continue low phosphorus diet. Will continue to monitor.    Anemia due to stage 5 chronic kidney disease, not on chronic dialysis   Assessment & Plan  Hemoglobin stable  Repeat lab work in a.m.    Results from last 7 days   Lab Units 02/26/24  0505 02/25/24  0434 02/24/24  0945 02/23/24  0458 02/22/24  0630 02/21/24  0440 02/20/24  1409   HEMOGLOBIN g/dL 11.3* 11.0* 10.5* 10.6* 10.4* 10.4* 10.4*   HEMATOCRIT % 33.0* 32.6* 31.7* 32.9* 32.7* 31.3* 31.9*   MCV fL 97 96 98 99* 100* 99* 100*          Dyslipidemia  Assessment & Plan  Continue statin    Schizophrenia (HCC)  Assessment & Plan  Patient is on Invega. Per nephrology, use is not recommended in ESRD  Patient was previously given the option of getting psychiatry involved regarding further management but patient stated that he did not want this medications changed without speaking with his psychiatrist first as this medication has been keeping his symptoms under control  Nephrologist spoke with patient's primary psychiatrist.  Unfortunately Invega cannot be split in half and therefore currently on every other day dosing of 6 mg per primary psychiatrist  Patient prefers taking it on a daily basis.  Discussed with patient to follow-up with psychiatry after discharge so that he can be started on the 3 mg dose that he can take daily  Monitor for any decompensation    Benign hypertension with chronic kidney disease, stage V (HCC)  Assessment &  Plan  Continue Toprol-XL, Norvasc and monitor blood pressures.    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 Now with ESRD being maintaned on hemodialysis.  Creatinine on admission 7.36 and continued to remain elevated  TDC placed 2/224/24  Nephrology following  Continue hemodialysis support per recommendations from nephrology  Vascular surgery scheduled fistula date for 3/21/24  Continue sevelamer  Repeat AM labs  Awaiting outpatient dialysis placement         VTE Pharmacologic Prophylaxis: VTE Score: 4 Heparin prophylaxis.    Mobility:   Basic Mobility Inpatient Raw Score: 23  -HLM Goal: 7: Walk 25 feet or more  JH-HLM Achieved: 7: Walk 25 feet or more    Patient Centered Rounds: I performed bedside rounds with nursing staff today.     Total Time Spent on Date of Encounter in care of patient: 30 mins. This time was spent on one or more of the following: performing physical exam; counseling and coordination of care; obtaining or reviewing history; documenting in the medical record; reviewing/ordering tests, medications or procedures; communicating with other healthcare professionals and discussing with patient's family/caregivers.    Current Length of Stay: 6 day(s)  Current Patient Status: Inpatient   Certification Statement: The patient will continue to require additional inpatient hospital stay due to ESRD.  Discharge Plan: Plan to discharge home once outpatient dialysis arrangements have been made.    Code Status: Level 3 - DNAR and DNI    Subjective:   Patient was  seen and examined at bedside. No acute events overnight. No new complaints. Patient is waiting for outpatient dialysis placement.    Objective:     Vitals:   Temp (24hrs), Av.9 °F (36.6 °C), Min:97.4 °F (36.3 °C), Max:98.2 °F (36.8 °C)    Temp:  [97.4 °F (36.3 °C)-98.2 °F (36.8 °C)] 97.7 °F (36.5 °C)  HR:  [70-85] 75  Resp:  [16-18] 16  BP: (101-131)/(66-94) 126/84  SpO2:  [93 %-97 %] 97 %  Body mass index is 27.75 kg/m².     Input and Output Summary (last 24 hours):     Intake/Output Summary (Last 24 hours) at 2024 1254  Last data filed at 2024 1125  Gross per 24 hour   Intake 500 ml   Output 507 ml   Net -7 ml     Physical Exam:   Physical Exam  HENT:      Head: Normocephalic.      Mouth/Throat:      Mouth: Mucous membranes are moist.   Eyes:      Extraocular Movements: Extraocular movements intact.   Cardiovascular:      Rate and Rhythm: Normal rate.   Pulmonary:      Effort: Pulmonary effort is normal. No respiratory distress.   Abdominal:      Palpations: Abdomen is soft.      Tenderness: There is no abdominal tenderness.   Skin:     General: Skin is warm.   Neurological:      Mental Status: He is alert and oriented to person, place, and time.   Psychiatric:         Mood and Affect: Mood normal.       Additional Data:     Labs:  Results from last 7 days   Lab Units 24  0505 24  0440 24  1409   WBC Thousand/uL 8.20   < > 7.16   HEMOGLOBIN g/dL 11.3*   < > 10.4*   HEMATOCRIT % 33.0*   < > 31.9*   PLATELETS Thousands/uL 152   < > 209   NEUTROS PCT %  --   --  65   LYMPHS PCT %  --   --  17   MONOS PCT %  --   --  13*   EOS PCT %  --   --  3    < > = values in this interval not displayed.     Results from last 7 days   Lab Units 24  0505 24  0440 24  1409   SODIUM mmol/L 137   < > 137   POTASSIUM mmol/L 4.6   < > 5.1   CHLORIDE mmol/L 104   < > 106   CO2 mmol/L 22   < > 22   BUN mg/dL 65*   < > 61*   CREATININE mg/dL 7.56*   < > 7.36*   ANION GAP mmol/L 11   < > 9    CALCIUM mg/dL 9.7   < > 9.5   ALBUMIN g/dL  --   --  3.8   TOTAL BILIRUBIN mg/dL  --   --  0.24   ALK PHOS U/L  --   --  52   ALT U/L  --   --  7   AST U/L  --   --  5*   GLUCOSE RANDOM mg/dL 87   < > 101    < > = values in this interval not displayed.     Results from last 7 days   Lab Units 02/21/24  0440   INR  1.08     Lines/Drains:  Invasive Devices       Peripheral Intravenous Line  Duration             Peripheral IV 02/20/24 Left Antecubital 5 days              Hemodialysis Catheter  Duration             HD Permanent Double Catheter 3 days                  Imaging:    Recent Cultures (last 7 days):         Last 24 Hours Medication List:   Current Facility-Administered Medications   Medication Dose Route Frequency Provider Last Rate    acetaminophen  650 mg Oral Q6H PRN Vanessa Cadena PA-C      amLODIPine  5 mg Oral Daily Stefany Revankar, DO      ceFAZolin   Intravenous Continuous PRN Dain Akers MD      ezetimibe  10 mg Oral Daily [facility] Revshahlaar, DO      fentanyl citrate (PF)   Intravenous PRN Dain Akers MD      heparin (porcine)  5,000 Units Subcutaneous Q8H CODY Stefany Revshahlaar, DO      lidocaine-epinephrine 1%-1:955999 buffered   Infiltration PRN Dain Akers MD      metoprolol succinate  50 mg Oral Daily Stefany Revshahlaar, DO      midazolam    PRN Dain Akers MD      nicotine  1 patch Transdermal Daily Stefany Revankar, DO      paliperidone  6 mg Oral Every Other Day Stefany Revankar, DO      pravastatin  80 mg Oral Daily With Dinner Stefany Revankar, DO      sevelamer  800 mg Oral TID With Meals Segun Rojo MD          Today, Patient Was Seen By: Chris Stewart MD    **Please Note: This note may have been constructed using a voice recognition system.**

## 2024-02-26 NOTE — ASSESSMENT & PLAN NOTE
Lab Results   Component Value Date    EGFR 7 02/26/2024    EGFR 9 02/25/2024    EGFR 8 02/24/2024    CREATININE 7.56 (H) 02/26/2024    CREATININE 6.22 (H) 02/25/2024    CREATININE 6.73 (H) 02/24/2024

## 2024-02-26 NOTE — DISCHARGE INSTR - OTHER ORDERS
[facility] - [address] [place] [address] - [phone]  Knvclf-Ueycdtl-Oqqdcgea-Friday - 11:30 am start time - 3 hour sessions    First treatment - Tuesday, 2/27/24          [facility] - [phone]

## 2024-02-27 ENCOUNTER — TRANSITIONAL CARE MANAGEMENT (OUTPATIENT)
Dept: FAMILY MEDICINE CLINIC | Facility: CLINIC | Age: 54
End: 2024-02-27

## 2024-02-27 NOTE — UTILIZATION REVIEW
NOTIFICATION OF ADMISSION DISCHARGE   This is a Notification of Discharge from [facility]. Please be advised that this patient has been discharge from our facility. Below you will find the admission and discharge date and time including the patient’s disposition.   UTILIZATION REVIEW CONTACT:  Kinza Ruiz  Utilization   Network Utilization Review Department  Phone: [phone] x carefully listen to the prompts. All voicemails are confidential.  Email: [email or web address]     ADMISSION INFORMATION  PRESENTATION DATE: 2/20/2024  1:40 PM  OBERVATION ADMISSION DATE:   INPATIENT ADMISSION DATE: 2/20/24  4:05 PM   DISCHARGE DATE: 2/26/2024  7:08 PM   DISPOSITION:Home/Self Care    Network Utilization Review Department  ATTENTION: Please call with any questions or concerns to [phone] and carefully listen to the prompts so that you are directed to the right person. All voicemails are confidential.   For Discharge needs, contact Care Management DC Support Team at [phone] opt. 2  Send all requests for admission clinical reviews, approved or denied determinations and any other requests to dedicated fax number below belonging to the campus where the patient is receiving treatment. List of dedicated fax numbers for the Facilities:  FACILITY NAME UR FAX NUMBER   ADMISSION DENIALS (Administrative/Medical Necessity) [phone]   DISCHARGE SUPPORT TEAM ([facility]) [phone]   PARENT CHILD HEALTH (Maternity/NICU/Pediatrics) [phone]   [facility] [phone]   [facility] [phone]   [facility] [phone]   [facility] [phone]   [facility] [phone]   [facility] [phone]   [facility] [phone]   [facility] [phone]    [facility] [phone]   [facility] [phone]   [facility] [phone]   [facility] [phone]

## 2024-02-28 ENCOUNTER — TELEPHONE (OUTPATIENT)
Age: 54
End: 2024-02-28

## 2024-02-28 NOTE — TELEPHONE ENCOUNTER
Patient contacted office as he needed to cancel his colonoscopy due to being on dialysis. Patient states that there is no blood in his stool.

## 2024-03-06 ENCOUNTER — OFFICE VISIT (OUTPATIENT)
Dept: FAMILY MEDICINE CLINIC | Facility: CLINIC | Age: 54
End: 2024-03-06
Payer: COMMERCIAL

## 2024-03-06 VITALS
OXYGEN SATURATION: 95 % | RESPIRATION RATE: 16 BRPM | SYSTOLIC BLOOD PRESSURE: 138 MMHG | HEART RATE: 88 BPM | DIASTOLIC BLOOD PRESSURE: 78 MMHG | HEIGHT: 68 IN | WEIGHT: 186.6 LBS | BODY MASS INDEX: 28.28 KG/M2 | TEMPERATURE: 97.8 F

## 2024-03-06 DIAGNOSIS — N18.6 ESRD (END STAGE RENAL DISEASE) ON DIALYSIS (HCC): ICD-10-CM

## 2024-03-06 DIAGNOSIS — F17.200 TOBACCO DEPENDENCE: ICD-10-CM

## 2024-03-06 DIAGNOSIS — Z76.89 ENCOUNTER FOR SUPPORT AND COORDINATION OF TRANSITION OF CARE: Primary | ICD-10-CM

## 2024-03-06 DIAGNOSIS — F20.81 SCHIZOPHRENIFORM DISORDER (HCC): ICD-10-CM

## 2024-03-06 DIAGNOSIS — Z99.2 ESRD (END STAGE RENAL DISEASE) ON DIALYSIS (HCC): ICD-10-CM

## 2024-03-06 PROCEDURE — 99495 TRANSJ CARE MGMT MOD F2F 14D: CPT | Performed by: FAMILY MEDICINE

## 2024-03-06 NOTE — PROGRESS NOTES
Assessment/Plan:  Diagnoses and all orders for this visit:    Encounter for support and coordination of transition of care    ESRD (end stage renal disease) on dialysis (HCC)    Schizophreniform disorder (HCC)    Tobacco dependence    BMI 28.0-28.9,adult    Hospital records reviewed  Medications reconciled  Specialist input appreciated  [facility]--[facility].      Subjective:      Patient ID: Jordy Tidwell is a 54 y.o. male.    Chief Complaint   Patient presents with   • Transition of Care Management     Elevated creatine ,CKD, patient doing dialysis 3-4 days a week       HPI  TCM Call     Hospital care reviewed  Records reviewed    Patient was hospitialized at  [facility]    Date of Admission  02/20/24    Date of discharge  02/26/24    Diagnosis  CKD    Disposition  Home    Were the patients medications reviewed and updated  Yes    Current Symptoms  None      TCM Call     Should patient be enrolled in anticoag monitoring?  No    Scheduled for follow up?  Yes    Patients specialists  Other (comment); Endocrinologist    Other specialists names  nephralogist DR Rojo    Did you obtain your prescribed medications  Yes    Do you need help managing your prescriptions or medications  No    Is transportation to your appointment needed  No    I have advised the patient to call PCP with any new or worsening symptoms  Teresa Barker /anca 2/27/23    Living Arrangements  Alone    Are you recieving any outpatient services  Yes    What type of services  dialysis    Are you recieving home care services  No    Are you using any community resources  No    Current waiver services  No    Have you fallen in the last 12 months  No    Interperter language line needed  No      Hospital records and consults reviewed  Dialysis sched. In [facility]--4x/wk  Being sched for left AVF creation, possible AVG 3/21/2024  Cont to smoke but has decreased amt  BP improved        The following portions of the patient's history  "were reviewed and updated as appropriate: allergies, current medications, past family history, past medical history, past social history, past surgical history and problem list.    Review of Systems   Constitutional:  Positive for fatigue. Negative for fever.   Eyes:         Wears glasses   Respiratory: Negative.     Cardiovascular: Negative.    Gastrointestinal: Negative.    Musculoskeletal:  Positive for arthralgias.   Skin:  Negative for rash.   Allergic/Immunologic: Positive for environmental allergies.   Neurological:  Negative for seizures and syncope.   Psychiatric/Behavioral:  Positive for decreased concentration and sleep disturbance. The patient is nervous/anxious.          Current Outpatient Medications   Medication Sig Dispense Refill   • amLODIPine (NORVASC) 5 mg tablet TAKE 1 TABLET BY MOUTH EVERY DAY (Patient taking differently: Take 5 mg by mouth every evening) 90 tablet 1   • ezetimibe (ZETIA) 10 mg tablet TAKE 1 TABLET BY MOUTH EVERY DAY 30 tablet 5   • metoprolol succinate (TOPROL-XL) 50 mg 24 hr tablet TAKE 1 TABLET BY MOUTH EVERY DAY 30 tablet 5   • paliperidone (INVEGA) 6 MG 24 hr tablet Every other day     • rosuvastatin (CRESTOR) 10 MG tablet TAKE 1 TABLET BY MOUTH EVERY DAY 90 tablet 1   • sevelamer (RENAGEL) 800 mg tablet Take 1 tablet (800 mg total) by mouth 3 (three) times a day with meals 90 tablet 0     No current facility-administered medications for this visit.       Objective:    /78 (BP Location: Right arm, Patient Position: Sitting, Cuff Size: Large)   Pulse 88   Temp 97.8 °F (36.6 °C)   Resp 16   Ht 5' 8\" (1.727 m)   Wt 84.6 kg (186 lb 9.6 oz)   SpO2 95%   BMI 28.37 kg/m²        Physical Exam  Vitals and nursing note reviewed.   Constitutional:       General: He is not in acute distress.     Appearance: He is well-developed.   HENT:      Mouth/Throat:      Pharynx: No oropharyngeal exudate.   Eyes:      General: No scleral icterus.     Conjunctiva/sclera: Conjunctivae " normal.   Cardiovascular:      Rate and Rhythm: Normal rate and regular rhythm.      Pulses: Normal pulses.   Pulmonary:      Effort: Pulmonary effort is normal. No respiratory distress.      Breath sounds: Normal breath sounds.   Abdominal:      General: Bowel sounds are normal.      Palpations: Abdomen is soft.      Tenderness: There is no abdominal tenderness. There is no right CVA tenderness or left CVA tenderness.   Musculoskeletal:         General: No tenderness. Normal range of motion.      Cervical back: Neck supple.   Skin:     General: Skin is warm and dry.      Capillary Refill: Capillary refill takes less than 2 seconds.      Coloration: Skin is not jaundiced.   Neurological:      Mental Status: He is alert and oriented to person, place, and time.      Cranial Nerves: No cranial nerve deficit.      Comments: No acute focal deficit   Psychiatric:      Comments: Mood stable, pleasant, cooperative         Sheila Roach MD

## 2024-03-07 LAB
BUN SERPL-MCNC: 38 MG/DL (ref 6–24)
BUN/CREAT SERPL: 7 (ref 9–20)
CALCIUM SERPL-MCNC: 10.4 MG/DL (ref 8.7–10.2)
CHLORIDE SERPL-SCNC: 99 MMOL/L (ref 96–106)
CO2 SERPL-SCNC: 26 MMOL/L (ref 20–29)
CREAT SERPL-MCNC: 5.36 MG/DL (ref 0.76–1.27)
EGFR: 12 ML/MIN/1.73
ERYTHROCYTE [DISTWIDTH] IN BLOOD BY AUTOMATED COUNT: 13 % (ref 11.6–15.4)
GLUCOSE SERPL-MCNC: 96 MG/DL (ref 70–99)
HCT VFR BLD AUTO: 31.8 % (ref 37.5–51)
HGB BLD-MCNC: 10.7 G/DL (ref 13–17.7)
MCH RBC QN AUTO: 32.1 PG (ref 26.6–33)
MCHC RBC AUTO-ENTMCNC: 33.6 G/DL (ref 31.5–35.7)
MCV RBC AUTO: 96 FL (ref 79–97)
PLATELET # BLD AUTO: 164 X10E3/UL (ref 150–450)
POTASSIUM SERPL-SCNC: 4.3 MMOL/L (ref 3.5–5.2)
RBC # BLD AUTO: 3.33 X10E6/UL (ref 4.14–5.8)
SODIUM SERPL-SCNC: 141 MMOL/L (ref 134–144)
WBC # BLD AUTO: 7.6 X10E3/UL (ref 3.4–10.8)

## 2024-03-13 NOTE — PRE-PROCEDURE INSTRUCTIONS
Pre-Surgery Instructions:   Medication Instructions    amLODIPine (NORVASC) 5 mg tablet Take night before surgery    ezetimibe (ZETIA) 10 mg tablet Take night before surgery    metoprolol succinate (TOPROL-XL) 50 mg 24 hr tablet Take day of surgery.    paliperidone (INVEGA) 6 MG 24 hr tablet Take night before surgery    rosuvastatin (CRESTOR) 10 MG tablet Take night before surgery    sevelamer (RENAGEL) 800 mg tablet Take night before surgery    Medication instructions for day surgery reviewed. Please use only a sip of water to take your instructed medications. Avoid all over the counter vitamins, supplements and NSAIDS for one week prior to surgery per anesthesia guidelines. Tylenol is ok to take as needed.     You will receive a call one business day prior to surgery with an arrival time and hospital directions. If your surgery is scheduled on a Monday, the hospital will be calling you on the Friday prior to your surgery. If you have not heard from anyone by 8pm, please call the hospital supervisor through the hospital  at [phone]. ([place] [phone] or [place] [phone]).    Do not eat or drink anything after midnight the night before your surgery, including candy, mints, lifesavers, or chewing gum. Do not drink alcohol 24hrs before your surgery. Try not to smoke at least 24hrs before your surgery.       Follow the pre surgery showering instructions as listed in the “My Surgical Experience Booklet” or otherwise provided by your surgeon's office. Do not use a blade to shave the surgical area 1 week before surgery. It is okay to use a clean electric clippers up to 24 hours before surgery. Do not apply any lotions, creams, including makeup, cologne, deodorant, or perfumes after showering on the day of your surgery. Do not use dry shampoo, hair spray, hair gel, or any type of hair products.     No contact lenses, eye make-up, or artificial eyelashes. Remove nail polish, including gel polish, and  any artificial, gel, or acrylic nails if possible. Remove all jewelry including rings and body piercing jewelry.     Wear causal clothing that is easy to take on and off. Consider your type of surgery.    Keep any valuables, jewelry, piercings at home. Please bring any specially ordered equipment (sling, braces) if indicated.    Arrange for a responsible person to drive you to and from the hospital on the day of your surgery. Please confirm the visitor policy for the day of your procedure when you receive your phone call with an arrival time.     Call the surgeon's office with any new illnesses, exposures, or additional questions prior to surgery.    Please reference your “My Surgical Experience Booklet” for additional information to prepare for your upcoming surgery.

## 2024-03-18 ENCOUNTER — TELEPHONE (OUTPATIENT)
Dept: VASCULAR SURGERY | Facility: CLINIC | Age: 54
End: 2024-03-18

## 2024-03-18 NOTE — TELEPHONE ENCOUNTER
----- Message from Adan Ramirez sent at 3/15/2024  4:24 PM EDT -----  Regarding: RE: TRANSPORTATION  SCHEDULED LYFT 3/21 @ 7:50 AM PICKUP  ----- Message -----  From: Kristi Santana MA  Sent: 3/15/2024   9:35 AM EDT  To: [facility] Surgery Coordinator; #  Subject: TRANSPORTATION                                   Patients Name: Jordy Tidwell  : 1970  Phone Number: [phone]  Appointment Date: 3/21/24  Appointment time: 9:30AM   Address: [address] [place] [id number]  Drop off Facility/Office Name: [facility]  Drop off  Address: [address] [place] [address]  Cost Center: [phone]  Special notes/directions for  PATIENT IS HAVING SURGERY   Note for scheduling team

## 2024-03-20 ENCOUNTER — ANESTHESIA EVENT (OUTPATIENT)
Dept: PERIOP | Facility: HOSPITAL | Age: 54
End: 2024-03-20
Payer: COMMERCIAL

## 2024-03-21 ENCOUNTER — HOSPITAL ENCOUNTER (OUTPATIENT)
Facility: HOSPITAL | Age: 54
Setting detail: OUTPATIENT SURGERY
Discharge: HOME | End: 2024-03-21
Attending: SURGERY | Admitting: SURGERY
Payer: COMMERCIAL

## 2024-03-21 ENCOUNTER — ANESTHESIA (OUTPATIENT)
Dept: PERIOP | Facility: HOSPITAL | Age: 54
End: 2024-03-21
Payer: COMMERCIAL

## 2024-03-21 VITALS
HEIGHT: 68 IN | HEART RATE: 66 BPM | RESPIRATION RATE: 18 BRPM | BODY MASS INDEX: 29.24 KG/M2 | OXYGEN SATURATION: 97 % | DIASTOLIC BLOOD PRESSURE: 82 MMHG | SYSTOLIC BLOOD PRESSURE: 127 MMHG | WEIGHT: 192.9 LBS | TEMPERATURE: 97.9 F

## 2024-03-21 DIAGNOSIS — N18.6 ESRD (END STAGE RENAL DISEASE) ON DIALYSIS (HCC): Primary | ICD-10-CM

## 2024-03-21 DIAGNOSIS — Z99.2 ESRD (END STAGE RENAL DISEASE) ON DIALYSIS (HCC): Primary | ICD-10-CM

## 2024-03-21 PROCEDURE — 36821 AV FUSION DIRECT ANY SITE: CPT | Performed by: PHYSICIAN ASSISTANT

## 2024-03-21 PROCEDURE — NC001 PR NO CHARGE: Performed by: SURGERY

## 2024-03-21 PROCEDURE — 36821 AV FUSION DIRECT ANY SITE: CPT | Performed by: SURGERY

## 2024-03-21 RX ORDER — FENTANYL CITRATE 50 UG/ML
INJECTION, SOLUTION INTRAMUSCULAR; INTRAVENOUS AS NEEDED
Status: DISCONTINUED | OUTPATIENT
Start: 2024-03-21 | End: 2024-03-21

## 2024-03-21 RX ORDER — CHLORHEXIDINE GLUCONATE ORAL RINSE 1.2 MG/ML
15 SOLUTION DENTAL ONCE
Status: COMPLETED | OUTPATIENT
Start: 2024-03-21 | End: 2024-03-21

## 2024-03-21 RX ORDER — PROPOFOL 10 MG/ML
INJECTION, EMULSION INTRAVENOUS AS NEEDED
Status: DISCONTINUED | OUTPATIENT
Start: 2024-03-21 | End: 2024-03-21

## 2024-03-21 RX ORDER — ONDANSETRON 2 MG/ML
INJECTION INTRAMUSCULAR; INTRAVENOUS AS NEEDED
Status: DISCONTINUED | OUTPATIENT
Start: 2024-03-21 | End: 2024-03-21

## 2024-03-21 RX ORDER — CEFAZOLIN SODIUM 2 G/50ML
SOLUTION INTRAVENOUS AS NEEDED
Status: DISCONTINUED | OUTPATIENT
Start: 2024-03-21 | End: 2024-03-21

## 2024-03-21 RX ORDER — SODIUM CHLORIDE 9 MG/ML
INJECTION, SOLUTION INTRAVENOUS CONTINUOUS PRN
Status: DISCONTINUED | OUTPATIENT
Start: 2024-03-21 | End: 2024-03-21

## 2024-03-21 RX ORDER — MIDAZOLAM HYDROCHLORIDE 2 MG/2ML
INJECTION, SOLUTION INTRAMUSCULAR; INTRAVENOUS AS NEEDED
Status: DISCONTINUED | OUTPATIENT
Start: 2024-03-21 | End: 2024-03-21

## 2024-03-21 RX ORDER — SODIUM CHLORIDE, SODIUM LACTATE, POTASSIUM CHLORIDE, CALCIUM CHLORIDE 600; 310; 30; 20 MG/100ML; MG/100ML; MG/100ML; MG/100ML
125 INJECTION, SOLUTION INTRAVENOUS CONTINUOUS
Status: DISCONTINUED | OUTPATIENT
Start: 2024-03-21 | End: 2024-03-21 | Stop reason: HOSPADM

## 2024-03-21 RX ORDER — SODIUM CHLORIDE 9 MG/ML
INJECTION, SOLUTION INTRAVENOUS AS NEEDED
Status: DISCONTINUED | OUTPATIENT
Start: 2024-03-21 | End: 2024-03-21 | Stop reason: HOSPADM

## 2024-03-21 RX ORDER — HYDROCODONE BITARTRATE AND ACETAMINOPHEN 5; 325 MG/1; MG/1
1 TABLET ORAL EVERY 6 HOURS PRN
Qty: 30 TABLET | Refills: 0 | Status: SHIPPED | OUTPATIENT
Start: 2024-03-21 | End: 2024-03-31

## 2024-03-21 RX ORDER — EPHEDRINE SULFATE 50 MG/ML
INJECTION INTRAVENOUS AS NEEDED
Status: DISCONTINUED | OUTPATIENT
Start: 2024-03-21 | End: 2024-03-21

## 2024-03-21 RX ORDER — LIDOCAINE HYDROCHLORIDE 10 MG/ML
INJECTION, SOLUTION EPIDURAL; INFILTRATION; INTRACAUDAL; PERINEURAL AS NEEDED
Status: DISCONTINUED | OUTPATIENT
Start: 2024-03-21 | End: 2024-03-21

## 2024-03-21 RX ORDER — BUPIVACAINE HYDROCHLORIDE 5 MG/ML
INJECTION, SOLUTION EPIDURAL; INTRACAUDAL AS NEEDED
Status: DISCONTINUED | OUTPATIENT
Start: 2024-03-21 | End: 2024-03-21 | Stop reason: HOSPADM

## 2024-03-21 RX ADMIN — PHENYLEPHRINE HYDROCHLORIDE 5 MCG: 10 INJECTION INTRAVENOUS at 10:46

## 2024-03-21 RX ADMIN — ONDANSETRON 4 MG: 2 INJECTION INTRAMUSCULAR; INTRAVENOUS at 10:46

## 2024-03-21 RX ADMIN — CEFAZOLIN SODIUM 2000 MG: 2 SOLUTION INTRAVENOUS at 10:32

## 2024-03-21 RX ADMIN — FENTANYL CITRATE 25 MCG: 50 INJECTION, SOLUTION INTRAMUSCULAR; INTRAVENOUS at 11:11

## 2024-03-21 RX ADMIN — SODIUM CHLORIDE, SODIUM LACTATE, POTASSIUM CHLORIDE, AND CALCIUM CHLORIDE 125 ML/HR: .6; .31; .03; .02 INJECTION, SOLUTION INTRAVENOUS at 08:56

## 2024-03-21 RX ADMIN — EPHEDRINE SULFATE 10 MG: 50 INJECTION, SOLUTION INTRAVENOUS at 11:44

## 2024-03-21 RX ADMIN — PROPOFOL 200 MG: 10 INJECTION, EMULSION INTRAVENOUS at 10:40

## 2024-03-21 RX ADMIN — LIDOCAINE HYDROCHLORIDE 50 MG: 10 INJECTION, SOLUTION EPIDURAL; INFILTRATION; INTRACAUDAL; PERINEURAL at 10:40

## 2024-03-21 RX ADMIN — PHENYLEPHRINE HYDROCHLORIDE 5 MCG/MIN: 10 INJECTION INTRAVENOUS at 10:43

## 2024-03-21 RX ADMIN — CHLORHEXIDINE GLUCONATE 15 ML: 1.2 SOLUTION ORAL at 08:55

## 2024-03-21 RX ADMIN — FENTANYL CITRATE 50 MCG: 50 INJECTION, SOLUTION INTRAMUSCULAR; INTRAVENOUS at 12:19

## 2024-03-21 RX ADMIN — MIDAZOLAM 2 MG: 1 INJECTION INTRAMUSCULAR; INTRAVENOUS at 10:34

## 2024-03-21 RX ADMIN — FENTANYL CITRATE 25 MCG: 50 INJECTION, SOLUTION INTRAMUSCULAR; INTRAVENOUS at 11:13

## 2024-03-21 RX ADMIN — FENTANYL CITRATE 50 MCG: 50 INJECTION, SOLUTION INTRAMUSCULAR; INTRAVENOUS at 10:40

## 2024-03-21 RX ADMIN — FENTANYL CITRATE 50 MCG: 50 INJECTION, SOLUTION INTRAMUSCULAR; INTRAVENOUS at 11:08

## 2024-03-21 RX ADMIN — EPHEDRINE SULFATE 5 MG: 50 INJECTION, SOLUTION INTRAVENOUS at 11:28

## 2024-03-21 NOTE — H&P
"H&P Exam - Vascular Surgery   Jordy Tidwell 54 y.o. male MRN: [id number]  Unit/Bed#: OR POOL Encounter: [id number]    Assessment/Plan     Assessment:  ESRD  Plan:  Access creation today    History of Present Illness   History, ROS and PFSH reviewed.  HPI:  Jordy Tidwell is a 54 y.o. male who presents for procedure.    Review of Systems    Historical Information   Past Medical History:   Diagnosis Date    Chronic kidney disease     M<T<R<F 8579-9017 Dialysis    Hypertension     Mood disorder (HCC)     Psychiatric disorder     Schizophrenia (HCC)      Past Surgical History:   Procedure Laterality Date    COLONOSCOPY      IR BIOPSY KIDNEY RANDOM  08/15/2023    IR TUNNELED DIALYSIS CATHETER PLACEMENT  2/22/2024    NO PAST SURGERIES       Social History   Social History     Substance and Sexual Activity   Alcohol Use Not Currently    Comment: on dialysis     Social History     Substance and Sexual Activity   Drug Use No     Social History     Tobacco Use   Smoking Status Every Day    Current packs/day: 1.00    Average packs/day: 1 pack/day for 35.7 years (35.7 ttl pk-yrs)    Types: Cigarettes    Start date: 7/14/1988    Passive exposure: Never   Smokeless Tobacco Never     E-Cigarette/Vaping    E-Cigarette Use Never User      E-Cigarette/Vaping Substances    Nicotine No     THC No     CBD No     Flavoring No     Other No     Unknown No      Family History: non-contributory    Meds/Allergies   all current active meds have been reviewed  Allergies   Allergen Reactions    Geodon [Ziprasidone] Fatigue    Haldol [Haloperidol] Fatigue       Objective   Vitals: Blood pressure 148/95, pulse 70, temperature 97.5 °F (36.4 °C), resp. rate 16, height 5' 8\" (1.727 m), weight 87.5 kg (192 lb 14.4 oz), SpO2 99%.,Body mass index is 29.33 kg/m².  No intake or output data in the 24 hours ending 03/21/24 1014  Invasive Devices       Peripheral Intravenous Line  Duration             Peripheral IV 02/20/24 Left Antecubital 29 days "    Peripheral IV 03/21/24 Dorsal (posterior);Right Hand <1 day              Hemodialysis Catheter  Duration             HD Permanent Double Catheter 27 days                    Physical Exam  Alert and oriented  RRR  CTAB  Ab soft, nt  LE warm    Lab Results: I have personally reviewed pertinent reports.    Imaging: I have personally reviewed pertinent reports.    EKG, Pathology, and Other Studies: I have personally reviewed pertinent reports.      Code Status: Prior  Advance Directive and Living Will:      Power of :    POLST:      Counseling / Coordination of Care  None

## 2024-03-21 NOTE — PERIOPERATIVE NURSING NOTE
Patient received from PACU in 0/10 pain. Left wrist surgical site intact with exofin. Left pulse thrill is felt. VSS. Will continue to monitor til discharge criteria is met.   Pt tolerating water. Will call pt ride to pick him in 30 min. Call bell and bedside table within reach.

## 2024-03-21 NOTE — ANESTHESIA PREPROCEDURE EVALUATION
Procedure:  left arm AVF creation, possible AVG (Left: Arm Upper)    Relevant Problems   CARDIO   (+) Bleeding hemorrhoids   (+) Familial hypercholesterolemia      GI/HEPATIC   (+) Bleeding hemorrhoids   (+) Gastroesophageal reflux disease with esophagitis without hemorrhage   (+) Rectal bleeding      /RENAL   (+) NAVEEN (acute kidney injury) (HCC)   (+) Benign hypertension with chronic kidney disease, stage V ([facility])   (+) CKD (chronic kidney disease) stage 5, GFR less than 15 ml/min ([facility])   (+) Chronic kidney disease-mineral and bone disorder   (+) ESRD (end stage renal disease) on dialysis ([facility])   (+) Renal cyst      HEMATOLOGY   (+) Anemia due to stage 5 chronic kidney disease, not on chronic dialysis       NEURO/PSYCH   (+) Schizophrenia ([facility])        Physical Exam    Airway    Mallampati score: II  TM Distance: >3 FB  Neck ROM: full     Dental   No notable dental hx     Cardiovascular  Cardiovascular exam normal    Pulmonary  Pulmonary exam normal     Other Findings        Anesthesia Plan  ASA Score- 3     Anesthesia Type- general with ASA Monitors.         Additional Monitors:     Airway Plan: LMA.           Plan Factors-Exercise tolerance (METS): >4 METS.    Chart reviewed.   Existing labs reviewed. Patient summary reviewed.    Patient is a current smoker.  Patient smoked on day of surgery.    Obstructive sleep apnea risk education given perioperatively.        Induction- intravenous.    Postoperative Plan- Plan for postoperative opioid use.     Informed Consent- Anesthetic plan and risks discussed with patient.  I personally reviewed this patient with the CRNA. Discussed and agreed on the Anesthesia Plan with the CRNA..

## 2024-03-21 NOTE — OP NOTE
OPERATIVE REPORT  PATIENT NAME: Jordy Tidwell    :  1970  MRN: [id number]  Pt Location: [facility] OR ROOM 01    SURGERY DATE: 3/21/2024    Surgeons and Role:     * Ward Guthrie,  - Primary     * Ray Chaney PA-C - Assisting there is no qualified available  [facility] resident for the procedure she was required for assistance with surgical exposure and closure    Preop Diagnosis:  End-stage renal disease    Post-Op Diagnosis Codes:  Same         Procedure(s):  Left - left arm radio-cephalic AVF creation    Specimen(s):  * No specimens in log *    Estimated Blood Loss:   Minimal    Drains:  * No LDAs found *    Anesthesia Type:   General/LMA plus local    Operative Indications:  54-year-old gentleman referred to me in the office approximately a month ago with stage IV CKD.  I recommended left arm access creation and discussed AV fistula as well as AV graft placement with him in the interim he was started on dialysis via a right IJ PermCath.  Risks benefits and alternative procedures were discussed with him in detail and he consented to proceed      Operative Findings:  IntraOp vein mapping demonstrated adequate cephalic vein in the forearm and upper arm I elected to proceed with a radiocephalic fistula.  At the end the procedure there was multiphasic radial and ulnar Doppler signals as well as a thrill present in the fistula proximately      Complications:   None    Procedure and Technique:  Informed consent was taken the appropriate part of the patient was correct identified in the holding area brought to the operating placed in supine position appropriate lines and monitors were placed after satisfactory induction of general LMA anesthesia the left arm was prepped draped in the normal sterile fashion a Jako timeout was performed and the patient received appropriate periprocedural antibiotics.  Prior to prepping I had repeated the ultrasound vein mapping of the left arm with the above-stated findings I elected  to proceed with a radiocephalic fistula a oblique incision was made along the left wrist between the previously marked cephalic vein and radial artery dissection was carried down through the subcutaneous tissue and the cephalic vein was circumferentially dissected out and controlled it did appear to be an adequate size it was measuring 3 mm on the ultrasound.  The retinaculum over the radial artery was then opened and the radial artery was dissected out and controlled as well this also appeared to be adequate size for fistula creation in the cephalic vein was clipped distally it was clamped proximally with a bulldog clamp and then divided near the clips it was generously dilated and flushed with heparinized saline the radial artery was clamped proximally and distally using bulldog clamps a generous longitudinal arteriotomy was made with an 11 blade extended with Pedro scissors the vein graft was then generously spatulated and an end-to-side anastomosis was fashioned using a running 6-0 Prolene prior to completing the suture line the vessels were antegrade and retrograde flushed the suture line was then completed the clamps were removed there was a good thrill in the fistula and multiphasic radial and ulnar Doppler signals the anastomosis was noted to be hemostatic some tissue was freed up to allow for a more gentle angle towards the native cephalic vein.  Some local anesthetic was instilled the wound was closed in 2 layers the skin closed with 4-0 Monocryl in subcuticular fashion and Dermabond was applied for dressing.           I was present for the entire procedure.    Patient Disposition:  PACU       Vascular Quality Initiative - Hemodialysis Access Placement    Pre-admission Information   Functional status: Fully active; able to carry on all predisease activities without restriction.     ESRD: ESRD: Hemodialysis dependent.  Current Access Type.: Tunneled Catheter    Historical Information      Previous Access:  none   Access Type/Location:         Procedure Information      Status: Outpatient     Side:left      Anesthesia: General     Access Type: Access Type: Surgical AVF Inflow Artery is: Radial, Wrist Intraoperative Artery taget diameter is: 2 mm.  Outflow Vein is: Cephalic Forearm.  Intraoperative Vein target diameter is: 3mm  Anastomosis configuration is: End to Side.  Cocomitant Procedure performed-: None    Completion Fistulogram: no     Preop ARTERIAL evaluation and/or treatment: duplex    Preop VENOUS evaluation and/or treatment: ultrasound mapping    *Obtain Target Diameters from study          Post op Information     Discharge Status: Home    Post op Complications: None        SIGNATURE: Ward Guthrie DO  DATE: March 21, 2024  TIME: 11:58 AM

## 2024-03-21 NOTE — PERIOPERATIVE NURSING NOTE
Left wrist surgical site intact with exofin.  Left pulse ausculatated with doppler, and able to be palpated.

## 2024-03-21 NOTE — ANESTHESIA POSTPROCEDURE EVALUATION
Post-Op Assessment Note    CV Status:  Stable  Pain Score: 0    Pain management: adequate       Mental Status:  Awake and sleepy   Hydration Status:  Stable   PONV Controlled:  None   Airway Patency:  Patent  Two or more mitigation strategies used for obstructive sleep apnea   Post Op Vitals Reviewed: Yes      Staff: CRNA   Comments: spontaneously breathing, oral airway, simple mask to O2, vss, HOB @ 30 degrees, fully endorsed to recovery w/o AC              BP   126/54   Temp      Pulse  78   Resp   14   SpO2   99

## 2024-03-21 NOTE — DISCHARGE INSTR - AVS FIRST PAGE
DISCHARGE INSTRUCTIONS  DIALYSIS FISTULA SURGERY    ACTIVITY:  Limit use of the operated arm to what is necessary for the first day after surgery. On the second day after surgery, you may start to increase use of your arm as tolerated.  Avoid heavy lifting (no more than 15 lbs) for the first one week.  You should start to exercise your hand on the side of the fistula by squeezing a stress ball or a rolled-up sock. This increases blood flow in your fistula and arm so your fistula will function better.    Feel for a thrill every day. The thrill is the vibration or pulse you feel over the fistula that means the blood is flowing through it. If you cannot feel a thrill, call our office ([phone]).    DIET:   Resume your normal diet.  Good nutrition is important for healing of your incision.    DRESSING:   You may have surgical glue at your surgical site.  There are stitches present under the skin which will absorb on their own.  The glue is used to cover the incision, assist in closure, and prevent contamination. This adhesive will darken and peel away on its own within one to two weeks. Do not pick at it.  If you have a dressing over your surgical site, remove this on the second day after surgery.      INCISION:   If you do not have a dialysis catheter in place, you may shower and get your incision wet.  Wash incision daily with soap and water, but do not rub or scrub the incision; rinse thoroughly and pat dry.  You may have stitches or staples to close your incision and it is okay for these to get wet.  Do not bathe in a tub or swim for the first 4 week following surgery or if you have any open wounds.  It is normal to have mild swelling or discoloration around the incision.   If increasing redness or pain develops, call our office immediately.  Numbness in the region of the incision may occur following the surgery.  This normally improves over six to twelve months.  If you have numbness or pain in your hand,  please call our office immediately.  DO NOT put any powders, creams, ointments, or lotions on your incision.     ARM SWELLING:    Most patients have some noticeable arm swelling after surgery.  This usually disappears within a few weeks.  If swelling is present, elevate the arm whenever possible.      RESTRICTIONS:   Do NOT have blood draws, IV's, or blood pressures performed on the operated arm.    FISTULA USE:    Your fistula will not be used until it has fully matured - approximately 6 to 12 weeks. If you are using a catheter for dialysis, this will not be removed until after your fistula has matured and is being used for dialysis without any issues.    FOLLOW UP STUDIES:  A Doppler ultrasound will be performed about 5-6 weeks after surgery.  Your surgeon will arrange this at your first postoperative visit.     FOLLOW UP APPOINTMENTS:  Making and keeping follow up appointments and ultrasound tests are important to your recovery.  If you have difficulty making it to or keeping your follow up appointments, call the office.    If you have increased pain, fever >101.5, increased drainage, redness or a bad smell at your surgery site, new coldness/numbness of your arm or leg, please call us immediately and GO directly to the ER.    PLEASE CALL THE OFFICE IF YOU HAVE ANY QUESTIONS  [phone]  [phone] [address], [address], [place] [address]  [address] [facility], [address], [place] [address]  [address] [place] [address]  [address] [facility], [address], [place] [address]  [address] [facility], [place] [address]  [address] [facility], 2nd Floor, [address], [place] [address]  [address] [facility], [address], [place] [address]  [address] [facility], 1st Floor, [address], [place] [address]  [address], [place] [address]  [address] [address]

## 2024-03-25 ENCOUNTER — TELEPHONE (OUTPATIENT)
Dept: VASCULAR SURGERY | Facility: CLINIC | Age: 54
End: 2024-03-25

## 2024-03-25 NOTE — TELEPHONE ENCOUNTER
Vascular Nurse Navigator Post Op Call    Procedure: Left - left arm radio-cephalic AVF creation      Date of Procedure: 3/21/24     Surgeon:    * Ward Guthrie DO - Primary     * Ray Chaney PA-C - Assisting      Painful tingling or numbness in your fingers?: No    Paleness/Coolness in hands/fingers?: No    Redness, swelling or pus from your wound?: No    Bleeding?: No    Thrill present?: Yes    Statin pt was discharged on post op?:  Rosuvastatin (Crestor)    Fever/chills?: No    Uncontrolled Pain?: No      Reviewed discharge instructions and incision care with patient.      Dialysis Days and Location:  M-W-F at [facility]    NEXT SCHEDULED OFFICE VISIT: 4/5/24 at 10 am with Dr. Guthrie at [facility]     Transportation Confirmed?: Yes      Any Questions or Concerns?   Patient stated that he is doing good since procedure.  He stated that he has discomfort in his arm and has been using Norco every 6 hrs for pain.  Advised him to try Tylenol for pain.  He stated that he feels numbness in the middle of his wrist and is able to move his hand and fingers.  Reviewed incision care with him - wash daily with soap and water.  He stated that he started dialysis about a month ago.  He stated that he was going to [facility] in [place] on M-T-TH-F and today was his last day at the [place] location.  He stated he starts at the [place] location this Wednesday.  All questions answered.  No concerns expressed at this time.

## 2024-03-25 NOTE — TELEPHONE ENCOUNTER
Vascular Nurse Navigator Post Op Phone Call    Post-Discharge phone call attempted to assess patient recovery after vascular surgery I left a message on answering machine. Will attempt to contact at later date.        Pt's chart was reviewed prior to call and leaving message.    Procedure: Left - left arm radio-cephalic AVF creation     Date of Procedure: 3/21/24    Surgeon:    * Ward Guthrie, DO - Primary     * Ray Chaney PA-C - Assisting        Statin pt was discharged on post op?:  Rosuvastatin (Crestor)    Dialysis Days and Location: Currently not on dialysis    Reminded pt of the following in message:    NEXT SCHEDULED OFFICE VISIT:  4/5/24 at 10 am with Dr. Guthrie at [facility]    To contact [facility] with any concerns.

## 2024-03-27 ENCOUNTER — HOSPITAL ENCOUNTER (EMERGENCY)
Facility: HOSPITAL | Age: 54
Discharge: HOME | End: 2024-03-28
Attending: EMERGENCY MEDICINE
Payer: COMMERCIAL

## 2024-03-27 VITALS
SYSTOLIC BLOOD PRESSURE: 156 MMHG | TEMPERATURE: 97.9 F | HEART RATE: 93 BPM | OXYGEN SATURATION: 99 % | RESPIRATION RATE: 18 BRPM | DIASTOLIC BLOOD PRESSURE: 85 MMHG

## 2024-03-27 DIAGNOSIS — K62.3 PARTIAL RECTAL PROLAPSE: ICD-10-CM

## 2024-03-27 DIAGNOSIS — K59.00 CONSTIPATION: Primary | ICD-10-CM

## 2024-03-27 PROCEDURE — 99284 EMERGENCY DEPT VISIT MOD MDM: CPT | Performed by: EMERGENCY MEDICINE

## 2024-03-27 PROCEDURE — 99283 EMERGENCY DEPT VISIT LOW MDM: CPT

## 2024-03-27 PROCEDURE — 96372 THER/PROPH/DIAG INJ SC/IM: CPT

## 2024-03-27 RX ORDER — HYDROCORTISONE ACETATE 25 MG/1
25 SUPPOSITORY RECTAL 2 TIMES DAILY
Qty: 12 SUPPOSITORY | Refills: 0 | Status: SHIPPED | OUTPATIENT
Start: 2024-03-27

## 2024-03-27 RX ORDER — POLYETHYLENE GLYCOL 3350 17 G/17G
17 POWDER, FOR SOLUTION ORAL DAILY
Qty: 119 G | Refills: 0 | Status: SHIPPED | OUTPATIENT
Start: 2024-03-27 | End: 2024-04-03

## 2024-03-27 RX ORDER — LACTULOSE 10 G/15ML
20 SOLUTION ORAL ONCE
Status: COMPLETED | OUTPATIENT
Start: 2024-03-27 | End: 2024-03-27

## 2024-03-27 RX ORDER — AMOXICILLIN 250 MG
1 CAPSULE ORAL ONCE
Status: COMPLETED | OUTPATIENT
Start: 2024-03-27 | End: 2024-03-27

## 2024-03-27 RX ORDER — BISACODYL 10 MG
10 SUPPOSITORY, RECTAL RECTAL DAILY
Qty: 12 SUPPOSITORY | Refills: 0 | Status: SHIPPED | OUTPATIENT
Start: 2024-03-27

## 2024-03-27 RX ORDER — POLYETHYLENE GLYCOL 3350 17 G/17G
17 POWDER, FOR SOLUTION ORAL ONCE
Status: COMPLETED | OUTPATIENT
Start: 2024-03-27 | End: 2024-03-27

## 2024-03-27 RX ORDER — AMOXICILLIN 250 MG
1 CAPSULE ORAL DAILY
Qty: 20 TABLET | Refills: 0 | Status: SHIPPED | OUTPATIENT
Start: 2024-03-27

## 2024-03-27 RX ADMIN — METHYLNALTREXONE BROMIDE 14 MG: 12 INJECTION, SOLUTION SUBCUTANEOUS at 22:44

## 2024-03-27 RX ADMIN — LACTULOSE 20 G: 20 SOLUTION ORAL at 22:43

## 2024-03-27 RX ADMIN — DOCUSATE SODIUM AND SENNOSIDES 1 TABLET: 8.6; 5 TABLET, FILM COATED ORAL at 22:44

## 2024-03-27 RX ADMIN — POLYETHYLENE GLYCOL 3350 17 G: 17 POWDER, FOR SOLUTION ORAL at 22:44

## 2024-03-28 ENCOUNTER — TELEPHONE (OUTPATIENT)
Age: 54
End: 2024-03-28

## 2024-03-28 NOTE — ED PROVIDER NOTES
Final Diagnosis:  No diagnosis found.    Chief Complaint   Patient presents with    Constipation     Patient c/o stool being at the rectum but can't pass it, did not try enemas, only tried sitting on toilet, drinking milk, jello, yogurt, without relief, last bowel movement was 3 days ago, did happen 3 weeks ago and passed stool after 3 hours       HPI  Patient presents with a constant need to stool.  He called his PCP they recommended some stool softener and prune juice as patient did not comply.  He presents here with recurrent constipation.  He said he was here 3 weeks ago with.  Stools lots of straining.  On exam he has a rectal prolapse.  It is partial prolapse versus edematous incarcerated internal hemorrhoid. No vomiting. No abd pain.     EMS additionally reports:     - Previous charting underwent limited review with attention to last ED visits, labs, ekgs, and prior imaging.  Chart review reveals :     No results displayed because visit has over 200 results.          - No language barrier.   - History obtained from patient    - Discuss patient's care, with patient permission or by chart review, with      PMH:   has a past medical history of Chronic kidney disease, Hypertension, Mood disorder (HCC), Psychiatric disorder, and Schizophrenia (HCC).    PSH:   has a past surgical history that includes No past surgeries; IR biopsy kidney random (08/15/2023); Colonoscopy; IR tunneled dialysis catheter placement (2/22/2024); and pr arteriovenous anastomosis open direct (Left, 3/21/2024).     Social History:  Tobacco Use: High Risk (3/27/2024)    Patient History     Smoking Tobacco Use: Every Day     Smokeless Tobacco Use: Never     Passive Exposure: Never     Alcohol Use: Not on file     No illicit use       ROS:  Pertinent positives/negatives: .     Some ROS may be present in the HPI and would take precedent over these standard questions asked below.   Review of Systems     CONSTITUTIONAL:  No lethargy. No unexpected  weight loss. No change in behavior.  EYES:  No pain, redness, or discharge. No loss of vision. No orbital trauma or pain.   ENT:  No tinnitus or decreased hearing. No epistaxis/purulent rhinorrhea. No voice change, airway closing, trismus.   CARDIOVASCULAR:  No chest pain. No skin mottling or pallor. No change in exertional capacity  RESPIRATORY:  No hemoptysis. No paroxysmal nocturnal dyspnea. No stridor. No apnea or bluing.   GASTROINTESTINAL:  No vomiting, diarrhea. No distension. No melena. No hematochezia.   GENITOURINARY:  No nocturia. No hematuria or foul smelling or cloudy urine. No discharge. No sores/adenopathy.   MUSCULOSKELETAL:  No contracture.  No new deformity.   INTEGUMENTARY:  No swelling. No unexpected contusions. No abrasions. No lymphangitis.  NEUROLOGIC:  No meningismus. No new numbness of the extremities. No new focal weakness. No postural instability  PSYCHIATRIC:  No SI HI AVH  HEMATOLOGICAL:  No bleeding. No petechiae. No bruising.  ALLERGIES:  No urticaria. No sudden abd cramping. No stridor.    PE:     Physical exam highlights:   Physical Exam       Vitals:    03/27/24 2205 03/27/24 2209   BP: 156/85    Pulse: 93    Resp: 18    Temp:  97.9 °F (36.6 °C)   TempSrc:  Oral   SpO2: 99%      Vitals reviewed by me.   Nursing note reviewed  Chaperone present for all sensitive exam.  Const: No acute distress. Alert. Nontoxic. Not diaphoretic.    HEENT: External ears normal. No protrusion drainage swelling. Nose normal. No drainage/traumatic deformity. MM. Mouth with baseline/symmetric movement. No trismus.   Eyes: No squinting. No icterus. No tearing/swelling/drainage. Tracks through the room with normal EOM.   Neck: ROM normal. No rigidity. No meningismus.  Cards: Rate as per vitals Compared to monitor sinus unless documented. Regular Well perfused.  Pulm: Effort and excursion normal. No distress. No audible wheezing/no stridor. Normal resp rate without retraction or change in work of  breathing.  Abd: No distension beyond baseline. No fluctuant wave. Patient without peritoneal pain with shifting/bumping the bed.   MSK: ROM normal baseline. No deformity. No contractures from baseline.   Skin: No new rashes visible. Well perfused. No wounds visualized on exposed skin  Neuro: Nonfocal. Baseline. CN grossly intact. Moving all four with coordination.   Psych: Normal behavior and affect.        A:  - Nursing note reviewed.    Ddx and MDM  Considered diagnoses  Rectal prolapse  Bowel regimen to soft stool      Reduce  Sugar and slow constant pressure  Reduced  Outpaitent GI f/u    No signs of bowel obstruction  Stooling here.            Dispo decision       My conversation with consultant reveals:        Decision rules:                           My read of the XR/CT scan reveals:   No orders to display       No orders of the defined types were placed in this encounter.    Labs Reviewed - No data to display    *Each of these labs was reviewed. Particular standout labs will be noted in the ED Course above     Final Diagnosis:  No diagnosis found.      P:  - hospital tx includes   Medications   lactulose (CHRONULAC) oral solution 20 g (20 g Oral Given 3/27/24 2243)   polyethylene glycol (MIRALAX) packet 17 g (17 g Oral Given 3/27/24 2244)   senna-docusate sodium (SENOKOT S) 8.6-50 mg per tablet 1 tablet (1 tablet Oral Given 3/27/24 2244)   methylnaltrexone (Relistor) subcutaneous injection 14 mg (14 mg Subcutaneous Given 3/27/24 2244)         - disposition  ED Disposition       None          Follow-up Information    None         - patient will call their PCP to let them know they were in the emergency department. We discuss return precautions and patient is agreeable with plan and aformentioned disposition.       - additional treatment intended, if consistent with primary provider:  - patient to follow with :      Patient's Medications   Discharge Prescriptions    No medications on file     No discharge  "procedures on file.  Prior to Admission Medications   Prescriptions Last Dose Informant Patient Reported? Taking?   HYDROcodone-acetaminophen (Norco) 5-325 mg per tablet   No No   Sig: Take 1 tablet by mouth every 6 (six) hours as needed for pain for up to 10 days Max Daily Amount: 4 tablets   amLODIPine (NORVASC) 5 mg tablet  Self No No   Sig: TAKE 1 TABLET BY MOUTH EVERY DAY   Patient taking differently: Take 5 mg by mouth every evening   ezetimibe (ZETIA) 10 mg tablet  Self No No   Sig: TAKE 1 TABLET BY MOUTH EVERY DAY   metoprolol succinate (TOPROL-XL) 50 mg 24 hr tablet  Self No No   Sig: TAKE 1 TABLET BY MOUTH EVERY DAY   paliperidone (INVEGA) 6 MG 24 hr tablet   No No   Sig: Every other day   rosuvastatin (CRESTOR) 10 MG tablet  Self No No   Sig: TAKE 1 TABLET BY MOUTH EVERY DAY   sevelamer (RENAGEL) 800 mg tablet   No No   Sig: Take 1 tablet (800 mg total) by mouth 3 (three) times a day with meals      Facility-Administered Medications: None       Portions of the record may have been created with voice recognition software. Occasional wrong word or \"sound a like\" substitutions may have occurred due to the inherent limitations of voice recognition software. Read the chart carefully and recognize, using context, where substitutions have occurred.    Electronically signed by:  MD Robyb Dyer MD  03/30/24 5129    "

## 2024-03-28 NOTE — DISCHARGE INSTRUCTIONS
Stop taking oxycodone  Use the miralax and others to make your stool SOFT  Read the instructions about SITZ bath  Follow with gen surgery

## 2024-03-28 NOTE — TELEPHONE ENCOUNTER
Patients GI provider:  Dr. Stephens     Number to return call: [phone]     Reason for call: Pt calling requesting to speak with someone regarding a prescription of bisacodyl. Patient asking for directions    Scheduled procedure/appointment date if applicable: Apt/procedure

## 2024-03-29 NOTE — TELEPHONE ENCOUNTER
Called and left message for patient to use the the medication once a day till relief. Instructed patient to return call with any further questions or concerns.

## 2024-04-05 ENCOUNTER — TELEPHONE (OUTPATIENT)
Dept: VASCULAR SURGERY | Facility: CLINIC | Age: 54
End: 2024-04-05

## 2024-04-05 ENCOUNTER — OFFICE VISIT (OUTPATIENT)
Dept: VASCULAR SURGERY | Facility: CLINIC | Age: 54
End: 2024-04-05
Payer: COMMERCIAL

## 2024-04-05 VITALS
BODY MASS INDEX: 29.1 KG/M2 | WEIGHT: 192 LBS | HEIGHT: 68 IN | HEART RATE: 73 BPM | DIASTOLIC BLOOD PRESSURE: 86 MMHG | SYSTOLIC BLOOD PRESSURE: 152 MMHG

## 2024-04-05 DIAGNOSIS — N18.6 ESRD (END STAGE RENAL DISEASE) (HCC): Primary | ICD-10-CM

## 2024-04-05 PROCEDURE — 99215 OFFICE O/P EST HI 40 MIN: CPT | Performed by: SURGERY

## 2024-04-05 RX ORDER — CEFAZOLIN SODIUM 2 G/50ML
2000 SOLUTION INTRAVENOUS ONCE
OUTPATIENT
Start: 2024-04-05 | End: 2024-04-05

## 2024-04-05 RX ORDER — CHLORHEXIDINE GLUCONATE ORAL RINSE 1.2 MG/ML
15 SOLUTION DENTAL ONCE
OUTPATIENT
Start: 2024-04-05 | End: 2024-04-05

## 2024-04-05 NOTE — TELEPHONE ENCOUNTER
REMINDER: Under Reason For Call, comments MUST be formatted as:   (Surgeon's Initials) / (Procedure)      Special Instructions / FYI: LEVEL 3    Consent: I certify that patient has signed, printed, timed, and dated their surgery consent.  I certify that the patient's LEGAL NAME and DATE OF BIRTH are written in the upper left corner on BOTH sides of the consent.  I certify that BOTH sides of the completed surgery consent have been scanned into the patient's Epic chart by myself on 4/5/2024.  Yes, I have LABELED the consent in Epic as Consent for Vascular Procedure.     For Surgical Clearances     Levels   1-3   ROUTE this encounter to [facility] Clearance Pool (AND)   [facility] Surgery Coordinator Pool     Level   4   ROUTE this encounter to [facility] Surgery Coordinator Pool       HYDRATION CLEARANCES   ONLY ROUTE TO  [facility] Clearance Pool       Yes, I have ROUTED this encounter to [facility] Surgery Coordinator and/or [facility] Clearance Pool.

## 2024-04-05 NOTE — PROGRESS NOTES
Assessment/Plan:    Benign hypertension with chronic kidney disease, stage V ([facility])  Lab Results   Component Value Date    EGFR 12 (L) 03/06/2024    EGFR 7 02/26/2024    EGFR 9 02/25/2024    CREATININE 5.36 (H) 03/06/2024    CREATININE 7.56 (H) 02/26/2024    CREATININE 6.22 (H) 02/25/2024       Patient seen initially by myself 2 months ago for access creation I scheduled him for left arm fistula creation and he had a left radiocephalic fistula creation done 2 weeks ago.  He has been on dialysis now for approximately a month via a PermCath.  Unfortunately today in the office there is no bruit or thrill present in the fistula he denies any hand pain or numbness his incision is healed well.  At the time of his fistula creation his arm was mapped and he does have adequate caliber upper arm cephalic I will reschedule him for a left brachiocephalic AV fistula creation possible AV graft.  After discussing all risks benefits and alternative therapies with him in detail he consented to proceed       Diagnoses and all orders for this visit:    ESRD (end stage renal disease) ([facility])  -     Case request operating room: left arm AVF creation, possible AV graft; Standing  -     Case request operating room: left arm AVF creation, possible AV graft    Other orders  -     Diet NPO; Sips with meds; Standing  -     Void on call to OR; Standing  -     Insert peripheral IV; Standing  -     Nursing Communication CHG bath, have staff wash entire body (neck down) per pre op bathing protocol. Routine, evening prior to, and day of surgery.; Standing  -     Nursing Communication Swab both nares with Povidone-Iodine solution, EXCLUDE if patient has shellfish/Iodine allergy, and replace with nasal alcohol swabstick. Routine, day of surgery, on call to OR.; Standing  -     chlorhexidine (PERIDEX) 0.12 % oral rinse 15 mL  -     Place sequential compression device; Standing  -     ceFAZolin (ANCEF) IVPB (premix in dextrose) 2,000 mg 50 mL       "    Subjective:      Patient ID: Jordy Tidwell is a 54 y.o. male.    Patient is s/p L AVF creation done 3/21/24 ([place]), presents today for post-op visit. Denies pain, numbness, or tingling. -Thrill/bruit Incision is C/D/I.     HPI    The following portions of the patient's history were reviewed and updated as appropriate: allergies, current medications, past family history, past medical history, past social history, past surgical history, and problem list.    Review of Systems   Constitutional: Negative.    HENT: Negative.     Eyes: Negative.    Respiratory: Negative.     Cardiovascular: Negative.    Gastrointestinal: Negative.    Endocrine: Negative.    Genitourinary: Negative.    Musculoskeletal: Negative.    Skin: Negative.    Allergic/Immunologic: Negative.    Neurological: Negative.    Hematological: Negative.    Psychiatric/Behavioral: Negative.         I have reviewed the ROS above and made changes as needed.      Objective:      /86 (BP Location: Right arm, Patient Position: Sitting, Cuff Size: Standard)   Pulse 73   Ht 5' 8\" (1.727 m)   Wt 87.1 kg (192 lb)   BMI 29.19 kg/m²          Physical Exam      General  Exam: alert, awake, oriented, no distress, consistent with stated age    Integumentary  Exam: warm, dry, no gross lesions, no bruises and normal color    Head and Neck  Exam: supple, no bruits, trachea midline, no JVD, no mass or palpable nodes    Eye  Exam: extraoccular movements intact, no scleral icterus, sclera clear, pupils equal round and reactive to light    ENMT  Exam: oral mucosa pink and moist    Chest and Lung  Exam: chest normal without deformity, bilaterally expansive, clear to auscultation    Cardiovascular  Exam: regular rate, regular rhythm, no murmurs, no rubs or gallops    Adbomen  Exam: soft, non-tender, non-distended, no pulsatile abdominal masses, no abdominal bruit    Peripheral Vascular  Exam: no clubbing of the digits of the upper extremity, no cyanosis, no " edema, both feet are warm, radial pulses 2+ bilaterally, skin well perfused, without and no varicosities.    No widened popliteal pulse noted bilaterally    Upper Extremity:  Palpation: Radial pulse- Bilateral 2+    Left wrist incision c/d/I  Thrill and bruit absent in AVF  Normal left hand  strength  Palpable left radial pulse    Neurologic  Exam:alert, non-focal, oriented x 3, cranial nerves II-XII grossly intact          Operative Scheduling Information:    Hospital:  [facility]    Physician:  [place]    Surgery: Left AVF creation, possible AV graft    Urgency:  Standard    Level:  Level 3: Outpatients to be scheduled for elective surgery than can be delayed up to 4 weeks without reasonable expectation of detriment to patient    Case Length:  Normal    Post-op Bed:  Outpatient    OR Table:  Standard    Equipment Needs:  None    Medication Instructions:  None    Hydration:  No    Contrast Allergy:  no

## 2024-04-05 NOTE — ASSESSMENT & PLAN NOTE
Lab Results   Component Value Date    EGFR 12 (L) 03/06/2024    EGFR 7 02/26/2024    EGFR 9 02/25/2024    CREATININE 5.36 (H) 03/06/2024    CREATININE 7.56 (H) 02/26/2024    CREATININE 6.22 (H) 02/25/2024       Patient seen initially by myself 2 months ago for access creation I scheduled him for left arm fistula creation and he had a left radiocephalic fistula creation done 2 weeks ago.  He has been on dialysis now for approximately a month via a PermCath.  Unfortunately today in the office there is no bruit or thrill present in the fistula he denies any hand pain or numbness his incision is healed well.  At the time of his fistula creation his arm was mapped and he does have adequate caliber upper arm cephalic I will reschedule him for a left brachiocephalic AV fistula creation possible AV graft.  After discussing all risks benefits and alternative therapies with him in detail he consented to proceed

## 2024-04-08 ENCOUNTER — PREP FOR PROCEDURE (OUTPATIENT)
Dept: VASCULAR SURGERY | Facility: CLINIC | Age: 54
End: 2024-04-08

## 2024-04-08 NOTE — TELEPHONE ENCOUNTER
Verified patient's insurance   CONFIRMED - Patient's insurance is [employer]  Is patient requesting a call when authorization has been obtained? Patient did not request a call.    Surgery Date: 5/16/24  Primary Surgeon: KELLY // Ward Guthrie (NPI: [id number])  Assisting Surgeon: Not Applicable (N/A)  Facility: [facility] (Tax: [id number] / NPI: [id number])  Inpatient / Outpatient: Outpatient  Level: 3    Clearance Received: No clearance ordered.  Consent Received: Yes, scanned into Epic on 4/5/24.  Medication Hold / Last Dose: Not Applicable (N/A)  IR Notified: Not Applicable (N/A)  Rep. Notified: Not Applicable (N/A)  Equipment Needs: Not Applicable (N/A)  Vas Lab Requested: Not Applicable (N/A)  Patient Contacted: 4/8/24    Diagnosis: N18.6  Procedure/ CPT Code(s): (AV) Arteriovenous Fistula // CPT: 39731    For varicose vein related procedures:   Last LEVDR: Not Applicable (N/A)  CEAP Classification: Not Applicable (N/A)  VCSS: Not Applicable (N/A)    Post Operative Date/ Time: 5/31/24 , 8am [place] ([place]) with Ward Guthrie (NPI: [id number])     *Please review medication hold(s), PATs, and check H&P with patient.*  PATIENT WAS MAILED SURGERY/SHOWERING/DISCHARGE/COVID INSTRUCTIONS AFTER REVIEWING WITH THEM VIA PHONE CALL.

## 2024-04-13 DIAGNOSIS — I10 ESSENTIAL HYPERTENSION: ICD-10-CM

## 2024-04-13 RX ORDER — METOPROLOL SUCCINATE 50 MG/1
TABLET, EXTENDED RELEASE ORAL
Qty: 30 TABLET | Refills: 5 | Status: SHIPPED | OUTPATIENT
Start: 2024-04-13

## 2024-04-29 ENCOUNTER — TELEPHONE (OUTPATIENT)
Age: 54
End: 2024-04-29

## 2024-04-29 NOTE — TELEPHONE ENCOUNTER
Spoke with nursing, they usually advise waiting 2 weeks before and 2 weeks after surgery. Patient is going to have to ask the pharmacist if he can wait for 2 weeks after 5/31/24. Please contact patient and make him aware.

## 2024-04-29 NOTE — TELEPHONE ENCOUNTER
LMOM for Jordy. Advised him of the 2 -week protocol for vaccines/surgery. His Left AVF creation, possible AV graft is scheduled in [place] with Dr. Guthrie on 5/16/24. He is within the window to get the 2nd vaccine on 5/31/24.   Requested a CB with any questions or concerns.

## 2024-04-29 NOTE — TELEPHONE ENCOUNTER
Pt reports he went to [facility] and received a Hep B vaccine, [facility] told him he would need 2nd shot in 30days around May 30th, pt would like to know if this is going to be an issue with his upcoming procedure on 5/31/24 for AVF placement

## 2024-04-29 NOTE — TELEPHONE ENCOUNTER
Patient called to speak with jefferson Santana , would like to speak to her , did not say why , just would like to speak to her again.

## 2024-04-29 NOTE — TELEPHONE ENCOUNTER
Pt called to speak to Kristi. I attemped to transfer him, but call disconnected before I could transfer. Please return pt's call.

## 2024-04-29 NOTE — TELEPHONE ENCOUNTER
Rec call back from Jordy and we went over the dates of first Hep B shot 4/27, surgery is 5/16, and 2nd Hep B will be after 5/31. Advised him he is fine w/those dates.

## 2024-05-10 ENCOUNTER — TELEPHONE (OUTPATIENT)
Dept: VASCULAR SURGERY | Facility: CLINIC | Age: 54
End: 2024-05-10

## 2024-05-10 NOTE — TELEPHONE ENCOUNTER
----- Message from Adan Ramirez sent at 5/10/2024  7:36 AM EDT -----  Regarding: RE: RYANNE PEARL  @ 05:30 P/U  ----- Message -----  From: Zulay Jorgensen  Sent: 2024   7:00 AM EDT  To: [facility] Surgery Coordinator; #  Subject: RYANNE                                             Patients Name: Jordy Yaw  : 1970  Phone Number: [phone]  Appointment Date: 24  Appointment time: 8am - needs to be there for 6:30am   Address: [address] [place] [id number]  Drop off Facility/Office Name: [facility]   Drop off  Address: [address] [place] [id number]  [employer]: [id number]  Special notes/directions for   Note for scheduling team

## 2024-05-13 DIAGNOSIS — N17.9 AKI (ACUTE KIDNEY INJURY) (HCC): Primary | ICD-10-CM

## 2024-05-13 NOTE — PRE-PROCEDURE INSTRUCTIONS
Pre-Surgery Instructions:   Medication Instructions    amLODIPine (NORVASC) 5 mg tablet Take night before surgery    ezetimibe (ZETIA) 10 mg tablet Take night before surgery    metoprolol succinate (TOPROL-XL) 50 mg 24 hr tablet Take day of surgery.    paliperidone (INVEGA) 6 MG 24 hr tablet Take night before surgery    polyethylene glycol (MIRALAX) 17 g packet Hold day of surgery.    rosuvastatin (CRESTOR) 10 MG tablet Take night before surgery    senna-docusate sodium (SENOKOT S) 8.6-50 mg per tablet Hold day of surgery.    sevelamer (RENAGEL) 800 mg tablet Take night before surgery    Medication instructions for day surgery reviewed. Please use only a sip of water to take your instructed medications. Avoid all over the counter vitamins, supplements and NSAIDS for one week prior to surgery per anesthesia guidelines. Tylenol is ok to take as needed.     You will receive a call one business day prior to surgery with an arrival time and hospital directions. If your surgery is scheduled on a Monday, the hospital will be calling you on the Friday prior to your surgery. If you have not heard from anyone by 8pm, please call the hospital supervisor through the hospital  at [phone]. ([facility] [phone] or [facility] [phone]).    Do not eat or drink anything after midnight the night before your surgery, including candy, mints, lifesavers, or chewing gum. Do not drink alcohol 24hrs before your surgery. Try not to smoke at least 24hrs before your surgery.       Follow the pre surgery showering instructions as listed in the “My Surgical Experience Booklet” or otherwise provided by your surgeon's office. Do not use a blade to shave the surgical area 1 week before surgery. It is okay to use a clean electric clippers up to 24 hours before surgery. Do not apply any lotions, creams, including makeup, cologne, deodorant, or perfumes after showering on the day of your surgery. Do not use dry shampoo, hair spray,  hair gel, or any type of hair products.     No contact lenses, eye make-up, or artificial eyelashes. Remove nail polish, including gel polish, and any artificial, gel, or acrylic nails if possible. Remove all jewelry including rings and body piercing jewelry.     Wear causal clothing that is easy to take on and off. Consider your type of surgery.    Keep any valuables, jewelry, piercings at home. Please bring any specially ordered equipment (sling, braces) if indicated.    Arrange for a responsible person to drive you to and from the hospital on the day of your surgery. Please confirm the visitor policy for the day of your procedure when you receive your phone call with an arrival time.     Call the surgeon's office with any new illnesses, exposures, or additional questions prior to surgery.    Please reference your “My Surgical Experience Booklet” for additional information to prepare for your upcoming surgery.

## 2024-05-15 ENCOUNTER — ANESTHESIA EVENT (OUTPATIENT)
Dept: PERIOP | Facility: HOSPITAL | Age: 54
End: 2024-05-15
Payer: MEDICARE

## 2024-05-15 NOTE — ANESTHESIA PREPROCEDURE EVALUATION
Procedure:  left arm AVF creation, possible AV graft (Left: Arm Upper)    Relevant Problems   CARDIO   (+) Bleeding hemorrhoids   (+) Familial hypercholesterolemia      GI/HEPATIC   (+) Bleeding hemorrhoids   (+) Gastroesophageal reflux disease with esophagitis without hemorrhage   (+) Rectal bleeding      /RENAL   (+) Benign hypertension with chronic kidney disease, stage V ([facility])   (+) CKD (chronic kidney disease) stage 5, GFR less than 15 ml/min ([facility])   (+) Chronic kidney disease-mineral and bone disorder   (+) ESRD (end stage renal disease) on dialysis ([facility])   (+) Renal cyst      HEMATOLOGY   (+) Anemia due to stage 5 chronic kidney disease, not on chronic dialysis  ([facility])      NEURO/PSYCH   (+) Schizophrenia ([facility])      Behavioral Health   (+) Tobacco dependence   Last dialyzed yesterday. Potassium pending     Physical Exam    Airway    Mallampati score: II  TM Distance: >3 FB  Neck ROM: full     Dental   Comment: Denies loose teeth     Cardiovascular  Cardiovascular exam normal    Pulmonary  Pulmonary exam normal     Other Findings  Portions of exam deferred due to low yield and/or unknown COVID status      Anesthesia Plan  ASA Score- 3     Anesthesia Type- general with ASA Monitors.         Additional Monitors:     Airway Plan: LMA.           Plan Factors-Exercise tolerance (METS): >4 METS.    Chart reviewed.   Existing labs reviewed. Patient summary reviewed.    Patient is a current smoker.      Obstructive sleep apnea risk education given perioperatively.        Induction- intravenous.    Postoperative Plan-         Informed Consent- Anesthetic plan and risks discussed with patient.  I personally reviewed this patient with the CRNA. Discussed and agreed on the Anesthesia Plan with the CRNA..

## 2024-05-16 ENCOUNTER — ANESTHESIA (OUTPATIENT)
Dept: PERIOP | Facility: HOSPITAL | Age: 54
End: 2024-05-16
Payer: MEDICARE

## 2024-05-16 ENCOUNTER — HOSPITAL ENCOUNTER (OUTPATIENT)
Facility: HOSPITAL | Age: 54
Setting detail: OUTPATIENT SURGERY
Discharge: HOME | End: 2024-05-16
Attending: SURGERY | Admitting: SURGERY
Payer: MEDICARE

## 2024-05-16 VITALS
DIASTOLIC BLOOD PRESSURE: 86 MMHG | SYSTOLIC BLOOD PRESSURE: 168 MMHG | OXYGEN SATURATION: 95 % | BODY MASS INDEX: 28.27 KG/M2 | HEIGHT: 68 IN | WEIGHT: 186.51 LBS | HEART RATE: 62 BPM | RESPIRATION RATE: 18 BRPM | TEMPERATURE: 97.6 F

## 2024-05-16 DIAGNOSIS — N17.9 AKI (ACUTE KIDNEY INJURY) (HCC): ICD-10-CM

## 2024-05-16 DIAGNOSIS — N18.6 ESRD (END STAGE RENAL DISEASE) ON DIALYSIS (HCC): Primary | ICD-10-CM

## 2024-05-16 DIAGNOSIS — Z99.2 ESRD (END STAGE RENAL DISEASE) ON DIALYSIS (HCC): Primary | ICD-10-CM

## 2024-05-16 LAB — POTASSIUM SERPL-SCNC: 4.3 MMOL/L (ref 3.5–5.3)

## 2024-05-16 PROCEDURE — 36821 AV FUSION DIRECT ANY SITE: CPT | Performed by: SURGERY

## 2024-05-16 PROCEDURE — NC001 PR NO CHARGE: Performed by: SURGERY

## 2024-05-16 PROCEDURE — 36821 AV FUSION DIRECT ANY SITE: CPT | Performed by: PHYSICIAN ASSISTANT

## 2024-05-16 PROCEDURE — 84132 ASSAY OF SERUM POTASSIUM: CPT | Performed by: ANESTHESIOLOGY

## 2024-05-16 RX ORDER — SODIUM CHLORIDE 9 MG/ML
25 INJECTION, SOLUTION INTRAVENOUS CONTINUOUS
Status: DISCONTINUED | OUTPATIENT
Start: 2024-05-16 | End: 2024-05-16 | Stop reason: HOSPADM

## 2024-05-16 RX ORDER — FENTANYL CITRATE/PF 50 MCG/ML
25 SYRINGE (ML) INJECTION
Status: DISCONTINUED | OUTPATIENT
Start: 2024-05-16 | End: 2024-05-16 | Stop reason: HOSPADM

## 2024-05-16 RX ORDER — ONDANSETRON 2 MG/ML
INJECTION INTRAMUSCULAR; INTRAVENOUS AS NEEDED
Status: DISCONTINUED | OUTPATIENT
Start: 2024-05-16 | End: 2024-05-16

## 2024-05-16 RX ORDER — SODIUM CHLORIDE 9 MG/ML
INJECTION, SOLUTION INTRAVENOUS AS NEEDED
Status: DISCONTINUED | OUTPATIENT
Start: 2024-05-16 | End: 2024-05-16 | Stop reason: HOSPADM

## 2024-05-16 RX ORDER — LIDOCAINE HYDROCHLORIDE 10 MG/ML
INJECTION, SOLUTION EPIDURAL; INFILTRATION; INTRACAUDAL; PERINEURAL AS NEEDED
Status: DISCONTINUED | OUTPATIENT
Start: 2024-05-16 | End: 2024-05-16

## 2024-05-16 RX ORDER — CHLORHEXIDINE GLUCONATE ORAL RINSE 1.2 MG/ML
15 SOLUTION DENTAL ONCE
Status: DISCONTINUED | OUTPATIENT
Start: 2024-05-16 | End: 2024-05-16 | Stop reason: HOSPADM

## 2024-05-16 RX ORDER — HYDROCODONE BITARTRATE AND ACETAMINOPHEN 5; 325 MG/1; MG/1
1 TABLET ORAL EVERY 6 HOURS PRN
Qty: 20 TABLET | Refills: 0 | Status: SHIPPED | OUTPATIENT
Start: 2024-05-16 | End: 2024-05-26

## 2024-05-16 RX ORDER — CEFAZOLIN SODIUM 2 G/50ML
2000 SOLUTION INTRAVENOUS ONCE
Status: COMPLETED | OUTPATIENT
Start: 2024-05-16 | End: 2024-05-16

## 2024-05-16 RX ORDER — ONDANSETRON 2 MG/ML
4 INJECTION INTRAMUSCULAR; INTRAVENOUS ONCE AS NEEDED
Status: DISCONTINUED | OUTPATIENT
Start: 2024-05-16 | End: 2024-05-16 | Stop reason: HOSPADM

## 2024-05-16 RX ORDER — FENTANYL CITRATE 50 UG/ML
INJECTION, SOLUTION INTRAMUSCULAR; INTRAVENOUS AS NEEDED
Status: DISCONTINUED | OUTPATIENT
Start: 2024-05-16 | End: 2024-05-16

## 2024-05-16 RX ORDER — PROPOFOL 10 MG/ML
INJECTION, EMULSION INTRAVENOUS AS NEEDED
Status: DISCONTINUED | OUTPATIENT
Start: 2024-05-16 | End: 2024-05-16

## 2024-05-16 RX ADMIN — SODIUM CHLORIDE: 0.9 INJECTION, SOLUTION INTRAVENOUS at 07:45

## 2024-05-16 RX ADMIN — FENTANYL CITRATE 100 MCG: 50 INJECTION, SOLUTION INTRAMUSCULAR; INTRAVENOUS at 08:53

## 2024-05-16 RX ADMIN — FENTANYL CITRATE 50 MCG: 50 INJECTION, SOLUTION INTRAMUSCULAR; INTRAVENOUS at 08:10

## 2024-05-16 RX ADMIN — PROPOFOL 100 MG: 10 INJECTION, EMULSION INTRAVENOUS at 08:15

## 2024-05-16 RX ADMIN — ONDANSETRON 4 MG: 2 INJECTION INTRAMUSCULAR; INTRAVENOUS at 09:11

## 2024-05-16 RX ADMIN — CEFAZOLIN SODIUM 2000 MG: 2 SOLUTION INTRAVENOUS at 07:37

## 2024-05-16 RX ADMIN — LIDOCAINE HYDROCHLORIDE 50 MG: 10 INJECTION, SOLUTION EPIDURAL; INFILTRATION; INTRACAUDAL; PERINEURAL at 07:38

## 2024-05-16 RX ADMIN — FENTANYL CITRATE 50 MCG: 50 INJECTION, SOLUTION INTRAMUSCULAR; INTRAVENOUS at 07:38

## 2024-05-16 RX ADMIN — PROPOFOL 150 MG: 10 INJECTION, EMULSION INTRAVENOUS at 07:38

## 2024-05-16 NOTE — NURSING NOTE
0955: Patient returned from PACU alert and oriented times 4. Patient reports 8/10 pain, but is declining pain medication. Per pt, will take pain medication at home. Site is covered by exofin glue and is clean, dry, intact. Positive bruit and thrill, radial pulse w/ doppler    1214: Site remains clean, dry, intact. Per pt, pain decreasing to 5/10; pt continues to refuse pain medication. Discharge instructions reviewed with pt and pt expressed understanding. Per pt, no questions or concerns at this time. Advised to contact Dr. Mckeon's office with any questions or concerns once home.  Positive bruit and thrill, radial pulse w/ doppler. Patient taken to ride via wheelchair. Patient ambulated from wheelchair to passenger seat w/o difficulty,

## 2024-05-16 NOTE — OP NOTE
OPERATIVE REPORT  PATIENT NAME: Jordy Tidwell    :  1970  MRN: [id number]  Pt Location: [facility] OR ROOM 02    SURGERY DATE: 2024    Surgeons and Role:     * Ward Guthrie,  - Primary     * Garry Martinez PA-C - Assisting there is no qualified available  [facility] resident for the procedure he was required for assistance with surgical exposure and closure    Preop Diagnosis:  ESRD (end stage renal disease) (HCC) [N18.6]    Post-Op Diagnosis Codes:     * ESRD (end stage renal disease) (HCC) [N18.6]    Procedure(s):  Left - left arm brachiocephalic AVF creation    Specimen(s):  * No specimens in log *    Estimated Blood Loss:   Minimal    Drains:  * No LDAs found *    Anesthesia Type:   General/LMA plus local anesthetic    Operative Indications:  ESRD (end stage renal disease) (HCC) [N18.6]  54-year-old male on hemodialysis.  I created a left radiocephalic AV fistula for him 3 months ago which failed to mature I subsequently recommended a brachiocephalic AV fistula creation and after discussing all risks benefits and alternative therapies with him he consented to proceed.      Operative Findings:  Adequate caliber basilic and cephalic vein in the upper arm after creation of the fistula was a good thrill in the cephalic fistula with a multiphasic left radial Doppler signal.        Complications:   None    Procedure and Technique:  Informed consent was obtained from the appropriate party the patient was correctly identified in the holding area brought to the operating room placed in the supine position appropriate lines and monitors were placed after satisfactory induction of general LMA anesthesia the left arm was prepped and draped in the normal sterile fashion a preoperative ultrasound was performed with the above-stated findings.  I elected to proceed with a brachiocephalic fistula.  A transverse incision was made just distal to the elbow crease using a 15 blade dissection was carried through the  subtendinous tissue the cephalic vein was identified it was circumferentially dissected out and noted to be an adequate caliber vein.  Dissection was then carried onto the bicipital aponeurosis which was then divided transversely and the brachial artery was identified and circumferentially controlled this was greater than 2 mm was soft and had a good pulse.  The cephalic vein was clipped distally clamp proximally divided with Pedro scissors near the clips it was then slightly distended using a fine hemostat and generously flushed using heparinized saline the brachial artery was then clamped proximally and distally using vascular clamps a longitudinal arteriotomy was made with an 11 blade and extended with Pedro scissors the vein did not require spatulation and then a standard end-to-side anastomosis was then fashioned using a running 6-0 Prolene prior to completing the suture line the vessels were antegrade and retrograde flushed the suture line was completed the clamps were released there was then noted to be a good thrill in the cephalic vein and the anastomosis was hemostatic I did clip an additional branch on the cephalic vein laterally there is also the confluence with the basilic vein which required ligation of the basilic vein at this point there was a good thrill going up for a few centimeters with Doppler in the cephalic vein the wound was irrigated some local anesthetic was instilled in a field block and then using a 2 layer closure for Vicryl for the soft tissue and 4-0 Monocryl for the skin in subcuticular fashion the incision was then closed and Dermabond was applied for dressing.  Patient tolerated the procedure well.         I was present for the entire procedure.    Patient Disposition:  PACU       Vascular Quality Initiative - Hemodialysis Access Placement    Pre-admission Information   Functional status: Fully active; able to carry on all predisease activities without restriction.     ESRD: ESRD:  Hemodialysis dependent.  Current Access Type.: Tunneled Catheter    Historical Information      Previous Access: left   Access Type/Location: ACCESS TYPE: Surgical AVF  Access Location: Forearm Cephalic.         Procedure Information      Status: Outpatient     Side:left      Anesthesia: General     Access Type: Access Type: Surgical AVF Inflow Artery is: Brachial, Antecubital Intraoperative Artery taget diameter is: 2 mm.  Outflow Vein is: Cephalic, Upper Arm.  Intraoperative Vein target diameter is: 4mm  Anastomosis configuration is: End to Side.  Cocomitant Procedure performed-: None    Completion Fistulogram: no     Preop ARTERIAL evaluation and/or treatment: duplex    Preop VENOUS evaluation and/or treatment: ultrasound mapping    *Obtain Target Diameters from study          Post op Information     Discharge Status: Home    Post op Complications: None        SIGNATURE: Ward Guthrie DO  DATE: May 16, 2024  TIME: 9:05 AM

## 2024-05-16 NOTE — ANESTHESIA POSTPROCEDURE EVALUATION
Post-Op Assessment Note    CV Status:  Stable  Pain Score: 0    Pain management: adequate       Mental Status:  Sleepy and arousable   Hydration Status:  Stable   PONV Controlled:  None   Airway Patency:  Patent     Post Op Vitals Reviewed: Yes    No anethesia notable event occurred.    Staff: CRNA               BP   158/80   Temp      Pulse  78   Resp   20   SpO2   98

## 2024-05-16 NOTE — DISCHARGE INSTR - AVS FIRST PAGE
DISCHARGE INSTRUCTIONS  DIALYSIS FISTULA SURGERY    ACTIVITY:  Limit use of the operated arm to what is necessary for the first day after surgery. On the second day after surgery, you may start to increase use of your arm as tolerated.  Avoid heavy lifting (no more than 15 lbs) for the first one week.  You should start to exercise your hand on the side of the fistula by squeezing a stress ball or a rolled-up sock. This increases blood flow in your fistula and arm so your fistula will function better.    Feel for a thrill every day. The thrill is the vibration or pulse you feel over the fistula that means the blood is flowing through it. If you cannot feel a thrill, call our office ([phone]).    DIET:   Resume your normal diet.  Good nutrition is important for healing of your incision.    DRESSING:   You may have surgical glue at your surgical site.  There are stitches present under the skin which will absorb on their own.  The glue is used to cover the incision, assist in closure, and prevent contamination. This adhesive will darken and peel away on its own within one to two weeks. Do not pick at it.  If you have a dressing over your surgical site, remove this on the second day after surgery.      INCISION:   If you do not have a dialysis catheter in place, you may shower and get your incision wet.  Wash incision daily with soap and water, but do not rub or scrub the incision; rinse thoroughly and pat dry.  You may have stitches or staples to close your incision and it is okay for these to get wet.  Do not bathe in a tub or swim for the first 4 week following surgery or if you have any open wounds.  It is normal to have mild swelling or discoloration around the incision.   If increasing redness or pain develops, call our office immediately.  Numbness in the region of the incision may occur following the surgery.  This normally improves over six to twelve months.  If you have numbness or pain in your hand,  please call our office immediately.  DO NOT put any powders, creams, ointments, or lotions on your incision.     ARM SWELLING:    Most patients have some noticeable arm swelling after surgery.  This usually disappears within a few weeks.  If swelling is present, elevate the arm whenever possible.      RESTRICTIONS:   Do NOT have blood draws, IV's, or blood pressures performed on the operated arm.    FISTULA USE:    Your fistula will not be used until it has fully matured - approximately 6 to 12 weeks. If you are using a catheter for dialysis, this will not be removed until after your fistula has matured and is being used for dialysis without any issues.    FOLLOW UP STUDIES:  A Doppler ultrasound will be performed about 5-6 weeks after surgery.  Your surgeon will arrange this at your first postoperative visit.     FOLLOW UP APPOINTMENTS:  Making and keeping follow up appointments and ultrasound tests are important to your recovery.  If you have difficulty making it to or keeping your follow up appointments, call the office.    If you have increased pain, fever >101.5, increased drainage, redness or a bad smell at your surgery site, new coldness/numbness of your arm or leg, please call us immediately and GO directly to the ER.    PLEASE CALL THE OFFICE IF YOU HAVE ANY QUESTIONS  [phone]  [phone] [address], [address], [place] [address]  [address] [facility], [address], [place] [address]  [address] [place] [address]  [address] [facility], [address] [address]  [address], [place] [address]  [address] [facility], [address]nd [address], [address], [place] [address]  [address] [facility], [address], [place] [address]  [address] [facility], 1st Floor, [address], [place] [address]  [address], [place] [address]  [address] [address]

## 2024-05-16 NOTE — H&P
"H&P Exam - Vascular Surgery   Jordy Tidwell 54 y.o. male MRN: [id number]  Unit/Bed#: OR POOL Encounter: [id number]    Assessment & Plan     Assessment:  ESRD  Plan:  Left arm access creation    History of Present Illness   History, ROS and PFSH reviewed.  HPI:  Jordy Tidwell is a 54 y.o. male who presents for procedure.    Review of Systems    Historical Information   Past Medical History:   Diagnosis Date    Chronic kidney disease     M<T<R<F [id number] Dialysis    Hypertension     Mood disorder (HCC)     Psychiatric disorder     Schizophrenia (HCC)      Past Surgical History:   Procedure Laterality Date    COLONOSCOPY      IR BIOPSY KIDNEY RANDOM  08/15/2023    IR TUNNELED DIALYSIS CATHETER PLACEMENT  2/22/2024    NO PAST SURGERIES      HI ARTERIOVENOUS ANASTOMOSIS OPEN DIRECT Left 3/21/2024    Procedure: left arm radio-cephalic AVF creation;  Surgeon: Ward Guthrie DO;  Location: [facility];  Service: Vascular     Social History   Social History     Substance and Sexual Activity   Alcohol Use Not Currently    Comment: on dialysis     Social History     Substance and Sexual Activity   Drug Use No     Social History     Tobacco Use   Smoking Status Every Day    Types: Cigars    Passive exposure: Never   Smokeless Tobacco Never   Tobacco Comments    daily     E-Cigarette/Vaping    E-Cigarette Use Never User      E-Cigarette/Vaping Substances    Nicotine No     THC No     CBD No     Flavoring No     Other No     Unknown No      Family History: non-contributory    Meds/Allergies   all current active meds have been reviewed  Allergies   Allergen Reactions    Geodon [Ziprasidone] Fatigue    Haldol [Haloperidol] Fatigue       Objective   Vitals: Blood pressure 162/93, pulse 68, temperature (!) 97.1 °F (36.2 °C), temperature source Temporal, resp. rate 18, height 5' 8\" (1.727 m), weight 84.6 kg (186 lb 8.2 oz), SpO2 99%.,Body mass index is 28.36 kg/m².  No intake or output data in the 24 hours ending 05/16/24 " 0714  Invasive Devices       Peripheral Intravenous Line  Duration             Peripheral IV 05/16/24 Distal;Right;Ventral (anterior) Forearm <1 day              Hemodialysis Catheter  Duration             HD Permanent Double Catheter 83 days                    Physical Exam  Alert and oriented  Rhythm regular  Breathing unlabored  Ab soft, nt  LE warm    Lab Results: I have personally reviewed pertinent reports.    Imaging: I have personally reviewed pertinent reports.    EKG, Pathology, and Other Studies: I have personally reviewed pertinent reports.      Code Status: Prior  Advance Directive and Living Will:      Power of :    POLST:      Counseling / Coordination of Care  None

## 2024-05-20 ENCOUNTER — NURSE TRIAGE (OUTPATIENT)
Age: 54
End: 2024-05-20

## 2024-05-20 ENCOUNTER — TELEPHONE (OUTPATIENT)
Dept: VASCULAR SURGERY | Facility: CLINIC | Age: 54
End: 2024-05-20

## 2024-05-20 NOTE — TELEPHONE ENCOUNTER
Reviewed. No acute concerns at this time. Recommend arm elevation to assist with swelling and hand exercises. We will further evaluate at his next OV on 5/31. If he develops any new or worsening symptom, he should notify the office.

## 2024-05-20 NOTE — TELEPHONE ENCOUNTER
Vascular Nurse Navigator Post Op Phone Call    Post-Discharge phone call attempted to assess patient recovery after vascular surgery I left a message on answering machine. Will attempt to contact at later date.        Pt's chart was reviewed prior to call and leaving message.    Procedure: Left - left arm brachiocephalic AVF creation     Date of Procedure: 5/16/24    Surgeon:    * Ward Guthrie, DO - Primary     * Garry Martinez PA-C - Assisting        Statin pt was discharged on post op?:  Rosuvastatin (Crestor)    Dialysis Days and Location:  MWF at [facility]    Reminded pt of the following in message:    NEXT SCHEDULED OFFICE VISIT:  5/31/24 at 8 am with Dr. Guthrie at [facility]    To contact [facility] with any concerns.

## 2024-05-20 NOTE — TELEPHONE ENCOUNTER
"Pt is s/p left arm brachiocephalic AVF creation (Left: Arm Upper) with Dr. Guthrie on 5/16/2024. Received call from pt stating starting two days ago, he started with pins and needles / a numbness/tingling sensation in his left arm when using his arm to open a pill bottle / using his arm. Pt states once he stops using his arm/hand, the pins and needles sensation goes away. He states he does have some swelling at the incision site that travels down to his hand, stating his left palm is a little more swollen than his R. He states he can feel a thrill and can make a fist with his hand. Denies issues with the incision site, denies redness, drainage, fever, chills. Denies any color changes/temp changes to his hand, chest pain, sob.     Reason for Disposition   Numbness (i.e., loss of sensation) in hand or fingers    Answer Assessment - Initial Assessment Questions  1. ONSET: \"When did the pain start?\"      When he goes to open a bottle of pills, has pins and needles. Also has swelling in the forearm that travels down to his hand. Pt states his L palm is a little more swollen than the R.  2. LOCATION: \"Where is the pain located?\"      2 days ago  3. PAIN: \"How bad is the pain?\" (Scale 1-10; or mild, moderate, severe)    - MILD (1-3): doesn't interfere with normal activities    - MODERATE (4-7): interferes with normal activities (e.g., work or school) or awakens from sleep    - SEVERE (8-10): excruciating pain, unable to do any normal activities, unable to hold a cup of water      Pain/discomfort pins and needles sensation  when using the arm. Denies having this sensation at rest.  6. OTHER SYMPTOMS: \"Do you have any other symptoms?\" (e.g., neck pain, swelling, rash, fever, numbness, weakness)      Can make a fist with the hand. Denies issues with the incision site, Denies redness, drainage, fever, chills. Denies any color changes, temp changes, chest pain, sob. Pt states can feel the thrill.    Protocols used: Arm " Pain-ADULT-[place]

## 2024-05-20 NOTE — TELEPHONE ENCOUNTER
Vascular Nurse Navigator Post Op Call    Procedure: Left - left arm brachiocephalic AVF creation      Date of Procedure: 5/16/24     Surgeon:    * Ward Guthrie DO - Primary     * Garry Martinez PA-C - Assisting      Painful tingling or numbness in your fingers?: No    Paleness/Coolness in hands/fingers?: No    Redness, swelling or pus from your wound?: No    Bleeding?: No    Thrill present?: Yes    Statin pt was discharged on post op?:  Rosuvastatin (Crestor)    Fever/chills?: No    Uncontrolled Pain?: No      Reviewed discharge instructions and incision care with patient.      Dialysis Days and Location:  MWF at [facility]     NEXT SCHEDULED OFFICE VISIT:  5/31/24 at 8 am with Dr. Guthrie at [facility] Confirmed?: Yes      Any Questions or Concerns?    Patient stated that he is doing good since procedure.  He stated that he has numbness and tingling in his fingers when he is doing a task, such as opening a pill bottle, and then this goes away when he stops (please see telephone note from Karla on 5/20/24 for further information and recommendations).  He stated that his arm is swollen.  Advised him to elevate his arm when he is able to.  Reviewed incision care with him - wash daily with soap and water.  All questions answered.  No concerns expressed at this time.

## 2024-05-20 NOTE — TELEPHONE ENCOUNTER
Patient returned call.  Informed him of recommendations from Merlyn Fernandez PA-C.  Advised him to elevate arm and do hand exercises.  All questions answered.  Reviewed when to contact office.

## 2024-05-23 DIAGNOSIS — E78.2 MIXED HYPERLIPIDEMIA: ICD-10-CM

## 2024-05-23 DIAGNOSIS — I10 ESSENTIAL HYPERTENSION: ICD-10-CM

## 2024-05-23 RX ORDER — AMLODIPINE BESYLATE 5 MG/1
TABLET ORAL
Qty: 90 TABLET | Refills: 1 | Status: SHIPPED | OUTPATIENT
Start: 2024-05-23

## 2024-05-24 RX ORDER — EZETIMIBE 10 MG/1
TABLET ORAL
Qty: 90 TABLET | Refills: 1 | Status: SHIPPED | OUTPATIENT
Start: 2024-05-24

## 2024-05-29 DIAGNOSIS — E78.01 FAMILIAL HYPERCHOLESTEROLEMIA: ICD-10-CM

## 2024-05-29 RX ORDER — ROSUVASTATIN CALCIUM 10 MG/1
10 TABLET, COATED ORAL DAILY
Qty: 90 TABLET | Refills: 1 | Status: SHIPPED | OUTPATIENT
Start: 2024-05-29

## 2024-05-31 ENCOUNTER — OFFICE VISIT (OUTPATIENT)
Dept: VASCULAR SURGERY | Facility: CLINIC | Age: 54
End: 2024-05-31

## 2024-05-31 VITALS
HEIGHT: 68 IN | RESPIRATION RATE: 20 BRPM | DIASTOLIC BLOOD PRESSURE: 82 MMHG | SYSTOLIC BLOOD PRESSURE: 158 MMHG | HEART RATE: 78 BPM | WEIGHT: 184.5 LBS | BODY MASS INDEX: 27.96 KG/M2 | OXYGEN SATURATION: 95 %

## 2024-05-31 DIAGNOSIS — Z99.2 ESRD (END STAGE RENAL DISEASE) ON DIALYSIS (HCC): Primary | ICD-10-CM

## 2024-05-31 DIAGNOSIS — N18.6 ESRD (END STAGE RENAL DISEASE) ON DIALYSIS (HCC): Primary | ICD-10-CM

## 2024-05-31 PROCEDURE — 99024 POSTOP FOLLOW-UP VISIT: CPT | Performed by: SURGERY

## 2024-05-31 NOTE — ASSESSMENT & PLAN NOTE
Lab Results   Component Value Date    EGFR 12 (L) 03/06/2024    EGFR 7 02/26/2024    EGFR 9 02/25/2024    CREATININE 5.36 (H) 03/06/2024    CREATININE 7.56 (H) 02/26/2024    CREATININE 6.22 (H) 02/25/2024     54-year-old gentleman presents for follow-up after AV fistula creation.  He initially had a left radiocephalic fistula created by myself several months ago which failed to mature.  He then had a left brachiocephalic fistula created by myself 2 weeks ago.  He is currently on dialysis via a PermCath.  Today his fistula has an excellent thrill 2 weeks postop he denies any left hand pain he has a palpable left radial pulse distally his incision is healing well.  I discussed with him that we would plan for a duplex to assess maturation in 4 weeks and then he can follow-up with me in the office in 1 month.  Hopefully at that time the fistula can then be used for access.

## 2024-05-31 NOTE — PROGRESS NOTES
Assessment/Plan:      There are no diagnoses linked to this encounter.    ESRD (end stage renal disease) on dialysis ([facility])  Lab Results   Component Value Date    EGFR 12 (L) 03/06/2024    EGFR 7 02/26/2024    EGFR 9 02/25/2024    CREATININE 5.36 (H) 03/06/2024    CREATININE 7.56 (H) 02/26/2024    CREATININE 6.22 (H) 02/25/2024     54-year-old gentleman presents for follow-up after AV fistula creation.  He initially had a left radiocephalic fistula created by myself several months ago which failed to mature.  He then had a left brachiocephalic fistula created by myself 2 weeks ago.  He is currently on dialysis via a PermCath.  Today his fistula has an excellent thrill 2 weeks postop he denies any left hand pain he has a palpable left radial pulse distally his incision is healing well.  I discussed with him that we would plan for a duplex to assess maturation in 4 weeks and then he can follow-up with me in the office in 1 month.  Hopefully at that time the fistula can then be used for access.        Subjective:     Patient ID: Jordy Tidwell is a 54 y.o. male.    Patient is s/p Lt brachiocephalic AVF creation on 5/16/24 by Dr. Guthrie. Pt denies LUE pain or loss of motion. Pt c/o slight tingling and numbness in the LUE that is getting better. Pt is on HD MWF. There is a thrill and bruit. Pt smokes 1 ppd.    HPI    Review of Systems   Constitutional: Negative.    HENT: Negative.     Eyes: Negative.    Respiratory: Negative.     Cardiovascular: Negative.    Gastrointestinal: Negative.    Endocrine: Negative.    Genitourinary: Negative.    Musculoskeletal: Negative.    Skin: Negative.    Allergic/Immunologic: Negative.    Neurological:  Positive for numbness (Lt hand).   Hematological: Negative.    Psychiatric/Behavioral: Negative.         I have reviewed the ROS above and made changes as needed.      Objective:     Physical Exam      General  Exam: alert, awake, oriented, no distress, consistent with stated  age    Integumentary  Exam: warm, dry, no gross lesions, no bruises and normal color    Head and Neck  Exam: supple, no bruits, trachea midline, no JVD, no mass or palpable nodes    Chest and Lung  Exam: chest normal without deformity, bilaterally expansive, clear to auscultation    Cardiovascular  Exam: regular rate, regular rhythm, no murmurs, no rubs or gallops    Adbomen  Exam: soft, non-tender, non-distended, no pulsatile abdominal masses, no abdominal bruit    Peripheral Vascular  Exam: no clubbing of the digits of the upper extremity, no cyanosis, no edema, both feet are warm, radial pulses 2+ bilaterally, skin well perfused, without and no varicosities.    No widened popliteal pulse noted bilaterally    Upper Extremity:  Palpation: Radial pulse- Bilateral 2+    Thrill present in cephalic fistula  Palpable left radial pulse  AC incision c/d/i  Normal LUE  strength    Neurologic  Exam:alert, non-focal, oriented x 3, cranial nerves II-XII grossly intact

## 2024-07-02 ENCOUNTER — HOSPITAL ENCOUNTER (OUTPATIENT)
Dept: RADIOLOGY | Facility: HOSPITAL | Age: 54
Discharge: HOME | End: 2024-07-02
Attending: SURGERY
Payer: MEDICARE

## 2024-07-02 DIAGNOSIS — Z99.2 ESRD (END STAGE RENAL DISEASE) ON DIALYSIS (HCC): ICD-10-CM

## 2024-07-02 DIAGNOSIS — N18.6 ESRD (END STAGE RENAL DISEASE) ON DIALYSIS (HCC): ICD-10-CM

## 2024-07-02 PROCEDURE — 93990 DOPPLER FLOW TESTING: CPT | Performed by: SURGERY

## 2024-07-02 PROCEDURE — 93990 DOPPLER FLOW TESTING: CPT

## 2024-07-03 ENCOUNTER — TELEPHONE (OUTPATIENT)
Dept: VASCULAR SURGERY | Facility: CLINIC | Age: 54
End: 2024-07-03

## 2024-07-03 ENCOUNTER — TELEPHONE (OUTPATIENT)
Age: 54
End: 2024-07-03

## 2024-07-03 NOTE — TELEPHONE ENCOUNTER
Talked with [facility] nurse.  Advised that fistula is ready for use per Dr. Guthrie.  Documentation faxed to [facility].

## 2024-07-03 NOTE — TELEPHONE ENCOUNTER
----- Message from Ward Guthrie DO sent at 7/3/2024  8:18 AM EDT -----  Hello,    Please let this patients Nephrologist and HD unit know his AVF can be used for access.  He does not need an OV with me.

## 2024-07-03 NOTE — TELEPHONE ENCOUNTER
Vascular office called dr lin, fistula can be used for dialysis and no further appts are needed with vas for the pt

## 2024-07-03 NOTE — TELEPHONE ENCOUNTER
Called and s/w Batool at Dr. Costello's office and relayed information. Called [facility] in [place] and s/w Pebbles and relayed information from Dr. Guthrie. Faxed note to [phone] at [facility]

## 2024-07-05 ENCOUNTER — TELEPHONE (OUTPATIENT)
Age: 54
End: 2024-07-05

## 2024-07-05 NOTE — TELEPHONE ENCOUNTER
Patient called because he recieved a notification regarding his post op appt , he cannot come for 2:00pm on 07/26/24 bc of dialysis

## 2024-07-08 NOTE — TELEPHONE ENCOUNTER
Please see separate note from 7/5. Pt cannot come at his r/s time on 7/26 because he is hooked up to dialysis until 3. Pt would like to r/s this appt. Please reach out to him with a new appt. Pt stated he will be busy tomorrow (7/9), a message can be left on his phone and he will get back to us when he can.

## 2024-07-09 NOTE — TELEPHONE ENCOUNTER
Per note from Dr. Guthrie 7/3/2024 found below, he states patient doesn't need to be seen in office. MA-Renetta has already contacted Nephrologist and  axed over required documents to his HD unit.     Spoke with patient and advised Dr. Guthrie does not need to see him in the office and we have contacted his dialysis and Nephrologist regarding AVF access. He denies any symptoms, concerns, or issues at this time. I advised should he have any issues to call us back. Appointment for 7/26 has been canceled. Patient expressed understanding.

## 2024-07-16 ENCOUNTER — OFFICE VISIT (OUTPATIENT)
Dept: GASTROENTEROLOGY | Facility: CLINIC | Age: 54
End: 2024-07-16
Payer: MEDICARE

## 2024-07-16 VITALS
WEIGHT: 177.2 LBS | BODY MASS INDEX: 26.86 KG/M2 | HEART RATE: 76 BPM | DIASTOLIC BLOOD PRESSURE: 78 MMHG | HEIGHT: 68 IN | SYSTOLIC BLOOD PRESSURE: 134 MMHG

## 2024-07-16 DIAGNOSIS — K59.09 OTHER CONSTIPATION: Primary | ICD-10-CM

## 2024-07-16 PROCEDURE — 99214 OFFICE O/P EST MOD 30 MIN: CPT | Performed by: INTERNAL MEDICINE

## 2024-07-16 RX ORDER — NICOTINE 21 MG/24HR
PATCH, TRANSDERMAL 24 HOURS TRANSDERMAL
COMMUNITY
Start: 2024-03-26

## 2024-07-16 NOTE — PROGRESS NOTES
Follow-up Note -  [facility]  Jordy Tidwell 1970 male         ASSESSMENT & PLAN:    Other constipation  Patient was seen in the ED in March because of constipation when he was taking oxycodone and was noted to have partial rectal prolapse or prolapsed internal hemorrhoids.  He has been doing well since then with regular bowel movements.  No evidence of any rectal prolapse on physical exam today.    -High-fiber diet and fiber supplements    -May take MiraLAX and stool softeners if needed    -Advised patient to call if he develops any recurrent symptoms      Reason: Follow-up from the ED    HPI:  Mr. Spence was seen in the ED in March because of partial rectal prolapse versus prolapsed internal hemorrhoids there was noted on the physical exam.  Patient reports having had problems with constipation at that time when he was taking oxycodone.  He was straining too hard and 2 different laxatives at that time.  He took MiraLAX and senna plus for some time but has not been using it for a month.  He reports having regular bowel movements now and denies any straining.  Denies any rectal bleeding.  Good appetite, no recent weight loss.  Denies abdominal pain, nausea or vomiting.    He has history of colon polyps and the last colonoscopy was in August 2021.  He was seen in the office in February because of some fresh bleeding which was thought to be from hemorrhoids but he was recommended for colonoscopy but he canceled.    REVIEW OF SYSTEMS: Review of Systems   Constitutional:  Negative for activity change, appetite change, chills, diaphoresis, fatigue, fever and unexpected weight change.   HENT:  Negative for ear discharge, ear pain, facial swelling, hearing loss, nosebleeds, sore throat, tinnitus and voice change.    Eyes:  Negative for pain, discharge, redness, itching and visual disturbance.   Respiratory:  Negative for apnea, cough, chest tightness, shortness of breath and wheezing.     Cardiovascular:  Negative for chest pain and palpitations.   Gastrointestinal:         As noted in HPI   Endocrine: Negative for cold intolerance, heat intolerance and polyuria.   Genitourinary:  Negative for difficulty urinating, dysuria, flank pain, hematuria and urgency.   Musculoskeletal:  Negative for arthralgias, back pain, gait problem, joint swelling and myalgias.   Skin:  Negative for rash and wound.   Neurological:  Negative for dizziness, tremors, seizures, speech difficulty, light-headedness, numbness and headaches.   Hematological:  Negative for adenopathy. Does not bruise/bleed easily.   Psychiatric/Behavioral:  Negative for agitation, behavioral problems and confusion. The patient is not nervous/anxious.         Past Medical History:   Diagnosis Date    Chronic kidney disease     M<T<R<F [id number] Dialysis    Hypertension     Mood disorder (HCC)     Psychiatric disorder     Schizophrenia (HCC)       Past Surgical History:   Procedure Laterality Date    COLONOSCOPY      IR BIOPSY KIDNEY RANDOM  08/15/2023    IR TUNNELED DIALYSIS CATHETER PLACEMENT  2/22/2024    NO PAST SURGERIES      NV ARTERIOVENOUS ANASTOMOSIS OPEN DIRECT Left 3/21/2024    Procedure: left arm radio-cephalic AVF creation;  Surgeon: Ward Guthrie DO;  Location: [facility] OR;  Service: Vascular    NV ARTERIOVENOUS ANASTOMOSIS OPEN DIRECT Left 5/16/2024    Procedure: left arm brachiocephalic AVF creation;  Surgeon: aWrd Guthrie DO;  Location: [facility] OR;  Service: Vascular     Social History     Socioeconomic History    Marital status: Single     Spouse name: Not on file    Number of children: Not on file    Years of education: Not on file    Highest education level: Not on file   Occupational History    Not on file   Tobacco Use    Smoking status: Every Day     Types: Cigars     Passive exposure: Never    Smokeless tobacco: Never    Tobacco comments:     daily   Vaping Use    Vaping status: Never Used   Substance and Sexual Activity     Alcohol use: Not Currently     Comment: on dialysis    Drug use: No    Sexual activity: Not on file   Other Topics Concern    Not on file   Social History Narrative    Not on file     Social Determinants of Health     Financial Resource Strain: Not on file   Food Insecurity: No Food Insecurity (2/22/2024)    Hunger Vital Sign     Worried About Running Out of Food in the Last Year: Never true     Ran Out of Food in the Last Year: Never true   Transportation Needs: No Transportation Needs (2/22/2024)    PRAPARE - Transportation     Lack of Transportation (Medical): No     Lack of Transportation (Non-Medical): No   Physical Activity: Not on file   Stress: Not on file   Social Connections: Not on file   Intimate Partner Violence: Not on file   Housing Stability: Low Risk  (2/22/2024)    Housing Stability Vital Sign     Unable to Pay for Housing in the Last Year: No     Number of Times Moved in the Last Year: 1     Homeless in the Last Year: No     Family History   Problem Relation Age of Onset    Breast cancer Mother     Cancer Father         bone/spinal     Geodon [ziprasidone] and Haldol [haloperidol]  Current Outpatient Medications   Medication Sig Dispense Refill    ezetimibe (ZETIA) 10 mg tablet TAKE 1 TABLET BY MOUTH EVERY DAY 90 tablet 1    metoprolol succinate (TOPROL-XL) 50 mg 24 hr tablet TAKE 1 TABLET BY MOUTH EVERY DAY 30 tablet 5    nicotine (NICODERM CQ) 14 mg/24hr TD 24 hr patch Place on the skin      paliperidone (INVEGA) 6 MG 24 hr tablet Every other day      rosuvastatin (CRESTOR) 10 MG tablet TAKE 1 TABLET BY MOUTH EVERY DAY 90 tablet 1    senna-docusate sodium (SENOKOT S) 8.6-50 mg per tablet Take 1 tablet by mouth daily (Patient taking differently: Take 1 tablet by mouth daily as needed) 20 tablet 0    sevelamer (RENAGEL) 800 mg tablet Take 1 tablet (800 mg total) by mouth 3 (three) times a day with meals 90 tablet 0    amLODIPine (NORVASC) 5 mg tablet TAKE 1 TABLET BY MOUTH EVERY DAY (Patient  "not taking: Reported on 7/16/2024) 90 tablet 1    polyethylene glycol (MIRALAX) 17 g packet Take 17 g by mouth daily for 7 days 119 g 0     No current facility-administered medications for this visit.       Blood pressure 134/78, pulse 76, height 5' 8\" (1.727 m), weight 80.4 kg (177 lb 3.2 oz).    PHYSICAL EXAM: Physical Exam  Constitutional:       Appearance: He is well-developed.   HENT:      Head: Normocephalic and atraumatic.   Eyes:      General: No scleral icterus.        Right eye: No discharge.         Left eye: No discharge.      Conjunctiva/sclera: Conjunctivae normal.      Pupils: Pupils are equal, round, and reactive to light.   Neck:      Thyroid: No thyromegaly.      Vascular: No JVD.      Trachea: No tracheal deviation.   Cardiovascular:      Rate and Rhythm: Normal rate and regular rhythm.      Heart sounds: Normal heart sounds. No murmur heard.     No friction rub. No gallop.   Pulmonary:      Effort: Pulmonary effort is normal. No respiratory distress.      Breath sounds: Normal breath sounds. No wheezing or rales.   Chest:      Chest wall: No tenderness.   Abdominal:      General: Bowel sounds are normal. There is no distension.      Palpations: Abdomen is soft. There is no mass.      Tenderness: There is no abdominal tenderness. There is no guarding or rebound.      Hernia: No hernia is present.   Genitourinary:     Comments: Rectal exam-normal sphincter tone.  No palpable masses.  Musculoskeletal:      Cervical back: Neck supple.   Lymphadenopathy:      Cervical: No cervical adenopathy.   Skin:     General: Skin is warm and dry.      Findings: No erythema or rash.   Neurological:      Mental Status: He is alert and oriented to person, place, and time.   Psychiatric:         Behavior: Behavior normal.         Thought Content: Thought content normal.          Lab Results   Component Value Date    WBC 7.6 03/06/2024    HGB 10.7 (L) 03/06/2024    HCT 31.8 (L) 03/06/2024    MCV 96 03/06/2024    PLT " 164 03/06/2024     Lab Results   Component Value Date    CALCIUM 9.7 02/26/2024    K 4.3 05/16/2024    CO2 26 03/06/2024    CL 99 03/06/2024    BUN 38 (H) 03/06/2024    CREATININE 5.36 (H) 03/06/2024     Lab Results   Component Value Date    ALT 7 02/20/2024    AST 5 (L) 02/20/2024    ALKPHOS 52 02/20/2024     Lab Results   Component Value Date    INR 1.08 02/21/2024    INR 0.96 02/20/2024    INR 0.89 08/15/2023    PROTIME 14.2 02/21/2024    PROTIME 13.0 02/20/2024    PROTIME 12.2 08/15/2023       No results found.

## 2024-07-16 NOTE — ASSESSMENT & PLAN NOTE
Patient was seen in the ED in March because of constipation when he was taking oxycodone and was noted to have partial rectal prolapse or prolapsed internal hemorrhoids.  He has been doing well since then with regular bowel movements.  No evidence of any rectal prolapse on physical exam today.    -High-fiber diet and fiber supplements    -May take MiraLAX and stool softeners if needed    -Advised patient to call if he develops any recurrent symptoms

## 2024-07-22 ENCOUNTER — TELEPHONE (OUTPATIENT)
Dept: FAMILY MEDICINE CLINIC | Facility: CLINIC | Age: 54
End: 2024-07-22

## 2024-07-31 ENCOUNTER — TELEPHONE (OUTPATIENT)
Age: 54
End: 2024-07-31

## 2024-07-31 NOTE — TELEPHONE ENCOUNTER
We received a call from the dialysis center informing them that they need us to send the patient's Pneumococcal vaccine to the fax number: [phone]

## 2024-08-01 ENCOUNTER — HOSPITAL ENCOUNTER (OUTPATIENT)
Dept: NON INVASIVE DIAGNOSTICS | Facility: HOSPITAL | Age: 54
Discharge: HOME | End: 2024-08-01
Payer: MEDICARE

## 2024-08-01 DIAGNOSIS — Z99.2 CKD (CHRONIC KIDNEY DISEASE) REQUIRING CHRONIC DIALYSIS (HCC): ICD-10-CM

## 2024-08-01 DIAGNOSIS — N18.6 CKD (CHRONIC KIDNEY DISEASE) REQUIRING CHRONIC DIALYSIS (HCC): ICD-10-CM

## 2024-08-01 PROCEDURE — 36589 REMOVAL TUNNELED CV CATH: CPT | Performed by: RADIOLOGY

## 2024-08-01 PROCEDURE — 36589 REMOVAL TUNNELED CV CATH: CPT

## 2024-08-01 RX ORDER — LIDOCAINE WITH 8.4% SOD BICARB 0.9%(10ML)
SYRINGE (ML) INJECTION AS NEEDED
Status: COMPLETED | OUTPATIENT
Start: 2024-08-01 | End: 2024-08-01

## 2024-08-01 RX ADMIN — Medication 10 ML: at 09:43

## 2024-08-01 NOTE — DISCHARGE INSTRUCTIONS
Perma-cath Removal   WHAT YOU NEED TO KNOW:   A perma-cath is a catheter placed through a vein into or near your right atrium. Your right atrium is the right upper chamber of your heart. A perma-cath is used for dialysis in an emergency or until a long-term device is ready to use. Or you no longer need dialysis.    DISCHARGE INSTRUCTIONS:   Call 911 for any of the following:   You feel lightheaded, short of breath, and have chest pain.    You start bleeding    Contact Interventional Radiology at [phone] (ALFA PATIENTS: Contact Interventional Radiology at [phone]) ([facility]MAR[facility] PATIENTS: Contact Interventional Radiology at [phone]) if:  Blood soaks through your bandage.   You have new swelling in your arm, neck, face, or chest on your right side.  Your bruises or pain get worse.   You have a fever or chills.  Persistent nausea or vomiting.   Your incision is red, swollen, or draining pus.   You have questions or concerns about your condition or care.  Self-care:   Resume your normal diet. Small sips of flat soda will help with nausea.  Keep your dressings dry. Do not get your perma-cath site wet until the incision closes.  You may take a tub bath, but do not get your dressings wet. Water in your wound can cause bacteria to grow and cause an infection. If your dressing gets wet, remove the wet dressing and apply a dry bandaid. Keep it covered until the incision closes. This should only take a few days to heal. Do not use soaps or ointments.  Immediately after your catheter is removed, no strenuous activity for twenty four hours and stay in an upright sitting position for two hours.   Follow up with your healthcare provider as directed: Write down your questions so you remember to ask them during your visits.

## 2024-08-01 NOTE — SEDATION DOCUMENTATION
Right permacath removed, steris with gauze and tegaderm applied.  No bleeding noted.  Pt tolerated without incident. Pt discharged home with instructions.

## 2024-08-07 ENCOUNTER — RA CDI HCC (OUTPATIENT)
Dept: OTHER | Facility: HOSPITAL | Age: 54
End: 2024-08-07

## 2024-08-14 ENCOUNTER — OFFICE VISIT (OUTPATIENT)
Dept: FAMILY MEDICINE CLINIC | Facility: CLINIC | Age: 54
End: 2024-08-14
Payer: MEDICARE

## 2024-08-14 VITALS
HEART RATE: 63 BPM | OXYGEN SATURATION: 99 % | HEIGHT: 68 IN | BODY MASS INDEX: 26.07 KG/M2 | SYSTOLIC BLOOD PRESSURE: 100 MMHG | WEIGHT: 172 LBS | DIASTOLIC BLOOD PRESSURE: 64 MMHG | TEMPERATURE: 98.3 F | RESPIRATION RATE: 12 BRPM

## 2024-08-14 DIAGNOSIS — Z87.891 PERSONAL HISTORY OF NICOTINE DEPENDENCE: ICD-10-CM

## 2024-08-14 DIAGNOSIS — Z99.2 ESRD (END STAGE RENAL DISEASE) ON DIALYSIS (HCC): ICD-10-CM

## 2024-08-14 DIAGNOSIS — Z13.29 SCREENING FOR THYROID DISORDER: ICD-10-CM

## 2024-08-14 DIAGNOSIS — E78.2 MIXED HYPERLIPIDEMIA: ICD-10-CM

## 2024-08-14 DIAGNOSIS — N18.6 ESRD (END STAGE RENAL DISEASE) ON DIALYSIS (HCC): ICD-10-CM

## 2024-08-14 DIAGNOSIS — Z00.00 WELCOME TO MEDICARE PREVENTIVE VISIT: Primary | ICD-10-CM

## 2024-08-14 DIAGNOSIS — F20.9 SCHIZOPHRENIA, UNSPECIFIED TYPE (HCC): ICD-10-CM

## 2024-08-14 DIAGNOSIS — F17.200 TOBACCO DEPENDENCE: ICD-10-CM

## 2024-08-14 DIAGNOSIS — E83.39 HYPERPHOSPHATEMIA: ICD-10-CM

## 2024-08-14 PROBLEM — D68.9 COAGULATION DEFECT, UNSPECIFIED (HCC): Status: ACTIVE | Noted: 2024-08-14

## 2024-08-14 LAB
SL AMB  POCT GLUCOSE, UA: NORMAL
SL AMB LEUKOCYTE ESTERASE,UA: NORMAL
SL AMB POCT BILIRUBIN,UA: NORMAL
SL AMB POCT BLOOD,UA: 250
SL AMB POCT CLARITY,UA: CLEAR
SL AMB POCT COLOR,UA: YELLOW
SL AMB POCT KETONES,UA: NORMAL
SL AMB POCT NITRITE,UA: NORMAL
SL AMB POCT PH,UA: 9
SL AMB POCT SPECIFIC GRAVITY,UA: 1.01
SL AMB POCT URINE PROTEIN: 500
SL AMB POCT UROBILINOGEN: NORMAL

## 2024-08-14 PROCEDURE — 81002 URINALYSIS NONAUTO W/O SCOPE: CPT | Performed by: FAMILY MEDICINE

## 2024-08-14 PROCEDURE — G0402 INITIAL PREVENTIVE EXAM: HCPCS | Performed by: FAMILY MEDICINE

## 2024-08-14 NOTE — PATIENT INSTRUCTIONS
Medicare Preventive Visit Patient Instructions  Thank you for completing your Welcome to Medicare Visit or Medicare Annual Wellness Visit today. Your next wellness visit will be due in one year (8/15/2025).  The screening/preventive services that you may require over the next 5-10 years are detailed below. Some tests may not apply to you based off risk factors and/or age. Screening tests ordered at today's visit but not completed yet may show as past due. Also, please note that scanned in results may not display below.  Preventive Screenings:  Service Recommendations Previous Testing/Comments   Colorectal Cancer Screening  Colonoscopy    Fecal Occult Blood Test (FOBT)/Fecal Immunochemical Test (FIT)  Fecal DNA/Cologuard Test  Flexible Sigmoidoscopy Age: 45-75 years old   Colonoscopy: every 10 years (May be performed more frequently if at higher risk)  OR  FOBT/FIT: every 1 year  OR  Cologuard: every 3 years  OR  Sigmoidoscopy: every 5 years  Screening may be recommended earlier than age 45 if at higher risk for colorectal cancer. Also, an individualized decision between you and your healthcare provider will decide whether screening between the ages of 76-85 would be appropriate. Colonoscopy: 08/10/2021  FOBT/FIT: Not on file  Cologuard: Not on file  Sigmoidoscopy: Not on file    Screening Current     Prostate Cancer Screening Individualized decision between patient and health care provider in men between ages of 55-69   Medicare will cover every 12 months beginning on the day after your 50th birthday PSA: 1.8 ng/mL     Screening Current     Hepatitis C Screening Once for adults born between 1945 and 1965  More frequently in patients at high risk for Hepatitis C Hep C Antibody: 02/23/2024    Screening Current   Diabetes Screening 1-2 times per year if you're at risk for diabetes or have pre-diabetes Fasting glucose: 109 mg/dL (8/15/2023)  A1C: 5.8 % (9/25/2023)  Screening Current   Cholesterol Screening Once every 5  years if you don't have a lipid disorder. May order more often based on risk factors. Lipid panel: 09/25/2023  Screening Not Indicated  History Lipid Disorder      Other Preventive Screenings Covered by Medicare:  Abdominal Aortic Aneurysm (AAA) Screening: covered once if your at risk. You're considered to be at risk if you have a family history of AAA or a male between the age of 65-75 who smoking at least 100 cigarettes in your lifetime.  Lung Cancer Screening: covers low dose CT scan once per year if you meet all of the following conditions: (1) Age 55-77; (2) No signs or symptoms of lung cancer; (3) Current smoker or have quit smoking within the last 15 years; (4) You have a tobacco smoking history of at least 20 pack years (packs per day x number of years you smoked); (5) You get a written order from a healthcare provider.  Glaucoma Screening: covered annually if you're considered high risk: (1) You have diabetes OR (2) Family history of glaucoma OR (3)  aged 50 and older OR (4)  American aged 65 and older  Osteoporosis Screening: covered every 2 years if you meet one of the following conditions: (1) Have a vertebral abnormality; (2) On glucocorticoid therapy for more than 3 months; (3) Have primary hyperparathyroidism; (4) On osteoporosis medications and need to assess response to drug therapy.  HIV Screening: covered annually if you're between the age of 15-65. Also covered annually if you are younger than 15 and older than 65 with risk factors for HIV infection. For pregnant patients, it is covered up to 3 times per pregnancy.    Immunizations:  Immunization Recommendations   Influenza Vaccine Annual influenza vaccination during flu season is recommended for all persons aged >= 6 months who do not have contraindications   Pneumococcal Vaccine   * Pneumococcal conjugate vaccine = PCV13 (Prevnar 13), PCV15 (Vaxneuvance), PCV20 (Prevnar 20)  * Pneumococcal polysaccharide vaccine = PPSV23  (Pneumovax) Adults 19-65 yo with certain risk factors or if 65+ yo  If never received any pneumonia vaccine: recommend Prevnar 20 (PCV20)  Give PCV20 if previously received 1 dose of PCV13 or PPSV23   Hepatitis B Vaccine 3 dose series if at intermediate or high risk (ex: diabetes, end stage renal disease, liver disease)   Respiratory syncytial virus (RSV) Vaccine - COVERED BY MEDICARE PART D  * RSVPreF3 (Arexvy) CDC recommends that adults 60 years of age and older may receive a single dose of RSV vaccine using shared clinical decision-making (SCDM)   Tetanus (Td) Vaccine - COST NOT COVERED BY MEDICARE PART B Following completion of primary series, a booster dose should be given every 10 years to maintain immunity against tetanus. Td may also be given as tetanus wound prophylaxis.   Tdap Vaccine - COST NOT COVERED BY MEDICARE PART B Recommended at least once for all adults. For pregnant patients, recommended with each pregnancy.   Shingles Vaccine (Shingrix) - COST NOT COVERED BY MEDICARE PART B  2 shot series recommended in those 19 years and older who have or will have weakened immune systems or those 50 years and older     Health Maintenance Due:      Topic Date Due   • Colorectal Cancer Screening  08/09/2026   • HIV Screening  Completed   • Hepatitis C Screening  Completed   • Lung Cancer Screening  Discontinued     Immunizations Due:      Topic Date Due   • Hepatitis A Vaccine (1 of 2 - Risk 2-dose series) Never done   • COVID-19 Vaccine (9 - 2023-24 season) 09/01/2023   • Influenza Vaccine (1) 09/01/2024     Advance Directives   What are advance directives?  Advance directives are legal documents that state your wishes and plans for medical care. These plans are made ahead of time in case you lose your ability to make decisions for yourself. Advance directives can apply to any medical decision, such as the treatments you want, and if you want to donate organs.   What are the types of advance directives?  There  are many types of advance directives, and each state has rules about how to use them. You may choose a combination of any of the following:  Living will:  This is a written record of the treatment you want. You can also choose which treatments you do not want, which to limit, and which to stop at a certain time. This includes surgery, medicine, IV fluid, and tube feedings.   Durable power of  for healthcare (DPAHC):  This is a written record that states who you want to make healthcare choices for you when you are unable to make them for yourself. This person, called a proxy, is usually a family member or a friend. You may choose more than 1 proxy.  Do not resuscitate (DNR) order:  A DNR order is used in case your heart stops beating or you stop breathing. It is a request not to have certain forms of treatment, such as CPR. A DNR order may be included in other types of advance directives.  Medical directive:  This covers the care that you want if you are in a coma, near death, or unable to make decisions for yourself. You can list the treatments you want for each condition. Treatment may include pain medicine, surgery, blood transfusions, dialysis, IV or tube feedings, and a ventilator (breathing machine).  Values history:  This document has questions about your views, beliefs, and how you feel and think about life. This information can help others choose the care that you would choose.  Why are advance directives important?  An advance directive helps you control your care. Although spoken wishes may be used, it is better to have your wishes written down. Spoken wishes can be misunderstood, or not followed. Treatments may be given even if you do not want them. An advance directive may make it easier for your family to make difficult choices about your care.   Cigarette Smoking and Your Health   Risks to your health if you smoke:  Nicotine and other chemicals found in tobacco damage every cell in your body.  Even if you are a light smoker, you have an increased risk for cancer, heart disease, and lung disease. If you are pregnant or have diabetes, smoking increases your risk for complications.   Benefits to your health if you stop smoking:   You decrease respiratory symptoms such as coughing, wheezing, and shortness of breath.   You reduce your risk for cancers of the lung, mouth, throat, kidney, bladder, pancreas, stomach, and cervix. If you already have cancer, you increase the benefits of chemotherapy. You also reduce your risk for cancer returning or a second cancer from developing.   You reduce your risk for heart disease, blood clots, heart attack, and stroke.   You reduce your risk for lung infections, and diseases such as pneumonia, asthma, chronic bronchitis, and emphysema.  Your circulation improves. More oxygen can be delivered to your body. If you have diabetes, you lower your risk for complications, such as kidney, artery, and eye diseases. You also lower your risk for nerve damage. Nerve damage can lead to amputations, poor vision, and blindness.  You improve your body's ability to heal and to fight infections.  For more information and support to stop smoking:   [employer]  Phone: 1- 137 - 900-5300  Web Address: www.[employer]  Weight Management   Why it is important to manage your weight:  Being overweight increases your risk of health conditions such as heart disease, high blood pressure, type 2 diabetes, and certain types of cancer. It can also increase your risk for osteoarthritis, sleep apnea, and other respiratory problems. Aim for a slow, steady weight loss. Even a small amount of weight loss can lower your risk of health problems.  How to lose weight safely:  A safe and healthy way to lose weight is to eat fewer calories and get regular exercise. You can lose up about 1 pound a week by decreasing the number of calories you eat by 500 calories each day.   Healthy meal plan for weight  management:  A healthy meal plan includes a variety of foods, contains fewer calories, and helps you stay healthy. A healthy meal plan includes the following:  Eat whole-grain foods more often.  A healthy meal plan should contain fiber. Fiber is the part of grains, fruits, and vegetables that is not broken down by your body. Whole-grain foods are healthy and provide extra fiber in your diet. Some examples of whole-grain foods are whole-wheat breads and pastas, oatmeal, brown rice, and bulgur.  Eat a variety of vegetables every day.  Include dark, leafy greens such as spinach, kale, rita greens, and mustard greens. Eat yellow and orange vegetables such as carrots, sweet potatoes, and winter squash.   Eat a variety of fruits every day.  Choose fresh or canned fruit (canned in its own juice or light syrup) instead of juice. Fruit juice has very little or no fiber.  Eat low-fat dairy foods.  Drink fat-free (skim) milk or 1% milk. Eat fat-free yogurt and low-fat cottage cheese. Try low-fat cheeses such as mozzarella and other reduced-fat cheeses.  Choose meat and other protein foods that are low in fat.  Choose beans or other legumes such as split peas or lentils. Choose fish, skinless poultry (chicken or turkey), or lean cuts of red meat (beef or pork). Before you cook meat or poultry, cut off any visible fat.   Use less fat and oil.  Try baking foods instead of frying them. Add less fat, such as margarine, sour cream, regular salad dressing and mayonnaise to foods. Eat fewer high-fat foods. Some examples of high-fat foods include french fries, doughnuts, ice cream, and cakes.  Eat fewer sweets.  Limit foods and drinks that are high in sugar. This includes candy, cookies, regular soda, and sweetened drinks.  Exercise:  Exercise at least 30 minutes per day on most days of the week. Some examples of exercise include walking, biking, dancing, and swimming. You can also fit in more physical activity by taking the stairs  instead of the elevator or parking farther away from stores. Ask your healthcare provider about the best exercise plan for you.      © Copyright [employer] 2018 Information is for End User's use only and may not be sold, redistributed or otherwise used for commercial purposes. All illustrations and images included in CareNotes® are the copyrighted property of [employer] or [employer]

## 2024-08-14 NOTE — PROGRESS NOTES
Ambulatory Visit  Name: Jordy Tidwell      : 1970      MRN: [id number]  Encounter Provider: Sheila Roach MD  Encounter Date: 2024   Encounter department: [facility]    Assessment & Plan   1. Welcome to Medicare preventive visit  -     POCT ECG  -     POCT urine dip  2. ESRD (end stage renal disease) on dialysis (HCC)  Comments:  follows w nephrology  3. Hyperphosphatemia  Comments:  on [facility]  4. Schizophrenia, unspecified type (HCC)  Comments:  follows w psychiatry  5. Screening for thyroid disorder  -     TSH, 3rd generation; Future  6. Mixed hyperlipidemia  -     Lipid Panel with Direct LDL reflex; Future  -     TSH, 3rd generation; Future  -     Lipase; Future  7. Personal history of nicotine dependence  -     CT lung screening program; Future; Expected date: 2024  8. Tobacco dependence  Comments:  encouraged complete smoking cessation.  Orders:  -     CT lung screening program; Future; Expected date: 2024  9. BMI 26.0-26.9,adult       Preventive health issues were discussed with patient, and age appropriate screening tests were ordered as noted in patient's After Visit Summary. Personalized health advice and appropriate referrals for health education or preventive services given if needed, as noted in patient's After Visit Summary.    History of Present Illness     HPI   Patient Care Team:  Sheila Roach MD as PCP - General (Family Medicine)  Sheila Roach MD as PCP - PCP-[facility] ([facility])  Sheila Roach MD as PCP - PCP-[facility] ([facility])  Judith Costello MD (Nephrology)    Interval hx reviewed  On HD MWF   Tolerating well  BP low normal  Wgt loss noted  Needs future lab orders for lipids/lipase and thyroid testing  Also due for LD CT Lung Screen    Review of Systems   Constitutional:  Positive for fatigue. Negative for fever.   Respiratory:  Negative for shortness of breath.    Cardiovascular:  Negative for chest pain.        Left  brachiocephalic AVF   Gastrointestinal:  Positive for nausea. Negative for blood in stool, diarrhea and vomiting.   Genitourinary:         On HD   Musculoskeletal:  Positive for arthralgias and myalgias.   Neurological:  Negative for seizures.   Psychiatric/Behavioral:  Positive for sleep disturbance. The patient is nervous/anxious.         Hx schizophrenia     Medical History Reviewed by provider this encounter:  Tobacco  Allergies  Meds  Problems  Med Hx  Surg Hx  Fam Hx       Annual Wellness Visit Questionnaire   Jordy is here for his Welcome to Medicare visit.     Health Risk Assessment:   Patient rates overall health as good. Patient feels that their physical health rating is slightly better. Patient is satisfied with their life. Eyesight was rated as same. Hearing was rated as same. Patient feels that their emotional and mental health rating is slightly better. Patients states they are never, rarely angry. Patient states they are often unusually tired/fatigued. Pain experienced in the last 7 days has been none. Patient states that he has experienced no weight loss or gain in last 6 months.     Depression Screening:   PHQ-2 Score: 0      Fall Risk Screening:   In the past year, patient has experienced: no history of falling in past year      Home Safety:  Patient does not have trouble with stairs inside or outside of their home. Patient has working smoke alarms and has working carbon monoxide detector. Home safety hazards include: none.     Nutrition:   Current diet is Regular.     Medications:   Patient is not currently taking any over-the-counter supplements. Patient is able to manage medications.     Activities of Daily Living (ADLs)/Instrumental Activities of Daily Living (IADLs):   Walk and transfer into and out of bed and chair?: Yes  Dress and groom yourself?: Yes    Bathe or shower yourself?: Yes    Feed yourself? Yes  Do your laundry/housekeeping?: Yes  Manage your money, pay your bills and  track your expenses?: Yes  Make your own meals?: Yes    Do your own shopping?: Yes    Previous Hospitalizations:   Any hospitalizations or ED visits within the last 12 months?: No      Advance Care Planning:   Living will: No    Durable POA for healthcare: No    Advanced directive: No    Advanced directive counseling given: Yes    ACP document given: Yes      Cognitive Screening:   Provider or family/friend/caregiver concerned regarding cognition?: No    PREVENTIVE SCREENINGS      Cardiovascular Screening:    General: History Lipid Disorder    Due for: Lipid Panel      Diabetes Screening:     General: Screening Current    Due for: Blood Glucose      Colorectal Cancer Screening:     General: Screening Current      Prostate Cancer Screening:    General: Screening Current      Osteoporosis Screening:    General: Risks and Benefits Discussed      Abdominal Aortic Aneurysm (AAA) Screening:    Risk factors include: tobacco use        General: Screening Not Indicated      Lung Cancer Screening:     General: Risks and Benefits Discussed    Due for: Low Dose CT (LDCT)      Hepatitis C Screening:    General: Screening Current    Screening, Brief Intervention, and Referral to Treatment (SBIRT)    Screening  Typical number of drinks in a day: 0  Typical number of drinks in a week: 0  Interpretation: Low risk drinking behavior.    Single Item Drug Screening:  How often have you used an illegal drug (including marijuana) or a prescription medication for non-medical reasons in the past year? never    Single Item Drug Screen Score: 0  Interpretation: Negative screen for possible drug use disorder    Brief Intervention  Alcohol & drug use screenings were reviewed. No concerns regarding substance use disorder identified.     Social Determinants of Health     Food Insecurity: No Food Insecurity (8/14/2024)    Hunger Vital Sign    • Worried About Running Out of Food in the Last Year: Never true    • Ran Out of Food in the Last Year:  "Never true   Transportation Needs: No Transportation Needs (8/14/2024)    PRAPARE - Transportation    • Lack of Transportation (Medical): No    • Lack of Transportation (Non-Medical): No   Housing Stability: Low Risk  (8/14/2024)    Housing Stability Vital Sign    • Unable to Pay for Housing in the Last Year: No    • Number of Times Moved in the Last Year: 1    • Homeless in the Last Year: No   Utilities: Not At Risk (8/14/2024)    [facility] Utilities    • Threatened with loss of utilities: No     No results found.    Objective     /64 (BP Location: Left arm, Patient Position: Sitting, Cuff Size: Standard)   Pulse 63   Temp 98.3 °F (36.8 °C) (Temporal)   Resp 12   Ht 5' 8\" (1.727 m)   Wt 78 kg (172 lb)   SpO2 99%   BMI 26.15 kg/m²     Physical Exam  Vitals and nursing note reviewed.   Constitutional:       General: He is not in acute distress.     Appearance: He is well-developed.   Cardiovascular:      Rate and Rhythm: Normal rate and regular rhythm.   Pulmonary:      Effort: Pulmonary effort is normal. No respiratory distress.      Breath sounds: Normal breath sounds.   Abdominal:      General: Bowel sounds are normal.      Palpations: Abdomen is soft.      Tenderness: There is no abdominal tenderness. There is no right CVA tenderness or left CVA tenderness.   Musculoskeletal:         General: Normal range of motion.      Cervical back: Neck supple.      Right lower leg: No edema.      Left lower leg: No edema.   Skin:     General: Skin is warm and dry.      Coloration: Skin is not jaundiced.      Comments: Left brachiocephalic AVF   Neurological:      Mental Status: He is alert and oriented to person, place, and time.      Cranial Nerves: No cranial nerve deficit.   Psychiatric:      Comments: Mood stable, cooperative w exam           "

## 2024-08-15 PROCEDURE — G0403 EKG FOR INITIAL PREVENT EXAM: HCPCS | Performed by: FAMILY MEDICINE

## 2024-08-20 PROBLEM — Z87.891 PERSONAL HISTORY OF NICOTINE DEPENDENCE: Status: ACTIVE | Noted: 2024-08-20

## 2024-08-20 RX ORDER — SEVELAMER HYDROCHLORIDE 800 MG/1
1600 TABLET, FILM COATED ORAL
COMMUNITY

## 2024-08-29 ENCOUNTER — APPOINTMENT (OUTPATIENT)
Dept: LAB | Facility: CLINIC | Age: 54
End: 2024-08-29
Payer: MEDICARE

## 2024-08-29 DIAGNOSIS — E78.2 MIXED HYPERLIPIDEMIA: ICD-10-CM

## 2024-08-29 DIAGNOSIS — Z13.29 SCREENING FOR THYROID DISORDER: ICD-10-CM

## 2024-08-29 DIAGNOSIS — Z12.5 PROSTATE CANCER SCREENING: ICD-10-CM

## 2024-08-29 LAB
CHOLEST SERPL-MCNC: 118 MG/DL
HDLC SERPL-MCNC: 39 MG/DL
LDLC SERPL CALC-MCNC: 47 MG/DL (ref 0–100)
LIPASE SERPL-CCNC: 36 U/L (ref 11–82)
PSA SERPL-MCNC: 1.75 NG/ML (ref 0–4)
TRIGL SERPL-MCNC: 159 MG/DL
TSH SERPL DL<=0.05 MIU/L-ACNC: 1.15 UIU/ML (ref 0.45–4.5)

## 2024-08-29 PROCEDURE — 83690 ASSAY OF LIPASE: CPT

## 2024-08-29 PROCEDURE — 84443 ASSAY THYROID STIM HORMONE: CPT

## 2024-08-29 PROCEDURE — 36415 COLL VENOUS BLD VENIPUNCTURE: CPT

## 2024-08-29 PROCEDURE — G0103 PSA SCREENING: HCPCS

## 2024-08-29 PROCEDURE — 80061 LIPID PANEL: CPT

## 2024-08-31 DIAGNOSIS — I10 ESSENTIAL HYPERTENSION: ICD-10-CM

## 2024-09-01 RX ORDER — METOPROLOL SUCCINATE 50 MG/1
TABLET, EXTENDED RELEASE ORAL
Qty: 90 TABLET | Refills: 1 | Status: SHIPPED | OUTPATIENT
Start: 2024-09-01

## 2024-09-13 PROBLEM — Z13.29 SCREENING FOR THYROID DISORDER: Status: RESOLVED | Noted: 2020-09-02 | Resolved: 2024-09-13

## 2024-09-18 ENCOUNTER — OFFICE VISIT (OUTPATIENT)
Dept: FAMILY MEDICINE CLINIC | Facility: CLINIC | Age: 54
End: 2024-09-18
Payer: MEDICARE

## 2024-09-18 VITALS
RESPIRATION RATE: 12 BRPM | SYSTOLIC BLOOD PRESSURE: 122 MMHG | DIASTOLIC BLOOD PRESSURE: 74 MMHG | WEIGHT: 169 LBS | OXYGEN SATURATION: 98 % | TEMPERATURE: 98.2 F | HEIGHT: 68 IN | HEART RATE: 72 BPM | BODY MASS INDEX: 25.61 KG/M2

## 2024-09-18 DIAGNOSIS — Z99.2 ESRD (END STAGE RENAL DISEASE) ON DIALYSIS (HCC): Primary | ICD-10-CM

## 2024-09-18 DIAGNOSIS — N18.6 ESRD (END STAGE RENAL DISEASE) ON DIALYSIS (HCC): Primary | ICD-10-CM

## 2024-09-18 DIAGNOSIS — Z87.891 PERSONAL HISTORY OF NICOTINE DEPENDENCE: ICD-10-CM

## 2024-09-18 DIAGNOSIS — E78.5 DYSLIPIDEMIA: ICD-10-CM

## 2024-09-18 DIAGNOSIS — I10 HYPERTENSION, UNSPECIFIED TYPE: ICD-10-CM

## 2024-09-18 DIAGNOSIS — F20.9 SCHIZOPHRENIA, UNSPECIFIED TYPE (HCC): ICD-10-CM

## 2024-09-18 PROCEDURE — G2211 COMPLEX E/M VISIT ADD ON: HCPCS | Performed by: FAMILY MEDICINE

## 2024-09-18 PROCEDURE — 99214 OFFICE O/P EST MOD 30 MIN: CPT | Performed by: FAMILY MEDICINE

## 2024-09-18 NOTE — PROGRESS NOTES
Assessment/Plan:  Diagnoses and all orders for this visit:    ESRD (end stage renal disease) on dialysis ([facility])  Comments:  follows w nephrologist  on dialysis    Schizophrenia, unspecified type ([facility])  Comments:  cont. under psychiatry care    Hypertension, unspecified type  Comments:  BP at goal--cont. same    Personal history of nicotine dependence  Comments:  readdressed smoking cessation-  pt states has cut back some    Dyslipidemia  Comments:  low chol diet, cont current meds    BMI 25.0-25.9,adult          Subjective:      Patient ID: Jordy Tidwell is a 54 y.o. male.    Chief Complaint   Patient presents with   • Follow-up     Lab review  Living will       HPI    Follow-up  BP at goal  Recent labs reviewed-lipids improved; renal fxn under neph. care  Pt brought in paperwork for DNR/Living Will for  review/completion-copies charted under media tab  No new concerns    The following portions of the patient's history were reviewed and updated as appropriate: allergies, current medications, past family history, past medical history, past social history, past surgical history and problem list.    Review of Systems   Constitutional:  Positive for fatigue. Negative for fever.   Respiratory:  Negative for shortness of breath.    Cardiovascular:  Negative for chest pain.        Left brachiocephalic AVF   Gastrointestinal:  Positive for nausea. Negative for blood in stool, diarrhea and vomiting.   Genitourinary:         On HD   Musculoskeletal:  Positive for arthralgias and myalgias.   Neurological:  Negative for seizures.   Psychiatric/Behavioral:  Positive for sleep disturbance. The patient is nervous/anxious.         Hx schizophrenia       Current Outpatient Medications   Medication Sig Dispense Refill   • ezetimibe (ZETIA) 10 mg tablet TAKE 1 TABLET BY MOUTH EVERY DAY 90 tablet 1   • metoprolol succinate (TOPROL-XL) 50 mg 24 hr tablet TAKE 1 TABLET BY MOUTH EVERY DAY 90 tablet 1   • nicotine (NICODERM CQ) 14  "mg/24hr TD 24 hr patch Place on the skin     • paliperidone (INVEGA) 6 MG 24 hr tablet Every other day     • senna-docusate sodium (SENOKOT S) 8.6-50 mg per tablet Take 1 tablet by mouth daily (Patient taking differently: Take 1 tablet by mouth daily as needed) 20 tablet 0   • sevelamer (RENAGEL) 800 mg tablet Take 1,600 mg by mouth 3 (three) times a day with meals And 800mg with snacks     • Sucroferric Oxyhydroxide (VELPHORO PO) Take by mouth TID w meals for phosphorus control     • polyethylene glycol (MIRALAX) 17 g packet Take 17 g by mouth daily for 7 days 119 g 0   • rosuvastatin (CRESTOR) 10 MG tablet TAKE 1 TABLET BY MOUTH EVERY DAY 90 tablet 1     No current facility-administered medications for this visit.       Objective:    /74 (BP Location: Left arm, Patient Position: Sitting, Cuff Size: Standard)   Pulse 72   Temp 98.2 °F (36.8 °C) (Temporal)   Resp 12   Ht 5' 8\" (1.727 m)   Wt 76.7 kg (169 lb)   SpO2 98%   BMI 25.70 kg/m²        Physical Exam  Vitals and nursing note reviewed.   Constitutional:       General: He is not in acute distress.     Appearance: He is well-developed.   Eyes:      General: No scleral icterus.     Conjunctiva/sclera: Conjunctivae normal.   Cardiovascular:      Rate and Rhythm: Normal rate and regular rhythm.      Pulses: Normal pulses.   Pulmonary:      Effort: Pulmonary effort is normal. No respiratory distress.      Breath sounds: Normal breath sounds.   Abdominal:      General: Bowel sounds are normal.      Palpations: Abdomen is soft.      Tenderness: There is no abdominal tenderness. There is no right CVA tenderness or left CVA tenderness.   Musculoskeletal:         General: No tenderness. Normal range of motion.      Cervical back: Neck supple. No tenderness.      Right lower leg: No edema.      Left lower leg: No edema.   Skin:     General: Skin is warm and dry.      Capillary Refill: Capillary refill takes less than 2 seconds.      Coloration: Skin is not " jaundiced.   Neurological:      Mental Status: He is alert and oriented to person, place, and time.      Cranial Nerves: No cranial nerve deficit.      Comments: No acute focal deficit   Psychiatric:      Comments: Mood stable, pleasant, cooperative              Sheila Roach MD

## 2024-09-23 ENCOUNTER — TELEPHONE (OUTPATIENT)
Age: 54
End: 2024-09-23

## 2024-09-23 NOTE — TELEPHONE ENCOUNTER
Patient called because his kidney doctor said that he should have a cancer screening done from his pcp because his phosphorus and calcium levels are high. Patient would like a call back to discuss how he should go about getting the screening done. Please advise. Thank you.

## 2024-09-23 NOTE — TELEPHONE ENCOUNTER
Pt called to ask if Dr Roach had ordered a cancer screening for him.  He would like someone to call him once he orders the screening.

## 2024-09-29 DIAGNOSIS — E78.01 FAMILIAL HYPERCHOLESTEROLEMIA: ICD-10-CM

## 2024-09-29 PROBLEM — I10 HYPERTENSION: Status: ACTIVE | Noted: 2024-09-29

## 2024-09-29 RX ORDER — ROSUVASTATIN CALCIUM 10 MG/1
10 TABLET, COATED ORAL DAILY
Qty: 90 TABLET | Refills: 1 | Status: SHIPPED | OUTPATIENT
Start: 2024-09-29

## 2024-10-08 ENCOUNTER — HOSPITAL ENCOUNTER (OUTPATIENT)
Dept: RADIOLOGY | Facility: HOSPITAL | Age: 54
Discharge: HOME | End: 2024-10-08
Attending: FAMILY MEDICINE
Payer: MEDICARE

## 2024-10-08 DIAGNOSIS — F17.200 TOBACCO DEPENDENCE: ICD-10-CM

## 2024-10-08 DIAGNOSIS — Z87.891 PERSONAL HISTORY OF NICOTINE DEPENDENCE: ICD-10-CM

## 2024-10-08 PROCEDURE — 71271 CT THORAX LUNG CANCER SCR C-: CPT

## 2024-11-14 ENCOUNTER — TELEPHONE (OUTPATIENT)
Age: 54
End: 2024-11-14

## 2024-11-14 NOTE — TELEPHONE ENCOUNTER
Called patient and advised. He had covid and flu shots about 3 weeks ago at [facility]- not sure exact date.. He gave documentation to nurse at dialysis center. He will have them fax flu/covid vaccine record to us when he goes there tomorrow.

## 2024-11-14 NOTE — TELEPHONE ENCOUNTER
Please advise pt that he is UTD w his pneum vacc (he had done in 2023).    The RSV is recommended for >60yrs old w underlying conditions, otherwise for >74yo.    Also find out dates he was given the flu and COVID 19 shots and enter into record-thanks

## 2024-11-14 NOTE — TELEPHONE ENCOUNTER
Patient wants to know if should get either the RSV or the pneumonia shot.    He got Covid and Flu shot about 2 weeks ago from local [facility].

## 2024-11-18 ENCOUNTER — TELEPHONE (OUTPATIENT)
Dept: ADMINISTRATIVE | Facility: OTHER | Age: 54
End: 2024-11-18

## 2024-11-18 NOTE — TELEPHONE ENCOUNTER
----- Message from Teresa BUTLER sent at 11/15/2024  1:48 PM EST -----  Regarding: care gap request  11/15/24 1:48 PM    Hello, our patient attached above has had Immunization(s) completed/performed. Please assist in updating the patient chart by making an External outreach to [facility] facility located in [place] . The date of service is 1999-1154.    Thank you,  Teresa MCCABE FP

## 2024-11-20 NOTE — TELEPHONE ENCOUNTER
Pt called to check if his flu and Covid vaccines done at [facility] had been received by the office. They are in media. Thank you.

## 2024-11-25 NOTE — TELEPHONE ENCOUNTER
Upon review of the In Basket request we were able to locate, review, and update the patient chart as requested for Immunization(s) Covid and Influenza vaccine.    Any additional questions or concerns should be emailed to the Practice Liaisons via the appropriate education email address, please do not reply via In Basket.    Thank you  Adelina Yen MA   [facility]

## 2025-01-06 ENCOUNTER — TELEPHONE (OUTPATIENT)
Age: 55
End: 2025-01-06

## 2025-01-06 NOTE — TELEPHONE ENCOUNTER
Patient called stating he thinks he has the flu, covid or pneumonia - c/o congestion, mucous with reddish pink coloring and mentioned he is a dialysis patient.  He was just getting ready to go on the dialysis machine and wanted an appointment with Dr. Roach for tomorrow or Weds.  I advised she is not in the office until Friday this week and offered same day/next day appointments with other providers.  Patient ONLY wants to see Dr. Roach and is asking if he can come on Friday afternoon around 330-4pm-  Please advise.

## 2025-01-07 ENCOUNTER — APPOINTMENT (OUTPATIENT)
Dept: RADIOLOGY | Facility: CLINIC | Age: 55
End: 2025-01-07
Payer: MEDICARE

## 2025-01-07 ENCOUNTER — OFFICE VISIT (OUTPATIENT)
Dept: FAMILY MEDICINE CLINIC | Facility: CLINIC | Age: 55
End: 2025-01-07
Payer: MEDICARE

## 2025-01-07 VITALS
SYSTOLIC BLOOD PRESSURE: 148 MMHG | RESPIRATION RATE: 16 BRPM | HEIGHT: 68 IN | HEART RATE: 84 BPM | WEIGHT: 172.4 LBS | TEMPERATURE: 97.2 F | DIASTOLIC BLOOD PRESSURE: 78 MMHG | OXYGEN SATURATION: 99 % | BODY MASS INDEX: 26.13 KG/M2

## 2025-01-07 DIAGNOSIS — J22 LOWER RESPIRATORY TRACT INFECTION: Primary | ICD-10-CM

## 2025-01-07 DIAGNOSIS — Z99.2 ESRD (END STAGE RENAL DISEASE) ON DIALYSIS (HCC): ICD-10-CM

## 2025-01-07 DIAGNOSIS — N25.81 SECONDARY HYPERPARATHYROIDISM OF RENAL ORIGIN (HCC): ICD-10-CM

## 2025-01-07 DIAGNOSIS — E08.22 DIABETES MELLITUS DUE TO UNDERLYING CONDITION WITH DIABETIC CHRONIC KIDNEY DISEASE, UNSPECIFIED CKD STAGE, UNSPECIFIED WHETHER LONG TERM INSULIN USE (HCC): ICD-10-CM

## 2025-01-07 DIAGNOSIS — F20.9 SCHIZOPHRENIA, UNSPECIFIED TYPE (HCC): ICD-10-CM

## 2025-01-07 DIAGNOSIS — J22 LOWER RESPIRATORY TRACT INFECTION: ICD-10-CM

## 2025-01-07 DIAGNOSIS — E44.1 MILD PROTEIN-CALORIE MALNUTRITION (HCC): ICD-10-CM

## 2025-01-07 DIAGNOSIS — N18.6 ESRD (END STAGE RENAL DISEASE) ON DIALYSIS (HCC): ICD-10-CM

## 2025-01-07 DIAGNOSIS — D68.9 COAGULATION DEFECT, UNSPECIFIED (HCC): ICD-10-CM

## 2025-01-07 DIAGNOSIS — I12.0 BENIGN HYPERTENSION WITH CHRONIC KIDNEY DISEASE, STAGE V (HCC): ICD-10-CM

## 2025-01-07 DIAGNOSIS — I77.0 ARTERIOVENOUS FISTULA, ACQUIRED (HCC): ICD-10-CM

## 2025-01-07 DIAGNOSIS — N18.5 BENIGN HYPERTENSION WITH CHRONIC KIDNEY DISEASE, STAGE V (HCC): ICD-10-CM

## 2025-01-07 LAB
SARS-COV-2 AG UPPER RESP QL IA: NEGATIVE
SL AMB POCT RAPID FLU A: NORMAL
SL AMB POCT RAPID FLU B: NORMAL
VALID CONTROL: NORMAL

## 2025-01-07 PROCEDURE — 99214 OFFICE O/P EST MOD 30 MIN: CPT | Performed by: STUDENT IN AN ORGANIZED HEALTH CARE EDUCATION/TRAINING PROGRAM

## 2025-01-07 PROCEDURE — 71046 X-RAY EXAM CHEST 2 VIEWS: CPT

## 2025-01-07 PROCEDURE — 87811 SARS-COV-2 COVID19 W/OPTIC: CPT | Performed by: STUDENT IN AN ORGANIZED HEALTH CARE EDUCATION/TRAINING PROGRAM

## 2025-01-07 PROCEDURE — 87804 INFLUENZA ASSAY W/OPTIC: CPT | Performed by: STUDENT IN AN ORGANIZED HEALTH CARE EDUCATION/TRAINING PROGRAM

## 2025-01-07 RX ORDER — DOXYCYCLINE HYCLATE 100 MG
100 TABLET ORAL 2 TIMES DAILY
Qty: 14 TABLET | Refills: 0 | Status: SHIPPED | OUTPATIENT
Start: 2025-01-07 | End: 2025-01-14

## 2025-01-07 RX ORDER — METHYLPREDNISOLONE 4 MG/1
TABLET ORAL
Qty: 21 EACH | Refills: 0 | Status: SHIPPED | OUTPATIENT
Start: 2025-01-07

## 2025-01-07 NOTE — ASSESSMENT & PLAN NOTE
Lab Results   Component Value Date    EGFR 12 (L) 03/06/2024    EGFR 7 02/26/2024    EGFR 9 02/25/2024    CREATININE 5.36 (H) 03/06/2024    CREATININE 7.56 (H) 02/26/2024    CREATININE 6.22 (H) 02/25/2024     Continue appropriate dialysis schedule/follow-up with nephrology

## 2025-01-07 NOTE — ASSESSMENT & PLAN NOTE
Lab Results   Component Value Date    EGFR 12 (L) 03/06/2024    EGFR 7 02/26/2024    EGFR 9 02/25/2024    CREATININE 5.36 (H) 03/06/2024    CREATININE 7.56 (H) 02/26/2024    CREATININE 6.22 (H) 02/25/2024     Continue to monitor ambulatory blood pressure, continue Toprol extended release 50 mg

## 2025-01-07 NOTE — ASSESSMENT & PLAN NOTE
Most recent hemoglobin A1c of 5.8%, well-controlled.  Lab Results   Component Value Date    HGBA1C 5.8 (H) 09/25/2023

## 2025-01-07 NOTE — PROGRESS NOTES
Name: Jordy Tidwell      : 1970      MRN: [id number]  Encounter Provider: José Antonio Flores DO  Encounter Date: 2025   Encounter department: [facility]    Assessment & Plan  Lower respiratory tract infection  Patient is a pleasant 55-year-old male coming in secondary to lower respiratory tract infection symptoms, recommend doxycycline/steroid taper to help relieve congestion/inflammation follow-up on x-ray imaging.  Doxycycline appropriate choice in the setting of ESRD.  If symptoms worsen or not improving reevaluation recommended.  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  Lab Results   Component Value Date    HGBA1C 5.8 (H) 2023            ESRD (end stage renal disease) on dialysis ([facility])  Lab Results   Component Value Date    EGFR 12 (L) 2024    EGFR 7 2024    EGFR 9 2024    CREATININE 5.36 (H) 2024    CREATININE 7.56 (H) 2024    CREATININE 6.22 (H) 2024     Continue appropriate dialysis schedule/follow-up with nephrology       Schizophrenia, unspecified type ([facility])  Mood stable on exam, continue to follow closely with psychiatry       Coagulation defect, unspecified ([facility])         Benign hypertension with chronic kidney disease, stage V ([facility])  Lab  Results   Component Value Date    EGFR 12 (L) 03/06/2024    EGFR 7 02/26/2024    EGFR 9 02/25/2024    CREATININE 5.36 (H) 03/06/2024    CREATININE 7.56 (H) 02/26/2024    CREATININE 6.22 (H) 02/25/2024     Continue to monitor ambulatory blood pressure, continue Toprol extended release 50 mg         Depression Screening and Follow-up Plan: Patient was screened for depression during today's encounter. They screened negative with a PHQ-2 score of 0.        History of Present Illness     Acute care visit secondary to upper/lower respiratory tract infection symptoms.    Cough  Associated symptoms include postnasal drip. Pertinent negatives include no chest pain, chills, ear pain, fever, rash, sore throat, shortness of breath or wheezing.     Review of Systems   Constitutional:  Negative for chills and fever.   HENT:  Positive for congestion and postnasal drip. Negative for ear pain and sore throat.    Eyes:  Negative for pain and visual disturbance.   Respiratory:  Positive for cough. Negative for shortness of breath and wheezing.    Cardiovascular:  Negative for chest pain and palpitations.   Gastrointestinal:  Negative for abdominal pain and vomiting.   Genitourinary:  Negative for dysuria and hematuria.   Musculoskeletal:  Negative for arthralgias and back pain.   Skin:  Negative for color change and rash.   Neurological:  Negative for seizures and syncope.   All other systems reviewed and are negative.    Past Medical History:   Diagnosis Date   • Chronic kidney disease     M<T<R<F [id number] Dialysis   • Hypertension    • Mood disorder (HCC)    • Psychiatric disorder    • Schizophrenia (HCC)      Past Surgical History:   Procedure Laterality Date   • COLONOSCOPY     • IR BIOPSY KIDNEY RANDOM  08/15/2023   • IR TUNNELED DIALYSIS CATHETER PLACEMENT  2/22/2024   • IR TUNNELED DIALYSIS CATHETER REMOVAL  8/1/2024   • NO PAST SURGERIES     • [place] ARTERIOVENOUS ANASTOMOSIS OPEN DIRECT Left 3/21/2024    Procedure: left arm  radio-cephalic AVF creation;  Surgeon: Ward Guthrie DO;  Location: [facility];  Service: Vascular   • [place] ARTERIOVENOUS ANASTOMOSIS OPEN DIRECT Left 5/16/2024    Procedure: left arm brachiocephalic AVF creation;  Surgeon: Ward Guthrie DO;  Location: [facility];  Service: Vascular     Family History   Problem Relation Age of Onset   • Breast cancer Mother    • Cancer Father         bone/spinal     Social History     Tobacco Use   • Smoking status: Every Day     Types: Cigars     Passive exposure: Never   • Smokeless tobacco: Never   • Tobacco comments:     daily   Vaping Use   • Vaping status: Never Used   Substance and Sexual Activity   • Alcohol use: Not Currently     Comment: on dialysis   • Drug use: No   • Sexual activity: Not on file     Current Outpatient Medications on File Prior to Visit   Medication Sig   • ezetimibe (ZETIA) 10 mg tablet TAKE 1 TABLET BY MOUTH EVERY DAY   • metoprolol succinate (TOPROL-XL) 50 mg 24 hr tablet TAKE 1 TABLET BY MOUTH EVERY DAY   • paliperidone (INVEGA) 6 MG 24 hr tablet Every other day   • rosuvastatin (CRESTOR) 10 MG tablet TAKE 1 TABLET BY MOUTH EVERY DAY   • sevelamer (RENAGEL) 800 mg tablet Take 1,600 mg by mouth 3 (three) times a day with meals And 800mg with snacks   • Sucroferric Oxyhydroxide (VELPHORO PO) Take by mouth TID w meals for phosphorus control   • polyethylene glycol (MIRALAX) 17 g packet Take 17 g by mouth daily for 7 days   • [DISCONTINUED] nicotine (NICODERM CQ) 14 mg/24hr TD 24 hr patch Place on the skin (Patient not taking: Reported on 1/7/2025)   • [DISCONTINUED] senna-docusate sodium (SENOKOT S) 8.6-50 mg per tablet Take 1 tablet by mouth daily (Patient not taking: Reported on 1/7/2025)     Allergies   Allergen Reactions   • Geodon [Ziprasidone] Fatigue   • Haldol [Haloperidol] Fatigue     Immunization History   Administered Date(s) Administered   • COVID-19 MODERNA VACC 0.25 ML IM BOOSTER 12/14/2021, 04/15/2022   • COVID-19 MODERNA VACC 0.5 ML IM  "12/14/2021, 04/15/2022, 04/15/2022   • COVID-19 PFIZER VACCINE 0.3 ML IM 03/31/2021, 04/21/2021, 09/07/2022, 10/03/2024   • INFLUENZA 09/22/2021, 10/03/2024   • Influenza, injectable, quadrivalent, preservative free 0.5 mL 09/10/2019, 09/18/2020, 10/11/2023   • Influenza, recombinant, quadrivalent,injectable, preservative free 10/14/2022   • Pneumococcal Conjugate Vaccine 20-valent (Pcv20), Polysace 11/10/2023     Objective   /78 (BP Location: Left arm, Patient Position: Sitting, Cuff Size: Standard)   Pulse 84   Temp (!) 97.2 °F (36.2 °C) (Temporal)   Resp 16   Ht 5' 8\" (1.727 m)   Wt 78.2 kg (172 lb 6.4 oz)   SpO2 99%   BMI 26.21 kg/m²     Physical Exam  Vitals and nursing note reviewed.   Constitutional:       General: He is not in acute distress.     Appearance: He is well-developed. He is ill-appearing.   HENT:      Head: Normocephalic and atraumatic.   Eyes:      General: No scleral icterus.     Conjunctiva/sclera: Conjunctivae normal.   Cardiovascular:      Rate and Rhythm: Normal rate and regular rhythm.      Pulses: Normal pulses.      Heart sounds: No murmur heard.  Pulmonary:      Effort: Pulmonary effort is normal. No respiratory distress.      Breath sounds: Rhonchi present.   Abdominal:      General: Bowel sounds are normal. There is no distension.      Palpations: Abdomen is soft.      Tenderness: There is no abdominal tenderness.   Musculoskeletal:         General: No swelling. Normal range of motion.      Cervical back: Neck supple.   Skin:     General: Skin is warm and dry.      Capillary Refill: Capillary refill takes less than 2 seconds.   Neurological:      General: No focal deficit present.      Mental Status: He is alert and oriented to person, place, and time. Mental status is at baseline.   Psychiatric:         Mood and Affect: Mood normal.         "

## 2025-01-09 ENCOUNTER — RESULTS FOLLOW-UP (OUTPATIENT)
Dept: FAMILY MEDICINE CLINIC | Facility: CLINIC | Age: 55
End: 2025-01-09

## 2025-01-09 NOTE — TELEPHONE ENCOUNTER
----- Message from José Antonio Flores DO sent at 1/9/2025  7:06 AM EST -----  Please let patient know there is no acute cardiopulmonary disease, continue medication as prescribed.    Message for PCP, there is a stable 3 mm metallic foreign body in inferior lateral left chest wall, asymptomatic on exam, when patient follows up for routine management just wanted to give you the heads up to see if this needs to be removed.

## 2025-01-23 ENCOUNTER — OFFICE VISIT (OUTPATIENT)
Dept: FAMILY MEDICINE CLINIC | Facility: CLINIC | Age: 55
End: 2025-01-23
Payer: MEDICARE

## 2025-01-23 VITALS
HEIGHT: 68 IN | RESPIRATION RATE: 14 BRPM | DIASTOLIC BLOOD PRESSURE: 80 MMHG | WEIGHT: 172 LBS | SYSTOLIC BLOOD PRESSURE: 120 MMHG | BODY MASS INDEX: 26.07 KG/M2 | OXYGEN SATURATION: 98 % | TEMPERATURE: 98.6 F | HEART RATE: 90 BPM

## 2025-01-23 DIAGNOSIS — I10 PRIMARY HYPERTENSION: ICD-10-CM

## 2025-01-23 DIAGNOSIS — Z99.2 ESRD (END STAGE RENAL DISEASE) ON DIALYSIS (HCC): ICD-10-CM

## 2025-01-23 DIAGNOSIS — N18.6 ESRD (END STAGE RENAL DISEASE) ON DIALYSIS (HCC): ICD-10-CM

## 2025-01-23 DIAGNOSIS — E08.22 DIABETES MELLITUS DUE TO UNDERLYING CONDITION WITH DIABETIC CHRONIC KIDNEY DISEASE, UNSPECIFIED CKD STAGE, UNSPECIFIED WHETHER LONG TERM INSULIN USE (HCC): ICD-10-CM

## 2025-01-23 DIAGNOSIS — J01.00 ACUTE NON-RECURRENT MAXILLARY SINUSITIS: Primary | ICD-10-CM

## 2025-01-23 PROCEDURE — G2211 COMPLEX E/M VISIT ADD ON: HCPCS | Performed by: STUDENT IN AN ORGANIZED HEALTH CARE EDUCATION/TRAINING PROGRAM

## 2025-01-23 PROCEDURE — 99214 OFFICE O/P EST MOD 30 MIN: CPT | Performed by: STUDENT IN AN ORGANIZED HEALTH CARE EDUCATION/TRAINING PROGRAM

## 2025-01-23 RX ORDER — AMOXICILLIN AND CLAVULANATE POTASSIUM 250; 125 MG/1; MG/1
1 TABLET, FILM COATED ORAL EVERY 12 HOURS SCHEDULED
Qty: 14 TABLET | Refills: 0 | Status: SHIPPED | OUTPATIENT
Start: 2025-01-23 | End: 2025-01-30

## 2025-01-23 RX ORDER — PREDNISONE 20 MG/1
20 TABLET ORAL DAILY
Qty: 5 TABLET | Refills: 0 | Status: SHIPPED | OUTPATIENT
Start: 2025-01-23

## 2025-01-23 NOTE — ASSESSMENT & PLAN NOTE
Most recent hemoglobin A1c well-controlled 5.8%  Lab Results   Component Value Date    HGBA1C 5.8 (H) 09/25/2023

## 2025-01-23 NOTE — PROGRESS NOTES
Name: Jordy Tidwell      : 1970      MRN: [id number]  Encounter Provider: José Antonio Flores DO  Encounter Date: 2025   Encounter department: [facility]  :  Assessment & Plan  Acute non-recurrent maxillary sinusitis  Symptoms consistent with acute sinusitis, previously treated for lower respiratory tract infection with Doxy/steroid.  Recommend continuing low-dose prednisone, switch antibiotic to Augmentin, renally dosed based on HD, up-to-date guidelines reviewed.  If symptoms worsen or not improving reevaluation recommended.  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    Cough  Associated symptoms include postnasal drip. Pertinent negatives include no chest pain, chills, fever, headaches, rash, sore throat, shortness of breath or wheezing.     Review of Systems   Constitutional:  Negative for chills, fatigue and fever.   HENT:  Positive for congestion and postnasal drip. Negative for sore throat.    Eyes:  Negative for pain  "and visual disturbance.   Respiratory:  Positive for cough. Negative for shortness of breath and wheezing.    Cardiovascular:  Negative for chest pain and palpitations.   Gastrointestinal:  Negative for abdominal pain, constipation, diarrhea, nausea and vomiting.   Genitourinary:  Negative for dysuria and frequency.   Musculoskeletal:  Negative for back pain and neck pain.   Skin:  Negative for color change and rash.   Neurological:  Negative for dizziness and headaches.   Psychiatric/Behavioral:  Negative for agitation and confusion.    All other systems reviewed and are negative.      Objective   /80 (BP Location: Right arm, Patient Position: Sitting, Cuff Size: Standard)   Pulse 90   Temp 98.6 °F (37 °C) (Temporal)   Resp 14   Ht 5' 8\" (1.727 m)   Wt 78 kg (172 lb)   SpO2 98%   BMI 26.15 kg/m²      Physical Exam  Vitals and nursing note reviewed.   Constitutional:       General: He is not in acute distress.     Appearance: He is well-developed.   HENT:      Head: Normocephalic and atraumatic.      Nose: Congestion and rhinorrhea present.   Eyes:      Conjunctiva/sclera: Conjunctivae normal.   Cardiovascular:      Rate and Rhythm: Normal rate and regular rhythm.      Pulses: Normal pulses.      Heart sounds: No murmur heard.  Pulmonary:      Effort: Pulmonary effort is normal. No respiratory distress.      Breath sounds: Normal breath sounds.   Abdominal:      General: Bowel sounds are normal. There is no distension.      Palpations: Abdomen is soft.      Tenderness: There is no abdominal tenderness.   Musculoskeletal:         General: No swelling. Normal range of motion.      Cervical back: Neck supple.   Skin:     General: Skin is warm and dry.      Capillary Refill: Capillary refill takes less than 2 seconds.   Neurological:      General: No focal deficit present.      Mental Status: He is alert and oriented to person, place, and time. Mental status is at baseline.   Psychiatric:         Mood and " Affect: Mood normal.         Behavior: Behavior normal.

## 2025-01-23 NOTE — ASSESSMENT & PLAN NOTE
Lab Results   Component Value Date    EGFR 12 (L) 03/06/2024    EGFR 7 02/26/2024    EGFR 9 02/25/2024    CREATININE 5.36 (H) 03/06/2024    CREATININE 7.56 (H) 02/26/2024    CREATININE 6.22 (H) 02/25/2024     Continue hemodialysis regimen, renally dosed Augmentin

## 2025-02-01 DIAGNOSIS — E78.2 MIXED HYPERLIPIDEMIA: ICD-10-CM

## 2025-02-01 RX ORDER — EZETIMIBE 10 MG/1
10 TABLET ORAL DAILY
Qty: 90 TABLET | Refills: 1 | Status: SHIPPED | OUTPATIENT
Start: 2025-02-01

## 2025-02-28 DIAGNOSIS — I10 ESSENTIAL HYPERTENSION: ICD-10-CM

## 2025-02-28 RX ORDER — METOPROLOL SUCCINATE 50 MG/1
50 TABLET, EXTENDED RELEASE ORAL DAILY
Qty: 90 TABLET | Refills: 1 | Status: SHIPPED | OUTPATIENT
Start: 2025-02-28

## 2025-04-03 DIAGNOSIS — E78.01 FAMILIAL HYPERCHOLESTEROLEMIA: ICD-10-CM

## 2025-04-03 RX ORDER — ROSUVASTATIN CALCIUM 10 MG/1
10 TABLET, COATED ORAL DAILY
Qty: 90 TABLET | Refills: 1 | Status: SHIPPED | OUTPATIENT
Start: 2025-04-03

## 2025-07-26 DIAGNOSIS — E78.2 MIXED HYPERLIPIDEMIA: ICD-10-CM

## 2025-07-28 RX ORDER — EZETIMIBE 10 MG/1
10 TABLET ORAL DAILY
Qty: 90 TABLET | Refills: 1 | Status: SHIPPED | OUTPATIENT
Start: 2025-07-28

## 2025-08-06 ENCOUNTER — OFFICE VISIT (OUTPATIENT)
Dept: FAMILY MEDICINE CLINIC | Facility: CLINIC | Age: 55
End: 2025-08-06
Payer: MEDICARE

## 2025-08-21 ENCOUNTER — TELEPHONE (OUTPATIENT)
Dept: ADMINISTRATIVE | Facility: OTHER | Age: 55
End: 2025-08-21

## (undated) DEVICE — DEVON SURGICAL SITE MINI-MARKER PREP RESISTANT: Brand: DEVON

## (undated) DEVICE — SUT VICRYL 3-0 SH 27 IN J416H

## (undated) DEVICE — GLOVE SRG BIOGEL ECLIPSE 7.5

## (undated) DEVICE — BETHL CAROTID ENDARTERECTOMY: Brand: CARDINAL HEALTH

## (undated) DEVICE — ADHESIVE SKIN HIGH VISCOSITY EXOFIN 1ML

## (undated) DEVICE — DRAPE SHEET THREE QUARTER

## (undated) DEVICE — 1/2 FORCE SURGICAL SPRING CLIP: Brand: STEALTH® SPRING CLIP

## (undated) DEVICE — SUT SILK 3-0 18 IN A184H

## (undated) DEVICE — NEPTUNE E-SEP SMOKE EVACUATION PENCIL, COATED, 70MM BLADE, PUSH BUTTON SWITCH: Brand: NEPTUNE E-SEP

## (undated) DEVICE — TIBURON HAND DRAPE: Brand: CONVERTORS

## (undated) DEVICE — HEMOCLIP CARTRIDGE SM

## (undated) DEVICE — HEMOCLIP CARTRIDGE MED

## (undated) DEVICE — SUT PROLENE 5-0 BV-1 24 IN 9702H

## (undated) DEVICE — DECANTER: Brand: UNBRANDED

## (undated) DEVICE — SUT PROLENE 6-0 BV-1/BV-1 24 IN 8305H

## (undated) DEVICE — ULTRASOUND GEL STERILE FOIL PK

## (undated) DEVICE — SUT SILK 2-0 18 IN A185H

## (undated) DEVICE — TOWEL SET X-RAY

## (undated) DEVICE — SUT MONOCRYL 4-0 PS-2 27 IN Y426H

## (undated) DEVICE — ELECTRODE BLADE MOD E-Z CLEAN  2.75IN 7CM -0012AM